# Patient Record
Sex: FEMALE | Race: BLACK OR AFRICAN AMERICAN | NOT HISPANIC OR LATINO | Employment: FULL TIME | URBAN - METROPOLITAN AREA
[De-identification: names, ages, dates, MRNs, and addresses within clinical notes are randomized per-mention and may not be internally consistent; named-entity substitution may affect disease eponyms.]

---

## 2017-01-09 RX ORDER — METFORMIN HYDROCHLORIDE 500 MG/1
TABLET ORAL
Qty: 60 TABLET | Refills: 0 | Status: SHIPPED | OUTPATIENT
Start: 2017-01-09 | End: 2017-03-09 | Stop reason: SDUPTHER

## 2017-02-26 ENCOUNTER — HOSPITAL ENCOUNTER (INPATIENT)
Facility: HOSPITAL | Age: 52
LOS: 4 days | Discharge: HOME | DRG: 023 | End: 2017-03-02
Attending: EMERGENCY MEDICINE | Admitting: PSYCHIATRY & NEUROLOGY
Payer: COMMERCIAL

## 2017-02-26 DIAGNOSIS — I63.031 CEREBRAL INFARCTION DUE TO THROMBOSIS OF RIGHT CAROTID ARTERY: Primary | ICD-10-CM

## 2017-02-26 DIAGNOSIS — I77.3 FIBROMUSCULAR DYSPLASIA OF CERVICOCRANIAL ARTERY: ICD-10-CM

## 2017-02-26 DIAGNOSIS — I63.9 CVA (CEREBRAL VASCULAR ACCIDENT): ICD-10-CM

## 2017-02-26 PROBLEM — I77.71 INTERNAL CAROTID ARTERY DISSECTION: Status: ACTIVE | Noted: 2017-02-26

## 2017-02-26 PROBLEM — G81.04 FLACCID HEMIPLEGIA AFFECTING LEFT NONDOMINANT SIDE: Status: ACTIVE | Noted: 2017-02-26

## 2017-02-26 PROBLEM — I63.411 EMBOLIC STROKE INVOLVING RIGHT MIDDLE CEREBRAL ARTERY: Status: ACTIVE | Noted: 2017-02-26

## 2017-02-26 LAB
ABO + RH BLD: NORMAL
ALBUMIN SERPL BCP-MCNC: 3.5 G/DL
ALP SERPL-CCNC: 63 U/L
ALT SERPL W/O P-5'-P-CCNC: 9 U/L
ANION GAP SERPL CALC-SCNC: 11 MMOL/L
AST SERPL-CCNC: 19 U/L
BASOPHILS # BLD AUTO: 0.05 K/UL
BASOPHILS NFR BLD: 0.4 %
BILIRUB SERPL-MCNC: 0.3 MG/DL
BILIRUB UR QL STRIP: NEGATIVE
BLD GP AB SCN CELLS X3 SERPL QL: NORMAL
BUN SERPL-MCNC: 15 MG/DL
CALCIUM SERPL-MCNC: 8.7 MG/DL
CHLORIDE SERPL-SCNC: 106 MMOL/L
CHOLEST/HDLC SERPL: 2 {RATIO}
CLARITY UR REFRACT.AUTO: CLEAR
CO2 SERPL-SCNC: 21 MMOL/L
COLOR UR AUTO: ABNORMAL
CREAT SERPL-MCNC: 1.1 MG/DL (ref 0.5–1.4)
CREAT SERPL-MCNC: 1.2 MG/DL
DIFFERENTIAL METHOD: ABNORMAL
EOSINOPHIL # BLD AUTO: 0.2 K/UL
EOSINOPHIL NFR BLD: 1.7 %
ERYTHROCYTE [DISTWIDTH] IN BLOOD BY AUTOMATED COUNT: 20.2 %
EST. GFR  (AFRICAN AMERICAN): >60 ML/MIN/1.73 M^2
EST. GFR  (NON AFRICAN AMERICAN): 52.8 ML/MIN/1.73 M^2
GLUCOSE SERPL-MCNC: 151 MG/DL
GLUCOSE UR QL STRIP: NEGATIVE
HCT VFR BLD AUTO: 24.9 %
HCT VFR BLD AUTO: 25.8 %
HDL/CHOLESTEROL RATIO: 50.3 %
HDLC SERPL-MCNC: 155 MG/DL
HDLC SERPL-MCNC: 78 MG/DL
HGB BLD-MCNC: 7 G/DL
HGB BLD-MCNC: 7.4 G/DL
HGB UR QL STRIP: NEGATIVE
INR PPP: 1
KETONES UR QL STRIP: ABNORMAL
LDLC SERPL CALC-MCNC: 60 MG/DL
LEUKOCYTE ESTERASE UR QL STRIP: NEGATIVE
LYMPHOCYTES # BLD AUTO: 1.2 K/UL
LYMPHOCYTES NFR BLD: 8.8 %
MCH RBC QN AUTO: 20.8 PG
MCHC RBC AUTO-ENTMCNC: 28.7 %
MCV RBC AUTO: 73 FL
MONOCYTES # BLD AUTO: 0.7 K/UL
MONOCYTES NFR BLD: 5.5 %
NEUTROPHILS # BLD AUTO: 11.3 K/UL
NEUTROPHILS NFR BLD: 83.3 %
NITRITE UR QL STRIP: NEGATIVE
NONHDLC SERPL-MCNC: 77 MG/DL
PH UR STRIP: 6 [PH] (ref 5–8)
PLATELET # BLD AUTO: 706 K/UL
PMV BLD AUTO: 10.2 FL
POC PTINR: 1 (ref 0.9–1.2)
POC PTWBT: 12.5 SEC (ref 9.7–14.3)
POCT GLUCOSE: 172 MG/DL (ref 70–110)
POCT GLUCOSE: 93 MG/DL (ref 70–110)
POTASSIUM SERPL-SCNC: 3.8 MMOL/L
PROT SERPL-MCNC: 8 G/DL
PROT UR QL STRIP: NEGATIVE
PROTHROMBIN TIME: 10.7 SEC
RBC # BLD AUTO: 3.56 M/UL
SAMPLE: NORMAL
SAMPLE: NORMAL
SODIUM SERPL-SCNC: 138 MMOL/L
SP GR UR STRIP: 1.02 (ref 1–1.03)
TRIGL SERPL-MCNC: 85 MG/DL
TSH SERPL DL<=0.005 MIU/L-ACNC: 1.15 UIU/ML
URN SPEC COLLECT METH UR: ABNORMAL
UROBILINOGEN UR STRIP-ACNC: NEGATIVE EU/DL
WBC # BLD AUTO: 13.57 K/UL

## 2017-02-26 PROCEDURE — 25000003 PHARM REV CODE 250: Performed by: STUDENT IN AN ORGANIZED HEALTH CARE EDUCATION/TRAINING PROGRAM

## 2017-02-26 PROCEDURE — 96374 THER/PROPH/DIAG INJ IV PUSH: CPT

## 2017-02-26 PROCEDURE — 93010 ELECTROCARDIOGRAM REPORT: CPT | Mod: 77,,, | Performed by: INTERNAL MEDICINE

## 2017-02-26 PROCEDURE — 81003 URINALYSIS AUTO W/O SCOPE: CPT

## 2017-02-26 PROCEDURE — 85610 PROTHROMBIN TIME: CPT

## 2017-02-26 PROCEDURE — 20000000 HC ICU ROOM

## 2017-02-26 PROCEDURE — 80061 LIPID PANEL: CPT

## 2017-02-26 PROCEDURE — 85014 HEMATOCRIT: CPT

## 2017-02-26 PROCEDURE — 83036 HEMOGLOBIN GLYCOSYLATED A1C: CPT

## 2017-02-26 PROCEDURE — 03CK3ZZ EXTIRPATION OF MATTER FROM RIGHT INTERNAL CAROTID ARTERY, PERCUTANEOUS APPROACH: ICD-10-PCS | Performed by: RADIOLOGY

## 2017-02-26 PROCEDURE — 25500020 PHARM REV CODE 255: Performed by: EMERGENCY MEDICINE

## 2017-02-26 PROCEDURE — 93005 ELECTROCARDIOGRAM TRACING: CPT

## 2017-02-26 PROCEDURE — 86850 RBC ANTIBODY SCREEN: CPT

## 2017-02-26 PROCEDURE — 63600175 PHARM REV CODE 636 W HCPCS: Performed by: RADIOLOGY

## 2017-02-26 PROCEDURE — 86920 COMPATIBILITY TEST SPIN: CPT

## 2017-02-26 PROCEDURE — 85018 HEMOGLOBIN: CPT

## 2017-02-26 PROCEDURE — 99285 EMERGENCY DEPT VISIT HI MDM: CPT

## 2017-02-26 PROCEDURE — 86900 BLOOD TYPING SEROLOGIC ABO: CPT

## 2017-02-26 PROCEDURE — 99291 CRITICAL CARE FIRST HOUR: CPT | Mod: ,,, | Performed by: EMERGENCY MEDICINE

## 2017-02-26 PROCEDURE — 84443 ASSAY THYROID STIM HORMONE: CPT

## 2017-02-26 PROCEDURE — 27000221 HC OXYGEN, UP TO 24 HOURS

## 2017-02-26 PROCEDURE — 82565 ASSAY OF CREATININE: CPT

## 2017-02-26 PROCEDURE — 99223 1ST HOSP IP/OBS HIGH 75: CPT | Mod: ,,, | Performed by: PSYCHIATRY & NEUROLOGY

## 2017-02-26 PROCEDURE — 80053 COMPREHEN METABOLIC PANEL: CPT

## 2017-02-26 PROCEDURE — 85025 COMPLETE CBC W/AUTO DIFF WBC: CPT

## 2017-02-26 PROCEDURE — 93010 ELECTROCARDIOGRAM REPORT: CPT | Mod: ,,, | Performed by: INTERNAL MEDICINE

## 2017-02-26 PROCEDURE — 99900035 HC TECH TIME PER 15 MIN (STAT)

## 2017-02-26 PROCEDURE — 82962 GLUCOSE BLOOD TEST: CPT

## 2017-02-26 RX ORDER — LANOLIN ALCOHOL/MO/W.PET/CERES
1000 CREAM (GRAM) TOPICAL DAILY
Status: DISCONTINUED | OUTPATIENT
Start: 2017-02-27 | End: 2017-02-27

## 2017-02-26 RX ORDER — SENNOSIDES 8.6 MG/1
8.6 TABLET ORAL DAILY PRN
Status: DISCONTINUED | OUTPATIENT
Start: 2017-02-26 | End: 2017-02-28

## 2017-02-26 RX ORDER — IBUPROFEN 200 MG
16 TABLET ORAL
Status: DISCONTINUED | OUTPATIENT
Start: 2017-02-26 | End: 2017-03-02 | Stop reason: HOSPADM

## 2017-02-26 RX ORDER — POTASSIUM CHLORIDE 7.45 MG/ML
60 INJECTION INTRAVENOUS
Status: DISCONTINUED | OUTPATIENT
Start: 2017-02-26 | End: 2017-02-28

## 2017-02-26 RX ORDER — IODIXANOL 320 MG/ML
150 INJECTION, SOLUTION INTRAVASCULAR
Status: COMPLETED | OUTPATIENT
Start: 2017-02-26 | End: 2017-02-26

## 2017-02-26 RX ORDER — GLUCAGON 1 MG
1 KIT INJECTION
Status: DISCONTINUED | OUTPATIENT
Start: 2017-02-26 | End: 2017-03-02 | Stop reason: HOSPADM

## 2017-02-26 RX ORDER — MAGNESIUM SULFATE HEPTAHYDRATE 40 MG/ML
4 INJECTION, SOLUTION INTRAVENOUS
Status: DISCONTINUED | OUTPATIENT
Start: 2017-02-26 | End: 2017-02-28

## 2017-02-26 RX ORDER — POLYETHYLENE GLYCOL 3350 17 G/17G
17 POWDER, FOR SOLUTION ORAL DAILY
Status: DISCONTINUED | OUTPATIENT
Start: 2017-02-26 | End: 2017-03-02 | Stop reason: HOSPADM

## 2017-02-26 RX ORDER — POTASSIUM CHLORIDE 7.45 MG/ML
40 INJECTION INTRAVENOUS
Status: DISCONTINUED | OUTPATIENT
Start: 2017-02-26 | End: 2017-02-28

## 2017-02-26 RX ORDER — POTASSIUM CHLORIDE 7.45 MG/ML
80 INJECTION INTRAVENOUS
Status: DISCONTINUED | OUTPATIENT
Start: 2017-02-26 | End: 2017-02-28

## 2017-02-26 RX ORDER — SODIUM CHLORIDE 0.9 % (FLUSH) 0.9 %
3 SYRINGE (ML) INJECTION EVERY 8 HOURS
Status: DISCONTINUED | OUTPATIENT
Start: 2017-02-26 | End: 2017-03-02 | Stop reason: HOSPADM

## 2017-02-26 RX ORDER — MIDAZOLAM HYDROCHLORIDE 1 MG/ML
INJECTION, SOLUTION INTRAMUSCULAR; INTRAVENOUS CODE/TRAUMA/SEDATION MEDICATION
Status: DISCONTINUED | OUTPATIENT
Start: 2017-02-26 | End: 2017-02-28

## 2017-02-26 RX ORDER — SODIUM CHLORIDE 9 MG/ML
INJECTION, SOLUTION INTRAVENOUS CONTINUOUS
Status: DISCONTINUED | OUTPATIENT
Start: 2017-02-26 | End: 2017-03-01

## 2017-02-26 RX ORDER — MAGNESIUM SULFATE HEPTAHYDRATE 40 MG/ML
2 INJECTION, SOLUTION INTRAVENOUS
Status: DISCONTINUED | OUTPATIENT
Start: 2017-02-26 | End: 2017-02-28

## 2017-02-26 RX ORDER — IBUPROFEN 200 MG
24 TABLET ORAL
Status: DISCONTINUED | OUTPATIENT
Start: 2017-02-26 | End: 2017-03-02 | Stop reason: HOSPADM

## 2017-02-26 RX ORDER — LABETALOL HYDROCHLORIDE 5 MG/ML
10 INJECTION, SOLUTION INTRAVENOUS EVERY 4 HOURS PRN
Status: DISCONTINUED | OUTPATIENT
Start: 2017-02-26 | End: 2017-03-02 | Stop reason: HOSPADM

## 2017-02-26 RX ORDER — ALPRAZOLAM 0.5 MG/1
0.5 TABLET ORAL ONCE
Status: COMPLETED | OUTPATIENT
Start: 2017-02-26 | End: 2017-02-26

## 2017-02-26 RX ORDER — INSULIN ASPART 100 [IU]/ML
1-10 INJECTION, SOLUTION INTRAVENOUS; SUBCUTANEOUS
Status: DISCONTINUED | OUTPATIENT
Start: 2017-02-26 | End: 2017-02-28

## 2017-02-26 RX ORDER — IPRATROPIUM BROMIDE AND ALBUTEROL SULFATE 2.5; .5 MG/3ML; MG/3ML
3 SOLUTION RESPIRATORY (INHALATION) EVERY 4 HOURS PRN
Status: DISCONTINUED | OUTPATIENT
Start: 2017-02-26 | End: 2017-03-02 | Stop reason: HOSPADM

## 2017-02-26 RX ADMIN — SODIUM CHLORIDE, PRESERVATIVE FREE 3 ML: 5 INJECTION INTRAVENOUS at 02:02

## 2017-02-26 RX ADMIN — SODIUM CHLORIDE: 0.9 INJECTION, SOLUTION INTRAVENOUS at 02:02

## 2017-02-26 RX ADMIN — SODIUM CHLORIDE 500 ML: 0.9 INJECTION, SOLUTION INTRAVENOUS at 01:02

## 2017-02-26 RX ADMIN — IODIXANOL 55 ML: 320 INJECTION, SOLUTION INTRAVASCULAR at 09:02

## 2017-02-26 RX ADMIN — IOHEXOL 100 ML: 350 INJECTION, SOLUTION INTRAVENOUS at 08:02

## 2017-02-26 RX ADMIN — ALPRAZOLAM 0.5 MG: 0.5 TABLET ORAL at 03:02

## 2017-02-26 RX ADMIN — MIDAZOLAM HYDROCHLORIDE 2 MG: 1 INJECTION, SOLUTION INTRAMUSCULAR; INTRAVENOUS at 08:02

## 2017-02-26 NOTE — H&P
History & Physical  Neurocritical Care    Admit Date: 2/26/2017  LOS: 0    Code Status: Full Code     CC: Cerebral infarction due to thrombosis of right carotid artery    SUBJECTIVE:     History of Present Illness: Patient is a 51 year old female with PMHx DVT & PE x2 (first in 1991 thought to be due to OCP use) s/p greenfiled filter placement in 1990s with her most recent DVT in June 2016 hospitalized @ [facility], asthma, HTN, B12 deficiency with anemia, DM and daily alcohol use admitted to NCC unit after R ICA thrombectomy. Patient also had distal M3 branch occlusion that was too distal for intervention.     Patient was in usual state of health when she woke up around 6 AM short of breath. Patient then fell out of her bed on her right side and could not get up due to left sided weakness. Patient was found by her aunt and came to hospital for further care. In ED Vascular Neurology declined IV TPA due to patient being compliant on Xarelto. Patient was taken directly to angiography where R ICA occlusive thrombus seen and thrombectoly was successfully performed. Subsequent R distal M3 branch occlusion was too distal for intervention. Patient admitted to critical care unit for further management. Patient only endorsing SOB following intervention. Continues with left arm weakness.         Past Medical History:   Diagnosis Date    Asthma     Dyspnea on exertion     Hx of long term use of blood thinners     Hypertension 3/8/2016    PE (pulmonary embolism)     Hx of multiple DVT/PE    Presence of IVC filter     Scleroderma     Type 2 diabetes mellitus without complication      Past Surgical History:   Procedure Laterality Date    DANETTE FILTER PLACEMENT       No current facility-administered medications on file prior to encounter.      Current Outpatient Prescriptions on File Prior to Encounter   Medication Sig Dispense Refill    albuterol (PROVENTIL) 2.5 mg /3 mL (0.083 %) nebulizer solution Take 3 mLs (2.5 mg  total) by nebulization every 6 (six) hours as needed for Wheezing. 180 mL 3    albuterol 90 mcg/actuation inhaler Inhale 1-2 puffs into the lungs every 6 (six) hours as needed for Wheezing. 1 Inhaler 0    cyanocobalamin (VITAMIN B-12) 1000 MCG tablet Take 100 mcg by mouth once daily.      fluticasone-salmeterol 250-50 mcg/dose (ADVAIR) 250-50 mcg/dose diskus inhaler Inhale 1 puff into the lungs 2 (two) times daily. 60 each 3    metformin (GLUCOPHAGE) 500 MG tablet TAKE ONE TABLET BY MOUTH TWICE DAILY WITH MEALS 60 tablet 0    rivaroxaban (XARELTO) 20 mg Tab Take 1 tablet (20 mg total) by mouth daily with dinner or evening meal. 30 tablet 11    triamterene-hydrochlorothiazide 37.5-25 mg (MAXZIDE-25) 37.5-25 mg per tablet Take 1 tablet by mouth once daily. 90 tablet 4    lisinopril (PRINIVIL,ZESTRIL) 20 MG tablet TAKE ONE TABLET BY MOUTH ONCE DAILY 30 tablet 0     Review of patient's allergies indicates:   Allergen Reactions    Plasma, human, normal      Family History   Problem Relation Age of Onset    Breast cancer Mother     Hypertension Father     No Known Problems Brother     No Known Problems Maternal Grandmother     No Known Problems Maternal Grandfather     No Known Problems Paternal Grandmother     No Known Problems Paternal Grandfather     Diabetes Mellitus Maternal Uncle     No Known Problems Sister     No Known Problems Maternal Aunt     No Known Problems Paternal Aunt     No Known Problems Paternal Uncle     Psoriasis Neg Hx     Rheum arthritis Neg Hx     Thyroid disease Neg Hx     Lupus Neg Hx     Anemia Neg Hx     Arrhythmia Neg Hx     Asthma Neg Hx     Clotting disorder Neg Hx     Fainting Neg Hx     Heart attack Neg Hx     Heart disease Neg Hx     Heart failure Neg Hx     Hyperlipidemia Neg Hx     Stroke Neg Hx     Atrial Septal Defect Neg Hx      Social History   Substance Use Topics    Smoking status: Never Smoker    Smokeless tobacco: None    Alcohol use 1.8  oz/week     3 Cans of beer, 0 Standard drinks or equivalent per week      Comment: occ      Review of Symptoms:  Constitutional: Denies fevers, weight loss, chills, or weakness.  Eyes: Denies changes in vision.  ENT: Denies dysphagia, nasal discharge, ear pain or discharge.  Cardiovascular: Denies chest pain, palpitations, orthopnea, or claudication.  Respiratory: +shortness of breath, denies, cough, hemoptysis, or wheezing.  GI: Denies nausea/vomitting, hematochezia, melena, abd pain, or changes in appetite.  : Denies dysuria, incontinence, or hematuria.  Musculoskeletal: Denies joint pain or myalgias.  Skin/breast: Denies rashes, lumps, lesions, or discharge.  Neurologic: Denies headache, dizziness, vertigo, or paresthesias.  Psychiatric: Denies changes in mood or hallucinations.  Endocrine: Denies polyuria, polydipsia, heat/cold intolerance.  Hematologic/Lymph: Denies lymphadenopathy, easy bruising or easy bleeding.  Allergic/Immunologic: Denies rash, rhinitis.     OBJECTIVE:   Vital Signs (Most Recent):   Temp: 99 °F (37.2 °C) (02/26/17 1500)  Pulse: 84 (02/26/17 1600)  Resp: (!) 22 (02/26/17 1600)  BP: (!) 155/81 (02/26/17 1600)  SpO2: 100 % (02/26/17 1600)    Vital Signs (24h Range):   Temp:  [98.1 °F (36.7 °C)-99 °F (37.2 °C)] 99 °F (37.2 °C)  Pulse:  [] 84  Resp:  [15-36] 22  SpO2:  [98 %-100 %] 100 %  BP: (138-210)/() 155/81    ICP/CPP (Last 24h):        I & O (Last 24h):    Intake/Output Summary (Last 24 hours) at 02/26/17 1644  Last data filed at 02/26/17 1600   Gross per 24 hour   Intake           621.25 ml   Output                0 ml   Net           621.25 ml     Physical Exam:  GA: alert, no acute distress.   HEENT: No scleral icterus or JVD. (+swelling over left cheek, orbit and upper lip)  Pulmonary: decreased at bases but CTAB  Cardiac: tachycardic, S1 & S2 w/o rubs/murmurs/gallops.   Abdominal: Bowel sounds present x 4. No appreciable hepatosplenomegaly.  Skin: No jaundice, rashes,  or visible lesions.  Neuro:  --GCS: E4 V5 M6  --Mental Status: AAOx3, no dysarthria or aphasia  --CN II-XII grossly intact (noted left sided facial droop)   --Pupils 4mm, PERRL.   --Corneal reflex, gag, cough intact.  --LUE strength: 2/5  --RUE strength: 5/5  --LLE strength: 4-/5  --RLE strength: 5/5    Vent Data:        Lines/Drains/Airway:          Nutrition/Tube Feeds:   Current Diet Order   Procedures    Diet NPO     No food intake until passes RACHNA or evaluated by speech.       Labs:  ABG: No results for input(s): PH, PO2, PCO2, HCO3, POCSATURATED, BE in the last 24 hours.  BMP:  Recent Labs  Lab 02/26/17  0740      K 3.8      CO2 21*   BUN 15   CREATININE 1.2   *     LFT: Lab Results   Component Value Date    AST 19 02/26/2017    ALT 9 (L) 02/26/2017    ALKPHOS 63 02/26/2017    BILITOT 0.3 02/26/2017    ALBUMIN 3.5 02/26/2017    PROT 8.0 02/26/2017     CBC:   Lab Results   Component Value Date    WBC 13.57 (H) 02/26/2017    HGB 7.4 (L) 02/26/2017    HCT 25.8 (L) 02/26/2017    MCV 73 (L) 02/26/2017     (H) 02/26/2017     Microbiology x 7d:   Microbiology Results (last 7 days)     ** No results found for the last 168 hours. **        Imaging:  Imaging Results         IR Angiogram Carotid Internal Inc Arch and Cerebral Unilateral (Final result) Result time:  02/26/17 11:38:13    Final result by Van Coombs MD (02/26/17 11:38:13)    Impression:      1. Right internal carotid artery origin occlusion due to thrombus formation at the tortuous proximal ICA segment of fibromuscular disease.    2. Successful right ICA extracranial thrombectomy with restoration of antegrade flow into the right hemisphere.  There is a residual right M3 branch occlusion that was not further treated.  This is TICI 2B reperfusion.            Electronically signed by: VAN COOMBS MD  Date:     02/26/17  Time:    11:38     Narrative:    EXAM:   Cerebral angiogram.Mechanical thrombectomy of the right internal  carotid artery    CPT CODES:  34931,     CLINICAL HISTORY:  Patient is a 51-year-old female with right MCA stroke syndrome consisting of left-sided neglect and inability to move the left arm or leg , referred for cerebral angiography and possible intervention by Dr. Fritz of vascular neurology.    CLINICAL INDICATION:  To better delineate the anatomy in a high resolution study, in order to optimize medical decision making and management.  To treat the patient's stroke.    PROCEDURE COMMENT:   Informed consent was obtained from the the family and patient prior to the examination. A timeout was performed.      Sedation: Light sedation was given with Versed only.    The right groin was prepared using standard sterile technique and a right common femoral artery puncture was performed with a micropuncture needle. A 8F sheath was placed and connected to saline flush.  Through this sheath a 5F Wallace catheter was advanced over a wire and used to perform selective angiography of the following vessels: Right common and internal carotid arteries.    FINDINGS:    The right common carotid artery was injected demonstrating complete occlusion of the right internal carotid artery at the origin in the proximal bulb.  The Wallace catheter was advanced into the internal carotid artery and another injection outlined considerable amount of clot in the bulb and just above the bulb extending into a loop of the ICA with a beaded appearance consistent with fibromuscular disease.  There was minimal slow filling of the ICA above this abnormal segment.    INTERVENTION:  This was discussed with vascular neurology attending Dr. Fritz.  The Wallace catheter was exchanged over a guidewire for an Infinity guide catheter.  This was advanced into the distal bulb and suction thrombectomy was performed removing multiple soft blood clots.  Followup angiography demonstrated that all of the clots were removed, and there was underlying severe tortuous  fibromuscular disease.  There was no good intracranial flow through the extracranial and intracranial ICA without additional thrombus.  The right anterior cerebral artery filled normally.  Most of the MCA branches filled, but there was a right posterior division M3 branches occlusion which had retrograde filling via leptomeningeal collaterals.  Because of the severe disease at the proximal extracranial ICA, no additional intracranial thrombectomy attempts were made for this M3 occlusion.  Findings were discussed with Dr. Fritz who agreed with the plan.  The catheter and sheath were removed. There were no complications.    Hemostasis was obtained in the right groin using the Angio-Seal device.  The total contrast dose for the procedure was: 55 cc of Visipaque 320.  The total radiation dose was approximately: 5124 cGycm2.  Physicians in the procedure:  Dr. Christian Riley, Dr. Lopez.  Images and report were permanently recorded.            X-Ray Chest AP Portable (Final result) Result time:  02/26/17 08:33:58    Final result by Keily Mauro MD (02/26/17 08:33:58)    Impression:    No detrimental change.      Electronically signed by: KEILY MAURO MD  Date:     02/26/17  Time:    08:33     Narrative:    EXAM:  AP CHEST RADIOGRAPH.     CLINICAL INDICATION:  Stroke.    TECHNIQUE: Single AP view of the chest was obtained.     COMPARISON: Prior from 8/7/16    FINDINGS: The mediastinal structures are midline.  The cardiac silhouette is prominent but difficult to evaluate due to AP technique.  There is bandlike opacity in the right mid and lower lung zones which is favored to reflect scarring.  No new areas of consolidation seen.  No osseous abnormalities are identified.            CTA STROKE MULTI-PHASE (Final result) Result time:  02/26/17 11:25:24    Final result by Austyn Bhakta MD (02/26/17 11:25:24)    Impression:        Contrast extravasation as detailed above.  ______________________________________      Electronically signed by resident: TERI MATUTE MD  Date:     02/26/17  Time:    10:54            As the supervising and teaching physician, I personally reviewed the images and resident's interpretation and I agree with the findings.          Electronically signed by: HIPOLITO MAURO MD  Date:     02/26/17  Time:    11:25     Narrative:    CTA stroke multiphase    Technique: Initial  images were obtained.  Upon contrast injection there was extravasation into the antecubital fossa and lateral soft tissues of the upper arm.  The exam was terminated at this time.  Further  images were obtained of the arm at site of extravasation.    Findings:     images reveal no acute finding.  There is elevation of the right hemidiaphragm.    Contrast material is visualized in the antecubital fossa and lateral soft tissues of the upper arm.  Approximately 73 mL reported to be extravasated by the technologist.  Upon physical examination there was a palpable collection measuring approximately 6.5 x 5.0 cm.  Patient reported only minimal pain on palpation.  No sensation loss or weakness of the right upper extremity.  Radial pulse 2+.            CT Head Without Contrast (Final result) Result time:  02/26/17 07:35:25    Final result by Afshin Chairez MD (02/26/17 07:35:25)    Impression:        No acute intracranial abnormalities.  Specifically, no intracranial hemorrhage.    Preseptal soft tissue swelling with a small hematoma overlying the zygoma.        Electronically signed by: LEIGHTON AGUIRRE MD  Date:     02/26/17  Time:    07:35     Narrative:    Technique: 5-mm axial images were obtained through the head without the use of IV contrast. Coronal and sagittal reformats were performed.    Comparison: None.    Findings:     No large major vascular distribution infarct.  No intra-or extra-axial hemorrhage.  Ventricular system is normal in size and configuration without evidence for hydrocephalus.  No midline shift or  mass effect.    Paranasal sinuses and mastoid air cells are clear.  Calvarium is unremarkable.  Sellar region is normal.  There is left preseptal soft tissue swelling with a small hematoma overlying the zygoma.            CT Cervical Spine Without Contrast (Final result) Result time:  02/26/17 08:05:20    Final result by Afshin Chairez MD (02/26/17 08:05:20)    Impression:        Mild subluxation of C1 relative to C2, possibly positional in nature.  Given patient's reported history of trauma, atlantoaxial rotatory displacement is possible and cannot be entirely excluded.  Consider repeat examination would patient's head straightened.  Atlantodens interval is normal.  Atlantooccipital alignment is normal.      Electronically signed by: LEIGHTON AUGIRRE MD  Date:     02/26/17  Time:    08:05     Narrative:    Technique: 2 mm axial images were obtained through the neck without the use of contrast.  Coronal and sagittal reformats were performed.    Comparison: None    Findings:     There is mild subluxation of C1 relative to C2 with associated widening of the atlantodens interval.  Atlantooccipital alignment is normal.    Odontoid process is intact.  Vertebral bodies are normal without evidence for fracture.  No lytic or blastic lesions.  Facet joints below the C2 level are well aligned.  Posterior abdomen demonstrate no evidence of fracture.    No traumatic intervertebral disc herniation by CT criteria.    Lung apices are clear.  Prevertebral soft tissues are unremarkable.  No cervical lymph enlargement.                ASSESSMENT/PLAN:     Patient Active Problem List    Diagnosis Date Noted    Internal carotid artery dissection 02/26/2017    Cerebral infarction due to thrombosis of right carotid artery 02/26/2017    Flaccid hemiplegia affecting left nondominant side 02/26/2017    Cough     Pulmonary embolism without acute cor pulmonale 08/05/2016    Asthma     Type 2 diabetes mellitus without complication      Pulmonary embolism 03/08/2016    Essential hypertension 03/08/2016    Acute deep vein thrombosis of left lower extremity, unspecified vein 03/08/2016    Other pulmonary embolism without acute cor pulmonale, unspecified chronicity 03/08/2016    Scleroderma 03/08/2016    Long-term (current) use of anticoagulants 03/08/2016     Neuro:   CVA  - Patient with thrombosis in R ICA and distal M3 segment  - Vascular Neurology following  - Repeat CT head this afternoon and if stable can re-start patient's Xarelto.   - ST/PT/OT   - Carotid US ordered per vascular neurology  - 2D echo ordered   - SBP goal <180   - PRN Labetalol, can restart home antihypertensives in AM likely  - MRI brain ordered in AM  - NPO until passes RACHNA or seen by ST, follow up recommendations  - Consider hypercoagulable work up as an outpatient, review of chart showed negative CCP and cardiolipin Ab on 3/9/2016    History of Alcohol Use  - Patient reports that she drinks 3 16 oz beers per day.   - Tachycardic on admission, improved with xanax dose  - Monitor for alcohol withdrawl    Pulmonary:   History of Asthma  - Duo-neb PRN ordered  - CXR WNL essentially and satting 100%    History of PE  - Will restart Xarelto in ICU today if CT head WNL  - Continue to follow with vascular neurology thoughts on other NOACs    Cardiac:   - SBP <180  - Labetalol ordered  - 2D echo ordered  - Carotid US ordered  - Tachycardia on initial presentation, given NS bolus and xanax dose and HR improved  - EKG ordered    Renal:    - Monitor I & O's, UOP  - UA showed 1+ ketones otherwise WNL      ID:   - afebrile  - minor elevated white count 13.6    Hem/Onc:   B12 Anemia  - h/h 7.4/25.8  - platelets 706  - Will recheck h/h tonight  - Oral home b12 continued  - Ordered B12 level    Endocrine:    Hx DM  - TSH WNL  - HbA1c 6.8  - LDL 60  - SSI ordered    Fluids/Electrolytes/Nutrition/GI:   NPO then can advance to diabetic diet  NS @ 75 ml/hr  ADAT  SSI    Case discussed with  Dr. Tracy Amin MD  Neuro Critical Care PGY-3

## 2017-02-26 NOTE — ED PROVIDER NOTES
Encounter Date: 2/26/2017    SCRIBE #1 NOTE: I, Sonja Ba , ramya scribing for, and in the presence of, Dr. Fontana .       History     Chief Complaint   Patient presents with    Extremity Weakness     Complains of L sided weakness and shortness of breath that began at 0630 after waking up. Last seen normal at 2200 last night.      Review of patient's allergies indicates:   Allergen Reactions    Plasma, human, normal      HPI Comments: Time seen by provider: 6:51 AM    This is a 51 y.o. female who presents with complaint of extremity weakness. Complains of left sided weakness and shortness of breath that began at 6:30 AM after waking up. Patient went to bed at 10:00 PM last night and slept normally. She woke up with SOB and went to get her breathing machine and then fell because of left sided weakness.     7:44 AM  After discussing in more detail with patient she states she woke up at approximately 4am and was able to walk to use the bathroom and then back to bed. She then tried to get up out og bed approximately half hour later and she fell because she was weak on the left side. Family heard her fall and tried to help her but then they ultimately called EMS.  Pt does c/o shortness of breath. Pt denies having headache, vision changes, and chest pain.     The history is provided by the EMS personnel, the patient, medical records and a relative.     Past Medical History:   Diagnosis Date    Asthma     Dyspnea on exertion     Hx of long term use of blood thinners     Hypertension 3/8/2016    PE (pulmonary embolism)     Hx of multiple DVT/PE    Presence of IVC filter     Scleroderma     Type 2 diabetes mellitus without complication      Past Surgical History:   Procedure Laterality Date    DANETTE FILTER PLACEMENT       Family History   Problem Relation Age of Onset    Breast cancer Mother     Hypertension Father     No Known Problems Brother     No Known Problems Maternal Grandmother     No Known  Problems Maternal Grandfather     No Known Problems Paternal Grandmother     No Known Problems Paternal Grandfather     Diabetes Mellitus Maternal Uncle     No Known Problems Sister     No Known Problems Maternal Aunt     No Known Problems Paternal Aunt     No Known Problems Paternal Uncle     Psoriasis Neg Hx     Rheum arthritis Neg Hx     Thyroid disease Neg Hx     Lupus Neg Hx     Anemia Neg Hx     Arrhythmia Neg Hx     Asthma Neg Hx     Clotting disorder Neg Hx     Fainting Neg Hx     Heart attack Neg Hx     Heart disease Neg Hx     Heart failure Neg Hx     Hyperlipidemia Neg Hx     Stroke Neg Hx     Atrial Septal Defect Neg Hx      Social History   Substance Use Topics    Smoking status: Never Smoker    Smokeless tobacco: Not on file    Alcohol use 1.8 oz/week     3 Cans of beer, 0 Standard drinks or equivalent per week      Comment: occ     Review of Systems   Constitutional: Negative for fever.   HENT: Positive for facial swelling.    Eyes: Negative for visual disturbance.   Respiratory: Positive for shortness of breath. Negative for choking and chest tightness.    Cardiovascular: Negative for palpitations and leg swelling.   Gastrointestinal: Negative for abdominal pain.   Genitourinary: Negative for dysuria.   Musculoskeletal: Negative for back pain.   Skin: Negative for rash.   Neurological: Positive for weakness. Negative for dizziness and headaches.   Psychiatric/Behavioral: Negative for hallucinations.       Physical Exam   Initial Vitals   BP Pulse Resp Temp SpO2   02/26/17 0655 02/26/17 0655 02/26/17 0655 02/26/17 0655 02/26/17 0655   169/100 115 20 98.1 °F (36.7 °C) 100 %     Physical Exam    Nursing note and vitals reviewed.  Constitutional: She appears well-developed and well-nourished. She is not diaphoretic.   HENT:   Nose: Nose normal.   Lt april orbital and facial swelling  Lt inferior orbital- ecchymosis  Minimal blood lt upper lip, no laceration, no loose teeth      Eyes: EOM are normal.   Neck: No JVD present.   Cervical collar in place   Pulmonary/Chest: No respiratory distress.   Mild exp wheezing   Abdominal: Soft. Bowel sounds are normal. She exhibits no distension. There is no tenderness. There is no rebound.   Neurological: She is alert and oriented to person, place, and time.   Lt UE 4/5 and LE 3/5  Rt gaze preference   Skin: Skin is warm and dry.         ED Course   Critical Care  Date/Time: 2/26/2017 6:30 AM  Performed by: TIMOTHY STALEY  Authorized by: JULIETA CHAPPELL   Direct patient critical care time: 15 minutes  Additional history critical care time: 10 minutes  Ordering / reviewing critical care time: 10 minutes  Documentation critical care time: 5 minutes  Consulting other physicians critical care time: 5 minutes  Consult with family critical care time: 5 minutes  Total critical care time (exclusive of procedural time) : 50 minutes  Critical care was necessary to treat or prevent imminent or life-threatening deterioration of the following conditions: CNS failure or compromise.  Critical care was time spent personally by me on the following activities: discussions with consultants, examination of patient, ordering and review of laboratory studies and re-evaluation of patient's condition.        Labs Reviewed   CBC W/ AUTO DIFFERENTIAL - Abnormal; Notable for the following:        Result Value    WBC 13.57 (*)     RBC 3.56 (*)     Hemoglobin 7.4 (*)     Hematocrit 25.8 (*)     MCV 73 (*)     MCH 20.8 (*)     MCHC 28.7 (*)     RDW 20.2 (*)     Platelets 706 (*)     Gran # 11.3 (*)     Gran% 83.3 (*)     Lymph% 8.8 (*)     All other components within normal limits   COMPREHENSIVE METABOLIC PANEL - Abnormal; Notable for the following:     CO2 21 (*)     Glucose 151 (*)     ALT 9 (*)     eGFR if non  52.8 (*)     All other components within normal limits   LIPID PANEL - Abnormal; Notable for the following:     HDL 78 (*)     LDL Cholesterol  60.0 (*)     HDL/Chol Ratio 50.3 (*)     All other components within normal limits   PROTIME-INR   TSH   POCT GLUCOSE   TYPE & SCREEN   ISTAT CREATININE   ISTAT PROCEDURE     EKG Readings: (Independently Interpreted)   Sinus tachycardia of 124. No acute ischemic changes. No prior.        X-Rays:     Medical Decision Making:   Initial Assessment:   52 y/o F fall with Lt sided weakness and facial droop as well as facial trauma. Arrived with c-collar in place  Stroke called on arrival to ED for emergent eval Lt sided weakness will also eval trauma- intracranial hemorrhage, c-spine injury  Head CT, CT c-spine, poc INR and creatinine,CXR, ecg, routine blood work   Independently Interpreted Test(s):   I have ordered and independently interpreted X-rays - see prior notes.  I have ordered and independently interpreted EKG Reading(s) - see prior notes  Clinical Tests:   Lab Tests: Ordered and Reviewed  Radiological Study: Ordered and Reviewed  Medical Tests: Ordered and Reviewed            Scribe Attestation:   Scribe #1: I performed the above scribed service and the documentation accurately describes the services I performed. I attest to the accuracy of the note.    Attending Attestation:           Physician Attestation for Scribe:  Physician Attestation Statement for Scribe #1: I, Dr. Fontana, reviewed documentation, as scribed by Sonja Ba  in my presence, and it is both accurate and complete.         Attending ED Notes:   7:45 AM   CT cervical spine shows no evidence of fracture. Patient without neck pain. C collar removed.     10:45 AM  CTperfussion IV infilatrated. Neurovasc to take pt for intervention           ED Course     Clinical Impression:   The encounter diagnosis was CVA (cerebral vascular accident).    Disposition:   Disposition: Admitted  Condition: Serious       Jodee Fontana MD  02/27/17 0959

## 2017-02-26 NOTE — PLAN OF CARE
Problem: Patient Care Overview  Goal: Plan of Care Review  Outcome: Ongoing (interventions implemented as appropriate)  POC reviewed with pt and family at 1400. Pt verbalized understanding. Questions and concerns addressed. No acute events today. Pt progressing toward goals. Will continue to monitor. See flowsheets for full assessment and VS info.

## 2017-02-26 NOTE — SUBJECTIVE & OBJECTIVE
Symptom/Intervention Times  Last Known Normal Date:: 02/26/17 (02/26/17 1033)  Last Known Normal Time:: 0430 (02/26/17 1033)  Symptom Onset Date:: 02/26/17 (02/26/17 1033)  Symptom Onset Time:: 0430 (02/26/17 1033)  Stroke Team Called Date:: 02/26/17 (02/26/17 1033)  Stroke Team Called Time:: 0715 (02/26/17 1033)  Stroke Team Arrival Date:: 02/26/17 (02/26/17 1033)  Stroke Team Arrival Time:: 0720 (02/26/17 1033)  CT Interpretation Time:: 0722 (02/26/17 1033)    Past Medical History:   Diagnosis Date    Asthma     Dyspnea on exertion     Hx of long term use of blood thinners     Hypertension 3/8/2016    PE (pulmonary embolism)     Hx of multiple DVT/PE    Presence of IVC filter     Scleroderma     Type 2 diabetes mellitus without complication      Past Surgical History:   Procedure Laterality Date    DANETTE FILTER PLACEMENT       Family History   Problem Relation Age of Onset    Breast cancer Mother     Hypertension Father     No Known Problems Brother     No Known Problems Maternal Grandmother     No Known Problems Maternal Grandfather     No Known Problems Paternal Grandmother     No Known Problems Paternal Grandfather     Diabetes Mellitus Maternal Uncle     No Known Problems Sister     No Known Problems Maternal Aunt     No Known Problems Paternal Aunt     No Known Problems Paternal Uncle     Psoriasis Neg Hx     Rheum arthritis Neg Hx     Thyroid disease Neg Hx     Lupus Neg Hx     Anemia Neg Hx     Arrhythmia Neg Hx     Asthma Neg Hx     Clotting disorder Neg Hx     Fainting Neg Hx     Heart attack Neg Hx     Heart disease Neg Hx     Heart failure Neg Hx     Hyperlipidemia Neg Hx     Stroke Neg Hx     Atrial Septal Defect Neg Hx      Social History   Substance Use Topics    Smoking status: Never Smoker    Smokeless tobacco: Not on file    Alcohol use 1.8 oz/week     3 Cans of beer, 0 Standard drinks or equivalent per week      Comment: occ     Review of patient's  allergies indicates:   Allergen Reactions    Plasma, human, normal      Medications: I have reviewed the current medication administration record.    Prescriptions Prior to Admission   Medication Sig Dispense Refill Last Dose    albuterol (PROVENTIL) 2.5 mg /3 mL (0.083 %) nebulizer solution Take 3 mLs (2.5 mg total) by nebulization every 6 (six) hours as needed for Wheezing. 180 mL 3 Taking    albuterol 90 mcg/actuation inhaler Inhale 1-2 puffs into the lungs every 6 (six) hours as needed for Wheezing. 1 Inhaler 0 Taking    cyanocobalamin (VITAMIN B-12) 1000 MCG tablet Take 100 mcg by mouth once daily.   Taking    fluticasone-salmeterol 250-50 mcg/dose (ADVAIR) 250-50 mcg/dose diskus inhaler Inhale 1 puff into the lungs 2 (two) times daily. 60 each 3 Taking    metformin (GLUCOPHAGE) 500 MG tablet TAKE ONE TABLET BY MOUTH TWICE DAILY WITH MEALS 60 tablet 0     rivaroxaban (XARELTO) 20 mg Tab Take 1 tablet (20 mg total) by mouth daily with dinner or evening meal. 30 tablet 11 Taking    triamterene-hydrochlorothiazide 37.5-25 mg (MAXZIDE-25) 37.5-25 mg per tablet Take 1 tablet by mouth once daily. 90 tablet 4 Taking    lisinopril (PRINIVIL,ZESTRIL) 20 MG tablet TAKE ONE TABLET BY MOUTH ONCE DAILY 30 tablet 0        Review of Systems   Constitutional: Negative for activity change.   HENT: Negative for hearing loss.    Eyes: Negative for photophobia.   Respiratory: Negative for apnea.    Cardiovascular: Negative for chest pain.   Gastrointestinal: Negative for abdominal distention.   Endocrine: Negative for cold intolerance.   Genitourinary: Negative for difficulty urinating.   Musculoskeletal: Negative for arthralgias.   Skin: Negative for color change.   Neurological: Negative for dizziness.   Psychiatric/Behavioral: Negative for agitation.     Objective:     Vital Signs (Most Recent):  Temp: 98.1 °F (36.7 °C) (02/26/17 0655)  Pulse: (!) 118 (02/26/17 1300)  Resp: (!) 31 (02/26/17 1300)  BP: (!) 173/89  (17 1300)  SpO2: 100 % (17 1300)    Vital Signs Range (Last 24H):  Temp:  [98.1 °F (36.7 °C)]   Pulse:  []   Resp:  [15-36]   BP: (138-210)/()   SpO2:  [98 %-100 %]     Physical Exam   Constitutional: No distress.   HENT:   Head trauma to left face w/ swelling and edema involving orbit and upper lip   Eyes: Pupils are equal, round, and reactive to light.   Neck: No thyromegaly present.   Cardiovascular: Regular rhythm.    Tachycardic   Pulmonary/Chest: No respiratory distress. She has no wheezes.   Abdominal: She exhibits no distension. There is no tenderness.   Musculoskeletal: She exhibits no edema, tenderness or deformity.   Lymphadenopathy:     She has no cervical adenopathy.   Skin: Skin is warm and dry.       Neurological Exam:   LOC: alert and follows requests  Language: No aphasia  Speech: Dysarthria  Orientation: Person, Place, Time  Visual Fields (CN II): Full  EOM (CN III, IV, VI): fluctuating R gaze preference; able to overcome midline on most attempts  Pupils (CN III, IV, VI): PERRL  Facial Movement (CN VII): left lower facial droop, present despite confounder of left facial trauma  Motor*: Arm Left:  Paretic:  2/5, Leg Left:   Paretic:  3/5, Arm Right:   Normal (5/5), Leg Right:   Normal (5/5)  Cerebellar*: Normal limb  Sensation: decreased sensation on left hemibody ~ 50%   Tactile extinction noted on left side    NIH Stroke Scale:  Interval: baseline (upon arrival/admit)  Level of Consciousness: 0 - alert  LOC Questions: 0 - answers both correctly  LOC Commands: 0 - performs both correctly  Best Gaze: 1 - partial gaze palsy  Visual: 0 - no visual loss  Facial Palsy: 2 - partial  Motor Left Arm: 3 - no effort against gravity  Motor Right Arm: 0 - no drift  Motor Left Le - can't resist gravity  Motor Right Le - no drift  Limb Ataxia: 0 - absent  Sensory: 1 - mild to moderate loss  Best Language: 0 - no aphasia  Dysarthria: 1 - mild to moderate dysarthria  Extinction and  Inattention: 1 - partial neglect  NIH Stroke Scale Total: 11      Laboratory:  CMP:   Recent Labs  Lab 02/26/17  0740   CALCIUM 8.7   ALBUMIN 3.5   PROT 8.0      K 3.8   CO2 21*      BUN 15   CREATININE 1.2   ALKPHOS 63   ALT 9*   AST 19   BILITOT 0.3     CBC:   Recent Labs  Lab 02/26/17  0740   WBC 13.57*   RBC 3.56*   HGB 7.4*   HCT 25.8*   *   MCV 73*   MCH 20.8*   MCHC 28.7*     Lipid Panel:   Recent Labs  Lab 02/26/17  0740   CHOL 155   LDLCALC 60.0*   HDL 78*   TRIG 85     Coagulation:   Recent Labs  Lab 02/26/17  0740   INR 1.0     Hgb A1C: No results for input(s): HGBA1C in the last 168 hours.    Diagnostic Results:  Brain Imaging: CT Head. Date: 2/26/17  Acute Pathology: Infarct  ASPECTS 7 (R temporal, R insula, R temporal lobe)    Cerebrovascular Imaging: Angiogram Head. Date: 2/26/17  R ICA occlusion w/ irregularity of looped proximal segment; unclear if plaque w/ underlying stenosis vs. Dissection w/ underlying FMD; successful thrombectomy; intracranial runs do show at least one M3 branch occlusion w/ pial collateral retrograde filling    Cardiac Evaluation: Not completed yet

## 2017-02-26 NOTE — PROGRESS NOTES
Patient transported from IR on bed and portable CM. she is alert and oriented. VSS, HR in the 100's. Denies pain at the moment. Will lie flat until 1130. Gave report and care taken over by LUCIO Shelby.

## 2017-02-26 NOTE — H&P
Radiology History & Physical      SUBJECTIVE:     Chief Complaint: LUE and LLE weakness.    History of Present Illness:  Lyssa Gandara is a 51 y.o. female who presents for image guided diagnostic and therapeutic cerebral angiogram.    Past Medical History:   Diagnosis Date    Asthma     Dyspnea on exertion     Hx of long term use of blood thinners     Hypertension 3/8/2016    PE (pulmonary embolism)     Hx of multiple DVT/PE    Presence of IVC filter     Scleroderma     Type 2 diabetes mellitus without complication      Past Surgical History:   Procedure Laterality Date    DANETTE FILTER PLACEMENT         Home Meds:   Prior to Admission medications    Medication Sig Start Date End Date Taking? Authorizing Provider   albuterol (PROVENTIL) 2.5 mg /3 mL (0.083 %) nebulizer solution Take 3 mLs (2.5 mg total) by nebulization every 6 (six) hours as needed for Wheezing. 10/18/16 10/18/17 Yes Christina Guerrero MD   albuterol 90 mcg/actuation inhaler Inhale 1-2 puffs into the lungs every 6 (six) hours as needed for Wheezing. 3/5/16 3/5/17 Yes Meera Rudd PA-C   cyanocobalamin (VITAMIN B-12) 1000 MCG tablet Take 100 mcg by mouth once daily.   Yes Historical Provider, MD   fluticasone-salmeterol 250-50 mcg/dose (ADVAIR) 250-50 mcg/dose diskus inhaler Inhale 1 puff into the lungs 2 (two) times daily. 9/9/16 9/9/17 Yes Dylan Keane DNP   metformin (GLUCOPHAGE) 500 MG tablet TAKE ONE TABLET BY MOUTH TWICE DAILY WITH MEALS 1/9/17  Yes Gerard Joyce MD   rivaroxaban (XARELTO) 20 mg Tab Take 1 tablet (20 mg total) by mouth daily with dinner or evening meal. 8/6/16  Yes Sherwin Avitia PA-C   triamterene-hydrochlorothiazide 37.5-25 mg (MAXZIDE-25) 37.5-25 mg per tablet Take 1 tablet by mouth once daily. 10/18/16  Yes Christina Guerrero MD   lisinopril (PRINIVIL,ZESTRIL) 20 MG tablet TAKE ONE TABLET BY MOUTH ONCE DAILY 12/29/16   Gerard Joyce MD     Anticoagulants/Antiplatelets:  Xarelto    Allergies:   Review of patient's allergies indicates:   Allergen Reactions    Plasma, human, normal      Sedation History:  no adverse reactions    Review of Systems:   Hematological: no known coagulopathies  Respiratory: no shortness of breath  Cardiovascular: no chest pain  Gastrointestinal: no abdominal pain  Genito-Urinary: no dysuria  Musculoskeletal: negative  Neurological: no TIA or stroke symptoms  positive for weakness LUE and LLE         OBJECTIVE:     Vital Signs (Most Recent)  Temp: 98.1 °F (36.7 °C) (02/26/17 0655)  Pulse: (!) 124 (02/26/17 0815)  Resp: 17 (02/26/17 0815)  BP: (!) 161/108 (02/26/17 0815)  SpO2: 100 % (02/26/17 0815)    Physical Exam:  ASA: 3  Mallampati: 3    General: no acute distress  Mental Status: alert and oriented to person, place and time  HEENT: normocephalic, atraumatic  Chest: unlabored breathing  Heart: regular heart rate  Abdomen: nondistended  Extremity: LUE and LLE weakness.    Laboratory  Lab Results   Component Value Date    INR 1.0 02/26/2017       Lab Results   Component Value Date    WBC 13.57 (H) 02/26/2017    HGB 7.4 (L) 02/26/2017    HCT 25.8 (L) 02/26/2017    MCV 73 (L) 02/26/2017     (H) 02/26/2017      Lab Results   Component Value Date     (H) 02/26/2017     02/26/2017    K 3.8 02/26/2017     02/26/2017    CO2 21 (L) 02/26/2017    BUN 15 02/26/2017    CREATININE 1.2 02/26/2017    CALCIUM 8.7 02/26/2017    ALT 9 (L) 02/26/2017    AST 19 02/26/2017    ALBUMIN 3.5 02/26/2017    BILITOT 0.3 02/26/2017       ASSESSMENT/PLAN:     Sedation Plan: Moderate.  Patient will undergo image guided diagnostic and therapeutic cerebral angiogram.    Gemma Alcaraz  PGY-3  Department of Radiology  [phone]

## 2017-02-26 NOTE — ED NOTES
LOC: The patient is awake, alert and aware of environment with an appropriate affect, the patient is oriented x 3 and speaking appropriately.  APPEARANCE: Patient resting comfortably and in no acute distress, patient is clean and well groomed, patient's clothing is properly fastened.  SKIN: The skin is warm and dry, patient has normal skin turgor and moist mucus membranes; swelling noted left eye; dried blood noted around lips  MUSKULOSKELETAL: Limited ROM noted to left upper extremity;no obvious swelling or deformities noted.  RESPIRATORY: Airway is open and patent, respirations are spontaneous, patient has a normal effort and rate; wheezing noted throughout  CARDIAC: Normal heart sounds; tachycardic with rate of 129  ABDOMEN: Soft and non tender to palpation, no distention noted. Bowel sounds present.  NEURO: left sided facial droop noted; left hand grasp weaker than right; unable to lift left arm; drift noted to left lower extremity; pt c/o headache

## 2017-02-26 NOTE — PROGRESS NOTES
Received report from ER nurse Everett. Patient is in IR, alert and oriented. VSS, still cant feel left side. Will continue to monitor patient throughout procedure.

## 2017-02-26 NOTE — ED TRIAGE NOTES
"Pt was LSN at 2200. Per aunt, "I heard a thump around ten to seven. I asked her if she was ok and she said no. When I walked in her room and she was on the floor." Pt states she woke up around 0530 and fell. Pt oriented to person place and time. Pt c/o headache to right frontal region of head. Pt states "Im so tired." Pt has swelling noted to left eye. Pt c/o 2/10 headache. Presents with slurred speech   "

## 2017-02-26 NOTE — PLAN OF CARE
There was contrast extravasation into the right upper extremity and antecubital fossa at site of Right AC IV.  Patient reports mild pain on palpation.  A collection measuring approximately 6.5 x 5.0 cm is present along the anterior cubital dio and distal upper arm.  Patient denies weakness or numbness.  Extremity is warm with good perfusion.  Pulses 2+.      Recommend elevation of the affected extremity and cold compress to site of extravasation.     Austyn Bhakta  R-3  Pager: [phone]

## 2017-02-26 NOTE — IP AVS SNAPSHOT
[facility]  [address]  [facility] [address]  Phone: [phone]           Patient Discharge Instructions     Our goal is to set you up for success. This packet includes information on your condition, medications, and your home care. It will help you to care for yourself so you don't get sicker and need to go back to the hospital.     Please ask your nurse if you have any questions.        There are many details to remember when preparing to leave the hospital. Here is what you will need to do:    1. Take your medicine. If you are prescribed medications, review your Medication List in the following pages. You may have new medications to  at the pharmacy and others that you'll need to stop taking. Review the instructions for how and when to take your medications. Talk with your doctor or nurses if you are unsure of what to do.     2. Go to your follow-up appointments. Specific follow-up information is listed in the following pages. Your may be contacted by a transition nurse or clinical provider about future appointments. Be sure we have all of the phone numbers to reach you, if needed. Please contact your provider's office if you are unable to make an appointment.     3. Watch for warning signs. Your doctor or nurse will give you detailed warning signs to watch for and when to call for assistance. These instructions may also include educational information about your condition. If you experience any of warning signs to your health, call your doctor.               [facility]  Unless otherwise directed by your provider, please contact [facility], our nurse care line that is available for 24/7 assistance.     [phone] (toll-free)    Registered nurses in the [facility] provide clinical advisement, health education, appointment booking, and other advisory services.                    ** Verify the list of medication(s) below is accurate and up  "to date. Carry this with you in case of emergency. If your medications have changed, please notify your healthcare provider.             Medication List      START taking these medications        Additional Info                      dabigatran etexilate 150 mg Cap   Commonly known as:  PRADAXA   Quantity:  60 capsule   Refills:  1   Dose:  150 mg    Last time this was given:  150 mg on 3/2/2017  9:43 AM   Instructions:  Take 1 capsule (150 mg total) by mouth 2 (two) times daily. "Do NOT break, chew, or open capsules."     Begin Date    AM    Noon    PM    Bedtime       ferrous sulfate 325 (65 FE) MG EC tablet   Quantity:  30 tablet   Refills:  1   Dose:  325 mg    Last time this was given:  325 mg on 3/2/2017  9:41 AM   Instructions:  Take 1 tablet (325 mg total) by mouth once daily.     Begin Date    AM    Noon    PM    Bedtime         CONTINUE taking these medications        Additional Info                      * albuterol 90 mcg/actuation inhaler   Quantity:  1 Inhaler   Refills:  0   Dose:  1-2 puff    Instructions:  Inhale 1-2 puffs into the lungs every 6 (six) hours as needed for Wheezing.     Begin Date    AM    Noon    PM    Bedtime       * albuterol 2.5 mg /3 mL (0.083 %) nebulizer solution   Commonly known as:  PROVENTIL   Quantity:  180 mL   Refills:  3   Dose:  2.5 mg    Instructions:  Take 3 mLs (2.5 mg total) by nebulization every 6 (six) hours as needed for Wheezing.     Begin Date    AM    Noon    PM    Bedtime       cyanocobalamin 1000 MCG tablet   Commonly known as:  VITAMIN B-12   Refills:  0   Dose:  100 mcg    Last time this was given:  1,000 mcg on 2/27/2017  8:31 AM   Instructions:  Take 100 mcg by mouth once daily.     Begin Date    AM    Noon    PM    Bedtime       fluticasone-salmeterol 250-50 mcg/dose 250-50 mcg/dose diskus inhaler   Commonly known as:  ADVAIR   Quantity:  60 each   Refills:  3   Dose:  1 puff    Instructions:  Inhale 1 puff into the lungs 2 (two) times daily.     Begin " Date    AM    Noon    PM    Bedtime       lisinopril 20 MG tablet   Commonly known as:  PRINIVIL,ZESTRIL   Quantity:  30 tablet   Refills:  0    Last time this was given:  40 mg on 3/2/2017  9:40 AM   Instructions:  TAKE ONE TABLET BY MOUTH ONCE DAILY     Begin Date    AM    Noon    PM    Bedtime       metformin 500 MG tablet   Commonly known as:  GLUCOPHAGE   Quantity:  60 tablet   Refills:  0    Instructions:  TAKE ONE TABLET BY MOUTH TWICE DAILY WITH MEALS     Begin Date    AM    Noon    PM    Bedtime       triamterene-hydrochlorothiazide 37.5-25 mg 37.5-25 mg per tablet   Commonly known as:  MAXZIDE-25   Quantity:  90 tablet   Refills:  4   Dose:  1 tablet    Instructions:  Take 1 tablet by mouth once daily.     Begin Date    AM    Noon    PM    Bedtime       * Notice:  This list has 2 medication(s) that are the same as other medications prescribed for you. Read the directions carefully, and ask your doctor or other care provider to review them with you.      STOP taking these medications     rivaroxaban 20 mg Tab   Commonly known as:  XARELTO            Where to Get Your Medications      These medications were sent to [address]  [address] [place] [id number]     Phone:  [phone]     dabigatran etexilate 150 mg Cap    ferrous sulfate 325 (65 FE) MG EC tablet                  Please bring to all follow up appointments:    1. A copy of your discharge instructions.  2. All medicines you are currently taking in their original bottles.  3. Identification and insurance card.    Please arrive 15 minutes ahead of scheduled appointment time.    Please call 24 hours in advance if you must reschedule your appointment and/or time.        Follow-up Information     Follow up with [facility].    Specialty:  Outpatient Rehab    Why:  Please call to schedule your outpatient therapy appointment if you have not received a call  within 1-2 business days    Contact information:    [address]  [facility] [address]  [phone]    Additional information:    [facility]        Follow up with Gerard Joyce MD In 1 week.    Specialty:  Family Medicine    Contact information:    [address]  [facility] [address]  [phone]          Follow up with [facility] In 4 weeks.    Specialty:  Vascular Neurology    Why:  stroke follow up appointment    Contact information:    [address]  [facility] [address]  [phone]      Referrals     Future Orders    Ambulatory Referral to Physical/Occupational Therapy     Questions:    Post Surgical?:  No    Eval and Treat:  Yes    Duration:      Frequency (times per week):      Precautions:      Location:      OT Location:      Restore Functional ADL Training?:      Gait Training:      Therapeutic Exercises:      Wound Care:      Traction:      Electric Stimulation:      IONTO.:      U/S:      Developmental Stimulation?:      Specialty Programs:      Ambulatory Referral to Physical/Occupational Therapy     Questions:    Post Surgical?:  No    Eval and Treat:  Yes    Duration:      Frequency (times per week):      Precautions:      Location:      OT Location:      Restore Functional ADL Training?:      Gait Training:      Therapeutic Exercises:      Wound Care:      Traction:      Electric Stimulation:      IONTO.:      U/S:      Developmental Stimulation?:      Specialty Programs:          Discharge Instructions     Future Orders    Activity as tolerated     Diet general     Questions:    Total calories:      Fat restriction, if any:      Protein restriction, if any:      Na restriction, if any:      Fluid restriction:      Additional restrictions:  Dental Soft        Primary Diagnosis     Your primary diagnosis was:  Stroke      Admission Information     Date & Time Provider Department [facility]    2/26/2017  6:51 AM Dillan Fritz MD [facility] [id number]      Care Providers     Provider Role Specialty Primary office phone    Dillan Fritz MD Attending Provider Neurology [phone]    Harrison Thakur MD Team Attending  Neuro Critical Care [phone]    Kenton Harrison MD Team Attending  Neuro Critical Care [phone]    Kyrie Giron MD Team Attending  Interventional Neurology [phone]    Alexei Pepper MD Team Attending  Neurology [phone]      Your Vitals Were     BP                   165/77 (BP Location: Left arm, Patient Position: Lying, BP Method: Automatic)           Recent Lab Values        8/5/2016 11/4/2016 2/26/2017                     7:57 AM  8:56 AM  5:56 PM         A1C 9.1 (H) 6.8 (H) 6.8 (H)         Comment for A1C at  7:57 AM on 8/5/2016:  According to ADA guidelines, hemoglobin A1C <7.0% represents  optimal control in non-pregnant diabetic patients.  Different  metrics may apply to specific populations.   Standards of Medical Care in Diabetes - 2016.  For the purpose of screening for the presence of diabetes:  <5.7%     Consistent with the absence of diabetes  5.7-6.4%  Consistent with increasing risk for diabetes   (prediabetes)  >or=6.5%  Consistent with diabetes  Currently no consensus exists for use of hemoglobin A1C  for diagnosis of diabetes for children.      Comment for A1C at  8:56 AM on 11/4/2016:  According to ADA guidelines, hemoglobin A1C <7.0% represents  optimal control in non-pregnant diabetic patients.  Different  metrics may apply to specific populations.   Standards of Medical Care in Diabetes - 2016.  For the purpose of screening for the presence of diabetes:  <5.7%     Consistent with the absence of diabetes  5.7-6.4%  Consistent with increasing risk for diabetes   (prediabetes)  >or=6.5%  Consistent with diabetes  Currently no consensus exists for use of hemoglobin A1C  for diagnosis of diabetes for children.      Comment for A1C at  5:56 PM on 2/26/2017:  According to  ADA guidelines, hemoglobin A1C <7.0% represents  optimal control in non-pregnant diabetic patients.  Different  metrics may apply to specific populations.   Standards of Medical Care in Diabetes - 2016.  For the purpose of screening for the presence of diabetes:  <5.7%     Consistent with the absence of diabetes  5.7-6.4%  Consistent with increasing risk for diabetes   (prediabetes)  >or=6.5%  Consistent with diabetes  Currently no consensus exists for use of hemoglobin A1C  for diagnosis of diabetes for children.        Pending Labs     Order Current Status    Occult blood x 1, stool In process    Prepare RBC 2 Units; acute anemia Preliminary result      Allergies as of 3/2/2017        Reactions    Plasma, Human, Normal       Advance Directives     An advance directive is a document which, in the event you are no longer able to make decisions for yourself, tells your healthcare team what kind of treatment you do or do not want to receive, or who you would like to make those decisions for you.  If you do not currently have an advance directive, [facility] encourages you to create one.  For more information call:  [phone]), [phone] ([phone]),  or log on to [email or web address].        Language Assistance Services     ATTENTION: Language assistance services are available, free of charge. Please call [phone].      ATENCIÓN: Si habla español, tiene a babb disposición servicios gratuitos de asistencia lingüística. Llame al [phone].     CHÚ Ý: N?u b?n nói Ti?ng Vi?t, có các d?ch v? h? tr? ngôn ng? mi?n phí dành cho b?n. G?i s? [phone].        Blood Transfusion Reaction Signs and Symptoms     The blood you have received has been matched for you as carefully as possible. Most patients who receive a blood transfusion do not experience problems. However, there can be a delayed reaction that happens a few weeks after your blood transfusion. Contact your physician immediately if  you experience any NEW SYMPTOMS listed below:     Fever greater than 100.4 degrees    Chills   Yellow color to your skin or eyes(Jaundice)   Back pain, chest pain, or pain at the infusion site   Weakness (more than usual)   Discomfort or uneasiness more than usual (Malaise)   Nausea or vomiting   Shortness of breath, wheezing, or coughing   Higher or lower blood pressure than normal   Skin rash, itching, skin redness, or localized swelling (example: hands or feet)   Urinating less than normal   Urine appears reddish or orange and is darker than normal      Remember that some these signs may already exist for you--such as having chronic back pain or high blood pressure. You only need to look for and report to your doctor any new occurrences since your blood transfusion that are of concern.        Stroke Education              Diabetes Discharge Instructions                                   [employer] Information [facility] complies with applicable Federal civil rights laws and does not discriminate on the basis of race, color, national origin, age, disability, or sex.

## 2017-02-26 NOTE — PROCEDURES
Radiology Post-Procedure Note    Pre Op Diagnosis: Right MCA stroke    Post Op Diagnosis: Same, S/P thrombectomy RENETTA    Procedure: Cerebral angiogram    Procedure performed by: Christian Riley MD    Written Informed Consent Obtained: Yes    Specimen Removed: YES multiple soft clots from the right ICA above the bulb    Estimated Blood Loss: less than 100     Procedure report:     An 8F sheath was placed into the right femoral artery and a 5F Wallace catheter was advanced into the aortic arch.  The RCCA and RENETTA were subselected and angiography of the brain was performed after injection into each of these vessels.  There was RENETTA origin occlusion.  Injection into the RENETTA origin showed a loop just above the bulb with FMD filled with thrombus.  This was successfully treated with suction thrombectomy via an 8F sheath with restoration of flow to the right hemisphere.  There was a posterior frontal M3 occlusion present, but I chose not to treat due to the FMD with loop.      Groin sheath in at 9:06 AM.  TICI 2B flow noted at 9:17 AM after thrombectomy.  Please see Imaging report for full details.    The sheath removed and hemostasis achieved using Angioseal.  No hematoma was present at the time of hemostasis.    The patient tolerated the procedure well.     Christian Riley MD  Attending Radiologist  Interventional Neuroradiology  Pager: [phone]

## 2017-02-26 NOTE — PROGRESS NOTES
Received rm 190 for angio. Under direct care of ER RN.    IN ROOM-835  PREPPED AND READY -905  LOCAL-906  ACCESS-906  1ST PASS TICI- N/A  2ND PASS TICI-N/A        HEMOSTASIS/END TIME-930  OUT OF ROOM-940

## 2017-02-26 NOTE — CONSULTS
[facility]  Vascular Neurology  Comprehensive Stroke Center  Consult Note      Consults  Subjective:     History of Present Illness:  51F p/w left hemiparesis and dysarthria, last known normal at 0430 w/ symptom onset at that time. These symptoms have never happened before. There are no identified triggers or modifying factors. There have been no recurrent events. There are no other associated symptoms.  Was brought to ED for evaluation and stroke team was alerted. NCCT was initially completed, attempted CTA but infiltration of IV occurred. Not a candidate for IVtPA d/t being on Xarelto and compliant for pulmonary embolus previously diagnosed. We decided to take patient straight to angiography. R ICA occlusive thrombus seen w/ successful aspiration thrombectomy revealing an irregular looped segment, unclear if underlying FMD w/ dissection or underlying atherosclerotic plaque rupture. Subsequently found to have distal M3 branch occlusion that was too distal for intervention.      Symptom/Intervention Times  Last Known Normal Date:: 02/26/17 (02/26/17 1033)  Last Known Normal Time:: 0430 (02/26/17 1033)  Symptom Onset Date:: 02/26/17 (02/26/17 1033)  Symptom Onset Time:: 0430 (02/26/17 1033)  Stroke Team Called Date:: 02/26/17 (02/26/17 1033)  Stroke Team Called Time:: 0715 (02/26/17 1033)  Stroke Team Arrival Date:: 02/26/17 (02/26/17 1033)  Stroke Team Arrival Time:: 0720 (02/26/17 1033)  CT Interpretation Time:: 0722 (02/26/17 1033)    Past Medical History:   Diagnosis Date    Asthma     Dyspnea on exertion     Hx of long term use of blood thinners     Hypertension 3/8/2016    PE (pulmonary embolism)     Hx of multiple DVT/PE    Presence of IVC filter     Scleroderma     Type 2 diabetes mellitus without complication      Past Surgical History:   Procedure Laterality Date    DANETTE FILTER PLACEMENT       Family History   Problem Relation Age of Onset    Breast cancer Mother      Hypertension Father     No Known Problems Brother     No Known Problems Maternal Grandmother     No Known Problems Maternal Grandfather     No Known Problems Paternal Grandmother     No Known Problems Paternal Grandfather     Diabetes Mellitus Maternal Uncle     No Known Problems Sister     No Known Problems Maternal Aunt     No Known Problems Paternal Aunt     No Known Problems Paternal Uncle     Psoriasis Neg Hx     Rheum arthritis Neg Hx     Thyroid disease Neg Hx     Lupus Neg Hx     Anemia Neg Hx     Arrhythmia Neg Hx     Asthma Neg Hx     Clotting disorder Neg Hx     Fainting Neg Hx     Heart attack Neg Hx     Heart disease Neg Hx     Heart failure Neg Hx     Hyperlipidemia Neg Hx     Stroke Neg Hx     Atrial Septal Defect Neg Hx      Social History   Substance Use Topics    Smoking status: Never Smoker    Smokeless tobacco: Not on file    Alcohol use 1.8 oz/week     3 Cans of beer, 0 Standard drinks or equivalent per week      Comment: occ     Review of patient's allergies indicates:   Allergen Reactions    Plasma, human, normal      Medications: I have reviewed the current medication administration record.    Prescriptions Prior to Admission   Medication Sig Dispense Refill Last Dose    albuterol (PROVENTIL) 2.5 mg /3 mL (0.083 %) nebulizer solution Take 3 mLs (2.5 mg total) by nebulization every 6 (six) hours as needed for Wheezing. 180 mL 3 Taking    albuterol 90 mcg/actuation inhaler Inhale 1-2 puffs into the lungs every 6 (six) hours as needed for Wheezing. 1 Inhaler 0 Taking    cyanocobalamin (VITAMIN B-12) 1000 MCG tablet Take 100 mcg by mouth once daily.   Taking    fluticasone-salmeterol 250-50 mcg/dose (ADVAIR) 250-50 mcg/dose diskus inhaler Inhale 1 puff into the lungs 2 (two) times daily. 60 each 3 Taking    metformin (GLUCOPHAGE) 500 MG tablet TAKE ONE TABLET BY MOUTH TWICE DAILY WITH MEALS 60 tablet 0     rivaroxaban (XARELTO) 20 mg Tab Take 1 tablet (20 mg  total) by mouth daily with dinner or evening meal. 30 tablet 11 Taking    triamterene-hydrochlorothiazide 37.5-25 mg (MAXZIDE-25) 37.5-25 mg per tablet Take 1 tablet by mouth once daily. 90 tablet 4 Taking    lisinopril (PRINIVIL,ZESTRIL) 20 MG tablet TAKE ONE TABLET BY MOUTH ONCE DAILY 30 tablet 0        Review of Systems   Constitutional: Negative for activity change.   HENT: Negative for hearing loss.    Eyes: Negative for photophobia.   Respiratory: Negative for apnea.    Cardiovascular: Negative for chest pain.   Gastrointestinal: Negative for abdominal distention.   Endocrine: Negative for cold intolerance.   Genitourinary: Negative for difficulty urinating.   Musculoskeletal: Negative for arthralgias.   Skin: Negative for color change.   Neurological: Negative for dizziness.   Psychiatric/Behavioral: Negative for agitation.     Objective:     Vital Signs (Most Recent):  Temp: 98.1 °F (36.7 °C) (02/26/17 0655)  Pulse: (!) 118 (02/26/17 1300)  Resp: (!) 31 (02/26/17 1300)  BP: (!) 173/89 (02/26/17 1300)  SpO2: 100 % (02/26/17 1300)    Vital Signs Range (Last 24H):  Temp:  [98.1 °F (36.7 °C)]   Pulse:  []   Resp:  [15-36]   BP: (138-210)/()   SpO2:  [98 %-100 %]     Physical Exam   Constitutional: No distress.   HENT:   Head trauma to left face w/ swelling and edema involving orbit and upper lip   Eyes: Pupils are equal, round, and reactive to light.   Neck: No thyromegaly present.   Cardiovascular: Regular rhythm.    Tachycardic   Pulmonary/Chest: No respiratory distress. She has no wheezes.   Abdominal: She exhibits no distension. There is no tenderness.   Musculoskeletal: She exhibits no edema, tenderness or deformity.   Lymphadenopathy:     She has no cervical adenopathy.   Skin: Skin is warm and dry.       Neurological Exam:   LOC: alert and follows requests  Language: No aphasia  Speech: Dysarthria  Orientation: Person, Place, Time  Visual Fields (CN II): Full  EOM (CN III, IV, VI):  fluctuating R gaze preference; able to overcome midline on most attempts  Pupils (CN III, IV, VI): PERRL  Facial Movement (CN VII): left lower facial droop, present despite confounder of left facial trauma  Motor*: Arm Left:  Paretic:  2/5, Leg Left:   Paretic:  3/5, Arm Right:   Normal (5/5), Leg Right:   Normal (5/5)  Cerebellar*: Normal limb  Sensation: decreased sensation on left hemibody ~ 50%   Tactile extinction noted on left side    NIH Stroke Scale:  Interval: baseline (upon arrival/admit)  Level of Consciousness: 0 - alert  LOC Questions: 0 - answers both correctly  LOC Commands: 0 - performs both correctly  Best Gaze: 1 - partial gaze palsy  Visual: 0 - no visual loss  Facial Palsy: 2 - partial  Motor Left Arm: 3 - no effort against gravity  Motor Right Arm: 0 - no drift  Motor Left Le - can't resist gravity  Motor Right Le - no drift  Limb Ataxia: 0 - absent  Sensory: 1 - mild to moderate loss  Best Language: 0 - no aphasia  Dysarthria: 1 - mild to moderate dysarthria  Extinction and Inattention: 1 - partial neglect  NIH Stroke Scale Total: 11      Laboratory:  CMP:   Recent Labs  Lab 17  0740   CALCIUM 8.7   ALBUMIN 3.5   PROT 8.0      K 3.8   CO2 21*      BUN 15   CREATININE 1.2   ALKPHOS 63   ALT 9*   AST 19   BILITOT 0.3     CBC:   Recent Labs  Lab 17  0740   WBC 13.57*   RBC 3.56*   HGB 7.4*   HCT 25.8*   *   MCV 73*   MCH 20.8*   MCHC 28.7*     Lipid Panel:   Recent Labs  Lab 17  0740   CHOL 155   LDLCALC 60.0*   HDL 78*   TRIG 85     Coagulation:   Recent Labs  Lab 17  0740   INR 1.0     Hgb A1C: No results for input(s): HGBA1C in the last 168 hours.    Diagnostic Results:  Brain Imaging: CT Head. Date: 17  Acute Pathology: Infarct  ASPECTS 7 (R temporal, R insula, R temporal lobe)    Cerebrovascular Imaging: Angiogram Head. Date: 17  R ICA occlusion w/ irregularity of looped proximal segment; unclear if plaque w/ underlying stenosis vs.  Dissection w/ underlying FMD; successful thrombectomy; intracranial runs do show at least one M3 branch occlusion w/ pial collateral retrograde filling    Cardiac Evaluation: Not completed yet    Assessment/Plan:     Patient is a 51 y.o. year old female with baseline mRS of 0    * Cerebral infarction due to thrombosis of right carotid artery  Thrombosis found in irregular loop of R ICA extracranial segment during DSA, s/p successful aspiration thrombectomy.   Distal artery to artery thromboembolism found in M3 branch which was too distal for intervention.  Underlying etiology of carotid lesion either dissection given irregularity vs. Atherosclerotic plaque w/ in-situ thrombosis; underlying FMD a possibility  · Continue Xarelto (or change NOAC) after MRI characterization of total infarct and risk of hemorrhage. May need to resort to NCCT given inability for patient to lay flat for long periods  · BP goal < 180/100, given continued distal branch occlusion.  · TTE  · Carotid US to further characterize carotid lesion given limited imaging  · PT/OT/SLP      Internal carotid artery dissection  Most probable etiology of thrombosis in the R ICA, although not confirmed and difficult with only DSA imaging for characterization.  MRI/A w/ T1-FS would be ideal to characterize lesion if patient can tolerate, otherwise at least a carotid US  Continued anticoagulation given underlying PE would also suffice for a dissection if present. Will likely recommend changing Xarelto to alternative NOAC, Dabigatran or Apixaban    Essential hypertension  Recanalization of extracranial carotid however still distal stenoses still present  Goal BP < 180/100 currently    Long-term (current) use of anticoagulants  On Xarelto for hx of PE  Need to discuss how she takes her Xarelto and if she takes it with meals  May consider alternative agents such as Apixaban or Dabigatran    Type 2 diabetes mellitus without complication  Goal -200 in the  acute setting  Overall Goal A1c < 7    Flaccid hemiplegia affecting left nondominant side  PT/OT/SLP evaluation and treatment ongoing      Dillan Fritz MD  Comprehensive Stroke Center  Department of Vascular Neurology   [facility]

## 2017-02-26 NOTE — CONSULTS
See consult note dated 02/26/2017 for full consult and recs.    Jacquelyn Morejon NP  Vascular Neurology  Staff: Catrina

## 2017-02-27 LAB
ALBUMIN SERPL BCP-MCNC: 2.8 G/DL
ALP SERPL-CCNC: 51 U/L
ALT SERPL W/O P-5'-P-CCNC: 8 U/L
ANION GAP SERPL CALC-SCNC: 9 MMOL/L
ANISOCYTOSIS BLD QL SMEAR: SLIGHT
ANISOCYTOSIS BLD QL SMEAR: SLIGHT
AST SERPL-CCNC: 19 U/L
BASOPHILS # BLD AUTO: 0.04 K/UL
BASOPHILS # BLD AUTO: 0.04 K/UL
BASOPHILS # BLD AUTO: 0.06 K/UL
BASOPHILS NFR BLD: 0.4 %
BASOPHILS NFR BLD: 0.4 %
BASOPHILS NFR BLD: 0.5 %
BILIRUB SERPL-MCNC: 0.3 MG/DL
BLD PROD TYP BPU: NORMAL
BLD PROD TYP BPU: NORMAL
BLOOD UNIT EXPIRATION DATE: NORMAL
BLOOD UNIT EXPIRATION DATE: NORMAL
BLOOD UNIT TYPE CODE: 5100
BLOOD UNIT TYPE CODE: 5100
BLOOD UNIT TYPE: NORMAL
BLOOD UNIT TYPE: NORMAL
BUN SERPL-MCNC: 9 MG/DL
CALCIUM SERPL-MCNC: 8.2 MG/DL
CHLORIDE SERPL-SCNC: 111 MMOL/L
CO2 SERPL-SCNC: 21 MMOL/L
CODING SYSTEM: NORMAL
CODING SYSTEM: NORMAL
CREAT SERPL-MCNC: 0.9 MG/DL
DIASTOLIC DYSFUNCTION: NO
DIFFERENTIAL METHOD: ABNORMAL
DISPENSE STATUS: NORMAL
DISPENSE STATUS: NORMAL
EOSINOPHIL # BLD AUTO: 0.1 K/UL
EOSINOPHIL # BLD AUTO: 0.2 K/UL
EOSINOPHIL # BLD AUTO: 0.2 K/UL
EOSINOPHIL NFR BLD: 1.4 %
EOSINOPHIL NFR BLD: 1.8 %
EOSINOPHIL NFR BLD: 1.9 %
ERYTHROCYTE [DISTWIDTH] IN BLOOD BY AUTOMATED COUNT: 19.8 %
ERYTHROCYTE [DISTWIDTH] IN BLOOD BY AUTOMATED COUNT: 20.1 %
ERYTHROCYTE [DISTWIDTH] IN BLOOD BY AUTOMATED COUNT: 21.8 %
EST. GFR  (AFRICAN AMERICAN): >60 ML/MIN/1.73 M^2
EST. GFR  (NON AFRICAN AMERICAN): >60 ML/MIN/1.73 M^2
ESTIMATED AVG GLUCOSE: 148 MG/DL
FERRITIN SERPL-MCNC: 5 NG/ML
GLUCOSE SERPL-MCNC: 90 MG/DL
HAPTOGLOB SERPL-MCNC: 215 MG/DL
HBA1C MFR BLD HPLC: 6.8 %
HCT VFR BLD AUTO: 20 %
HCT VFR BLD AUTO: 20 %
HCT VFR BLD AUTO: 21.1 %
HCT VFR BLD AUTO: 27.3 %
HGB BLD-MCNC: 5.7 G/DL
HGB BLD-MCNC: 5.7 G/DL
HGB BLD-MCNC: 6 G/DL
HGB BLD-MCNC: 8.1 G/DL
HYPOCHROMIA BLD QL SMEAR: ABNORMAL
HYPOCHROMIA BLD QL SMEAR: ABNORMAL
INR PPP: 1
IRON SERPL-MCNC: 10 UG/DL
LDH SERPL L TO P-CCNC: 248 U/L
LYMPHOCYTES # BLD AUTO: 1.5 K/UL
LYMPHOCYTES # BLD AUTO: 1.5 K/UL
LYMPHOCYTES # BLD AUTO: 2.1 K/UL
LYMPHOCYTES NFR BLD: 14.3 %
LYMPHOCYTES NFR BLD: 16.3 %
LYMPHOCYTES NFR BLD: 19.6 %
MAGNESIUM SERPL-MCNC: 1.4 MG/DL
MCH RBC QN AUTO: 20.5 PG
MCH RBC QN AUTO: 20.5 PG
MCH RBC QN AUTO: 23.2 PG
MCHC RBC AUTO-ENTMCNC: 28.4 %
MCHC RBC AUTO-ENTMCNC: 28.5 %
MCHC RBC AUTO-ENTMCNC: 29.7 %
MCV RBC AUTO: 72 FL
MCV RBC AUTO: 72 FL
MCV RBC AUTO: 78 FL
MITRAL VALVE MOBILITY: NORMAL
MONOCYTES # BLD AUTO: 0.7 K/UL
MONOCYTES # BLD AUTO: 0.7 K/UL
MONOCYTES # BLD AUTO: 0.8 K/UL
MONOCYTES NFR BLD: 7.1 %
MONOCYTES NFR BLD: 7.2 %
MONOCYTES NFR BLD: 7.2 %
NEUTROPHILS # BLD AUTO: 7 K/UL
NEUTROPHILS # BLD AUTO: 7.7 K/UL
NEUTROPHILS # BLD AUTO: 7.7 K/UL
NEUTROPHILS NFR BLD: 70.6 %
NEUTROPHILS NFR BLD: 74.6 %
NEUTROPHILS NFR BLD: 76.3 %
OVALOCYTES BLD QL SMEAR: ABNORMAL
PHOSPHATE SERPL-MCNC: 2.7 MG/DL
PLATELET # BLD AUTO: 540 K/UL
PLATELET # BLD AUTO: 606 K/UL
PLATELET # BLD AUTO: 624 K/UL
PLATELET BLD QL SMEAR: ABNORMAL
PMV BLD AUTO: 9.2 FL
PMV BLD AUTO: 9.3 FL
PMV BLD AUTO: 9.6 FL
POCT GLUCOSE: 110 MG/DL (ref 70–110)
POCT GLUCOSE: 181 MG/DL (ref 70–110)
POCT GLUCOSE: 77 MG/DL (ref 70–110)
POCT GLUCOSE: 91 MG/DL (ref 70–110)
POCT GLUCOSE: 95 MG/DL (ref 70–110)
POCT GLUCOSE: 97 MG/DL (ref 70–110)
POIKILOCYTOSIS BLD QL SMEAR: SLIGHT
POLYCHROMASIA BLD QL SMEAR: ABNORMAL
POLYCHROMASIA BLD QL SMEAR: ABNORMAL
POTASSIUM SERPL-SCNC: 3.5 MMOL/L
PROT SERPL-MCNC: 6.3 G/DL
PROTHROMBIN TIME: 10.8 SEC
RBC # BLD AUTO: 2.78 M/UL
RBC # BLD AUTO: 2.92 M/UL
RBC # BLD AUTO: 3.49 M/UL
RETICS/RBC NFR AUTO: 2 %
RETIRED EF AND QEF - SEE NOTES: 55 (ref 55–65)
SATURATED IRON: 2 %
SODIUM SERPL-SCNC: 141 MMOL/L
TOTAL IRON BINDING CAPACITY: 462 UG/DL
TRANS ERYTHROCYTES VOL PATIENT: NORMAL ML
TRANS ERYTHROCYTES VOL PATIENT: NORMAL ML
TRANSFERRIN SERPL-MCNC: 312 MG/DL
TRANSFERRIN SERPL-MCNC: 312 MG/DL
VIT B12 SERPL-MCNC: 168 PG/ML
WBC # BLD AUTO: 10.12 K/UL
WBC # BLD AUTO: 10.92 K/UL
WBC # BLD AUTO: 9.42 K/UL

## 2017-02-27 PROCEDURE — 85045 AUTOMATED RETICULOCYTE COUNT: CPT

## 2017-02-27 PROCEDURE — 63600175 PHARM REV CODE 636 W HCPCS: Performed by: NURSE PRACTITIONER

## 2017-02-27 PROCEDURE — 99233 SBSQ HOSP IP/OBS HIGH 50: CPT | Mod: ,,, | Performed by: NURSE PRACTITIONER

## 2017-02-27 PROCEDURE — 20000000 HC ICU ROOM

## 2017-02-27 PROCEDURE — 97802 MEDICAL NUTRITION INDIV IN: CPT

## 2017-02-27 PROCEDURE — G8979 MOBILITY GOAL STATUS: HCPCS | Mod: CK

## 2017-02-27 PROCEDURE — 97166 OT EVAL MOD COMPLEX 45 MIN: CPT

## 2017-02-27 PROCEDURE — 63600175 PHARM REV CODE 636 W HCPCS: Performed by: STUDENT IN AN ORGANIZED HEALTH CARE EDUCATION/TRAINING PROGRAM

## 2017-02-27 PROCEDURE — 85610 PROTHROMBIN TIME: CPT

## 2017-02-27 PROCEDURE — 83615 LACTATE (LD) (LDH) ENZYME: CPT

## 2017-02-27 PROCEDURE — 83010 ASSAY OF HAPTOGLOBIN QUANT: CPT

## 2017-02-27 PROCEDURE — G8987 SELF CARE CURRENT STATUS: HCPCS | Mod: CL

## 2017-02-27 PROCEDURE — P9021 RED BLOOD CELLS UNIT: HCPCS

## 2017-02-27 PROCEDURE — 93307 TTE W/O DOPPLER COMPLETE: CPT

## 2017-02-27 PROCEDURE — 80053 COMPREHEN METABOLIC PANEL: CPT

## 2017-02-27 PROCEDURE — 99233 SBSQ HOSP IP/OBS HIGH 50: CPT | Mod: ,,, | Performed by: PSYCHIATRY & NEUROLOGY

## 2017-02-27 PROCEDURE — 25000003 PHARM REV CODE 250: Performed by: NURSE PRACTITIONER

## 2017-02-27 PROCEDURE — 85025 COMPLETE CBC W/AUTO DIFF WBC: CPT | Mod: 91

## 2017-02-27 PROCEDURE — 94640 AIRWAY INHALATION TREATMENT: CPT

## 2017-02-27 PROCEDURE — 36430 TRANSFUSION BLD/BLD COMPNT: CPT

## 2017-02-27 PROCEDURE — 36415 COLL VENOUS BLD VENIPUNCTURE: CPT

## 2017-02-27 PROCEDURE — G8996 SWALLOW CURRENT STATUS: HCPCS | Mod: CJ

## 2017-02-27 PROCEDURE — 25000242 PHARM REV CODE 250 ALT 637 W/ HCPCS: Performed by: STUDENT IN AN ORGANIZED HEALTH CARE EDUCATION/TRAINING PROGRAM

## 2017-02-27 PROCEDURE — 83540 ASSAY OF IRON: CPT

## 2017-02-27 PROCEDURE — G8978 MOBILITY CURRENT STATUS: HCPCS | Mod: CK

## 2017-02-27 PROCEDURE — G8997 SWALLOW GOAL STATUS: HCPCS | Mod: CJ

## 2017-02-27 PROCEDURE — 93307 TTE W/O DOPPLER COMPLETE: CPT | Mod: 26,,, | Performed by: INTERNAL MEDICINE

## 2017-02-27 PROCEDURE — 83735 ASSAY OF MAGNESIUM: CPT

## 2017-02-27 PROCEDURE — G8988 SELF CARE GOAL STATUS: HCPCS | Mod: CJ

## 2017-02-27 PROCEDURE — 82728 ASSAY OF FERRITIN: CPT

## 2017-02-27 PROCEDURE — 92610 EVALUATE SWALLOWING FUNCTION: CPT

## 2017-02-27 PROCEDURE — 27000221 HC OXYGEN, UP TO 24 HOURS

## 2017-02-27 PROCEDURE — 97161 PT EVAL LOW COMPLEX 20 MIN: CPT

## 2017-02-27 PROCEDURE — 82607 VITAMIN B-12: CPT

## 2017-02-27 PROCEDURE — 25000003 PHARM REV CODE 250: Performed by: STUDENT IN AN ORGANIZED HEALTH CARE EDUCATION/TRAINING PROGRAM

## 2017-02-27 PROCEDURE — 84100 ASSAY OF PHOSPHORUS: CPT

## 2017-02-27 RX ORDER — HYDROCODONE BITARTRATE AND ACETAMINOPHEN 500; 5 MG/1; MG/1
TABLET ORAL
Status: DISCONTINUED | OUTPATIENT
Start: 2017-02-27 | End: 2017-03-02 | Stop reason: HOSPADM

## 2017-02-27 RX ORDER — CYANOCOBALAMIN 1000 UG/ML
1000 INJECTION, SOLUTION INTRAMUSCULAR; SUBCUTANEOUS
Status: DISCONTINUED | OUTPATIENT
Start: 2017-02-28 | End: 2017-03-02 | Stop reason: HOSPADM

## 2017-02-27 RX ORDER — MIDAZOLAM HYDROCHLORIDE 1 MG/ML
2 INJECTION INTRAMUSCULAR; INTRAVENOUS ONCE
Status: DISCONTINUED | OUTPATIENT
Start: 2017-02-27 | End: 2017-02-28

## 2017-02-27 RX ORDER — AMOXICILLIN 250 MG
1 CAPSULE ORAL DAILY
Status: DISCONTINUED | OUTPATIENT
Start: 2017-02-27 | End: 2017-03-02 | Stop reason: HOSPADM

## 2017-02-27 RX ORDER — ACETYLCYSTEINE 200 MG/ML
600 SOLUTION ORAL; RESPIRATORY (INHALATION) 2 TIMES DAILY
Status: COMPLETED | OUTPATIENT
Start: 2017-02-27 | End: 2017-02-27

## 2017-02-27 RX ORDER — THIAMINE HCL 100 MG
100 TABLET ORAL DAILY
Status: DISCONTINUED | OUTPATIENT
Start: 2017-02-27 | End: 2017-03-02 | Stop reason: HOSPADM

## 2017-02-27 RX ORDER — LISINOPRIL 20 MG/1
20 TABLET ORAL DAILY
Status: DISCONTINUED | OUTPATIENT
Start: 2017-02-28 | End: 2017-02-28

## 2017-02-27 RX ORDER — MIDAZOLAM HYDROCHLORIDE 1 MG/ML
INJECTION INTRAMUSCULAR; INTRAVENOUS
Status: DISPENSED
Start: 2017-02-27 | End: 2017-02-27

## 2017-02-27 RX ADMIN — POTASSIUM CHLORIDE 40 MEQ: 7.46 INJECTION, SOLUTION INTRAVENOUS at 08:02

## 2017-02-27 RX ADMIN — LABETALOL HYDROCHLORIDE 10 MG: 5 INJECTION, SOLUTION INTRAVENOUS at 11:02

## 2017-02-27 RX ADMIN — INSULIN ASPART 1 UNITS: 100 INJECTION, SOLUTION INTRAVENOUS; SUBCUTANEOUS at 11:02

## 2017-02-27 RX ADMIN — STANDARDIZED SENNA CONCENTRATE AND DOCUSATE SODIUM 1 TABLET: 8.6; 5 TABLET, FILM COATED ORAL at 02:02

## 2017-02-27 RX ADMIN — IPRATROPIUM BROMIDE AND ALBUTEROL SULFATE 3 ML: .5; 3 SOLUTION RESPIRATORY (INHALATION) at 12:02

## 2017-02-27 RX ADMIN — MAGNESIUM SULFATE IN WATER 4 G: 40 INJECTION, SOLUTION INTRAVENOUS at 05:02

## 2017-02-27 RX ADMIN — ACETYLCYSTEINE 600 MG: 200 INHALANT RESPIRATORY (INHALATION) at 09:02

## 2017-02-27 RX ADMIN — SODIUM PHOSPHATE, MONOBASIC, MONOHYDRATE 15 MMOL: 276; 142 INJECTION, SOLUTION INTRAVENOUS at 05:02

## 2017-02-27 RX ADMIN — IPRATROPIUM BROMIDE AND ALBUTEROL SULFATE 3 ML: .5; 3 SOLUTION RESPIRATORY (INHALATION) at 01:02

## 2017-02-27 RX ADMIN — SODIUM CHLORIDE, PRESERVATIVE FREE 3 ML: 5 INJECTION INTRAVENOUS at 09:02

## 2017-02-27 RX ADMIN — THIAMINE HCL TAB 100 MG 100 MG: 100 TAB at 02:02

## 2017-02-27 RX ADMIN — CYANOCOBALAMIN TAB 1000 MCG 1000 MCG: 1000 TAB at 08:02

## 2017-02-27 NOTE — PLAN OF CARE
SW reviewed chart. OT/PT orders are in and assigned. SW will continue to follow for recs and spoke with PT regarding this Pt. They will advise when recs are determined.    Radha Breen LMSW  Neurocritical Care   [facility]  [id number]

## 2017-02-27 NOTE — PLAN OF CARE
Problem: Patient Care Overview  Goal: Plan of Care Review  Recommendations     Recommendation/Intervention:   1. When able, advance diet to Cardiac - dental soft (texture per SLP) as tolerated. Encourage small bites and sips sitting upright.   2. Encourage optimal po intake and order Boost Glucose Control TID for added protein and calories.   3. If medically appropriate, add MV.   RD to monitor     Goals: Pt will receive nutrition within 24-48 hours  Nutrition Goal Status: new  Communication of RD Recs: reviewed with RN

## 2017-02-27 NOTE — CONSULTS
[facility]  Adult Nutrition  Consult Note    SUMMARY     Recommendations    Recommendation/Intervention:   1. When able, advance diet to Cardiac - dental soft (texture per SLP) as tolerated. Encourage small bites and sips sitting upright. 2. Encourage optimal po intake and order Boost Glucose Control TID for added protein and calories.   3. If medically appropriate, add MV.   RD to monitor    Goals: Pt will receive nutrition within 24-48 hours  Nutrition Goal Status: new  Communication of RD Recs: reviewed with RN    Continuum of Care Plan    Referral to Outpatient Services: other (see comments) (Nutrition D/C Planning: Adequate nutrition intake via PO)    Reason for Assessment    Reason for Assessment: physician consult  Diagnosis: stroke/CVA  Relevent Medical History: 52 yo F presents with daily alcohol consuumption, PMH of DVT, B12 deficiency anemia, T2DM   Interdisciplinary Rounds: did not attend     General Information Comments: Pt out for MRI upon visit. With SLP upon second attempt. Per SLP note, pt able to advance diet to dental soft. Currently NPO. RD will monitor and follow-up.     Nutrition Prescription Ordered    Current Diet Order: NPO     Evaluation of Received Nutrients/Fluid Intake      IV Fluid (mL): 426       Nutrition Risk Screen     Nutrition Risk Screen: dysphagia or difficulty swallowing    Nutrition/Diet History    Patient Reported Diet/Restrictions/Preferences: other (see comments) (FACUNDO)  Typical Food/Fluid Intake: FACUNDO  Food Preferences: FACUNDO        Factors Affecting Nutritional Intake: NPO      Labs/Tests/Procedures/Meds       Pertinent Labs Reviewed: reviewed  Pertinent Labs Comments: H&H 5.7/20.0, Plts 606, MCHC 28.5, RBC 2.78, Glu 90, Cl 111, Co2 21  Pertinent Medications Reviewed: reviewed  Pertinent Medications Comments: B12 tab, IVF @ 75 ml/hr    Physical Findings    Overall Physical Appearance: overweight        Skin: intact    Anthropometrics       Height (inches):  65 in  Weight Method: Stated  Weight (kg): 78 kg     Ideal Body Weight (IBW), Female: 125 lb     % Ideal Body Weight, Female (lb): 137.57 lb  BMI (kg/m2): 28.62  BMI Grade: 25 - 29.9 - overweight     Estimated/Assessed Needs    Weight Used For Calorie Calculations: 79.9 kg (176 lb 2.4 oz)   Height (cm): 165.1 cm     Energy Need Method: [place]-St Jeor (3280-5680 kcal (PAL x1.25-1.3))     RMR ([place]-St. Jeor Equation): 1404.1        Weight Used For Protein Calculations: 79.9 kg (176 lb 2.4 oz)  Protein Requirements: 72-88 g (0.9-1.1 g/kg)    Fluid Need Method: RDA Method (or per MD)     Monitor and Evaluation    Food and Nutrient Intake: energy intake  Food and Nutrient Adminstration: diet order     Physical Activity and Function: nutrition-related ADLs and IADLs  Anthropometric Measurements: weight, weight change, body mass index  Biochemical Data, Medical Tests and Procedures: electrolyte and renal panel, glucose/endocrine profile, lipid profile  Nutrition-Focused Physical Findings: overall appearance    Nutrition Risk    Level of Risk: high    Nutrition Follow-Up    RD Follow-up?: Yes    Nutrition Diagnosis    Problem: Inadequate energy intake   Etiology: related to inability to consume sufficient energy    S&S: dysphagia and NPO status  Status: New

## 2017-02-27 NOTE — PROGRESS NOTES
ICU Progress Note  Neurocritical Care    Admit Date: 2/26/2017  LOS: 1    CC: Cerebral infarction due to thrombosis of right carotid artery    Code Status: Full Code     SUBJECTIVE:     Interval History/Significant Events:     Patient is a 51 year old female with PMHx DVT & PE x2 (first in 1991 thought to be due to OCP use) s/p greenfiled filter placement in 1990s with her most recent DVT in June 2016 hospitalized @ [facility], asthma, HTN, B12 deficiency with anemia, DM and daily alcohol use admitted to NCC unit after R ICA thrombectomy. Patient also had distal M3 branch occlusion that was too distal for intervention.      Patient was in usual state of health when she woke up around 6 AM short of breath. Patient then fell out of her bed on her right side and could not get up due to left sided weakness. Patient was found by her aunt and came to hospital for further care. In ED Vascular Neurology declined IV TPA due to patient being compliant on Xarelto. Patient was taken directly to angiography where R ICA occlusive thrombus seen and thrombectoly was successfully performed. Subsequent R distal M3 branch occlusion was too distal for intervention. Patient admitted to critical care unit for further management. Patient only endorsing SOB following intervention. Continues with left arm weakness.           Plan:  SBP <160  ASA/chemical DVT ppx held while investigating anemia/blood loss.   ABD US in AM, mucomyst for renal protection and increase IVF  Anemia panel, transfuse 2 PRBC  MRI pending        Review of Symptoms:   Constitutional: Denies fevers or chills.  Pulmonary: Denies shortness of breath or cough.  Cardiology: Denies chest pain or palpitations.  GI: Denies abdominal pain or constipation.  Neurologic: Denies new weakness, headaches, or paresthesias.     Medications:  Continuous Infusions:   sodium chloride 0.9% 75 mL/hr at 02/27/17 0500     Scheduled Meds:   cyanocobalamin  1,000 mcg Oral Daily    midazolam         midazolam (PF)  2 mg Intravenous Once    polyethylene glycol  17 g Oral Daily    sodium chloride 0.9%  3 mL Intravenous Q8H    thiamine  100 mg Oral Daily     PRN Meds:.albuterol-ipratropium 2.5mg-0.5mg/3mL, dextrose 50%, dextrose 50%, glucagon (human recombinant), glucose, glucose, insulin aspart, labetalol, magnesium sulfate IVPB, magnesium sulfate IVPB, midazolam, potassium chloride **AND** potassium chloride **AND** potassium chloride, senna, sodium phosphate IVPB, sodium phosphate IVPB, sodium phosphate IVPB    OBJECTIVE:   Vital Signs (Most Recent):   Temp: 98.6 °F (37 °C) (02/27/17 1050)  Pulse: 84 (02/27/17 1353)  Resp: (!) 29 (02/27/17 1353)  BP: (!) 154/86 (02/27/17 1353)  SpO2: 100 % (02/27/17 1353)    Vital Signs (24h Range):   Temp:  [98.3 °F (36.8 °C)-99 °F (37.2 °C)] 98.6 °F (37 °C)  Pulse:  [] 84  Resp:  [15-34] 29  SpO2:  [95 %-100 %] 100 %  BP: (135-197)/() 154/86    ICP/CPP (Last 24h):        I & O (Last 24h):   Intake/Output Summary (Last 24 hours) at 02/27/17 1402  Last data filed at 02/27/17 1357   Gross per 24 hour   Intake           2417.5 ml   Output              775 ml   Net           1642.5 ml     Physical Exam:  GA: Alert, comfortable, no acute distress.   HEENT: No scleral icterus or JVD.   Pulmonary: Clear to auscultation A/P/L. No wheezing, crackles, or rhonchi.  Cardiac: RRR S1 & S2 w/o rubs/murmurs/gallops.   Abdominal: Bowel sounds present x 4. No appreciable hepatosplenomegaly.  Skin: No jaundice, rashes, or visible lesions.  Neuro:  --GCS: E4 V5 M6  --Mental Status:  AAO x 4, COnverse  --CN II-XII grossly intact.   --Pupils mm, PERRL.  -Left facial weakness   --left hemiparesis      Vent Data:        Lines/Drains/Airway:          Nutrition/Tube Feeds (if NPO state why): mechanical soft    Labs:  ABG: No results for input(s): PH, PO2, PCO2, HCO3, POCSATURATED, BE in the last 24 hours.  BMP:  Recent Labs  Lab 02/27/17  0257      K 3.5   *   CO2 21*    BUN 9   CREATININE 0.9   GLU 90   MG 1.4*   PHOS 2.7     LFT: Lab Results   Component Value Date    AST 19 02/27/2017    ALT 8 (L) 02/27/2017    ALKPHOS 51 (L) 02/27/2017    BILITOT 0.3 02/27/2017    ALBUMIN 2.8 (L) 02/27/2017    PROT 6.3 02/27/2017     CBC:   Lab Results   Component Value Date    WBC 9.42 02/27/2017    HGB 5.7 (LL) 02/27/2017    HGB 5.7 (LL) 02/27/2017    HCT 20.0 (L) 02/27/2017    HCT 20.0 (L) 02/27/2017    MCV 72 (L) 02/27/2017     (H) 02/27/2017     Microbiology x 7d:   Microbiology Results (last 7 days)     ** No results found for the last 168 hours. **        Imaging:  I personally reviewed the above image.    ASSESSMENT/PLAN:     Active Hospital Problems    Diagnosis    *Cerebral infarction due to thrombosis of right carotid artery    Internal carotid artery dissection    Flaccid hemiplegia affecting left nondominant side    Type 2 diabetes mellitus without complication    Long-term (current) use of anticoagulants    Essential hypertension      Neuro:   Right MCA stroke  S/p thrombectomy, right ICA occlusion 2/26  Vascular Neurology following  Q1hr Neuro Checks  SBP  < 160  PT/OT  Hold ASA/chemical DVT ppx while investigating decreasing Hgb    Pulmonary:   NC  Nebs PRN  CXR/ABG if distress    Cardiac:   HTN  -SBP <160  -Home lisinopril reumed  -Echo: EF 60%    Renal:    BUN/Crt stable  Concern with recent Cerebral angio contrast. Will give mucomyst x 24 hours and have ABD CT in am for eval of retroperitoneal bleed.     ID:   Leukocytosis improved  Afebrile      Hem/Onc:   Anemia  -Repeat CBC  -CT ABD in AM, monitor renal function with recent Cerebral angio  -Anemia panel  -Serial CBC  -Transfuse 2 PRBC     Endocrine:    DM2  HgbA1c 6.8  FSBS q6hr    TSH 1.147    Fluids/Electrolytes/Nutrition/GI:   Lytes PRN  Diet: Mechanical SOft, nectar liquids    Proph:  DVT: SCD  Constipation:  Senna, mirlax      Level 3  I spent >35 minutes reviewing patient records, examining, and  counseling the patient with greater than 50% of the time spent with direct patient care and coordination.     Adebayo Sabillon [facility]  NeuroCritical Care

## 2017-02-27 NOTE — PT/OT/SLP EVAL
Occupational Therapy  Evaluation    Lyssa Gandara   MRN: [id number]   Admitting Diagnosis: Cerebral infarction due to thrombosis of right carotid artery    OT Date of Treatment: 02/27/17   OT Start Time: 1005  OT Stop Time: 1025  OT Total Time (min): 20 min    Billable Minutes:  Evaluation 20    Diagnosis: Cerebral infarction due to thrombosis of right carotid artery   Left facial droop, left sided weakness, left eye maxillary bruising & hematoma s/p fall from bed, s/p right carotid thrombectomy    Past Medical History:   Diagnosis Date    Asthma     Dyspnea on exertion     Hx of long term use of blood thinners     Hypertension 3/8/2016    PE (pulmonary embolism)     Hx of multiple DVT/PE    Presence of IVC filter     Scleroderma     Type 2 diabetes mellitus without complication       Past Surgical History:   Procedure Laterality Date    DANETTE FILTER PLACEMENT         Referring physician: JOMAR Amin MD  Date referred to OT: 2/26    General Precautions: Standard, aspiration, fall, NPO (cardiac)    Do you have any cultural, spiritual, Anglican conflicts, given your current situation?: Mormonism     Patient History:  Living Environment  Living Environment Comment: Pt resides with her grand-mother & 2 aunts in [place] in 1 story house with 4-5 steps & 2 rails to enter.  Pt has a tall toilet & grab bars in bathtub.    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     Dominant hand: right    Subjective:  Communicated with RN prior to session.  Pt with no complaints.  Chief Complaint: none  Patient/Family stated goals: none stated    Pain  Ratin/10  Pain Rating Post-Intervention: 0/10    Objective:  Patient found with: blood pressure cuff, pulse ox (continuous), oxygen, peripheral IV    Cognitive Exam:  Oriented to: Person, Place, Time and Situation  Follows Commands/attention: Follows two-step commands  Communication: dysarthria  Memory:  No Deficits noted  Safety awareness/insight to disability: intact  Coping skills/emotional control: Appropriate to situation    Visual/perceptual:  Fair tracking & peripheral vision, some inattention to the left noted    Physical Exam:  Postural examination/scapula alignment: Rounded shoulder, Head forward and Posterior pelvic tilt  Skin integrity: bruising & edema around left eye & facial region  Edema: moderate at eye & face on left side    Sensation:   Moderately impaired to left side of face & LUE  WFL for RUE    Upper Extremity Range of Motion:  Right Upper Extremity: WFL  Left Upper Extremity: WFL    Upper Extremity Strength:  Right Upper Extremity: WFL  Left Upper Extremity: WFL   Strength: WFL    Fine motor coordination:   Slightly impaired LUE    Gross motor coordination: impaired LUE    Functional Mobility:  Bed Mobility:  Supine to Sit: Stand by Assistance    Transfers:  Sit <> Stand Assistance: Contact Guard Assistance (from EOB)  Sit <> Stand Assistive Device: No Assistive Device  Bed <> Chair Technique: Stand Pivot  Bed <> Chair Transfer Assistance: Contact Guard Assistance (from EOB to w/c)  Bed <> Chair Assistive Device: No Assistive Device    Functional Ambulation: Pt took 3-4 steps forward & back with CGA.    Activities of Daily Living:     UE Dressing Level of Assistance: Moderate assistance (seated EOB)  LE Dressing Level of Assistance: Contact guard (donning socks seated EOb)  Grooming Position: Standing  Grooming Level of Assistance: Contact guard assistance     AM-PAC 6 CLICK ADL  How much help from another person does this patient currently need?  1 = Unable, Total/Dependent  "Assistance  2 = A lot, Maximum/Moderate Assistance  3 = A little, Minimum/Contact Guard/Supervision  4 = None, Modified [place]/Independent    Putting on and taking off regular lower body clothing? : 2  Bathing (including washing, rinsing, drying)?: 2  Toileting, which includes using toilet, bedpan, or urinal? : 2  Putting on and taking off regular upper body clothing?: 2  Taking care of personal grooming such as brushing teeth?: 3  Eating meals?: 1  Total Score: 12    AM-PAC Raw Score CMS "G-Code Modifier Level of Impairment Assistance   6 % Total / Unable   7 - 9 CM 80 - 100% Maximal Assist   10 - 14 CL 60 - 80% Moderate Assist   15 - 19 CK 40 - 60% Moderate Assist   20 - 22 CJ 20 - 40% Minimal Assist   23 CI 1-20% SBA / CGA   24 CH 0% Independent/ Mod I       Patient left seated in w/c with RN present with all lines intact, call button in reach, RN notified and white board updated.    Assessment:  Lyssa Gandara is a 51 y.o. 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  Pt would benefit from OT to address independence with ADL's.    Rehab identified problem list/impairments: Rehab identified problem list/impairments: weakness, impaired endurance, impaired sensation, impaired self care skills, impaired functional mobilty, impaired balance, decreased coordination, decreased safety awareness, decreased upper extremity function, decreased lower extremity function    Rehab potential is good.    Activity tolerance: Good    Discharge recommendations: Discharge Facility/Level Of Care Needs: rehabilitation facility (pt is caretaker of her grandmother)     GOALS:   Occupational Therapy Goals        Problem: Occupational Therapy Goal    Goal Priority Disciplines " Outcome Interventions   Occupational Therapy Goal     OT, PT/OT Ongoing (interventions implemented as appropriate)    Description:  Goals to be met by: 3/12     Patient will increase functional independence with ADLs by performing:    UE Dressing with Supervision.  LE Dressing with Supervision.  Grooming while standing with Supervision.  Toileting from bedside commode with Supervision for hygiene and clothing management.   Rolling to Bilateral with Modified [place].   Supine to sit with Modified [place].  Stand pivot transfers with Supervision.                PLAN:  Patient to be seen 6 x/week to address the above listed problems via self-care/home management, neuromuscular re-education, cognitive retraining, sensory integration, therapeutic activities, therapeutic exercises  Plan of Care expires: 03/29/17  Plan of Care reviewed with: patient    OT G-codes  Functional Assessment Tool Used: [facility]  Score: 12  Functional Limitation: Self care  Self Care Current Status (): CL  Self Care Goal Status (): KIAH Rojo  02/27/2017

## 2017-02-27 NOTE — PLAN OF CARE
Problem: SLP Goal  Goal: SLP Goal  Speech Language Pathology Goals  Goals expected to be met by 3/6  1. Pt. Will participate in speech language eval  2. Tolerate soft diet with thin liquids with no s/s of aspiration  Outcome: Ongoing (interventions implemented as appropriate)  Dental soft diet with thin liquids recommended.  ELIA Fry MA/[facility]-SLP  Speech Language Pathologist  Pager [phone]  2/27/2017

## 2017-02-27 NOTE — PT/OT/SLP EVAL
Physical Therapy  Evaluation    Lyssa Gandara   MRN: [id number]   Admitting Diagnosis: Cerebral infarction due to thrombosis of right carotid artery    PT Received On: 17  PT Start Time: 1247     PT Stop Time: 1300    PT Total Time (min): 13 min       Billable Minutes:  Evaluation 13    Diagnosis: Cerebral infarction due to thrombosis of right carotid artery  S/p thrombectomy    Past Medical History:   Diagnosis Date    Asthma     Dyspnea on exertion     Hx of long term use of blood thinners     Hypertension 3/8/2016    PE (pulmonary embolism)     Hx of multiple DVT/PE    Presence of IVC filter     Scleroderma     Type 2 diabetes mellitus without complication       Past Surgical History:   Procedure Laterality Date    DANETTE FILTER PLACEMENT         Referring physician: Mary Amin MD  Date referred to PT: 17     General Precautions: Standard, aspiration, fall, NPO      Do you have any cultural, spiritual, Tenriism conflicts, given your current situation?: none stated    Patient History:  Living Environment Comment: Pt reports she lives with her grandmother & 2 aunts in a [facility] with 5 FELIPE, B HR. Pt provides care to grandmother and assists her with mobility & care. PTA, pt was (I) with all mobility and ADLs including driving. She owns no DME, but states her grandmother has a shower chair & BSC she doesn't use. Pt states her aunts are able to assist as needed upon d/c, but this PT is unsure to what extent.   Equipment Currently Used at Home: none       Previous Level of Function:  Ambulation Skills: independent  Transfer Skills: independent  ADL Skills: independent    Subjective:  Communicated with RN prior to session.  Pt agreeable to PT session.     Chief Complaint: SOB/difficulty breathing after session  Patient goals: To get stronger and return home    Pain Ratin/10   Pain Rating Post-Intervention: 0/10    Objective:   Patient found with: blood pressure cuff, pulse ox  (continuous), oxygen, telemetry, peripheral IV     Cognitive Exam:  Oriented to: Person, Place, Time and Situation    Follows Commands/attention: Follows two-step commands  Communication: dysarthria  Safety awareness/insight to disability: intact    Physical Exam:  Postural examination/scapula alignment: Rounded shoulder and Head forward    Skin integrity: Visible skin intact and Bruising of L eye  Edema: Mild L eye & face    Sensation:   Intact B LE    Lower Extremity Range of Motion:  Right Lower Extremity: WFL  Left Lower Extremity: WFL    Lower Extremity Strength:  Right Lower Extremity: 4/5 grossly throughout  Left Lower Extremity: 4/5 grossly throughout       Gross motor coordination: WFL    Functional Mobility:  Bed Mobility:  Supine to Sit: Stand by Assistance  Sit to Supine: Stand by Assistance    Transfers:  Sit <> Stand Assistance: Contact Guard Assistance  Sit <> Stand Assistive Device: No Assistive Device    Gait:   Gait Distance: Pt ambulated ~8 feet fwd/back from EOB using no AD and CGA. Pt demonstrated slight unsteady gait with decrease stance on L LE, short steps, and step-to gait. No LOB noted.   Assistance 1: Contact Guard Assistance  Gait Assistive Device: No device  Gait Pattern: 2-point gait  Gait Deviation(s): decreased jillian, increased time in double stance, decreased velocity of limb motion, decreased step length, decreased stride length, decreased swing-to-stance ratio, decreased toe-to-floor clearance    Stairs:  Unable to attempt this visit 2° ICU lines monitoring vitals    Balance:   Static Sit: GOOD: Takes MODERATE challenges from all directions  Dynamic Sit: GOOD: Maintains balance through MODERATE excursions of active trunk movement  Static Stand: FAIR: Maintains without assist but unable to take challenges  Dynamic stand: FAIR: Needs CONTACT GUARD during gait    Therapeutic Activities and Exercises:  FUNCTION IN SITTING TEST (FIST)    Anterior Nudge: superior sternum  4  Posterior  Nudge: between scapular spines 4  Lateral Nudge: to dominant side at acromion 4  Static sittin seconds  4  Sitting, shake no: left and right  3  Sitting, eyes closed: 30 seconds  4  Sitting, lift foot: dominant side, lift foot 1 inch twice  3   object from behind: hands breadth posterior/mid  4  Forward reach: use dominant arm, complete full ROM  3  Lateral reach: use dominant arm, clear opposite isch tub  3   object from floor: from between feet  3  Posterior scooting: move backwards 2 inches  3  Anterior scooting: move forward 2 inches  3  Lateral scooting: move to dominant side 2 inches  3    Total 48/ 56    Scoring Key:  4 = Independent (completes task independently & successfully)  3 = Verbal cues/increased time (completes task independently; only needs more time/cues)  2 = Upper extremity support (must use UE for support or assistance to complete successfully)  1 = Needs assistance ( document level: min, mod, max)  0 = Dependent (requires complete physical assist; unable to complete even w/physical assist    FIST cutoff score indicating higher likelihood of discharge NOT to home (with or without assistance) measured within first 5 days of inpatient rehabilitation < 42 points.    Education:  Education provided to pt regarding: PT role/POC; safety with mobility. Verbalized understanding.     Whiteboard updated with correct mobility information. RN/PCT notified.  Pt ok to transfer with RN/PCT. Use CGA x1 person.      AM-PAC 6 CLICK MOBILITY  How much help from another person does this patient currently need?   1 = Unable, Total/Dependent Assistance  2 = A lot, Maximum/Moderate Assistance  3 = A little, Minimum/Contact Guard/Supervision  4 = None, Modified [place]/Independent    Turning over in bed (including adjusting bedclothes, sheets and blankets)?: 4  Sitting down on and standing up from a chair with arms (e.g., wheelchair, bedside commode, etc.): 3  Moving from lying on back to  sitting on the side of the bed?: 4  Moving to and from a bed to a chair (including a wheelchair)?: 3  Need to walk in hospital room?: 3  Climbing 3-5 steps with a railing?: 2  Total Score: 19     AM-PAC Raw Score CMS G-Code Modifier Level of Impairment Assistance   6 % Total / Unable   7 - 9 CM 80 - 100% Maximal Assist   10 - 14 CL 60 - 80% Moderate Assist   15 - 19 CK 40 - 60% Moderate Assist   20 - 22 CJ 20 - 40% Minimal Assist   23 CI 1-20% SBA / CGA   24 CH 0% Independent/ Mod I     Patient left supine with all lines intact and call button in reach.    Assessment:   Lyssa Gandara is a 51 y.o. female with a medical diagnosis of Cerebral infarction due to thrombosis of right carotid artery; s/p thrombectomy and presents with decrease endurance, strength, balance, coordination, and safety with mobility.  Pt tolerated session well with no c/o increase pain. Pt O2 sats read 100% during PT interview with pt on supplemental oxygen. PT removed oxygen during session and O2 sats remained at 100% on RA. After gait, pt reported feeling SOB stating I can't breathe. O2 was replaced, and RN notified. Pt reported no other symptoms of dizziness. HR elevated during session, but within parameters.  Pt would benefit from continued skilled physical therapy for the listed impairments to improve functional independence and overall safety with mobility prior to d/c. PT recommends d/c disposition to Rehab for further strengthening prior to d/c home. Pt demonstrates good potential to progress and would benefit from 3 h/day to maximize recovery to gain functional independence prior to d/c home.    Pt's history is positive for the following co-morbidities impacting current health status and course of care: PE, Asthma, HTN, DM2. Impairments listed below.  Prior to admit, pt was (I) with mobility/transfers and with ADLs/self-care including driving. 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 She has 5 FELIPE her home which she is also unable to complete at this time.   .    Rehab identified problem list/impairments: Rehab identified problem list/impairments: weakness, impaired endurance, impaired balance, impaired self care skills, impaired functional mobilty, gait instability, decreased lower extremity function, decreased upper extremity function, decreased coordination, impaired cardiopulmonary response to activity, decreased safety awareness    Rehab potential is good.    Activity tolerance: Good    Discharge recommendations: Discharge Facility/Level Of Care Needs: rehabilitation facility (pt is caretaker of grandmother)     Barriers to discharge: Barriers to Discharge: Inaccessible home environment, Decreased caregiver support (5 FELIPE & unsure of family assist upon d/c)    Equipment recommendations: Equipment Needed After Discharge:  (TBD)     GOALS:   Physical Therapy Goals        Problem: Physical Therapy Goal    Goal Priority Disciplines Outcome Goal Variances Interventions   Physical Therapy Goal     PT/OT, PT      Description:  Goals to be met by: 3/11/17     Patient will increase functional independence with mobility by performin. Supine to sit with Modified [place]  2. Sit to supine with Modified [place]  3. Sit to stand transfer with Supervision  4. Gait  x 50 feet with Contact Guard Assistance using most appropriate AD.   5. Ascend/descend 5 stair with right Handrails Minimal Assistance.   6. Lower extremity exercise program x10-15 reps with supervision                PLAN:    Patient to be seen 6 x/week to address the above listed problems via gait training, therapeutic activities, therapeutic exercises, neuromuscular re-education  Plan of Care expires: 17  Plan of Care reviewed with: patient    Functional Assessment Tool Used: ampac  Score: 19  Functional Limitation: Mobility: Walking and moving around  Mobility: Walking and Moving Around Current  Status (): ROGERIO  Mobility: Walking and Moving Around Goal Status (): ROGERIO Vigil, PT  02/27/2017

## 2017-02-27 NOTE — PT/OT/SLP EVAL
"Speech Language Pathology  Evaluation    Lyssa Gandara   MRN: [id number]   Admitting Diagnosis: Cerebral infarction due to thrombosis of right carotid artery    Diet recommendations: Solid Diet Level: Dental Soft  Liquid Diet Level: Thin No straws, HOB to 90 degrees and Small bites/sips    SLP Treatment Date: 17  Speech Start Time: 0845     Speech Stop Time: 0855     Speech Total (min): 10 min       TREATMENT BILLABLE MINUTES:  Eval Swallow and Oral Function 10    Diagnosis: Cerebral infarction due to thrombosis of right carotid artery      Past Medical History:   Diagnosis Date    Asthma     Dyspnea on exertion     Hx of long term use of blood thinners     Hypertension 3/8/2016    PE (pulmonary embolism)     Hx of multiple DVT/PE    Presence of IVC filter     Scleroderma     Type 2 diabetes mellitus without complication      Past Surgical History:   Procedure Laterality Date    DANETTE FILTER PLACEMENT         Has the patient been evaluated by SLP for swallowing? : Yes  Keep patient NPO?: No   General Precautions: Standard, aspiration            Prior diet: regular    Subjective:  "My face is numb from when I fell"  Pain Ratin/10     Pain Rating Post-Intervention: 0/10    Objective:        Oral Musculature Evaluation  Oral Musculature: left weakness  Dentition: present and adequate  Mucosal Quality: good  Mandibular Strength and Mobility: WFL  Lingual Strength and Mobility: impaired left lateral movement  Velar Elevation: WFL  Buccal Strength and Mobility: impaired  Volitional Cough: strong  Volitional Swallow: no delay  Voice Prior to PO Intake: wfl     Bedside Swallow Eval:  Consistencies Assessed: Thin liquids 1/2 cup (3 teaspoons then 5 bolus controlled sips), Puree 3 teaspoons and Solids 1/2 cracker  Oral Phase: anterior loss  Pharyngeal Phase: no overt clinical  signs/symptoms of aspiration and no overt clinical signs/symptoms of pharyngeal dysphagia    Additional Treatment:  "     FIM:                                 Assessment:  Lyssa Gandara is a 51 y.o. female with a medical diagnosis of Cerebral infarction due to thrombosis of right carotid artery and presents with oral dysphagia.    Do you have any cultural, spiritual, Religion conflicts, given your current situation?: no     Discharge recommendations: Discharge Facility/Level Of Care Needs: rehabilitation facility     Goals:   SLP Goals        Problem: SLP Goal    Goal Priority Disciplines Outcome   SLP Goal     SLP Ongoing (interventions implemented as appropriate)   Description:  Speech Language Pathology Goals  Goals expected to be met by 3/6  1.  Pt. Will participate in speech language eval  2.  Tolerate soft diet with thin liquids with no s/s of aspiration                   Plan:   Patient to be seen Therapy Frequency: 5 x/week   Plan of Care expires: 03/29/17  Plan of Care reviewed with: patient  SLP Follow-up?: Yes  SLP - Next Visit Date: 02/28/17           Alexandria Fry MA, CCC-SLP  02/27/2017

## 2017-02-27 NOTE — PLAN OF CARE
Problem: Occupational Therapy Goal  Goal: Occupational Therapy Goal  Goals to be met by: 3/12     Patient will increase functional independence with ADLs by performing:    UE Dressing with Supervision.  LE Dressing with Supervision.  Grooming while standing with Supervision.  Toileting from bedside commode with Supervision for hygiene and clothing management.   Rolling to Bilateral with Modified [place].   Supine to sit with Modified [place].  Stand pivot transfers with Supervision.  Outcome: Ongoing (interventions implemented as appropriate)  OT eval completed.

## 2017-02-27 NOTE — PLAN OF CARE
SW met with Pt at bedside. Discussed therapy recs and gave list for rehab. She will review with family that will be visiting tomorrow.    Radha Breen LMSW  Neurocritical Care   [facility]  [id number]

## 2017-02-27 NOTE — PROGRESS NOTES
NCC team notified of critical Hemoglobin result of 5.7. Orders given to continue to monitor. Pt neuro status unchanged. Will continue to monitor.

## 2017-02-27 NOTE — PLAN OF CARE
Problem: Physical Therapy Goal  Goal: Physical Therapy Goal  Goals to be met by: 3/11/17     Patient will increase functional independence with mobility by performin. Supine to sit with Modified [place]  2. Sit to supine with Modified [place]  3. Sit to stand transfer with Supervision  4. Gait x 50 feet with Contact Guard Assistance using most appropriate AD.   5. Ascend/descend 5 stair with right Handrails Minimal Assistance.   6. Lower extremity exercise program x10-15 reps with supervision           PT evaluation completed. POC and goals established. D/c recommendation is IP Rehab.      Lala Vigil, PT, DPT  2017

## 2017-02-27 NOTE — PLAN OF CARE
Problem: Patient Care Overview  Goal: Plan of Care Review  Outcome: Ongoing (interventions implemented as appropriate)  POC reviewed with pt at 0500. Pt verbalized understanding. CT scan completed. Pt progressing toward goals. Will continue to monitor. See flowsheets for full assessment and VS info

## 2017-02-27 NOTE — PLAN OF CARE
[facility] [place], [place] - [address] [facility]  [address] [facility] [address]  Phone: [phone] Fax: [phone]    This CM spoke with patient at bedside, patient aunt Celi called and ask question concerning patient care and next phase of care from this unit. This CM informed nurse of family request.       02/27/17 1341   Discharge Assessment   Assessment Type Discharge Planning Assessment   Confirmed/corrected address and phone number on facesheet? Yes   Assessment information obtained from? Patient   Expected Length of Stay (days) 3   Communicated expected length of stay with patient/caregiver yes   Prior to hospitilization cognitive status: Alert/Oriented   Prior to hospitalization functional status: Independent   Current cognitive status: Alert/Oriented   Current Functional Status: Needs Assistance   Arrived From home or self-care  (via ambulance)   Lives With grandparent(s);other relative(s)   Able to Return to Prior Arrangements yes   Is patient able to care for self after discharge? Unable to determine at this time (comments)   How many people do you have in your home that can help with your care after discharge? 2   Who are your caregiver(s) and their phone number(s)? (Kristineelizabeth moctezuma [phone] & Zoe Juliocesar [phone])   Patient's perception of discharge disposition home or selfcare   Readmission Within The Last 30 Days no previous admission in last 30 days   Patient currently being followed by outpatient case management? No   Patient currently receives home health services? No   Does the patient currently use HME? No   Patient currently receives private duty nursing? No   Patient currently receives any other outside agency services? No   Equipment Currently Used at Home none   Do you have any problems affording any of your prescribed medications? No   Is the patient taking medications as prescribed? yes   Do you have any financial concerns  preventing you from receiving the healthcare you need? No   Does the patient have transportation to healthcare appointments? Yes   On Dialysis? No   Does the patient receive services at the [facility]madin Clinic? No   Are there any open cases? No   Discharge Plan A Rehab   Discharge Plan B Home Health;Home   Patient/Family In Agreement With Plan yes       Jennifer Florez   [id number]

## 2017-02-28 PROBLEM — I77.3 FIBROMUSCULAR DYSPLASIA OF CERVICOCRANIAL ARTERY: Status: ACTIVE | Noted: 2017-02-28

## 2017-02-28 PROBLEM — I63.411 EMBOLIC STROKE INVOLVING RIGHT MIDDLE CEREBRAL ARTERY: Status: ACTIVE | Noted: 2017-02-28

## 2017-02-28 PROBLEM — R29.898 LEFT ARM WEAKNESS: Status: ACTIVE | Noted: 2017-02-26

## 2017-02-28 LAB
ALBUMIN SERPL BCP-MCNC: 3 G/DL
ALP SERPL-CCNC: 57 U/L
ALT SERPL W/O P-5'-P-CCNC: 10 U/L
ANION GAP SERPL CALC-SCNC: 7 MMOL/L
AST SERPL-CCNC: 17 U/L
BASOPHILS # BLD AUTO: 0.04 K/UL
BASOPHILS # BLD AUTO: 0.05 K/UL
BASOPHILS NFR BLD: 0.4 %
BASOPHILS NFR BLD: 0.5 %
BILIRUB SERPL-MCNC: 1.2 MG/DL
BUN SERPL-MCNC: 7 MG/DL
CALCIUM SERPL-MCNC: 8.7 MG/DL
CHLORIDE SERPL-SCNC: 108 MMOL/L
CO2 SERPL-SCNC: 24 MMOL/L
CREAT SERPL-MCNC: 1.1 MG/DL
DIFFERENTIAL METHOD: ABNORMAL
DIFFERENTIAL METHOD: ABNORMAL
EOSINOPHIL # BLD AUTO: 0.2 K/UL
EOSINOPHIL # BLD AUTO: 0.3 K/UL
EOSINOPHIL NFR BLD: 2 %
EOSINOPHIL NFR BLD: 2.9 %
ERYTHROCYTE [DISTWIDTH] IN BLOOD BY AUTOMATED COUNT: 20.2 %
ERYTHROCYTE [DISTWIDTH] IN BLOOD BY AUTOMATED COUNT: 20.6 %
EST. GFR  (AFRICAN AMERICAN): >60 ML/MIN/1.73 M^2
EST. GFR  (NON AFRICAN AMERICAN): 58.7 ML/MIN/1.73 M^2
GLUCOSE SERPL-MCNC: 126 MG/DL
HCT VFR BLD AUTO: 28.1 %
HCT VFR BLD AUTO: 29.4 %
HGB BLD-MCNC: 8.6 G/DL
HGB BLD-MCNC: 9 G/DL
INR PPP: 1
LYMPHOCYTES # BLD AUTO: 0.9 K/UL
LYMPHOCYTES # BLD AUTO: 1.3 K/UL
LYMPHOCYTES NFR BLD: 12 %
LYMPHOCYTES NFR BLD: 9.8 %
MAGNESIUM SERPL-MCNC: 2 MG/DL
MCH RBC QN AUTO: 23.6 PG
MCH RBC QN AUTO: 23.7 PG
MCHC RBC AUTO-ENTMCNC: 30.6 %
MCHC RBC AUTO-ENTMCNC: 30.6 %
MCV RBC AUTO: 77 FL
MCV RBC AUTO: 77 FL
MONOCYTES # BLD AUTO: 0.6 K/UL
MONOCYTES # BLD AUTO: 1 K/UL
MONOCYTES NFR BLD: 6.9 %
MONOCYTES NFR BLD: 9.2 %
NEUTROPHILS # BLD AUTO: 7.4 K/UL
NEUTROPHILS # BLD AUTO: 7.9 K/UL
NEUTROPHILS NFR BLD: 75.8 %
NEUTROPHILS NFR BLD: 79.8 %
PHOSPHATE SERPL-MCNC: 2.6 MG/DL
PLATELET # BLD AUTO: 516 K/UL
PLATELET # BLD AUTO: 519 K/UL
PMV BLD AUTO: 9.4 FL
PMV BLD AUTO: 9.5 FL
POCT GLUCOSE: 121 MG/DL (ref 70–110)
POCT GLUCOSE: 125 MG/DL (ref 70–110)
POCT GLUCOSE: 204 MG/DL (ref 70–110)
POTASSIUM SERPL-SCNC: 3.6 MMOL/L
PROT SERPL-MCNC: 7 G/DL
PROTHROMBIN TIME: 10.5 SEC
RBC # BLD AUTO: 3.64 M/UL
RBC # BLD AUTO: 3.8 M/UL
SODIUM SERPL-SCNC: 139 MMOL/L
WBC # BLD AUTO: 10.44 K/UL
WBC # BLD AUTO: 9.23 K/UL

## 2017-02-28 PROCEDURE — 97110 THERAPEUTIC EXERCISES: CPT

## 2017-02-28 PROCEDURE — 99233 SBSQ HOSP IP/OBS HIGH 50: CPT | Mod: ,,, | Performed by: PSYCHIATRY & NEUROLOGY

## 2017-02-28 PROCEDURE — 25000003 PHARM REV CODE 250: Performed by: STUDENT IN AN ORGANIZED HEALTH CARE EDUCATION/TRAINING PROGRAM

## 2017-02-28 PROCEDURE — 97530 THERAPEUTIC ACTIVITIES: CPT

## 2017-02-28 PROCEDURE — 84100 ASSAY OF PHOSPHORUS: CPT

## 2017-02-28 PROCEDURE — 94640 AIRWAY INHALATION TREATMENT: CPT

## 2017-02-28 PROCEDURE — 20000000 HC ICU ROOM

## 2017-02-28 PROCEDURE — 25500020 PHARM REV CODE 255: Performed by: PSYCHIATRY & NEUROLOGY

## 2017-02-28 PROCEDURE — 83735 ASSAY OF MAGNESIUM: CPT

## 2017-02-28 PROCEDURE — 25000003 PHARM REV CODE 250: Performed by: NURSE PRACTITIONER

## 2017-02-28 PROCEDURE — 63600175 PHARM REV CODE 636 W HCPCS: Performed by: STUDENT IN AN ORGANIZED HEALTH CARE EDUCATION/TRAINING PROGRAM

## 2017-02-28 PROCEDURE — 80053 COMPREHEN METABOLIC PANEL: CPT

## 2017-02-28 PROCEDURE — 85610 PROTHROMBIN TIME: CPT

## 2017-02-28 PROCEDURE — 97116 GAIT TRAINING THERAPY: CPT

## 2017-02-28 PROCEDURE — 85025 COMPLETE CBC W/AUTO DIFF WBC: CPT | Mod: 91

## 2017-02-28 PROCEDURE — 27000221 HC OXYGEN, UP TO 24 HOURS

## 2017-02-28 PROCEDURE — A9585 GADOBUTROL INJECTION: HCPCS | Performed by: PSYCHIATRY & NEUROLOGY

## 2017-02-28 PROCEDURE — 97535 SELF CARE MNGMENT TRAINING: CPT

## 2017-02-28 PROCEDURE — 25000242 PHARM REV CODE 250 ALT 637 W/ HCPCS: Performed by: STUDENT IN AN ORGANIZED HEALTH CARE EDUCATION/TRAINING PROGRAM

## 2017-02-28 RX ORDER — LISINOPRIL 20 MG/1
20 TABLET ORAL ONCE
Status: COMPLETED | OUTPATIENT
Start: 2017-02-28 | End: 2017-02-28

## 2017-02-28 RX ORDER — INSULIN ASPART 100 [IU]/ML
1-10 INJECTION, SOLUTION INTRAVENOUS; SUBCUTANEOUS
Status: DISCONTINUED | OUTPATIENT
Start: 2017-02-28 | End: 2017-03-02 | Stop reason: HOSPADM

## 2017-02-28 RX ORDER — DABIGATRAN ETEXILATE 150 MG/1
150 CAPSULE ORAL 2 TIMES DAILY
Status: DISCONTINUED | OUTPATIENT
Start: 2017-02-28 | End: 2017-03-02 | Stop reason: HOSPADM

## 2017-02-28 RX ORDER — LISINOPRIL 20 MG/1
40 TABLET ORAL DAILY
Status: DISCONTINUED | OUTPATIENT
Start: 2017-03-01 | End: 2017-03-02 | Stop reason: HOSPADM

## 2017-02-28 RX ORDER — GADOBUTROL 604.72 MG/ML
10 INJECTION INTRAVENOUS
Status: COMPLETED | OUTPATIENT
Start: 2017-02-28 | End: 2017-02-28

## 2017-02-28 RX ADMIN — SODIUM CHLORIDE, PRESERVATIVE FREE 3 ML: 5 INJECTION INTRAVENOUS at 02:02

## 2017-02-28 RX ADMIN — IPRATROPIUM BROMIDE AND ALBUTEROL SULFATE 3 ML: .5; 3 SOLUTION RESPIRATORY (INHALATION) at 02:02

## 2017-02-28 RX ADMIN — SODIUM CHLORIDE, PRESERVATIVE FREE 3 ML: 5 INJECTION INTRAVENOUS at 09:02

## 2017-02-28 RX ADMIN — LABETALOL HYDROCHLORIDE 10 MG: 5 INJECTION, SOLUTION INTRAVENOUS at 09:02

## 2017-02-28 RX ADMIN — LABETALOL HYDROCHLORIDE 10 MG: 5 INJECTION, SOLUTION INTRAVENOUS at 08:02

## 2017-02-28 RX ADMIN — GADOBUTROL 10 ML: 604.72 INJECTION INTRAVENOUS at 03:02

## 2017-02-28 RX ADMIN — LISINOPRIL 20 MG: 20 TABLET ORAL at 09:02

## 2017-02-28 RX ADMIN — POTASSIUM CHLORIDE 40 MEQ: 7.46 INJECTION, SOLUTION INTRAVENOUS at 05:02

## 2017-02-28 RX ADMIN — INSULIN ASPART 3 UNITS: 100 INJECTION, SOLUTION INTRAVENOUS; SUBCUTANEOUS at 12:02

## 2017-02-28 RX ADMIN — SODIUM PHOSPHATE, MONOBASIC, MONOHYDRATE 15 MMOL: 276; 142 INJECTION, SOLUTION INTRAVENOUS at 09:02

## 2017-02-28 RX ADMIN — DABIGATRAN ETEXILATE MESYLATE 150 MG: 150 CAPSULE ORAL at 09:02

## 2017-02-28 RX ADMIN — CEFTRIAXONE 1 G: 1 INJECTION, SOLUTION INTRAVENOUS at 12:02

## 2017-02-28 RX ADMIN — DABIGATRAN ETEXILATE MESYLATE 150 MG: 150 CAPSULE ORAL at 12:02

## 2017-02-28 RX ADMIN — SODIUM CHLORIDE, PRESERVATIVE FREE 3 ML: 5 INJECTION INTRAVENOUS at 05:02

## 2017-02-28 RX ADMIN — IPRATROPIUM BROMIDE AND ALBUTEROL SULFATE 3 ML: .5; 3 SOLUTION RESPIRATORY (INHALATION) at 08:02

## 2017-02-28 NOTE — PROGRESS NOTES
Pt brought to and from CT scan on 2 L/min NC and cardiac monitoring. RN and PCT accompanied patient. No acute events occurred during travel.

## 2017-02-28 NOTE — PT/OT/SLP PROGRESS
Physical Therapy  Treatment    Lyssa Gandara   MRN: [id number]   Admitting Diagnosis: Cerebral infarction due to thrombosis of right carotid artery    PT Received On: 17  PT Start Time: 08     PT Stop Time: 853    PT Total Time (min): 23 min       Billable Minutes:  Gait [id number] and Therapeutic Activity 8    Treatment Type: Treatment  PT/PTA: PT             General Precautions: Standard, fall, aspiration  Orthopedic Precautions: N/A   Braces: N/A    Do you have any cultural, spiritual, Uatsdin conflicts, given your current situation?: none stated    Subjective:  Communicated with nsg prior to session.      Pain Ratin/10            Pt verbalized agreement to treatment session.        Objective:   Patient found with: blood pressure cuff, arterial line, pulse ox (continuous), oxygen, telemetry, peripheral IV; found supine in bed     Functional Mobility:  Bed Mobility:   Supine to Sit: Supervision  Sit to Supine: Supervision    Transfers:  Sit <> Stand Assistance: Stand By Assistance (x3 trials at bedside)  Sit <> Stand Assistive Device: No Assistive Device    Gait:   Gait Distance: 20 ft, 30 ft in room; slowed jillian, trace postural instability denoted  Assistance 1: Stand by Assistance  Gait Assistive Device: No device  Gait Pattern: reciprocal  Gait Deviation(s): decreased jillian, decreased stride length, decreased step length    Balance:   Static Sit: GOOD+: Takes MAXIMAL challenges from all directions.    Dynamic Sit: GOOD: Maintains balance through MODERATE excursions of active trunk movement  Static Stand: GOOD: Takes MODERATE challenges from all directions  Dynamic stand: GOOD: Needs SUPERVISION only during gait and able to self right with moderate      Therapeutic Activities and Exercises:  Stood x 3 trials for ~30 secs prior to performing gait for first 2 trials.   - SBA, no AD, trace postural instability denoted in static standing position    Pt performed unilateral reaching across  midline, forward, and lateral for 15 reps for (B) UE  - incr time needed to reach targets with L UE due to incr weakness.    EDUCATION  Pt educated pt on incr OOB activity including sitting in bedside chair majority of day and amb with nsg and PCT.   Educated pt on being appropriate to transfer with nsg and PCT  Updated white board with appropriate PT information; notified nsg   Pt verbalized agreement.   Educated on discharge recommendation amendment; pt in agreement. Spoke with OT (Conchis) regarding amendment as well.       AM-PAC 6 CLICK MOBILITY  How much help from another person does this patient currently need?   1 = Unable, Total/Dependent Assistance  2 = A lot, Maximum/Moderate Assistance  3 = A little, Minimum/Contact Guard/Supervision  4 = None, Modified [place]/Independent    Turning over in bed (including adjusting bedclothes, sheets and blankets)?: 4  Sitting down on and standing up from a chair with arms (e.g., wheelchair, bedside commode, etc.): 3  Moving from lying on back to sitting on the side of the bed?: 4  Moving to and from a bed to a chair (including a wheelchair)?: 3  Need to walk in hospital room?: 3  Climbing 3-5 steps with a railing?: 3  Total Score: 20    AM-PAC Raw Score CMS G-Code Modifier Level of Impairment Assistance   6 % Total / Unable   7 - 9 CM 80 - 100% Maximal Assist   10 - 14 CL 60 - 80% Moderate Assist   15 - 19 CK 40 - 60% Moderate Assist   20 - 22 CJ 20 - 40% Minimal Assist   23 CI 1-20% SBA / CGA   24 CH 0% Independent/ Mod I     Patient left supine with all lines intact and call button in reach.    Assessment:  Lyssa Gandara is a 51 y.o. female with a medical diagnosis of Cerebral infarction due to thrombosis of right carotid artery and presents with improvement with mobility; trace instability denoted with gait and transfers. Continues to demonstrate L UE weakness req skilled services to address current needs. Discharge recommendation amended to  discharge home c/ OP PT.  Pt remains appropriate for continued skilled services and discharge to postacute location to address the below deficits and improve pt return to PLOF.      Rehab identified problem list/impairments: Rehab identified problem list/impairments: weakness, impaired endurance, gait instability, impaired balance, decreased upper extremity function, decreased coordination, impaired fine motor, decreased safety awareness, pain    Rehab potential is good.    Activity tolerance: Good    Discharge recommendations: Discharge Facility/Level Of Care Needs: outpatient PT     Barriers to discharge: Barriers to Discharge: None    Equipment recommendations: Equipment Needed After Discharge: none     GOALS:   Physical Therapy Goals        Problem: Physical Therapy Goal    Goal Priority Disciplines Outcome Goal Variances Interventions   Physical Therapy Goal     PT/OT, PT Ongoing (interventions implemented as appropriate)     Description:  Goals to be met by: 3/11/17     Patient will increase functional independence with mobility by performin. Supine to sit with Modified [place]  2. Sit to supine with Modified [place]  3. Sit to stand transfer with Supervision  4. Gait  x 50 feet with Contact Guard Assistance using most appropriate AD. MET   Revised: Gait x 200 ft with Supervision using no AD.   5. Ascend/descend 5 stair with right Handrails Minimal Assistance.   6. Lower extremity exercise program x10-15 reps with supervision                 PLAN:    Patient to be seen 6 x/week  to address the above listed problems via gait training, therapeutic activities, therapeutic exercises, neuromuscular re-education  Plan of Care expires: 17  Plan of Care reviewed with: patient         Michelle Duncan, PT, DPT  2017

## 2017-02-28 NOTE — PROGRESS NOTES
[facility]  Vascular Neurology  Comprehensive Stroke Center  Progress Note      Neurologic Chief Complaint: R MCA stroke s/p R ICA thrombectomy and distal M3 occlusion on right.     Subjective:     Interval History: Passed swallow study, recommended dental soft diet. Echo finished this morning, results pending. MRI showing scattered foci of infarct within right MCA distribution. Left sided weakness improving. Carotid ultrasound shows 60-79% R ICA stenosis    HPI, Past Medical, Family, and Social History remains the same as documented in the initial encounter.     Review of Systems   Constitutional: Negative for activity change.   HENT: Negative for hearing loss.    Eyes: Negative for photophobia.   Respiratory: Negative for apnea.    Cardiovascular: Negative for chest pain.   Gastrointestinal: Negative for abdominal distention.   Endocrine: Negative for cold intolerance.   Genitourinary: Negative for difficulty urinating.   Musculoskeletal: Negative for arthralgias.   Skin: Negative for color change.   Neurological: Negative for dizziness.   Psychiatric/Behavioral: Negative for agitation.     Scheduled Meds:   [START ON 2/28/2017] cyanocobalamin  1,000 mcg Intramuscular Q7 Days    [START ON 2/28/2017] lisinopril  20 mg Oral Daily    midazolam (PF)  2 mg Intravenous Once    polyethylene glycol  17 g Oral Daily    senna-docusate 8.6-50 mg  1 tablet Oral Daily    sodium chloride 0.9%  3 mL Intravenous Q8H    thiamine  100 mg Oral Daily     Continuous Infusions:   sodium chloride 0.9% 100 mL/hr at 02/27/17 2300     PRN Meds:sodium chloride, albuterol-ipratropium 2.5mg-0.5mg/3mL, dextrose 50%, dextrose 50%, glucagon (human recombinant), glucose, glucose, insulin aspart, labetalol, magnesium sulfate IVPB, magnesium sulfate IVPB, midazolam, potassium chloride **AND** potassium chloride **AND** potassium chloride, senna, sodium phosphate IVPB, sodium phosphate IVPB, sodium phosphate  IVPB    Objective:     Vital Signs (Most Recent):  Temp: 99 °F (37.2 °C) (17)  Pulse: 91 (17)  Resp: 20 (17)  BP: (!) 156/74 (17)  SpO2: (!) 92 % (17)       Vital Signs Range (Last 24H):  Temp:  [98.3 °F (36.8 °C)-99.1 °F (37.3 °C)]   Pulse:  []   Resp:  [15-39]   BP: (134-197)/()   SpO2:  [92 %-100 %]        Physical Exam   Constitutional: No distress.   HENT:   Head trauma to left face w/ swelling and edema involving orbit and upper lip   Eyes: Pupils are equal, round, and reactive to light.   Neck: No thyromegaly present.   Cardiovascular: Regular rhythm.    Pulmonary/Chest: No respiratory distress. She has no wheezes.   Abdominal: She exhibits no distension. There is no tenderness.   Musculoskeletal: She exhibits no edema, tenderness or deformity.   Lymphadenopathy:     She has no cervical adenopathy.   Skin: Skin is warm and dry.     Neurological Exam:   LOC: alert and follows requests  Language: No aphasia  Speech: Dysarthria  Orientation: Person, Place, Time  Visual Fields (CN II): Full  EOM (CN III, IV, VI): fluctuating R gaze preference; able to overcome midline on most attempts  Pupils (CN III, IV, VI): PERRL  Facial Movement (CN VII): left lower facial droop, present despite confounder of left facial trauma  Motor*: Arm Left: Paretic: 4/5, Leg Left: Paretic: 4/5, Arm Right: Normal (5/5), Leg Right: Normal (5/5)  Cerebellar*: Normal limb  Sensation: decreased sensation on left hemibody ~ 50%       NIH Stroke Scale:    Level of Consciousness: 0 - alert  LOC Questions: 0 - answers both correctly  LOC Commands: 0 - performs both correctly  Best Gaze: 1 - partial gaze palsy  Visual: 0 - no visual loss  Facial Palsy: 1 - minor  Motor Left Arm: 1 - drift  Motor Right Arm: 0 - no drift  Motor Left Le - drift  Motor Right Le - no drift  Limb Ataxia: 0 - absent  Sensory: 1 - mild to moderate loss  Best Language: 0 - no aphasia  Dysarthria: 1  - mild to moderate dysarthria  Extinction and Inattention: 1 - partial neglect  NIH Stroke Scale Total: 7        Laboratory:  CMP:   Recent Labs  Lab 02/27/17  0257   CALCIUM 8.2*   ALBUMIN 2.8*   PROT 6.3      K 3.5   CO2 21*   *   BUN 9   CREATININE 0.9   ALKPHOS 51*   ALT 8*   AST 19   BILITOT 0.3     BMP:   Recent Labs  Lab 02/27/17  0257      K 3.5   *   CO2 21*   BUN 9   CREATININE 0.9   CALCIUM 8.2*     CBC:   Recent Labs  Lab 02/27/17  1409   WBC 10.12   RBC 2.92*   HGB 6.0*   HCT 21.1*   *   MCV 72*   MCH 20.5*   MCHC 28.4*     Lipid Panel:   Recent Labs  Lab 02/26/17  0740   CHOL 155   LDLCALC 60.0*   HDL 78*   TRIG 85     Coagulation:   Recent Labs  Lab 02/27/17 0257   INR 1.0     Platelet Aggregation Study: No results for input(s): PLTAGG, PLTAGINTERP, PLTAGREGLACO, ADPPLTAGGREG in the last 168 hours.  Hgb A1C:   Recent Labs  Lab 02/26/17  1756   HGBA1C 6.8*     TSH:   Recent Labs  Lab 02/26/17  0740   TSH 1.147       Diagnostic Results:  Brain Imaging: CT Head. Date: 2/26/17  Acute Pathology: Infarct  MRI 2/27/17  acute infarct of the right MCA distribution involving the majority of the right parietal lobe with additional scattered foci of infarcts involving the right caudate head, right occipital lobe, right temporal lobe, right subinsular cortex, and right posterior frontal lobe.       Cerebrovascular Imaging: Angiogram Head. Date: 2/26/17  R ICA occlusion w/ irregularity of looped proximal segment; unclear if plaque w/ underlying stenosis vs. Dissection w/ underlying FMD; successful thrombectomy; intracranial runs do show at least one M3 branch occlusion w/ pial collateral retrograde filling     Cardiac Evaluation:   Ef 55-60%. Normal ventricular function    Assessment/Plan:     2/26/17 - IR w/ successful aspiration thrombectomy of R ICA proximal thrombus, underlying vessel irregularity, ? FMD w/ dissection; distal M3 occlusion persists; no tPA d/t Xarelto --> NCC for  monitoring    2/27/17 - Passed swallow study, recommended dental soft diet. Echo finished this morning, results pending. MRI showing scattered foci of infarct within right MCA distribution. Left sided weakness improving. Carotid ultrasound shows 60-79% R ICA stenosis      * Cerebral infarction due to thrombosis of right carotid artery  Thrombosis found in irregular loop of R ICA extracranial segment during DSA, s/p successful aspiration thrombectomy.   Distal artery to artery thromboembolism found in M3 branch which was too distal for intervention.  Underlying etiology of carotid lesion either dissection given irregularity vs. Atherosclerotic plaque w/ in-situ thrombosis; underlying FMD a possibility  · Continue Xarelto (or change NOAC) after MRI characterization of total infarct and risk of hemorrhage. May need to resort to NCCT given inability for patient to lay flat for long periods  · BP goal < 180/100, given continued distal branch occlusion.  · TTE did not show any major abnormalities  · Carotid US showed 60-79% stenosis in R ICA  · PT/OT/SLP      Essential hypertension  Recanalization of extracranial carotid however still distal stenoses still present  Goal BP < 180/100 currently.    Long-term (current) use of anticoagulants  On Xarelto for hx of PE at home  Need to discuss how she takes her Xarelto and if she takes it with meals  May consider alternative agents such as Apixaban or Dabigatran  Holding Xarelto at this time    Type 2 diabetes mellitus without complication  Goal -200 in the acute setting  Overall Goal A1c < 7    Internal carotid artery dissection  Possible atherosclerotic disease vs underlying vascular dyplasia  Continued anticoagulation given underlying PE would also suffice for a dissection if present. Will likely recommend changing Xarelto to alternative NOAC, Dabigatran or Apixaban    Flaccid hemiplegia affecting left nondominant side  PT/OT/SLP evaluation and treatment  ongoing      Dillan White MD  Comprehensive Stroke Center  Department of Vascular Neurology   [facility]

## 2017-02-28 NOTE — PROGRESS NOTES
Pt had STAT CT scheduled for A.M. Called CT at this time to see when I would be able to come down with patient. They said they would give me a call when they were ready. Will continue to monitor and update

## 2017-02-28 NOTE — PLAN OF CARE
Problem: Physical Therapy Goal  Goal: Physical Therapy Goal  Goals to be met by: 3/11/17     Patient will increase functional independence with mobility by performin. Supine to sit with Modified [place]  2. Sit to supine with Modified [place]  3. Sit to stand transfer with Supervision  4. Gait x 50 feet with Contact Guard Assistance using most appropriate AD. MET  Revised: Gait x 200 ft with Supervision using no AD.   5. Ascend/descend 5 stair with right Handrails Minimal Assistance.   6. Lower extremity exercise program x10-15 reps with supervision   Outcome: Ongoing (interventions implemented as appropriate)   goals met and revised on today. Goals remain appropriate.     Michelle Duncan, PT, DPT  2017

## 2017-02-28 NOTE — PROGRESS NOTES
[facility]  Vascular Neurology  Comprehensive Stroke Center  Progress Note      Neurologic Chief Complaint: R MCA infarct    Subjective:     Review of Systems   HENT: Negative.    Eyes: Negative.    Respiratory: Negative.    Cardiovascular: Negative.    Gastrointestinal: Negative.    Endocrine: Negative.    Genitourinary: Negative.    Musculoskeletal: Negative.    Skin: Negative.    Allergic/Immunologic: Negative.    Neurological: Positive for facial asymmetry and weakness. Negative for dizziness, tremors, seizures, syncope, speech difficulty, light-headedness, numbness and headaches.   Hematological: Negative.    Psychiatric/Behavioral: Negative.      Scheduled Meds:   cefTRIAXone (ROCEPHIN) IVPB  1 g Intravenous Q12H    cyanocobalamin  1,000 mcg Intramuscular Q7 Days    dabigatran etexilate  150 mg Oral BID    insulin aspart  1-10 Units Subcutaneous TIDWM    [START ON 3/1/2017] lisinopril  40 mg Oral Daily    midazolam (PF)  2 mg Intravenous Once    polyethylene glycol  17 g Oral Daily    senna-docusate 8.6-50 mg  1 tablet Oral Daily    sodium chloride 0.9%  3 mL Intravenous Q8H    thiamine  100 mg Oral Daily     Continuous Infusions:   sodium chloride 0.9% 100 mL/hr at 02/28/17 0500     PRN Meds:sodium chloride, albuterol-ipratropium 2.5mg-0.5mg/3mL, dextrose 50%, dextrose 50%, glucagon (human recombinant), glucose, glucose, labetalol, magnesium sulfate IVPB, magnesium sulfate IVPB, midazolam, pneumoc 13-mohan conj-dip cr(PF), potassium chloride **AND** potassium chloride **AND** potassium chloride, senna, sodium phosphate IVPB, sodium phosphate IVPB, sodium phosphate IVPB    Objective:     Vital Signs (Most Recent):  Temp: 97.3 °F (36.3 °C) (02/28/17 0700)  Pulse: 67 (02/28/17 1400)  Resp: 19 (02/28/17 1400)  BP: (!) 158/64 (02/28/17 1400)  SpO2: 100 % (02/28/17 1400)       Vital Signs Range (Last 24H):  Temp:  [97.3 °F (36.3 °C)-99.2 °F (37.3 °C)]   Pulse:  []   Resp:  [17-55]    BP: (130-180)/(62-94)   SpO2:  [92 %-100 %]        Physical Exam   Neurological:   Alert and oriented person, place, year, situation.  CN 2-12 grossly intact except L facial droop.  Strength 5/5 except in LUE where weakness is mostly distal with LUE wrist extension 4/5 and LUE pronator drift  No dysarthria or aphasia noted  Finger to nose and heel to shin without issue bilaterally     NIH Stroke Scale:    Level of Consciousness: 0 - alert  LOC Questions: 0 - answers both correctly  LOC Commands: 0 - performs both correctly  Best Gaze: 0 - normal  Visual: 0 - no visual loss  Facial Palsy: 2 - partial  Motor Left Arm: 1 - drift  Motor Right Arm: 0 - no drift  Motor Left Le - no drift  Motor Right Le - no drift  Limb Ataxia: 0 - absent  Sensory: 0 - normal  Best Language: 0 - no aphasia  Dysarthria: 0 - normal articulation  Extinction and Inattention: 0 - no neglect  NIH Stroke Scale Total: 3      Laboratory:  CMP:   Recent Labs  Lab 17   CALCIUM 8.7   ALBUMIN 3.0*   PROT 7.0      K 3.6   CO2 24      BUN 7   CREATININE 1.1   ALKPHOS 57   ALT 10   AST 17   BILITOT 1.2*     BMP:   Recent Labs  Lab 17      K 3.6      CO2 24   BUN 7   CREATININE 1.1   CALCIUM 8.7     CBC:   Recent Labs  Lab 17  0849   WBC 10.44   RBC 3.64*   HGB 8.6*   HCT 28.1*   *   MCV 77*   MCH 23.6*   MCHC 30.6*     Lipid Panel:   Recent Labs  Lab 17  0740   CHOL 155   LDLCALC 60.0*   HDL 78*   TRIG 85     Coagulation:   Recent Labs  Lab 17   INR 1.0     Platelet Aggregation Study: No results for input(s): PLTAGG, PLTAGINTERP, PLTAGREGLACO, ADPPLTAGGREG in the last 168 hours.  Hgb A1C:   Recent Labs  Lab 17  1756   HGBA1C 6.8*     TSH:   Recent Labs  Lab 17  0740   TSH 1.147         Assessment/Plan:     17 - IR w/ successful aspiration thrombectomy of R ICA proximal thrombus, underlying vessel irregularity, ? FMD w/ dissection; distal M3 occlusion  persists; no tPA d/t Xarelto --> NCC for monitoring    2/27/17 - Passed swallow study, recommended dental soft diet. Echo finished this morning, results pending. MRI showing scattered foci of infarct within right MCA distribution. Left sided weakness improving. Carotid ultrasound shows 60-79% R ICA stenosis    2/28/17 - 's this AM. NCC started lisinopril 40 mg daily with SBP improving this afternoon to 160's.  NCC switched her xarelto to dabigatran as patient was an xarelto and still had embolic suspected stroke.      * Cerebral infarction due to thrombosis of right carotid artery  Thrombosis found in irregular loop of R ICA extracranial segment during DSA, s/p successful aspiration thrombectomy.   Distal artery to artery thromboembolism found in M3 branch which was too distal for intervention.  Underlying etiology of carotid lesion either dissection given irregularity vs. Atherosclerotic plaque w/ in-situ thrombosis; underlying FMD a possibility  · Continue Dabigatran  · BP goal < 180/100, given continued distal branch occlusion.  · TTE did not show any major abnormalities  · Carotid US showed 60-79% stenosis in R ICA  · PT/OT/SLP      Essential hypertension  Recanalization of extracranial carotid however still distal stenoses still present  Goal BP < 180/100 currently.    Long-term (current) use of anticoagulants  On Xarelto for hx of PE at home but still had suspected embolic stroke  Switched to Dabigatran    Type 2 diabetes mellitus without complication  Goal -180  Overall Goal A1c < 7    Internal carotid artery dissection  Possible atherosclerotic disease vs underlying vascular dyplasia  Continued anticoagulation given underlying PE would also suffice for a dissection if present.  Ordered MRA Neck with fat sat to differentiate carotid bulb atherosclerosis vs dissection    Left arm weakness  PT/OT/SLP evaluation and treatment ongoing      Jovani Bell MD  Comprehensive Stroke Center  Department  of Vascular Neurology   [facility]

## 2017-02-28 NOTE — PT/OT/SLP PROGRESS
"Occupational Therapy  Treatment/ Goals revised    Lyssa Gandara   MRN: [id number]   Admitting Diagnosis: Cerebral infarction due to thrombosis of right carotid artery    OT Date of Treatment: 17   OT Start Time: 853  OT Stop Time: 932  OT Total Time (min): 39 min    Billable Minutes:  Self Care/Home Management 29 and Therapeutic Exercise 10    General Precautions: Standard, aspiration, fall  Orthopedic Precautions: N/A  Braces: N/A    Do you have any cultural, spiritual, Adventist conflicts, given your current situation?: Taoism    Subjective:  Communicated with nurse and PT prior to session.  Patient:  "I need my lip to go down."  Pain Ratin/10   Pain Rating Post-Intervention: 0/10    Objective:  Patient found with: blood pressure cuff, pulse ox (continuous), oxygen, telemetry, peripheral IV   Family not present.    Functional Mobility:  Bed Mobility:  Rolling/Turning to Left: Modified independent  Rolling/Turning Right: Modified independent  Scooting/Bridging: Modified Independent  Supine to Sit: Modified Independent  Sit to Supine: Modified Independent    Transfers:   Sit <> Stand Assistance: Modified Independent  Sit <> Stand Assistive Device: No Assistive Device  Bed <> Chair Technique: Stand Pivot  Bed <> Chair Transfer Assistance: Modified Independent  Bed <> Chair Assistive Device: No Assistive Device    Activities of Daily Living:  Feeding:  SBA while seated in bedside chair  UE Dressing Level of Assistance: Minimum assistance (assistance with fasteners)  LE Dressing Level of Assistance: Minimum assistance (with assistance with fasteners)  Grooming Position: Standing  Grooming Level of Assistance: Stand by assistance     Additional Treatment:   Patient/ Family education provided for stroke warning signs, prevention guidelines and personal risk factors.  Patient/ Family verbalizing understanding via teach back method.   Patient education provided on role of OT and need for outpt OT upon " discharge.  Patient education provided on hemiplegic dressing technique, left UE weight bearing / positioning, postural control, and FMC. Patient verbalizing understanding via teach back method. Continued education, patient/ family training recommended. Oral motor ex performed while standing to address left facial weakness.  Left UE AROM performed one set x 10 rep in all planes of motion.  Left UE shoulder strength: 5/5; elbow 4/5; hand 3/5.   Patient's functional status and disposition recommendation discussed with patient, PT and nurse.  White board updated in patient's room.  OT asked if there were any other questions; patient/ family had no further questions.      AM-PAC 6 CLICK ADL   How much help from another person does this patient currently need?   1 = Unable, Total/Dependent Assistance  2 = A lot, Maximum/Moderate Assistance  3 = A little, Minimum/Contact Guard/Supervision  4 = None, Modified [place]/Independent    Putting on and taking off regular lower body clothing? : 3  Bathing (including washing, rinsing, drying)?: 3  Toileting, which includes using toilet, bedpan, or urinal? : 3  Putting on and taking off regular upper body clothing?: 3  Taking care of personal grooming such as brushing teeth?: 3  Eating meals?: 3  Total Score: 18     AM-PAC Raw Score CMS G-Code Modifier Level of Impairment Assistance   6 % Total / Unable   7 - 9 CM 80 - 100% Maximal Assist   10 - 14 CL 60 - 80% Moderate Assist   15 - 19 CK 40 - 60% Moderate Assist   20 - 22 CJ 20 - 40% Minimal Assist   23 CI 1-20% SBA / CGA   24 CH 0% Independent/ Mod I     Patient left up in chair with all lines intact and call button in reach    ASSESSMENT:  Lyssa Gandara is a 51 y.o. female with a medical diagnosis of Cerebral infarction due to thrombosis of right carotid artery and presents with performance deficits of physical skills including impaired fine motor coordination.   These performance deficits have resulted in  activity limitations including but not limited to:  upper body dressing, lower body dressing, carrying objects, typing, meal preparation, and assisting others in self car (grand mother).   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 Patient will benefit from skilled OT services to maximize level of independence with self-care skills and functional mobility.  Will benefit from outpt OT.    Rehab identified problem list/impairments: Rehab identified problem list/impairments: weakness, impaired self care skills, impaired functional mobilty, impaired fine motor, decreased upper extremity function    Rehab potential is good.    Activity tolerance: Good    Discharge recommendations: Discharge Facility/Level Of Care Needs: outpatient OT     Barriers to discharge: Barriers to Discharge: None    Equipment recommendations: none     GOALS:   Occupational Therapy Goals        Problem: Occupational Therapy Goal    Goal Priority Disciplines Outcome Interventions   Occupational Therapy Goal     OT, PT/OT Ongoing (interventions implemented as appropriate)    Description:  Goals achieved for supine-sit, rolling, transfers.  Revised goal to be met by: 3/7  Patient will demonstrate stand pivot transfers with modified independence   Not met  Patient will demonstrate grooming while standing with modified independence.   Not met  Patient will demonstrate upper body dressing with modified independence.   Not met  Patient will demonstrate lower body dressing with modified independence.   Not met  Patient will demonstrate toileting with modified independence.   Not met  Patient will demonstrate independence with HEP for left UE FMC.    Not met  Patient's family / caregiver will demonstrate independence and safety with assisting patient with self-care skills and functional mobility.     Not met  Patient and/or patient's family will verbalize understanding of stroke prevention  guidelines, personal risk factors and stroke warning signs via teachback method.  Not met                      Plan:  Patient to be seen 6 x/week to address the above listed problems via self-care/home management, therapeutic exercises, therapeutic activities, neuromuscular re-education, sensory integration, cognitive retraining  Plan of Care expires: 03/27/17  Plan of Care reviewed with: patient (nurse)         KIAH Simms  02/28/2017

## 2017-02-28 NOTE — SUBJECTIVE & OBJECTIVE
Neurologic Chief Complaint: R MCA infarct    Subjective:     Review of Systems   HENT: Negative.    Eyes: Negative.    Respiratory: Negative.    Cardiovascular: Negative.    Gastrointestinal: Negative.    Endocrine: Negative.    Genitourinary: Negative.    Musculoskeletal: Negative.    Skin: Negative.    Allergic/Immunologic: Negative.    Neurological: Positive for facial asymmetry and weakness. Negative for dizziness, tremors, seizures, syncope, speech difficulty, light-headedness, numbness and headaches.   Hematological: Negative.    Psychiatric/Behavioral: Negative.      Scheduled Meds:   cefTRIAXone (ROCEPHIN) IVPB  1 g Intravenous Q12H    cyanocobalamin  1,000 mcg Intramuscular Q7 Days    dabigatran etexilate  150 mg Oral BID    insulin aspart  1-10 Units Subcutaneous TIDWM    [START ON 3/1/2017] lisinopril  40 mg Oral Daily    midazolam (PF)  2 mg Intravenous Once    polyethylene glycol  17 g Oral Daily    senna-docusate 8.6-50 mg  1 tablet Oral Daily    sodium chloride 0.9%  3 mL Intravenous Q8H    thiamine  100 mg Oral Daily     Continuous Infusions:   sodium chloride 0.9% 100 mL/hr at 02/28/17 0500     PRN Meds:sodium chloride, albuterol-ipratropium 2.5mg-0.5mg/3mL, dextrose 50%, dextrose 50%, glucagon (human recombinant), glucose, glucose, labetalol, magnesium sulfate IVPB, magnesium sulfate IVPB, midazolam, pneumoc 13-mohan conj-dip cr(PF), potassium chloride **AND** potassium chloride **AND** potassium chloride, senna, sodium phosphate IVPB, sodium phosphate IVPB, sodium phosphate IVPB    Objective:     Vital Signs (Most Recent):  Temp: 97.3 °F (36.3 °C) (02/28/17 0700)  Pulse: 67 (02/28/17 1400)  Resp: 19 (02/28/17 1400)  BP: (!) 158/64 (02/28/17 1400)  SpO2: 100 % (02/28/17 1400)       Vital Signs Range (Last 24H):  Temp:  [97.3 °F (36.3 °C)-99.2 °F (37.3 °C)]   Pulse:  []   Resp:  [17-55]   BP: (130-180)/(62-94)   SpO2:  [92 %-100 %]        Physical Exam   Neurological:   Alert and  oriented person, place, year, situation.  CN 2-12 grossly intact except L facial droop.  Strength 5/5 except in LUE where weakness is mostly distal with LUE wrist extension 4/5 and LUE pronator drift  No dysarthria or aphasia noted  Finger to nose and heel to shin without issue bilaterally     NIH Stroke Scale:    Level of Consciousness: 0 - alert  LOC Questions: 0 - answers both correctly  LOC Commands: 0 - performs both correctly  Best Gaze: 0 - normal  Visual: 0 - no visual loss  Facial Palsy: 2 - partial  Motor Left Arm: 1 - drift  Motor Right Arm: 0 - no drift  Motor Left Le - no drift  Motor Right Le - no drift  Limb Ataxia: 0 - absent  Sensory: 0 - normal  Best Language: 0 - no aphasia  Dysarthria: 0 - normal articulation  Extinction and Inattention: 0 - no neglect  NIH Stroke Scale Total: 3      Laboratory:  CMP:   Recent Labs  Lab 17   CALCIUM 8.7   ALBUMIN 3.0*   PROT 7.0      K 3.6   CO2 24      BUN 7   CREATININE 1.1   ALKPHOS 57   ALT 10   AST 17   BILITOT 1.2*     BMP:   Recent Labs  Lab 17      K 3.6      CO2 24   BUN 7   CREATININE 1.1   CALCIUM 8.7     CBC:   Recent Labs  Lab 17  0849   WBC 10.44   RBC 3.64*   HGB 8.6*   HCT 28.1*   *   MCV 77*   MCH 23.6*   MCHC 30.6*     Lipid Panel:   Recent Labs  Lab 17  0740   CHOL 155   LDLCALC 60.0*   HDL 78*   TRIG 85     Coagulation:   Recent Labs  Lab 17   INR 1.0     Platelet Aggregation Study: No results for input(s): PLTAGG, PLTAGINTERP, PLTAGREGLACO, ADPPLTAGGREG in the last 168 hours.  Hgb A1C:   Recent Labs  Lab 17  1756   HGBA1C 6.8*     TSH:   Recent Labs  Lab 17  0740   TSH 1.147

## 2017-02-28 NOTE — PLAN OF CARE
Problem: Occupational Therapy Goal  Goal: Occupational Therapy Goal  Goals to be met by: 3/12     Patient will increase functional independence with ADLs by performing:    UE Dressing with Supervision.  LE Dressing with Supervision.  Grooming while standing with Supervision.  Toileting from bedside commode with Supervision for hygiene and clothing management.   Rolling to Bilateral with Modified [place].   Supine to sit with Modified [place].  Stand pivot transfers with Supervision.   Goals remain appropriate.  KIAH Loya  2/28/2017

## 2017-02-28 NOTE — SUBJECTIVE & OBJECTIVE
Neurologic Chief Complaint: R MCA stroke s/p R ICA thrombectomy and distal M3 occlusion on right.     Subjective:     Interval History: Passed swallow study, recommended dental soft diet. Echo finished this morning, results pending. MRI showing scattered foci of infarct within right MCA distribution. Left sided weakness improving. Carotid ultrasound shows 60-79% R ICA stenosis    HPI, Past Medical, Family, and Social History remains the same as documented in the initial encounter.     Review of Systems   Constitutional: Negative for activity change.   HENT: Negative for hearing loss.    Eyes: Negative for photophobia.   Respiratory: Negative for apnea.    Cardiovascular: Negative for chest pain.   Gastrointestinal: Negative for abdominal distention.   Endocrine: Negative for cold intolerance.   Genitourinary: Negative for difficulty urinating.   Musculoskeletal: Negative for arthralgias.   Skin: Negative for color change.   Neurological: Negative for dizziness.   Psychiatric/Behavioral: Negative for agitation.     Scheduled Meds:   [START ON 2/28/2017] cyanocobalamin  1,000 mcg Intramuscular Q7 Days    [START ON 2/28/2017] lisinopril  20 mg Oral Daily    midazolam (PF)  2 mg Intravenous Once    polyethylene glycol  17 g Oral Daily    senna-docusate 8.6-50 mg  1 tablet Oral Daily    sodium chloride 0.9%  3 mL Intravenous Q8H    thiamine  100 mg Oral Daily     Continuous Infusions:   sodium chloride 0.9% 100 mL/hr at 02/27/17 2300     PRN Meds:sodium chloride, albuterol-ipratropium 2.5mg-0.5mg/3mL, dextrose 50%, dextrose 50%, glucagon (human recombinant), glucose, glucose, insulin aspart, labetalol, magnesium sulfate IVPB, magnesium sulfate IVPB, midazolam, potassium chloride **AND** potassium chloride **AND** potassium chloride, senna, sodium phosphate IVPB, sodium phosphate IVPB, sodium phosphate IVPB    Objective:     Vital Signs (Most Recent):  Temp: 99 °F (37.2 °C) (02/27/17 2139)  Pulse: 91 (02/27/17  )  Resp: 20 (17)  BP: (!) 156/74 (17)  SpO2: (!) 92 % (17)       Vital Signs Range (Last 24H):  Temp:  [98.3 °F (36.8 °C)-99.1 °F (37.3 °C)]   Pulse:  []   Resp:  [15-39]   BP: (134-197)/()   SpO2:  [92 %-100 %]        Physical Exam   Constitutional: No distress.   HENT:   Head trauma to left face w/ swelling and edema involving orbit and upper lip   Eyes: Pupils are equal, round, and reactive to light.   Neck: No thyromegaly present.   Cardiovascular: Regular rhythm.    Pulmonary/Chest: No respiratory distress. She has no wheezes.   Abdominal: She exhibits no distension. There is no tenderness.   Musculoskeletal: She exhibits no edema, tenderness or deformity.   Lymphadenopathy:     She has no cervical adenopathy.   Skin: Skin is warm and dry.     Neurological Exam:   LOC: alert and follows requests  Language: No aphasia  Speech: Dysarthria  Orientation: Person, Place, Time  Visual Fields (CN II): Full  EOM (CN III, IV, VI): fluctuating R gaze preference; able to overcome midline on most attempts  Pupils (CN III, IV, VI): PERRL  Facial Movement (CN VII): left lower facial droop, present despite confounder of left facial trauma  Motor*: Arm Left: Paretic: 4/5, Leg Left: Paretic: 4/5, Arm Right: Normal (5/5), Leg Right: Normal (5/5)  Cerebellar*: Normal limb  Sensation: decreased sensation on left hemibody ~ 50%       NIH Stroke Scale:    Level of Consciousness: 0 - alert  LOC Questions: 0 - answers both correctly  LOC Commands: 0 - performs both correctly  Best Gaze: 1 - partial gaze palsy  Visual: 0 - no visual loss  Facial Palsy: 1 - minor  Motor Left Arm: 1 - drift  Motor Right Arm: 0 - no drift  Motor Left Le - drift  Motor Right Le - no drift  Limb Ataxia: 0 - absent  Sensory: 1 - mild to moderate loss  Best Language: 0 - no aphasia  Dysarthria: 1 - mild to moderate dysarthria  Extinction and Inattention: 1 - partial neglect  NIH Stroke Scale Total:  7        Laboratory:  CMP:   Recent Labs  Lab 02/27/17  0257   CALCIUM 8.2*   ALBUMIN 2.8*   PROT 6.3      K 3.5   CO2 21*   *   BUN 9   CREATININE 0.9   ALKPHOS 51*   ALT 8*   AST 19   BILITOT 0.3     BMP:   Recent Labs  Lab 02/27/17  0257      K 3.5   *   CO2 21*   BUN 9   CREATININE 0.9   CALCIUM 8.2*     CBC:   Recent Labs  Lab 02/27/17  1409   WBC 10.12   RBC 2.92*   HGB 6.0*   HCT 21.1*   *   MCV 72*   MCH 20.5*   MCHC 28.4*     Lipid Panel:   Recent Labs  Lab 02/26/17  0740   CHOL 155   LDLCALC 60.0*   HDL 78*   TRIG 85     Coagulation:   Recent Labs  Lab 02/27/17 0257   INR 1.0     Platelet Aggregation Study: No results for input(s): PLTAGG, PLTAGINTERP, PLTAGREGLACO, ADPPLTAGGREG in the last 168 hours.  Hgb A1C:   Recent Labs  Lab 02/26/17  1756   HGBA1C 6.8*     TSH:   Recent Labs  Lab 02/26/17  0740   TSH 1.147       Diagnostic Results:  Brain Imaging: CT Head. Date: 2/26/17  Acute Pathology: Infarct  MRI 2/27/17  acute infarct of the right MCA distribution involving the majority of the right parietal lobe with additional scattered foci of infarcts involving the right caudate head, right occipital lobe, right temporal lobe, right subinsular cortex, and right posterior frontal lobe.       Cerebrovascular Imaging: Angiogram Head. Date: 2/26/17  R ICA occlusion w/ irregularity of looped proximal segment; unclear if plaque w/ underlying stenosis vs. Dissection w/ underlying FMD; successful thrombectomy; intracranial runs do show at least one M3 branch occlusion w/ pial collateral retrograde filling     Cardiac Evaluation:   Ef 55-60%. Normal ventricular function

## 2017-03-01 LAB
ALBUMIN SERPL BCP-MCNC: 2.8 G/DL
ALP SERPL-CCNC: 45 U/L
ALT SERPL W/O P-5'-P-CCNC: 11 U/L
ANION GAP SERPL CALC-SCNC: 9 MMOL/L
AST SERPL-CCNC: 17 U/L
BASOPHILS # BLD AUTO: 0.04 K/UL
BASOPHILS # BLD AUTO: 0.05 K/UL
BASOPHILS NFR BLD: 0.5 %
BASOPHILS NFR BLD: 0.8 %
BILIRUB SERPL-MCNC: 0.5 MG/DL
BUN SERPL-MCNC: 6 MG/DL
CALCIUM SERPL-MCNC: 8.6 MG/DL
CHLORIDE SERPL-SCNC: 105 MMOL/L
CO2 SERPL-SCNC: 23 MMOL/L
CREAT SERPL-MCNC: 1.1 MG/DL
DIFFERENTIAL METHOD: ABNORMAL
DIFFERENTIAL METHOD: ABNORMAL
EOSINOPHIL # BLD AUTO: 0.2 K/UL
EOSINOPHIL # BLD AUTO: 0.2 K/UL
EOSINOPHIL NFR BLD: 2.7 %
EOSINOPHIL NFR BLD: 4 %
ERYTHROCYTE [DISTWIDTH] IN BLOOD BY AUTOMATED COUNT: 20.8 %
ERYTHROCYTE [DISTWIDTH] IN BLOOD BY AUTOMATED COUNT: 20.8 %
EST. GFR  (AFRICAN AMERICAN): >60 ML/MIN/1.73 M^2
EST. GFR  (NON AFRICAN AMERICAN): 58.7 ML/MIN/1.73 M^2
GLUCOSE SERPL-MCNC: 127 MG/DL
HCT VFR BLD AUTO: 27.2 %
HCT VFR BLD AUTO: 28.9 %
HGB BLD-MCNC: 8.2 G/DL
HGB BLD-MCNC: 8.8 G/DL
INR PPP: 1
LYMPHOCYTES # BLD AUTO: 0.6 K/UL
LYMPHOCYTES # BLD AUTO: 0.8 K/UL
LYMPHOCYTES NFR BLD: 10.6 %
LYMPHOCYTES NFR BLD: 9.4 %
MAGNESIUM SERPL-MCNC: 1.7 MG/DL
MCH RBC QN AUTO: 23.5 PG
MCH RBC QN AUTO: 23.6 PG
MCHC RBC AUTO-ENTMCNC: 30.1 %
MCHC RBC AUTO-ENTMCNC: 30.4 %
MCV RBC AUTO: 78 FL
MCV RBC AUTO: 78 FL
MONOCYTES # BLD AUTO: 0.8 K/UL
MONOCYTES # BLD AUTO: 0.8 K/UL
MONOCYTES NFR BLD: 13.9 %
MONOCYTES NFR BLD: 9.6 %
NEUTROPHILS # BLD AUTO: 4.3 K/UL
NEUTROPHILS # BLD AUTO: 6 K/UL
NEUTROPHILS NFR BLD: 71.6 %
NEUTROPHILS NFR BLD: 76.2 %
PHOSPHATE SERPL-MCNC: 2.9 MG/DL
PLATELET # BLD AUTO: 456 K/UL
PLATELET # BLD AUTO: 475 K/UL
PMV BLD AUTO: 9.4 FL
PMV BLD AUTO: 9.7 FL
POCT GLUCOSE: 113 MG/DL (ref 70–110)
POCT GLUCOSE: 130 MG/DL (ref 70–110)
POCT GLUCOSE: 135 MG/DL (ref 70–110)
POCT GLUCOSE: 176 MG/DL (ref 70–110)
POCT GLUCOSE: 179 MG/DL (ref 70–110)
POTASSIUM SERPL-SCNC: 3.8 MMOL/L
PROT SERPL-MCNC: 6.6 G/DL
PROTHROMBIN TIME: 11.1 SEC
RBC # BLD AUTO: 3.49 M/UL
RBC # BLD AUTO: 3.73 M/UL
SODIUM SERPL-SCNC: 137 MMOL/L
WBC # BLD AUTO: 5.98 K/UL
WBC # BLD AUTO: 7.84 K/UL

## 2017-03-01 PROCEDURE — 85025 COMPLETE CBC W/AUTO DIFF WBC: CPT

## 2017-03-01 PROCEDURE — 97535 SELF CARE MNGMENT TRAINING: CPT

## 2017-03-01 PROCEDURE — 25000003 PHARM REV CODE 250: Performed by: NURSE PRACTITIONER

## 2017-03-01 PROCEDURE — 25000003 PHARM REV CODE 250: Performed by: PSYCHIATRY & NEUROLOGY

## 2017-03-01 PROCEDURE — 25000242 PHARM REV CODE 250 ALT 637 W/ HCPCS: Performed by: STUDENT IN AN ORGANIZED HEALTH CARE EDUCATION/TRAINING PROGRAM

## 2017-03-01 PROCEDURE — 97116 GAIT TRAINING THERAPY: CPT

## 2017-03-01 PROCEDURE — 94640 AIRWAY INHALATION TREATMENT: CPT

## 2017-03-01 PROCEDURE — 94761 N-INVAS EAR/PLS OXIMETRY MLT: CPT

## 2017-03-01 PROCEDURE — 25000003 PHARM REV CODE 250: Performed by: STUDENT IN AN ORGANIZED HEALTH CARE EDUCATION/TRAINING PROGRAM

## 2017-03-01 PROCEDURE — 63600175 PHARM REV CODE 636 W HCPCS: Performed by: STUDENT IN AN ORGANIZED HEALTH CARE EDUCATION/TRAINING PROGRAM

## 2017-03-01 PROCEDURE — 83735 ASSAY OF MAGNESIUM: CPT

## 2017-03-01 PROCEDURE — 20600001 HC STEP DOWN PRIVATE ROOM

## 2017-03-01 PROCEDURE — 97110 THERAPEUTIC EXERCISES: CPT

## 2017-03-01 PROCEDURE — 80053 COMPREHEN METABOLIC PANEL: CPT

## 2017-03-01 PROCEDURE — 99233 SBSQ HOSP IP/OBS HIGH 50: CPT | Mod: ,,, | Performed by: PSYCHIATRY & NEUROLOGY

## 2017-03-01 PROCEDURE — 27000221 HC OXYGEN, UP TO 24 HOURS

## 2017-03-01 PROCEDURE — 99232 SBSQ HOSP IP/OBS MODERATE 35: CPT | Mod: ,,, | Performed by: NURSE PRACTITIONER

## 2017-03-01 PROCEDURE — 85610 PROTHROMBIN TIME: CPT

## 2017-03-01 PROCEDURE — 84100 ASSAY OF PHOSPHORUS: CPT

## 2017-03-01 PROCEDURE — 92523 SPEECH SOUND LANG COMPREHEN: CPT

## 2017-03-01 RX ORDER — FERROUS SULFATE 325(65) MG
325 TABLET, DELAYED RELEASE (ENTERIC COATED) ORAL DAILY
Status: DISCONTINUED | OUTPATIENT
Start: 2017-03-01 | End: 2017-03-02 | Stop reason: HOSPADM

## 2017-03-01 RX ORDER — POLYETHYLENE GLYCOL 3350 17 G/17G
17 POWDER, FOR SOLUTION ORAL DAILY
Status: DISCONTINUED | OUTPATIENT
Start: 2017-03-01 | End: 2017-03-01 | Stop reason: SDUPTHER

## 2017-03-01 RX ORDER — ACETAMINOPHEN 325 MG/1
650 TABLET ORAL EVERY 6 HOURS PRN
Status: DISCONTINUED | OUTPATIENT
Start: 2017-03-01 | End: 2017-03-02 | Stop reason: HOSPADM

## 2017-03-01 RX ADMIN — CEFTRIAXONE 1 G: 1 INJECTION, SOLUTION INTRAVENOUS at 12:03

## 2017-03-01 RX ADMIN — POLYETHYLENE GLYCOL 3350 17 G: 17 POWDER, FOR SOLUTION ORAL at 09:03

## 2017-03-01 RX ADMIN — ACETAMINOPHEN 650 MG: 325 TABLET ORAL at 08:03

## 2017-03-01 RX ADMIN — FERROUS SULFATE TAB EC 325 MG (65 MG FE EQUIVALENT) 325 MG: 325 (65 FE) TABLET DELAYED RESPONSE at 01:03

## 2017-03-01 RX ADMIN — THIAMINE HCL TAB 100 MG 100 MG: 100 TAB at 09:03

## 2017-03-01 RX ADMIN — DABIGATRAN ETEXILATE MESYLATE 150 MG: 150 CAPSULE ORAL at 09:03

## 2017-03-01 RX ADMIN — SODIUM CHLORIDE, PRESERVATIVE FREE 3 ML: 5 INJECTION INTRAVENOUS at 09:03

## 2017-03-01 RX ADMIN — IPRATROPIUM BROMIDE AND ALBUTEROL SULFATE 3 ML: .5; 3 SOLUTION RESPIRATORY (INHALATION) at 03:03

## 2017-03-01 RX ADMIN — STANDARDIZED SENNA CONCENTRATE AND DOCUSATE SODIUM 1 TABLET: 8.6; 5 TABLET, FILM COATED ORAL at 09:03

## 2017-03-01 RX ADMIN — INSULIN ASPART 2 UNITS: 100 INJECTION, SOLUTION INTRAVENOUS; SUBCUTANEOUS at 05:03

## 2017-03-01 RX ADMIN — SODIUM CHLORIDE, PRESERVATIVE FREE 3 ML: 5 INJECTION INTRAVENOUS at 01:03

## 2017-03-01 RX ADMIN — LISINOPRIL 40 MG: 20 TABLET ORAL at 09:03

## 2017-03-01 NOTE — PROGRESS NOTES
Report called to Brenda VALDES on [facility]. Pt transferred via wheelchair with portable tele box to 724. All belongings including cell phone, i pad, chargers sent with patient.

## 2017-03-01 NOTE — PROGRESS NOTES
[facility] notified that MRA of neck was not completed yesterday due to IV access and needs to be reordered if still needed. Pt now has adequate IV access

## 2017-03-01 NOTE — PLAN OF CARE
Problem: Occupational Therapy Goal  Goal: Occupational Therapy Goal  Goals achieved for supine-sit, rolling, transfers. Revised goal to be met by: 3/7  Patient will demonstrate stand pivot transfers with modified independence Not met  Patient will demonstrate grooming while standing with modified independence. Not met  Patient will demonstrate upper body dressing with modified independence. Not met  Patient will demonstrate lower body dressing with modified independence. Not met  Patient will demonstrate toileting with modified independence. Not met  Patient will demonstrate independence with HEP for left UE FMC. Not met  Patients family / caregiver will demonstrate independence and safety with assisting patient with self-care skills and functional mobility. Not met  Patient and/or patients family will verbalize understanding of stroke prevention guidelines, personal risk factors and stroke warning signs via teachback method. Not met        Goals remain appropriate.  KIAH Loya  3/1/2017

## 2017-03-01 NOTE — PLAN OF CARE
03/01/17 1545   Right Care Assessment   Can the patient answer the patient profile reliably? Yes, cognitively intact   How often would a person be available to care for the patient? Whenever needed   Describe the patient's ability to walk at the present time. No restrictions   How does the patient rate their overall health at the present time? Fair   Number of comorbid conditions (as recorded on the chart) Three   During the past month, has the patient often been bothered by feeling down, depressed or hopeless? No   During the past month, has the patient often been bothered by little interest or pleasure in doing things? No       Jennifer Florez   [id number]

## 2017-03-01 NOTE — PT/OT/SLP EVAL
Speech Language Pathology Evaluation    Lyssa Gandara   MRN: [id number]   Admitting Diagnosis: Cerebral infarction due to thrombosis of right carotid artery    Diet recommendations: Solid Diet Level: Dental Soft  Liquid Diet Level: Thin Feed only when awake/alert, No straws, HOB to 90 degrees, Small bites/sips and 1 bite/sip at a time    SLP Treatment Date: 17  Speech Start Time: 08     Speech Stop Time: 0832     Speech Total (min): 12 min       TREATMENT BILLABLE MINUTES:  Eval 12     Diagnosis: Cerebral infarction due to thrombosis of right carotid artery      Past Medical History:   Diagnosis Date    Asthma     Dyspnea on exertion     Hx of long term use of blood thinners     Hypertension 3/8/2016    PE (pulmonary embolism)     Hx of multiple DVT/PE    Presence of IVC filter     Scleroderma     Type 2 diabetes mellitus without complication      Past Surgical History:   Procedure Laterality Date    DANETTE FILTER PLACEMENT         Has the patient been evaluated by SLP for swallowing? : Yes  Keep patient NPO?: No   General Precautions: Standard, aspiration, fall          Social Hx: Patient lives with 2 aunts and her grandmother, works as manager of [employer], indpt pta.    Subjective:  Pt alert, cooperative  Patient goals: to return home/work.    Pain Ratin/10              Pain Rating Post-Intervention: 0/10    Objective:   Patient found with: blood pressure cuff, pulse ox (continuous), SCD, telemetry, peripheral IV    Oral Musculature Evaluation  Oral Musculature: left weakness  Dentition: present and adequate  Mucosal Quality: good  Mandibular Strength and Mobility: WFL  Lingual Strength and Mobility: impaired left lateral movement  Velar Elevation: WFL  Buccal Strength and Mobility: impaired  Volitional Cough: strong  Volitional Swallow: no delay  Voice Prior to PO Intake: wfl     Cognitive Status:  Behavioral Observations: alert and cooperative-  Memory and Orientation:  "Pt Ox4 indptly, immediate recall up to 5 digits/words was WFL, 1/3 unassociated words recalled after delay indptly, 1/3 with min A  Attention: adequate.  Problem Solving: Pt completed simple verbal problem solving, compare/contrast, convergent categorization, and 4 word sequencing with 100% accy, responses were somewhat concrete.  10 items stated in concrete cat in 1 min, 15-20 is WFL, pt reporting, "I just don't like animals", limited flexibility.  Assessment to be ongoing  Pragmatics: WFL  Executive Function: mental flexibility    Language: no    Auditory Comprehension: WFl for complex tasks    Verbal Expression: WFL, fluent    Motor Speech: mild dysarthria    Voice: WFL, some decreased breath support, pt reports on Advair at home to help control prior to admit    Reading: tba    Writing: tba    Visual-Spatial: tba    Additional Treatment:  Swallow treatment order discontinued.  SLP to resubmit.  Pt denied difficulty with recent meals though reports poor appetite. White board updated.     FIM:  Social Interaction: 7 Complete [place]--The patient interacts appropriately with staff, other patients, and family members (e.g., controls temper, accepts criticism, is aware that words and actions have an impact on others), and does not require medication for   Problem Solvin Modified [place]--In most situations, the patient recognizes a present problem, and with only mild difficulty makes appropriate decisions, initiates and carries out a sequence of steps to solve complex problems, or requires more than a   Comprehension: 7 Complete [place]--The patient understand complex or abstract directions and conversation, and understand either spoken or written language (not necessarily English).   Expression: 5 Standby Prompting--The patient expresses basic daily needs and ideas more than 90% of the time.  Requires prompting (e.g., frequent repetition) less than 10% of the time to be understood.   Memory: 5 " Supervision-The patient requires prompting (e.g., cueing, repetition, reminders) only under stressful or unfamiliar conditions, but no more than 10% of the time.     Assessment:  Lyssa Gandara is a 51 y.o. female with a SLP diagnosis of mild oral Dysphagia and Dysarthria.     Do you have any cultural, spiritual, Jehovah's witness conflicts, given your current situation?: none    Discharge recommendations: Discharge Facility/Level Of Care Needs: outpatient speech therapy, home (pending progress)     Goals:   SLP Goals        Problem: SLP Goal    Goal Priority Disciplines Outcome   SLP Goal     SLP Ongoing (interventions implemented as appropriate)   Description:  Speech Language Pathology Goals  Goals expected to be met by 3/6  1.  Pt. Will participate in speech language eval-met  2.  Tolerate soft diet with thin liquids with no s/s of aspiration-ongoing  Updated Speech Language Pathology Goals to be addressed daily, M-F,  All goals expected to be met by 3/8  2. Pt will demonstrate understanding of OME's indptly for home program.   3. Pt will implement simple speech strategies in conversation indptly.  4. Pt will complete delayed recall tasks with 90% accy indptly.    5. Pt will complete assessment of higher level cognition/organization to determine additional need for tx.  6. Pt will complete assessment of reading, writing, visual spatial skills to determine additional need for tx                        Plan:   Patient to be seen Therapy Frequency: 5 x/week   Plan of Care expires: 03/29/17  Plan of Care reviewed with: patient  SLP Follow-up?: Yes  SLP - Next Visit Date: 03/02/17           AMANDA Kowalski, HOLL[place]-SLP  03/01/2017

## 2017-03-01 NOTE — PROGRESS NOTES
ICU Progress Note  Neurocritical Care    Admit Date: 2/26/2017  LOS: 3    CC: Cerebral infarction due to thrombosis of right carotid artery    Code Status: Full Code     SUBJECTIVE:     Interval History/Significant Events:   Got PRN antihypertensives yesterday. CT of abdomen and pelvis. No cholecystitis, No retroperitoneal ICH. Bilateral patchy infiltrates in the lung.     HPI:  Mrs.. Gandara is a 51 y.o. female presenting with  left hemiparesis and dysarthria, last known normal at 0430 w/ symptom onset at that time. These symptoms have never happened before. There are no identified triggers or modifying factors. There have been no recurrent events. There are no other associated symptoms.  Was brought to ED for evaluation and stroke team was alerted. NCCT was initially completed, attempted CTA but infiltration of IV occurred. Not a candidate for IVtPA d/t being on Xarelto and compliant for pulmonary embolus previously diagnosed. We decided to take patient straight to angiography. R ICA occlusive thrombus seen w/ successful aspiration thrombectomy revealing an irregular looped segment, unclear if underlying FMD w/ dissection or underlying atherosclerotic plaque rupture. Subsequently found to have distal M3 branch occlusion that was too distal for intervention.      PE and DVTs on Xarelto, Extracranial thrombectomy and Had small M3 occlusion. R-ICA occlusion s/p thrombectomy. She got no rTPA. Last Ct with multiple R-MCA territory small juarez.       Review of Symptoms:    ROS     Medications:  Continuous Infusions:   sodium chloride 0.9% 100 mL/hr at 03/01/17 0000     Scheduled Meds:   cefTRIAXone (ROCEPHIN) IVPB  1 g Intravenous Q12H    cyanocobalamin  1,000 mcg Intramuscular Q7 Days    dabigatran etexilate  150 mg Oral BID    insulin aspart  1-10 Units Subcutaneous TIDWM    lisinopril  40 mg Oral Daily    polyethylene glycol  17 g Oral Daily    senna-docusate 8.6-50 mg  1 tablet Oral Daily    sodium chloride  0.9%  3 mL Intravenous Q8H    thiamine  100 mg Oral Daily     PRN Meds:.sodium chloride, albuterol-ipratropium 2.5mg-0.5mg/3mL, dextrose 50%, dextrose 50%, glucagon (human recombinant), glucose, glucose, labetalol, pneumoc 13-mohan conj-dip cr(PF)    OBJECTIVE:   Vital Signs (Most Recent):   Temp: 99 °F (37.2 °C) (03/01/17 0300)  Pulse: 96 (03/01/17 0600)  Resp: (!) 22 (03/01/17 0600)  BP: (!) 154/78 (03/01/17 0600)  SpO2: 98 % (03/01/17 0600)    Vital Signs (24h Range):   Temp:  [97.3 °F (36.3 °C)-99.9 °F (37.7 °C)] 99 °F (37.2 °C)  Pulse:  [] 96  Resp:  [18-50] 22  SpO2:  [97 %-100 %] 98 %  BP: (140-190)/(64-95) 154/78    ICP/CPP (Last 24h):        I & O (Last 24h):   Intake/Output Summary (Last 24 hours) at 03/01/17 0641  Last data filed at 03/01/17 0600   Gross per 24 hour   Intake             3060 ml   Output             2575 ml   Net              485 ml     Physical Exam:  Physical Exam  General   HEENT:   Chest Heart RRR / Lungs Clear to auscultation  Adbomen: Soft nontender + BS  Extremities: OK distal pulses.  Skin:   Neurological Exam:  MS; Alert, oriented to P/T/P/R, No language abnormalities. Normal mood.  CN: II-XII L-UMN VII Decreased sensation on L side. Dysphagia for clear liquids.  Motor: LUE 4+ /5 / RUE 5/5 Tone normal bilaterally Improved L strength.  LLE 4+ /5 / RLE 5/5 Tone normal bilaterally  Sensory: LT/PP/T/ Vibration UK  Complex sensory modalities: not tested  DTR: normal throughout.  Coordination /Fine motor:   Gait: Not tested.  Meningeal signs: Absent    Vent Data:        Lines/Drains/Airway:          Nutrition/Tube Feeds (if NPO state why): PO dental soft.     Labs:  ABG: No results for input(s): PH, PO2, PCO2, HCO3, POCSATURATED, BE in the last 24 hours.  BMP:  Recent Labs  Lab 03/01/17  0346      K 3.8      CO2 23   BUN 6   CREATININE 1.1   *   MG 1.7   PHOS 2.9     LFT: Lab Results   Component Value Date    AST 17 03/01/2017    ALT 11 03/01/2017     ALKPHOS 45 (L) 03/01/2017    BILITOT 0.5 03/01/2017    ALBUMIN 2.8 (L) 03/01/2017    PROT 6.6 03/01/2017     CBC:   Lab Results   Component Value Date    WBC 7.84 02/28/2017    HGB 8.2 (L) 02/28/2017    HCT 27.2 (L) 02/28/2017    MCV 78 (L) 02/28/2017     (H) 02/28/2017     Microbiology x 7d:   Microbiology Results (last 7 days)     ** No results found for the last 168 hours. **        Imaging:  MRI brain. R-parietal stroke.  I personally reviewed the above image.    ASSESSMENT/PLAN:     Active Hospital Problems    Diagnosis    *Cerebral infarction due to thrombosis of right carotid artery    Embolic stroke involving right middle cerebral artery    Fibromuscular dysplasia of cervicocranial artery    Internal carotid artery dissection    Left arm weakness    Type 2 diabetes mellitus without complication    Long-term (current) use of anticoagulants    Essential hypertension      Active Problem List:   1.R-ICA occlusion with distal M3 embolus and R-MCA strokes.  2. Anemia   3. B12 deficiency.         Assessment / Plan:     Neuro:  -No anticoagulation  -Pending to assess for antiplatelets.  -B12 IM 1mg q week x 4 and then monthly.  -Restart Dabigatran 150mg bid.  PT, OT and speech.  Resp:  2 NC at 100%  Dual nebs PRN.  CV: Keep SBP < 180 mmHg. TTE : diastolic dysf.   -Lisinopril 40mg qd Give extra dose of 20 mmHg qd.  -PRN Labetalol.  IVF/nutrition/Renal/GI: BUN/Cr OK  -PO soft mechanical with thickened liquids.  - cc/hr IVD x 24hrs and D/C.  -Mucomyst enteral 600mg q 12hrs For today.  -BR: Myralax Senna and Colace,  Hem / ID: CFT abd and pelvis negative for retroperitoneal ICH, May have a pneumonia.   -Anemia panel  -Transfuse 2 units of PRBC  -Rocephin 1gr qd x 24hrs.   Endo:  -SSI qAC  Prophylaxis:  -Dabigatran 150mg bid.  -SCDs.   Advance Directives and Disposition:    Full Code. Transfer to Vascular neurology        Uninterrupted level 3 Follow-up /Counseling Time (not including procedures): =  35 min                    Pelon Allen MD, MPH, FAHA,  Neurocritical Care Department Faculty

## 2017-03-01 NOTE — PROGRESS NOTES
ICU Transfer/Progress Note  Neurocritical Care    Admit Date: 2/26/2017  LOS: 3    CC: Cerebral infarction due to thrombosis of right carotid artery    Code Status: Full Code     SUBJECTIVE:     Interval History/Significant Events:     Patient is a 51 year old female with PMHx DVT & PE x2 (first in 1991 thought to be due to OCP use) s/p greenfiled filter placement in 1990s with her most recent DVT in June 2016 hospitalized @ [facility], asthma, HTN, B12 deficiency with anemia, DM and daily alcohol use admitted to NCC unit after R ICA thrombectomy. Patient also had distal M3 branch occlusion that was too distal for intervention.      Patient was in usual state of health when she woke up around 6 AM short of breath. Patient then fell out of her bed on her right side and could not get up due to left sided weakness. Patient was found by her aunt and came to hospital for further care. In ED Vascular Neurology declined IV TPA due to patient being compliant on Xarelto. Patient was taken directly to angiography where R ICA occlusive thrombus seen and thrombectoly was successfully performed. Subsequent R distal M3 branch occlusion was too distal for intervention. Patient admitted to critical care unit for further management. Patient only endorsing SOB following intervention. Continues with left arm weakness.           Plan:  -pending transfer to Stroke Team  - Iron replacement for anemia  -Stop IVF, takings in adequate PO    Review of Symptoms:   Constitutional: Denies fevers or chills.  Pulmonary: Denies shortness of breath or cough.  Cardiology: Denies chest pain or palpitations.  GI: Denies abdominal pain or constipation.  Neurologic: Denies new weakness, headaches, or paresthesias.     Medications:  Continuous Infusions:   sodium chloride 0.9% 100 mL/hr at 03/01/17 0900     Scheduled Meds:   cefTRIAXone (ROCEPHIN) IVPB  1 g Intravenous Q12H    cyanocobalamin  1,000 mcg Intramuscular Q7 Days    dabigatran etexilate  150  mg Oral BID    insulin aspart  1-10 Units Subcutaneous TIDWM    lisinopril  40 mg Oral Daily    polyethylene glycol  17 g Oral Daily    senna-docusate 8.6-50 mg  1 tablet Oral Daily    sodium chloride 0.9%  3 mL Intravenous Q8H    thiamine  100 mg Oral Daily     PRN Meds:.sodium chloride, acetaminophen, albuterol-ipratropium 2.5mg-0.5mg/3mL, dextrose 50%, dextrose 50%, glucagon (human recombinant), glucose, glucose, labetalol, pneumoc 13-mohan conj-dip cr(PF)    OBJECTIVE:   Vital Signs (Most Recent):   Temp: 99.7 °F (37.6 °C) (03/01/17 0800)  Pulse: 94 (03/01/17 0900)  Resp: 16 (03/01/17 0900)  BP: (!) 168/87 (03/01/17 0919)  SpO2: 95 % (03/01/17 0900)    Vital Signs (24h Range):   Temp:  [98.4 °F (36.9 °C)-99.9 °F (37.7 °C)] 99.7 °F (37.6 °C)  Pulse:  [] 94  Resp:  [0-50] 16  SpO2:  [95 %-100 %] 95 %  BP: (140-190)/(64-95) 168/87    ICP/CPP (Last 24h):        I & O (Last 24h):     Intake/Output Summary (Last 24 hours) at 03/01/17 1038  Last data filed at 03/01/17 0938   Gross per 24 hour   Intake             2650 ml   Output             1850 ml   Net              800 ml     Physical Exam:  GA: Alert, comfortable, no acute distress.   HEENT: No scleral icterus or JVD.   Pulmonary: Clear to auscultation A/P/L. No wheezing, crackles, or rhonchi.  Cardiac: RRR S1 & S2 w/o rubs/murmurs/gallops.   Abdominal: Bowel sounds present x 4. No appreciable hepatosplenomegaly.  Skin: No jaundice, rashes, or visible lesions.  Neuro:  --GCS: E4 V5 M6  --Mental Status:  AAO x 4, COnverse  --CN II-XII grossly intact.   --Pupils mm, PERRL.  -Left facial weakness   --left hemiparesis      Vent Data:        Lines/Drains/Airway:          Nutrition/Tube Feeds (if NPO state why): mechanical soft    Labs:  ABG: No results for input(s): PH, PO2, PCO2, HCO3, POCSATURATED, BE in the last 24 hours.  BMP:    Recent Labs  Lab 03/01/17  0346      K 3.8      CO2 23   BUN 6   CREATININE 1.1   *   MG 1.7   PHOS 2.9      LFT:   Lab Results   Component Value Date    AST 17 03/01/2017    ALT 11 03/01/2017    ALKPHOS 45 (L) 03/01/2017    BILITOT 0.5 03/01/2017    ALBUMIN 2.8 (L) 03/01/2017    PROT 6.6 03/01/2017     CBC:   Lab Results   Component Value Date    WBC 5.98 03/01/2017    HGB 8.8 (L) 03/01/2017    HCT 28.9 (L) 03/01/2017    MCV 78 (L) 03/01/2017     (H) 03/01/2017     Microbiology x 7d:   Microbiology Results (last 7 days)     ** No results found for the last 168 hours. **        Imaging:  I personally reviewed the above image.    ASSESSMENT/PLAN:     Active Hospital Problems    Diagnosis    *Cerebral infarction due to thrombosis of right carotid artery    Embolic stroke involving right middle cerebral artery    Fibromuscular dysplasia of cervicocranial artery    Internal carotid artery dissection    Left arm weakness    Type 2 diabetes mellitus without complication    Long-term (current) use of anticoagulants    Essential hypertension      Neuro:   Right MCA stroke  S/p thrombectomy, right ICA occlusion 2/26  Vascular Neurology following  Q1hr Neuro Checks  SBP  < 160  PT/OT  Low Vit B12, will need to be discharged on IM B12    Pulmonary:   NC  Nebs PRN  CXR/ABG if distress    Cardiac:   HTN  -SBP <160  -Home lisinopril reumed  -Echo: EF 60%    Renal:    BUN/Crt stable  Follow I/O, (+735ml)  IVF stopped    ID:   No leukocytosis  Afebrile  -rocephin started 2/28, no Cx drawn'. Stopped 3/1.    Hem/Onc:   Anemia  -CT Abd stable, no bleeding  -H/H stable, started dibigatran   -iron studies low, start Iron replacement ( will need to be discharged on Iron, f/u out patient).     Endocrine:    DM2  HgbA1c 6.8  FSBS q6hr    TSH 1.147    Fluids/Electrolytes/Nutrition/GI:   Lytes PRN  Diet: Mechanical SOft, nectar liquids    Proph:  DVT: SCD, no chemical with dagabatrin  Constipation:  Senna, mirlax      Level 2    Adebayo Sabillon [facility]  NeuroCritical Care

## 2017-03-01 NOTE — PLAN OF CARE
Problem: SLP Goal  Goal: SLP Goal  Speech Language Pathology Goals  Goals expected to be met by 3/6  1. Pt. Will participate in speech language eval  2. Tolerate soft diet with thin liquids with no s/s of aspiration     Outcome: Ongoing (interventions implemented as appropriate)  Speech, language, cognitive evaluation completed.  POC updated. AMANDA Kowalski, CCC/SLP  3/1/2017

## 2017-03-01 NOTE — PT/OT/SLP PROGRESS
"Occupational Therapy  Treatment    Lyssa Gandara   MRN: [id number]   Admitting Diagnosis: Cerebral infarction due to thrombosis of right carotid artery    OT Date of Treatment: 17   OT Start Time: 620  OT Stop Time: 644  OT Total Time (min): 24 min    Billable Minutes:  Self Care/Home Management 15 and Therapeutic Exercise 9    General Precautions: Standard, aspiration, fall  Orthopedic Precautions: N/A  Braces: N/A    Do you have any cultural, spiritual, Islam conflicts, given your current situation?: Oriental orthodox    Subjective:  Communicated with nurse prior to session.  Patient:  "I have a little headache."  Pain Ratin/10  Location:  (headache)  Pain Addressed: Reposition, Nurse notified  Pain Rating Post-Intervention: 2/10    Objective:  Patient found with: blood pressure cuff, SCD, telemetry, oxygen, peripheral IV   Family not present.    Functional Mobility:  Bed Mobility:  Rolling/Turning to Left: Modified independent  Rolling/Turning Right: Modified independent  Scooting/Bridging: Modified Independent  Supine to Sit: Modified Independent  Sit to Supine: Modified Independent    Transfers:   Sit <> Stand Assistance: Stand By Assistance  Sit <> Stand Assistive Device: No Assistive Device  Bed <> Chair Technique: Stand Pivot  Bed <> Chair Transfer Assistance: Stand By Assistance  Bed <> Chair Assistive Device: No Assistive Device    Activities of Daily Living:  UE Dressing Level of Assistance: Minimum assistance with assistance for fasteners  LE Dressing Level of Assistance: Minimum assistance with assistance for fasteners  Grooming Position: Standing  Grooming Level of Assistance: Stand by assistance         Additional Treatment:   Patient education provided on role of OT and need for outpt OT upon discharge. Patient education provided on hemiplegic dressing technique, left UE weight bearing / positioning, postural control, and FMC. Patient verbalizing understanding via teach back method. " Continued education, patient/ family training recommended. Oral motor ex performed while standing to address left facial weakness. Left UE AROM performed one set x 10 rep in all planes of motion.  Patient's functional status and disposition recommendation discussed with patient and nurse. White board updated in patient's room. OT asked if there were any other questions; patient/ family had no further questions.    AM-PAC 6 CLICK ADL   How much help from another person does this patient currently need?   1 = Unable, Total/Dependent Assistance  2 = A lot, Maximum/Moderate Assistance  3 = A little, Minimum/Contact Guard/Supervision  4 = None, Modified [place]cock/Independent    Putting on and taking off regular lower body clothing? : 3  Bathing (including washing, rinsing, drying)?: 3  Toileting, which includes using toilet, bedpan, or urinal? : 3  Putting on and taking off regular upper body clothing?: 3  Taking care of personal grooming such as brushing teeth?: 3  Eating meals?: 3  Total Score: 18     AM-PAC Raw Score CMS G-Code Modifier Level of Impairment Assistance   6 % Total / Unable   7 - 9 CM 80 - 100% Maximal Assist   10 - 14 CL 60 - 80% Moderate Assist   15 - 19 CK 40 - 60% Moderate Assist   20 - 22 CJ 20 - 40% Minimal Assist   23 CI 1-20% SBA / CGA   24 CH 0% Independent/ Mod I     Patient left supine with all lines intact, call button in reach and bed alarm on    ASSESSMENT:  Lyssa Gandara is a 51 y.o. female with a medical diagnosis of Cerebral infarction due to thrombosis of right carotid artery and presents with performance deficits of physical skills including impaired fine motor coordination. These performance deficits have resulted in activity limitations including but not limited to: upper body dressing, lower body dressing, carrying objects, typing, meal preparation, and assisting others in self car (grand mother). 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 Patient will benefit from skilled OT services to maximize level of independence with self-care skills and functional mobility. Will benefit from outpt OT.    Rehab identified problem list/impairments: Rehab identified problem list/impairments: weakness, impaired balance, decreased safety awareness, impaired endurance, impaired sensation, impaired self care skills, decreased coordination, impaired cognition, impaired functional mobilty, gait instability, decreased upper extremity function, impaired coordination, impaired fine motor    Rehab potential is good.    Activity tolerance: Good    Discharge recommendations: Discharge Facility/Level Of Care Needs: outpatient OT     Barriers to discharge: Barriers to Discharge: None    Equipment recommendations: none     GOALS:   Occupational Therapy Goals        Problem: Occupational Therapy Goal    Goal Priority Disciplines Outcome Interventions   Occupational Therapy Goal     OT, PT/OT Ongoing (interventions implemented as appropriate)    Description:  Goals achieved for supine-sit, rolling, transfers.  Revised goal to be met by: 3/7  Patient will demonstrate stand pivot transfers with modified independence   Not met  Patient will demonstrate grooming while standing with modified independence.   Not met  Patient will demonstrate upper body dressing with modified independence.   Not met  Patient will demonstrate lower body dressing with modified independence.   Not met  Patient will demonstrate toileting with modified independence.   Not met  Patient will demonstrate independence with HEP for left UE FMC.    Not met  Patient's family / caregiver will demonstrate independence and safety with assisting patient with self-care skills and functional mobility.     Not met  Patient and/or patient's family will verbalize understanding of stroke prevention guidelines, personal risk factors and stroke warning signs via teachback  method.  Not met                      Plan:  Patient to be seen 6 x/week to address the above listed problems via self-care/home management, therapeutic activities, therapeutic exercises, cognitive retraining, sensory integration  Plan of Care expires: 03/27/17  Plan of Care reviewed with: patient         Conchis KIAH Carver  03/01/2017

## 2017-03-01 NOTE — SUBJECTIVE & OBJECTIVE
Neurologic Chief Complaint: R MCA stroke s/p R ICA thrombectomy and distal M3 occlusion on right.     Subjective:     Interval History: Passed swallow study, recommended dental soft diet. Echo finished this morning, results pending. MRI showing scattered foci of infarct within right MCA distribution. Left sided weakness improving. Carotid ultrasound shows 60-79% R ICA stenosis    HPI, Past Medical, Family, and Social History remains the same as documented in the initial encounter.     Review of Systems   Constitutional: Negative for activity change.   HENT: Negative for hearing loss.    Eyes: Negative for photophobia.   Respiratory: Negative for apnea.    Cardiovascular: Negative for chest pain.   Gastrointestinal: Negative for abdominal distention.   Endocrine: Negative for cold intolerance.   Genitourinary: Negative for difficulty urinating.   Musculoskeletal: Negative for arthralgias.   Skin: Negative for color change.   Neurological: Negative for dizziness.   Psychiatric/Behavioral: Negative for agitation.     Scheduled Meds:   cyanocobalamin  1,000 mcg Intramuscular Q7 Days    dabigatran etexilate  150 mg Oral BID    ferrous sulfate  325 mg Oral Daily    insulin aspart  1-10 Units Subcutaneous TIDWM    lisinopril  40 mg Oral Daily    polyethylene glycol  17 g Oral Daily    senna-docusate 8.6-50 mg  1 tablet Oral Daily    sodium chloride 0.9%  3 mL Intravenous Q8H    thiamine  100 mg Oral Daily     Continuous Infusions:     PRN Meds:sodium chloride, acetaminophen, albuterol-ipratropium 2.5mg-0.5mg/3mL, dextrose 50%, dextrose 50%, glucagon (human recombinant), glucose, glucose, labetalol, pneumoc 13-mohan conj-dip cr(PF)    Objective:     Vital Signs (Most Recent):  Temp: 97.8 °F (36.6 °C) (03/01/17 1500)  Pulse: 101 (03/01/17 1705)  Resp: (!) 39 (03/01/17 1705)  BP: (!) 160/89 (03/01/17 1705)  SpO2: 95 % (03/01/17 1705)       Vital Signs Range (Last 24H):  Temp:  [97.8 °F (36.6 °C)-99.9 °F (37.7 °C)]    Pulse:  []   Resp:  [0-50]   BP: (140-190)/(65-95)   SpO2:  [95 %-100 %]        Physical Exam   Constitutional: No distress.   HENT:   Head trauma to left face w/ swelling and edema involving orbit and upper lip   Eyes: Pupils are equal, round, and reactive to light.   Neck: No thyromegaly present.   Cardiovascular: Regular rhythm.    Pulmonary/Chest: No respiratory distress. She has no wheezes.   Abdominal: She exhibits no distension. There is no tenderness.   Musculoskeletal: She exhibits no edema, tenderness or deformity.   Lymphadenopathy:     She has no cervical adenopathy.   Skin: Skin is warm and dry.     Neurological Exam:   LOC: alert and follows requests  Language: No aphasia  Speech: Dysarthria  Orientation: Person, Place, Time  Visual Fields (CN II): Full  EOM (CN III, IV, VI): fluctuating R gaze preference; able to overcome midline on most attempts  Pupils (CN III, IV, VI): PERRL  Facial Movement (CN VII): left lower facial droop, present despite confounder of left facial trauma  Motor*: Arm Left: Paretic: 4/5, Leg Left: Paretic: 4/5, Arm Right: Normal (5/5), Leg Right: Normal (5/5)  Cerebellar*: Normal limb  Sensation: decreased sensation on left hemibody ~ 50%       NIH Stroke Scale:    Level of Consciousness: 0 - alert  LOC Questions: 0 - answers both correctly  LOC Commands: 0 - performs both correctly  Best Gaze: 1 - partial gaze palsy  Visual: 0 - no visual loss  Facial Palsy: 1 - minor  Motor Left Arm: 1 - drift  Motor Right Arm: 0 - no drift  Motor Left Le - drift  Motor Right Le - no drift  Limb Ataxia: 0 - absent  Sensory: 1 - mild to moderate loss  Best Language: 0 - no aphasia  Dysarthria: 1 - mild to moderate dysarthria  Extinction and Inattention: 1 - partial neglect  NIH Stroke Scale Total: 7      Laboratory:  CMP:     Recent Labs  Lab 17  0346   CALCIUM 8.6*   ALBUMIN 2.8*   PROT 6.6      K 3.8   CO2 23      BUN 6   CREATININE 1.1   ALKPHOS 45*   ALT 11    AST 17   BILITOT 0.5     BMP:     Recent Labs  Lab 03/01/17  0346      K 3.8      CO2 23   BUN 6   CREATININE 1.1   CALCIUM 8.6*     CBC:     Recent Labs  Lab 03/01/17  0935   WBC 5.98   RBC 3.73*   HGB 8.8*   HCT 28.9*   *   MCV 78*   MCH 23.6*   MCHC 30.4*     Lipid Panel:     Recent Labs  Lab 02/26/17  0740   CHOL 155   LDLCALC 60.0*   HDL 78*   TRIG 85     Coagulation:     Recent Labs  Lab 03/01/17  0346   INR 1.0     Platelet Aggregation Study: No results for input(s): PLTAGG, PLTAGINTERP, PLTAGREGLACO, ADPPLTAGGREG in the last 168 hours.  Hgb A1C:     Recent Labs  Lab 02/26/17  1756   HGBA1C 6.8*     TSH:     Recent Labs  Lab 02/26/17  0740   TSH 1.147       Diagnostic Results:  Brain Imaging: CT Head. Date: 2/26/17  Acute Pathology: Infarct  MRI 2/27/17  acute infarct of the right MCA distribution involving the majority of the right parietal lobe with additional scattered foci of infarcts involving the right caudate head, right occipital lobe, right temporal lobe, right subinsular cortex, and right posterior frontal lobe.       Cerebrovascular Imaging: Angiogram Head. Date: 2/26/17  R ICA occlusion w/ irregularity of looped proximal segment; unclear if plaque w/ underlying stenosis vs. Dissection w/ underlying FMD; successful thrombectomy; intracranial runs do show at least one M3 branch occlusion w/ pial collateral retrograde filling     Cardiac Evaluation:   Ef 55-60%. Normal ventricular function

## 2017-03-01 NOTE — PLAN OF CARE
Problem: Patient Care Overview  Goal: Plan of Care Review  Outcome: Ongoing (interventions implemented as appropriate)  POC reviewed with pt at bedside. Pt verbalized understanding. Questions and concerns addressed. No acute events overnight. Pt progressing toward goals. Transfer orders remain. No bed available on our stepdown unit yet. Will continue to monitor. See flowsheets for full assessment and VS info

## 2017-03-01 NOTE — PROGRESS NOTES
Pt assisted to bathroom, audible wheezing noted, pt complained of being short of breath. Oxygen saturations 93% on room air. Respiratory therapy called for breathing treatment

## 2017-03-02 VITALS
RESPIRATION RATE: 20 BRPM | DIASTOLIC BLOOD PRESSURE: 77 MMHG | BODY MASS INDEX: 28.66 KG/M2 | OXYGEN SATURATION: 95 % | HEART RATE: 98 BPM | SYSTOLIC BLOOD PRESSURE: 165 MMHG | TEMPERATURE: 99 F | HEIGHT: 65 IN | WEIGHT: 172 LBS

## 2017-03-02 PROBLEM — I77.3 FIBROMUSCULAR DYSPLASIA OF CERVICOCRANIAL ARTERY: Status: ACTIVE | Noted: 2017-03-02

## 2017-03-02 PROBLEM — G93.6 CYTOTOXIC CEREBRAL EDEMA: Status: ACTIVE | Noted: 2017-03-02

## 2017-03-02 LAB
ALBUMIN SERPL BCP-MCNC: 2.9 G/DL
ALP SERPL-CCNC: 46 U/L
ALT SERPL W/O P-5'-P-CCNC: 12 U/L
ANION GAP SERPL CALC-SCNC: 11 MMOL/L
AST SERPL-CCNC: 26 U/L
BASOPHILS # BLD AUTO: 0.05 K/UL
BASOPHILS NFR BLD: 1.1 %
BILIRUB SERPL-MCNC: 0.5 MG/DL
BUN SERPL-MCNC: 8 MG/DL
CALCIUM SERPL-MCNC: 8.8 MG/DL
CHLORIDE SERPL-SCNC: 105 MMOL/L
CO2 SERPL-SCNC: 23 MMOL/L
CREAT SERPL-MCNC: 1 MG/DL
DIFFERENTIAL METHOD: ABNORMAL
EOSINOPHIL # BLD AUTO: 0.3 K/UL
EOSINOPHIL NFR BLD: 6.4 %
ERYTHROCYTE [DISTWIDTH] IN BLOOD BY AUTOMATED COUNT: 21.1 %
EST. GFR  (AFRICAN AMERICAN): >60 ML/MIN/1.73 M^2
EST. GFR  (NON AFRICAN AMERICAN): >60 ML/MIN/1.73 M^2
GLUCOSE SERPL-MCNC: 95 MG/DL
HCT VFR BLD AUTO: 29.1 %
HGB BLD-MCNC: 8.8 G/DL
INR PPP: 1
LYMPHOCYTES # BLD AUTO: 0.8 K/UL
LYMPHOCYTES NFR BLD: 17.4 %
MAGNESIUM SERPL-MCNC: 1.6 MG/DL
MCH RBC QN AUTO: 23.5 PG
MCHC RBC AUTO-ENTMCNC: 30.2 %
MCV RBC AUTO: 78 FL
MONOCYTES # BLD AUTO: 0.9 K/UL
MONOCYTES NFR BLD: 18.7 %
NEUTROPHILS # BLD AUTO: 2.6 K/UL
NEUTROPHILS NFR BLD: 56.2 %
PHOSPHATE SERPL-MCNC: 3.8 MG/DL
PLATELET # BLD AUTO: 459 K/UL
PMV BLD AUTO: 9.5 FL
POCT GLUCOSE: 104 MG/DL (ref 70–110)
POCT GLUCOSE: 120 MG/DL (ref 70–110)
POTASSIUM SERPL-SCNC: 3.6 MMOL/L
PROT SERPL-MCNC: 6.8 G/DL
PROTHROMBIN TIME: 10.4 SEC
RBC # BLD AUTO: 3.75 M/UL
SODIUM SERPL-SCNC: 139 MMOL/L
WBC # BLD AUTO: 4.7 K/UL

## 2017-03-02 PROCEDURE — 97110 THERAPEUTIC EXERCISES: CPT

## 2017-03-02 PROCEDURE — 25000242 PHARM REV CODE 250 ALT 637 W/ HCPCS: Performed by: STUDENT IN AN ORGANIZED HEALTH CARE EDUCATION/TRAINING PROGRAM

## 2017-03-02 PROCEDURE — 97532 *HC OT COG SKL DEV EA 15: CPT

## 2017-03-02 PROCEDURE — G8988 SELF CARE GOAL STATUS: HCPCS | Mod: CJ

## 2017-03-02 PROCEDURE — 84100 ASSAY OF PHOSPHORUS: CPT

## 2017-03-02 PROCEDURE — 92526 ORAL FUNCTION THERAPY: CPT

## 2017-03-02 PROCEDURE — 25000003 PHARM REV CODE 250: Performed by: STUDENT IN AN ORGANIZED HEALTH CARE EDUCATION/TRAINING PROGRAM

## 2017-03-02 PROCEDURE — 90471 IMMUNIZATION ADMIN: CPT | Performed by: NURSE PRACTITIONER

## 2017-03-02 PROCEDURE — 85025 COMPLETE CBC W/AUTO DIFF WBC: CPT

## 2017-03-02 PROCEDURE — 80053 COMPREHEN METABOLIC PANEL: CPT

## 2017-03-02 PROCEDURE — 97112 NEUROMUSCULAR REEDUCATION: CPT

## 2017-03-02 PROCEDURE — 36415 COLL VENOUS BLD VENIPUNCTURE: CPT

## 2017-03-02 PROCEDURE — 25000003 PHARM REV CODE 250: Performed by: NURSE PRACTITIONER

## 2017-03-02 PROCEDURE — 94640 AIRWAY INHALATION TREATMENT: CPT

## 2017-03-02 PROCEDURE — 63600175 PHARM REV CODE 636 W HCPCS: Performed by: NURSE PRACTITIONER

## 2017-03-02 PROCEDURE — 97530 THERAPEUTIC ACTIVITIES: CPT

## 2017-03-02 PROCEDURE — 3E0234Z INTRODUCTION OF SERUM, TOXOID AND VACCINE INTO MUSCLE, PERCUTANEOUS APPROACH: ICD-10-PCS | Performed by: NURSE PRACTITIONER

## 2017-03-02 PROCEDURE — 85610 PROTHROMBIN TIME: CPT

## 2017-03-02 PROCEDURE — G8989 SELF CARE D/C STATUS: HCPCS | Mod: CJ

## 2017-03-02 PROCEDURE — 83735 ASSAY OF MAGNESIUM: CPT

## 2017-03-02 PROCEDURE — 90670 PCV13 VACCINE IM: CPT | Performed by: NURSE PRACTITIONER

## 2017-03-02 PROCEDURE — 99233 SBSQ HOSP IP/OBS HIGH 50: CPT | Mod: ,,, | Performed by: PSYCHIATRY & NEUROLOGY

## 2017-03-02 PROCEDURE — 92507 TX SP LANG VOICE COMM INDIV: CPT

## 2017-03-02 PROCEDURE — 94761 N-INVAS EAR/PLS OXIMETRY MLT: CPT

## 2017-03-02 PROCEDURE — 97535 SELF CARE MNGMENT TRAINING: CPT

## 2017-03-02 RX ORDER — DABIGATRAN ETEXILATE 150 MG/1
150 CAPSULE ORAL 2 TIMES DAILY
Qty: 60 CAPSULE | Refills: 1 | Status: SHIPPED | OUTPATIENT
Start: 2017-03-02 | End: 2017-04-12 | Stop reason: SDUPTHER

## 2017-03-02 RX ORDER — FERROUS SULFATE 325(65) MG
325 TABLET, DELAYED RELEASE (ENTERIC COATED) ORAL DAILY
Qty: 30 TABLET | Refills: 1 | Status: SHIPPED | OUTPATIENT
Start: 2017-03-02 | End: 2018-06-30

## 2017-03-02 RX ADMIN — LISINOPRIL 40 MG: 20 TABLET ORAL at 09:03

## 2017-03-02 RX ADMIN — THIAMINE HCL TAB 100 MG 100 MG: 100 TAB at 09:03

## 2017-03-02 RX ADMIN — DABIGATRAN ETEXILATE MESYLATE 150 MG: 150 CAPSULE ORAL at 09:03

## 2017-03-02 RX ADMIN — PNEUMOCOCCAL 13-VALENT CONJUGATE VACCINE 0.5 ML: 2.2; 2.2; 2.2; 2.2; 2.2; 4.4; 2.2; 2.2; 2.2; 2.2; 2.2; 2.2; 2.2 INJECTION, SUSPENSION INTRAMUSCULAR at 03:03

## 2017-03-02 RX ADMIN — FERROUS SULFATE TAB EC 325 MG (65 MG FE EQUIVALENT) 325 MG: 325 (65 FE) TABLET DELAYED RESPONSE at 09:03

## 2017-03-02 RX ADMIN — SODIUM CHLORIDE, PRESERVATIVE FREE 3 ML: 5 INJECTION INTRAVENOUS at 06:03

## 2017-03-02 RX ADMIN — IPRATROPIUM BROMIDE AND ALBUTEROL SULFATE 3 ML: .5; 3 SOLUTION RESPIRATORY (INHALATION) at 12:03

## 2017-03-02 RX ADMIN — IPRATROPIUM BROMIDE AND ALBUTEROL SULFATE 3 ML: .5; 3 SOLUTION RESPIRATORY (INHALATION) at 11:03

## 2017-03-02 NOTE — PT/OT/SLP PROGRESS
"Speech Language Pathology  Treatment    Lyssa Gandara   MRN: [id number]   Admitting Diagnosis: Cerebral infarction due to thrombosis of right carotid artery    Diet recommendations: Solid Diet Level: Dental Soft  Liquid Diet Level: Thin Feed only when awake/alert, No straws, HOB to 90 degrees, Small bites/sips, 1 bite/sip at a time and Meds whole 1 at a time    SLP Treatment Date: 17  Speech Start Time: 1204     Speech Stop Time: 1222     Speech Total (min): 18 min       TREATMENT BILLABLE MINUTES:  Speech Therapy Individual 10 and Treatment Swallowing Dysfunction 8    Has the patient been evaluated by SLP for swallowing? : Yes  Keep patient NPO?: No   General Precautions: Standard, aspiration, fall          Subjective:  "Oh it's still off" re: speech    Pain Ratin/10              Pain Rating Post-Intervention: 0/10    Objective:   Patient found with: telemetry    Pt alert and appropriate t/o session.  Improved labial retraction noted.  Speech in conversation is % intelligible though some mild incoordination noted.  OME's and rapid speech drills introduced and demonstrated.  Pt demonstrated understanding.  Handout provided for additional practice.  Speech strategies also reviewed with good implementation by pt.  Pt denied difficulty with recent meals, reports mild pocketing.  Mild anterior loss and mild pocketing on L noted with soft solids though pt cleared indptly.  No overt s/s aspiration.       Assessment:  Lyssa Gandara is a 51 y.o. female with a medical diagnosis of Cerebral infarction due to thrombosis of right carotid artery and presents with mild dysarthria, mild oral dysphagia.    Discharge recommendations: Discharge Facility/Level Of Care Needs: outpatient speech therapy, home (pending progress)     Goals:   SLP Goals        Problem: SLP Goal    Goal Priority Disciplines Outcome   SLP Goal     SLP Ongoing (interventions implemented as appropriate)   Description:  Speech " Language Pathology Goals  Goals expected to be met by 3/6  1.  Pt. Will participate in speech language eval-met  2.  Tolerate soft diet with thin liquids with no s/s of aspiration-ongoing  Updated Speech Language Pathology Goals to be addressed daily, M-F,  All goals expected to be met by 3/8  2. Pt will demonstrate understanding of OME's indptly for home program.   3. Pt will implement simple speech strategies in conversation indptly.  4. Pt will complete delayed recall tasks with 90% accy indptly.    5. Pt will complete assessment of higher level cognition/organization to determine additional need for tx.  6. Pt will complete assessment of reading, writing, visual spatial skills to determine additional need for tx                        Plan:   Patient to be seen Therapy Frequency: 5 x/week   Plan of Care expires: 03/29/17  Plan of Care reviewed with: patient  SLP Follow-up?: Yes  SLP - Next Visit Date: 03/03/17           AMANDA Kowalski, CCC-SLP  03/02/2017

## 2017-03-02 NOTE — PROGRESS NOTES
Discharge instructions reviewed with pt, pt verbalized understanding. IV and telemetry removed at discharge. Pt in possession of all belongings. NAD.

## 2017-03-02 NOTE — PLAN OF CARE
Transition of care note    Transfer to Stroke    DX: CVA (cerebral vascular accident) [I63.9]  CVA (cerebral vascular accident) [I63.9]     PT/OT: Discharge Facility/Level Of Care Needs: outpatient PT     Insurance: Payor: [employer] / Plan: [facility] ALL OUT OF STATE / Product Type: PPO /      PCP: Gerard Joyce MD     Pharmacy:   [address] [address]  Phone: [phone] Fax: [phone]      No future appointments.     Jennifer Florez CM  [id number]

## 2017-03-02 NOTE — PLAN OF CARE
Problem: Physical Therapy Goal  Goal: Physical Therapy Goal  Goals to be met by: 3/11/17     Patient will increase functional independence with mobility by performin. Supine to sit with Modified [place]  2. Sit to supine with Modified [place]  3. Sit to stand transfer with Supervision  4. Gait x 50 feet with Contact Guard Assistance using most appropriate AD. MET  Revised: Gait x 200 ft with Supervision using no AD.   5. Ascend/descend 5 stair with right Handrails Minimal Assistance. MET 3/1/2017   6. Lower extremity exercise program x10-15 reps with supervision            Goals remain appropriate.      Vale Kenny, PTA.  3/1/2017

## 2017-03-02 NOTE — CONSULTS
PM&R consult follow up.     Participating with therapy.  Patient now mod I with mobility, transfers, functional ambulation, and ADLs.  Ambulated 200 ft SBA.  Patien progressing well with therapy and with limited goals for inpatient rehab.  Agree with therapy recommendations for outpatient PT, OT, and SLP.  Will sign off.  Please call with questions/concerns or re-consult if situation changes.    CHINTAN Wilcox, FNP-C  Physical Medicine & Rehabilitation   03/02/2017  Spectralink: [id number]

## 2017-03-02 NOTE — PLAN OF CARE
Problem: Occupational Therapy Goal  Goal: Occupational Therapy Goal  Goals achieved for supine-sit, rolling, transfers. Revised goal to be met by: 3/7  Patient will demonstrate stand pivot transfers with modified independence Not met  Patient will demonstrate grooming while standing with modified independence. Not met  Patient will demonstrate upper body dressing with modified independence. Not met  Patient will demonstrate lower body dressing with modified independence. Not met  Patient will demonstrate toileting with modified independence. Not met  Patient will demonstrate independence with HEP for left UE FMC. Not met  Patients family / caregiver will demonstrate independence and safety with assisting patient with self-care skills and functional mobility. Not met  Patient and/or patients family will verbalize understanding of stroke prevention guidelines, personal risk factors and stroke warning signs via teachback method. Not met        Goals remain appropriate.  KIAH Loya  3/2/2017

## 2017-03-02 NOTE — PLAN OF CARE
Problem: Patient Care Overview  Goal: Individualization & Mutuality  Outcome: Ongoing (interventions implemented as appropriate)  POC reviewed with patient. Verbalized understanding. AAOx4. Pt remained free from falls and injuries this shift. No new skin breakdown noted. VSS. Afebrile. Safety maintained. Telemetry monitoring in progress- NSR, ST. Stroke booklet reviewed. Questions and concerns addressed. No acute events overnight. Pt progressing towards goals. Will continue to monitor.

## 2017-03-02 NOTE — DISCHARGE SUMMARY
[facility]  Vascular Neurology  Comprehensive Stroke Center  Discharge Summary     Summary:     Admit Date: 2/26/2017  6:51 AM    Discharge Date and Time: 3/2/2017  3:54 PM    Attending Physician: No att. providers found     Discharge Provider: Meera Mendez PA-C    History of Present Illness: 51F p/w left hemiparesis and dysarthria, last known normal at 0430 w/ symptom onset at that time. These symptoms have never happened before. There are no identified triggers or modifying factors. There have been no recurrent events. There are no other associated symptoms.  Was brought to ED for evaluation and stroke team was alerted. NCCT was initially completed, attempted CTA but infiltration of IV occurred. Not a candidate for IVtPA d/t being on Xarelto and compliant for pulmonary embolus previously diagnosed. We decided to take patient straight to angiography. R ICA occlusive thrombus seen w/ successful aspiration thrombectomy revealing an irregular looped segment, unclear if underlying FMD w/ dissection or underlying atherosclerotic plaque rupture. Subsequently found to have distal M3 branch occlusion that was too distal for intervention.      Hospital Course (synopsis of major diagnoses, care, treatment, and services provided during the course of the hospital stay): Lyssa Gandara is a 51 y.o. Female with PMHx of DM, HTN, PE (on xarelto), and B12 anemia who presented with L hemiparesis and dysarthria. Patient was not a candidate for tPA due to being on Xarelto for her PE. Patient was supposed to have CTA but infiltration of the IV occurred. Therefore, patient was taken straight to IR for thrombectomy and was found to have a R ICA occlusive thrombus that was successfully aspirated. After clot removal, it was noted that the patient had an irregular looped segment suggesting possible FMD with dissection or underlying atherosclerotic plaque rupture. Patient also had a R M3 occlusion, which was  too distal for intervention.     MRI reveled scatter foci of infarct within R MCA distribution. Carotid ultrasound showed 60-79% R ICA stenosis. Echo revealed an EF of 55% with no LA enlargement. Patient had TCD with bubble contrast to rule out possible shunt, this study was negative. Patient had both hypercoagulable & autoimmune workup completed in 3/2016. Complete APLS lab work (DRVVT/LA, anticardiolipin, beta 2) was negative. 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     On 17, the patient passed her swallow study and tolerated a dental soft diet. On 17, her SBP was in the 190's and she was started on lisinopril 40 mg which dropped her SBP in the 160s. Patient was originally on xarelto for her pulmonary emboli, but was switched to dabigatran since she had a stroke while on xarelto. She remained neurologically stable. She currently has drift in LUE and LLE, decreased sensation on L, L facial droop, and dysarthria. Patient was evaluated by PT/OT and speech who recommended outpatient therapy. For secondary stroke prevention, the patient will continue dabigatran. She does not require a statin due to LDL of 60. She will resume all other home medications. Ms. Gandara will be discharged home with outpatient therapy and follow up in stroke clinic in 4-6 weeks and with her PCP in 1 week.           NIH Stroke Scale:    Level of Consciousness: 0 - alert  LOC Questions: 0 - answers both correctly  LOC Commands: 0 - performs both correctly  Best Gaze: 0 - normal  Visual: 0 - no visual loss  Facial Palsy: 1 - minor  Motor Left Arm: 1 - drift  Motor Right Arm: 0 - no drift  Motor Left Le - drift  Motor Right Le - no drift  Limb Ataxia: 0 - absent  Sensory: 1 - mild to moderate loss  Best Language: 0 - no aphasia  Dysarthria: 1 - mild to moderate dysarthria  Extinction and Inattention: 0 - no neglect  NIH Stroke Scale Total: 5  Modified Taylor Scale:   Timeline: At  discharge  Modified Taylor Score: 2 - slight disability          Assessment/Plan:     Interventions: Thrombectomy    Complications: None    Research Candidate?:  No    Neurological deficit at discharge: Weakness: left, Sensory Loss: left, Dysarthria     Disposition: Home or Self Care    Final Active Diagnoses:    Diagnosis Date Noted POA    PRINCIPAL PROBLEM:  Cerebral infarction due to thrombosis of right carotid artery [I63.031] 02/26/2017 Yes    Fibromuscular dysplasia of cervicocranial artery [I77.3] 03/02/2017 Yes    Cytotoxic cerebral edema [G93.6] 03/02/2017 Unknown    Internal carotid artery dissection [I77.71] 02/26/2017 Yes    Left arm weakness [R29.898] 02/26/2017 Yes    Type 2 diabetes mellitus without complication [E11.9]  Yes    Long-term (current) use of anticoagulants [Z79.01] 03/08/2016 Not Applicable    Essential hypertension [I10] 03/08/2016 Yes      Problems Resolved During this Admission:    Diagnosis Date Noted Date Resolved POA     No new Assessment & Plan notes have been filed under this hospital service since the last note was generated.  Service: Vascular Neurology      Recommendations:     Post-discharge complication risks: Falls    Stroke Education given to: patient    Follow-up in Stroke Clinic in 4-6 weeks and with PCP in 1 week    Discharge Plan:  Antocoagulant: Dabigatran  Aggresive risk factor modification:  Hypertension and Diabetes    Follow Up:  Follow-up Information     Follow up with [facility].    Specialty:  Outpatient Rehab    Why:  Please call to schedule your outpatient therapy appointment if you have not received a call within 1-2 business days    Contact information:    [address]  [facility] [id number] [phone]    Additional information:    [address]        Follow up with Gerard Joyce MD In 1 week.    Specialty:  Family Medicine    Contact information:    [address]  [facility] [address]  [phone]       "    Follow up with [facility] VASCULAR NEUROLOGY In 4 weeks.    Specialty:  Vascular Neurology    Why:  stroke follow up appointment    Contact information:    [address]  [facility] [id number] [phone]        Patient Instructions:     Ambulatory Referral to Physical/Occupational Therapy   Referral Priority: Routine Referral Type: Physical Medicine   Referral Reason: Specialty Services Required    Number of Visits Requested: 1      Ambulatory Referral to Physical/Occupational Therapy   Referral Priority: Routine Referral Type: Physical Medicine   Referral Reason: Specialty Services Required    Number of Visits Requested: 1      Diet general   Order Specific Question Answer Comments   Additional restrictions: Dental Soft      Activity as tolerated       Medications:  Reconciled Home Medications:   Discharge Medication List as of 3/2/2017  3:11 PM      START taking these medications    Details   dabigatran etexilate (PRADAXA) 150 mg Cap Take 1 capsule (150 mg total) by mouth 2 (two) times daily. "Do NOT break, chew, or open capsules.", Starting 3/2/2017, Until Discontinued, Normal      ferrous sulfate 325 (65 FE) MG EC tablet Take 1 tablet (325 mg total) by mouth once daily., Starting 3/2/2017, Until Discontinued, Normal         CONTINUE these medications which have NOT CHANGED    Details   albuterol (PROVENTIL) 2.5 mg /3 mL (0.083 %) nebulizer solution Take 3 mLs (2.5 mg total) by nebulization every 6 (six) hours as needed for Wheezing., Starting 10/18/2016, Until Wed 10/18/17, Normal      albuterol 90 mcg/actuation inhaler Inhale 1-2 puffs into the lungs every 6 (six) hours as needed for Wheezing., Starting 3/5/2016, Until Sun 3/5/17, Print      cyanocobalamin (VITAMIN B-12) 1000 MCG tablet Take 100 mcg by mouth once daily., Until Discontinued, Historical Med      fluticasone-salmeterol 250-50 mcg/dose (ADVAIR) 250-50 mcg/dose diskus inhaler Inhale 1 puff into the lungs 2 (two) times daily., " Starting 9/9/2016, Until Sat 9/9/17, Normal      metformin (GLUCOPHAGE) 500 MG tablet TAKE ONE TABLET BY MOUTH TWICE DAILY WITH MEALS, Normal      triamterene-hydrochlorothiazide 37.5-25 mg (MAXZIDE-25) 37.5-25 mg per tablet Take 1 tablet by mouth once daily., Starting 10/18/2016, Until Discontinued, Normal      lisinopril (PRINIVIL,ZESTRIL) 20 MG tablet TAKE ONE TABLET BY MOUTH ONCE DAILY, Normal         STOP taking these medications       rivaroxaban (XARELTO) 20 mg Tab Comments:   Reason for Stopping:                   Meera Mendez PA-C  Comprehensive Stroke Center  Department of Vascular Neurology   [facility]

## 2017-03-02 NOTE — PLAN OF CARE
03/02/2017      Lyssa Gandara  [address] [facility] [address]          Vascular Neurolgy [facility] [address] [facility] [address] [phone] after hours  [phone] fax Diagnosis:  Cerebral infarction due to thrombosis of right carotid artery                               Please evaluate and treat patient for her occupation therapy, physical therapy, and speech therapy needs.            __________________________  Dillan Fritz MD  03/02/2017

## 2017-03-02 NOTE — SUBJECTIVE & OBJECTIVE
NIH Stroke Scale:    Level of Consciousness: 0 - alert  LOC Questions: 0 - answers both correctly  LOC Commands: 0 - performs both correctly  Best Gaze: 0 - normal  Visual: 0 - no visual loss  Facial Palsy: 1 - minor  Motor Left Arm: 1 - drift  Motor Right Arm: 0 - no drift  Motor Left Le - drift  Motor Right Le - no drift  Limb Ataxia: 0 - absent  Sensory: 1 - mild to moderate loss  Best Language: 0 - no aphasia  Dysarthria: 1 - mild to moderate dysarthria  Extinction and Inattention: 0 - no neglect  NIH Stroke Scale Total: 5  Modified Gray Scale:   Timeline: At discharge  Modified Gray Score: 2 - slight disability

## 2017-03-02 NOTE — SUBJECTIVE & OBJECTIVE
Neurologic Chief Complaint: R MCA stroke s/p R ICA thrombectomy and distal M3 occlusion on right.     Subjective:     Interval History: NAEON, patient with no complaints this morning    HPI, Past Medical, Family, and Social History remains the same as documented in the initial encounter.     Review of Systems   Constitutional: Negative for chills and fever.   HENT: Negative for drooling and hearing loss.    Eyes: Negative for visual disturbance.   Respiratory: Negative for cough and shortness of breath.    Cardiovascular: Negative for chest pain and palpitations.   Gastrointestinal: Negative for nausea and vomiting.   Genitourinary: Negative for difficulty urinating.   Musculoskeletal: Negative for joint swelling.   Skin: Negative for rash.   Neurological: Positive for facial asymmetry, speech difficulty, weakness and numbness. Negative for dizziness and headaches.   Psychiatric/Behavioral: Negative for agitation and behavioral problems.     Scheduled Meds:   cyanocobalamin  1,000 mcg Intramuscular Q7 Days    dabigatran etexilate  150 mg Oral BID    ferrous sulfate  325 mg Oral Daily    insulin aspart  1-10 Units Subcutaneous TIDWM    lisinopril  40 mg Oral Daily    polyethylene glycol  17 g Oral Daily    senna-docusate 8.6-50 mg  1 tablet Oral Daily    sodium chloride 0.9%  3 mL Intravenous Q8H    thiamine  100 mg Oral Daily     Continuous Infusions:     PRN Meds:sodium chloride, acetaminophen, albuterol-ipratropium 2.5mg-0.5mg/3mL, dextrose 50%, dextrose 50%, glucagon (human recombinant), glucose, glucose, labetalol, pneumoc 13-mohan conj-dip cr(PF)    Objective:     Vital Signs (Most Recent):  Temp: 99.3 °F (37.4 °C) (03/02/17 1219)  Pulse: 99 (03/02/17 1219)  Resp: 20 (03/02/17 1219)  BP: (!) 165/77 (03/02/17 1219)  SpO2: 95 % (03/02/17 1219)  BP Location: Left arm    Vital Signs Range (Last 24H):  Temp:  [97.8 °F (36.6 °C)-99.4 °F (37.4 °C)]   Pulse:  []   Resp:  [16-40]   BP: (139-165)/(71-94)    SpO2:  [92 %-100 %]   BP Location: Left arm    Physical Exam   Constitutional: She is oriented to person, place, and time. She appears well-developed and well-nourished. No distress.   HENT:   Head: Normocephalic.   Head trauma to left face w/ swelling and edema involving orbit and upper lip   Eyes: EOM are normal. Pupils are equal, round, and reactive to light.   Neck: Normal range of motion.   Cardiovascular: Normal rate.    Pulmonary/Chest: Effort normal. No respiratory distress.   Abdominal: She exhibits no distension.   Musculoskeletal: Normal range of motion.   Neurological: She is alert and oriented to person, place, and time.   Skin: Skin is warm and dry.   Psychiatric: She has a normal mood and affect. Her behavior is normal.   Vitals reviewed.    Neurological Exam:   LOC: alert and follows requests  Language: No aphasia  Speech: Dysarthria  Orientation: Person, Place, Time  Visual Fields (CN II): Full  EOM (CN III, IV, VI): fluctuating R gaze preference; able to overcome midline on most attempts  Pupils (CN III, IV, VI): PERRL  Facial Movement (CN VII): left lower facial droop, present despite confounder of left facial trauma  Motor*: Arm Left: Paretic: 4/5, Leg Left: Paretic: 4/5, Arm Right: Normal (5/5), Leg Right: Normal (5/5)  Cerebellar*: Normal limb  Sensation: decreased sensation on left hemibody ~ 50%       NIH Stroke Scale:    Level of Consciousness: 0 - alert  LOC Questions: 0 - answers both correctly  LOC Commands: 0 - performs both correctly  Best Gaze: 0 - normal  Visual: 0 - no visual loss  Facial Palsy: 1 - minor  Motor Left Arm: 1 - drift  Motor Right Arm: 0 - no drift  Motor Left Le - drift  Motor Right Le - no drift  Limb Ataxia: 0 - absent  Sensory: 1 - mild to moderate loss  Best Language: 0 - no aphasia  Dysarthria: 1 - mild to moderate dysarthria  Extinction and Inattention: 0 - no neglect  NIH Stroke Scale Total: 5      Laboratory:  CMP:     Recent Labs  Lab 17  3595    CALCIUM 8.8   ALBUMIN 2.9*   PROT 6.8      K 3.6   CO2 23      BUN 8   CREATININE 1.0   ALKPHOS 46*   ALT 12   AST 26   BILITOT 0.5     BMP:     Recent Labs  Lab 03/02/17  0524      K 3.6      CO2 23   BUN 8   CREATININE 1.0   CALCIUM 8.8     CBC:     Recent Labs  Lab 03/02/17  0524   WBC 4.70   RBC 3.75*   HGB 8.8*   HCT 29.1*   *   MCV 78*   MCH 23.5*   MCHC 30.2*     Lipid Panel:     Recent Labs  Lab 02/26/17  0740   CHOL 155   LDLCALC 60.0*   HDL 78*   TRIG 85     Coagulation:     Recent Labs  Lab 03/02/17  0524   INR 1.0     Platelet Aggregation Study: No results for input(s): PLTAGG, PLTAGINTERP, PLTAGREGLACO, ADPPLTAGGREG in the last 168 hours.  Hgb A1C:     Recent Labs  Lab 02/26/17  1756   HGBA1C 6.8*     TSH:     Recent Labs  Lab 02/26/17  0740   TSH 1.147       Diagnostic Results:    CT Head. Date: 2/26/17  -No acute intracranial abnormalities.  Specifically, no intracranial hemorrhage.    MRI Brain. Date: 2/27/17  -acute infarct of the right MCA distribution involving the majority of the right parietal lobe with additional scattered foci of infarcts involving the right caudate head, right occipital lobe, right temporal lobe, right subinsular cortex, and right posterior frontal lobe.    Cerebrovascular Imaging: Angiogram Head. Date: 2/26/17  R ICA occlusion w/ irregularity of looped proximal segment; unclear if plaque w/ underlying stenosis vs. Dissection w/ underlying FMD; successful thrombectomy; intracranial runs do show at least one M3 branch occlusion w/ pial collateral retrograde filling     Cardiac Evaluation:   Ef 55-60%. Normal ventricular function  No LA enlargement    US Carotid. Date: 02/26/17  -60-79% stenosis of the right internal carotid artery by velocity.  Please note, there is vessel tortuosity, and a large focus of homogeneous plaque at the level of the bulb, within the internal carotid artery.  Visually, this plaque narrows the lumen by at least 70%.   Irregularity of the vessel wall is again noted, similar in appearance to the recent angiogram, please see that report.  -1-39% stenosis of the left internal carotid artery.

## 2017-03-02 NOTE — PT/OT/SLP PROGRESS
Physical Therapy  Treatment    Lyssa Gandara   MRN: [id number]   Admitting Diagnosis: Cerebral infarction due to thrombosis of right carotid artery    PT Received On: 17  PT Start Time: 1602     PT Stop Time: 1625    PT Total Time (min): 23 min       Billable Minutes:  Gait Training 13 and Therapeutic Exercise 10    Treatment Type: Treatment  PT/PTA: PTA     PTA Visit Number: 1       General Precautions: Standard, aspiration, fall  Orthopedic Precautions: N/A   Braces: N/A    Do you have any cultural, spiritual, [employer] conflicts, given your current situation?: none stated    Subjective:  Communicated with RN (Elaine) prior to session.  Pt agreeable to PT session    Pain Ratin/10   Pain Rating Post-Intervention: 0/10    Objective:   Patient found with: blood pressure cuff, pulse ox (continuous), telemetry (UIC with no family present)   2 attempts for tx session 2* pt with wheezing during mobility for 1st attempt.  RN suggests pt get a resp tx  And PTA return at a later time (3:00-3:13 pm)    Functional Mobility:  Bed Mobility:   Not performed 2* pt found/left seated UIC    Transfers:  Sit <> Stand Assistance: Stand By Assistance (from BS chair with vc's)  Sit <> Stand Assistive Device: No Assistive Device    Gait:   Gait Distance: 200ft with vc's for jillian, directional guidance and safety.  Pt demonstrates minor instability, but no LOB  Assistance 1: Stand by Assistance  Gait Assistive Device: No device  Gait Pattern: reciprocal  Gait Deviation(s): decreased jillian, decreased step length, decreased stride length    Stairs:  Pt ascended/descend 6 stair(s) with No Assistive Device with R HR with Stand-by Assistance and vc's for sequencing and safety.       Therapeutic Activities and Exercises:  B LE AROM ther ex's in standing using side rail with bed raised x15 reps with vc's with SBA for safety    AM-PAC 6 CLICK MOBILITY  How much help from another person does this patient currently need?   1 =  Unable, Total/Dependent Assistance  2 = A lot, Maximum/Moderate Assistance  3 = A little, Minimum/Contact Guard/Supervision  4 = None, Modified [place]/Independent    Turning over in bed (including adjusting bedclothes, sheets and blankets)?: 4  Sitting down on and standing up from a chair with arms (e.g., wheelchair, bedside commode, etc.): 3  Moving from lying on back to sitting on the side of the bed?: 4  Moving to and from a bed to a chair (including a wheelchair)?: 3  Need to walk in hospital room?: 3  Climbing 3-5 steps with a railing?: 3  Total Score: 20    AM-PAC Raw Score CMS G-Code Modifier Level of Impairment Assistance   6 % Total / Unable   7 - 9 CM 80 - 100% Maximal Assist   10 - 14 CL 60 - 80% Moderate Assist   15 - 19 CK 40 - 60% Moderate Assist   20 - 22 CJ 20 - 40% Minimal Assist   23 CI 1-20% SBA / CGA   24 CH 0% Independent/ Mod I     Patient left up in chair with all lines intact, call button in reach and RN present.    Assessment:  Lyssa Gandara is a 51 y.o. female with a medical diagnosis of Cerebral infarction due to thrombosis of right carotid artery and presents with impaired balance requiring SBA for safety.  Pt did well negotiating stairs.  Pt will cont to benefit from skilled PT intervention to address deficits and improve functional mobility.     Rehab identified problem list/impairments: Rehab identified problem list/impairments: weakness, impaired endurance, impaired self care skills, impaired functional mobilty, gait instability, impaired balance, decreased coordination, decreased upper extremity function, decreased lower extremity function, decreased safety awareness    Rehab potential is good.    Activity tolerance: Good    Discharge recommendations: Discharge Facility/Level Of Care Needs: outpatient PT     Barriers to discharge: Barriers to Discharge: None    Equipment recommendations: Equipment Needed After Discharge: none     GOALS:   Physical Therapy Goals         Problem: Physical Therapy Goal    Goal Priority Disciplines Outcome Goal Variances Interventions   Physical Therapy Goal     PT/OT, PT Ongoing (interventions implemented as appropriate)     Description:  Goals to be met by: 3/11/17     Patient will increase functional independence with mobility by performin. Supine to sit with Modified [place]  2. Sit to supine with Modified [place]  3. Sit to stand transfer with Supervision  4. Gait  x 50 feet with Contact Guard Assistance using most appropriate AD. MET   Revised: Gait x 200 ft with Supervision using no AD.   5. Ascend/descend 5 stair with right Handrails Minimal Assistance.   MET 3/1/2017   6. Lower extremity exercise program x10-15 reps with supervision                  PLAN:    Patient to be seen 6 x/week  to address the above listed problems via gait training, therapeutic activities, therapeutic exercises, neuromuscular re-education  Plan of Care expires: 17  Plan of Care reviewed with: patient         Vale Kenny, PTA  2017

## 2017-03-02 NOTE — PT/OT/SLP PROGRESS
"Occupational Therapy  Treatment    Lyssa Gandara   MRN: [id number]   Admitting Diagnosis: Cerebral infarction due to thrombosis of right carotid artery    OT Date of Treatment: 17   OT Start Time: 742  OT Stop Time: 822  OT Total Time (min): 40 min    Billable Minutes:  Self Care/Home Management 13 and Therapeutic Exercise 27    General Precautions: Standard, aspiration, fall  Orthopedic Precautions: N/A  Braces: N/A    Do you have any cultural, spiritual, Temple conflicts, given your current situation?: Nondenominational    Subjective:  Communicated with nurse prior to session.  Patient:  "I did it!"--in reference to being able to tie laces.  "I have been walking to the bathroom on my own."  Pain Ratin/10   Pain Rating Post-Intervention: 0/10    Objective:  Patient found with: peripheral IV, telemetry   Family not present.    Functional Mobility:  Bed Mobility:  Rolling/Turning to Left: Independent  Rolling/Turning Right: Independent  Scooting/Bridging: Independent  Supine to Sit: Independent  Sit to Supine: Independent    Transfers:   Sit <> Stand Assistance: Independent  Sit <> Stand Assistive Device: No Assistive Device  Bed <> Chair Technique: Stand Pivot  Bed <> Chair Transfer Assistance: Modified Independent  Bed <> Chair Assistive Device: No Assistive Device    Functional Ambulation: Modified independent walking to the bathroom.    Activities of Daily Living:  Feeding Level of Assistance: Modified independent  UE Dressing Level of Assistance: Modified independent  LE Dressing Level of Assistance: Modified independent  Grooming Position: Standing  Grooming Level of Assistance: Modified independent  Toileting Where Assessed: Toilet  Toileting Level of Assistance: Modified independent     Additional Treatment:   Patient/ Family education provided for stroke warning signs, prevention guidelines and personal risk factors.  Patient/ Family verbalizing understanding via teach back method.  Patient " education provided on role of OT and need for outpt OT upon discharge.  Patient education provided on left UE exercises for isolating finger extension / abduction and adduction.   Patient verbalizing understanding via teach back method. Continued education, patient/ family training recommended.  Oral motor ex performed while standing to address left facial weakness.   Addressed FMC with tip-tip pinch; able to open butter container with left hand; able to tear different consistencies of paper.  Recommended activities at home:  Shuffling cards, dealing cards with left hand, picking up coins.  Patient's functional status and disposition recommendation discussed with stroke team in daily rounds.  White board updated in patient's room.  OT asked if there were any other questions; patient/ family had no further questions.      AM-PAC 6 CLICK ADL   How much help from another person does this patient currently need?   1 = Unable, Total/Dependent Assistance  2 = A lot, Maximum/Moderate Assistance  3 = A little, Minimum/Contact Guard/Supervision  4 = None, Modified [place]/Independent    Putting on and taking off regular lower body clothing? : 4  Bathing (including washing, rinsing, drying)?: 4  Toileting, which includes using toilet, bedpan, or urinal? : 4  Putting on and taking off regular upper body clothing?: 4  Taking care of personal grooming such as brushing teeth?: 4  Eating meals?: 4  Total Score: 24     AM-PAC Raw Score CMS G-Code Modifier Level of Impairment Assistance   6 % Total / Unable   7 - 9 CM 80 - 100% Maximal Assist   10 - 14 CL 60 - 80% Moderate Assist   15 - 19 CK 40 - 60% Moderate Assist   20 - 22 CJ 20 - 40% Minimal Assist   23 CI 1-20% SBA / CGA   24 CH 0% Independent/ Mod I     Patient left up in chair with all lines intact    ASSESSMENT:  Lyssa Gandara is a 51 y.o. female with a medical diagnosis of Cerebral infarction due to thrombosis of right carotid artery and presents with  performance deficits of physical skills including impaired fine motor coordination. These performance deficits have resulted in activity limitations including but not limited to: upper body dressing, lower body dressing, carrying objects, typing, meal preparation, and assisting others in self car (grand mother). 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 Patient will benefit from skilled OT services to maximize level of independence with self-care skills and functional mobility. Will benefit from outpt OT.    Rehab identified problem list/impairments: Rehab identified problem list/impairments: impaired sensation, impaired self care skills, impaired functional mobilty, decreased coordination, impaired balance, impaired fine motor, impaired coordination    Rehab potential is good.    Activity tolerance: Good    Discharge recommendations: Discharge Facility/Level Of Care Needs: outpatient OT     Barriers to discharge: Barriers to Discharge: None    Equipment recommendations: none     GOALS:   Occupational Therapy Goals        Problem: Occupational Therapy Goal    Goal Priority Disciplines Outcome Interventions   Occupational Therapy Goal     OT, PT/OT Ongoing (interventions implemented as appropriate)    Description:  Goals achieved for supine-sit, rolling, transfers.  Revised goal to be met by: 3/7  Patient will demonstrate stand pivot transfers with modified independence   Not met  Patient will demonstrate grooming while standing with modified independence.   Not met  Patient will demonstrate upper body dressing with modified independence.   Not met  Patient will demonstrate lower body dressing with modified independence.   Not met  Patient will demonstrate toileting with modified independence.   Not met  Patient will demonstrate independence with HEP for left UE FMC.    Not met  Patient's family / caregiver will demonstrate independence and safety with  assisting patient with self-care skills and functional mobility.     Not met  Patient and/or patient's family will verbalize understanding of stroke prevention guidelines, personal risk factors and stroke warning signs via teachback method.  Not met                      Plan:  Patient to be seen 6 x/week to address the above listed problems via self-care/home management, sensory integration, therapeutic activities, therapeutic exercises, neuromuscular re-education  Plan of Care expires: 03/27/17  Plan of Care reviewed with: patient         KIAH Simms  03/02/2017

## 2017-03-02 NOTE — PROGRESS NOTES
[facility]  Vascular Neurology  Comprehensive Stroke Center  Progress Note      Neurologic Chief Complaint: R MCA stroke s/p R ICA thrombectomy and distal M3 occlusion on right.     Subjective:     Interval History: NAEON, patient with no complaints this morning    HPI, Past Medical, Family, and Social History remains the same as documented in the initial encounter.     Review of Systems   Constitutional: Negative for chills and fever.   HENT: Negative for drooling and hearing loss.    Eyes: Negative for visual disturbance.   Respiratory: Negative for cough and shortness of breath.    Cardiovascular: Negative for chest pain and palpitations.   Gastrointestinal: Negative for nausea and vomiting.   Genitourinary: Negative for difficulty urinating.   Musculoskeletal: Negative for joint swelling.   Skin: Negative for rash.   Neurological: Positive for facial asymmetry, speech difficulty, weakness and numbness. Negative for dizziness and headaches.   Psychiatric/Behavioral: Negative for agitation and behavioral problems.     Scheduled Meds:   cyanocobalamin  1,000 mcg Intramuscular Q7 Days    dabigatran etexilate  150 mg Oral BID    ferrous sulfate  325 mg Oral Daily    insulin aspart  1-10 Units Subcutaneous TIDWM    lisinopril  40 mg Oral Daily    polyethylene glycol  17 g Oral Daily    senna-docusate 8.6-50 mg  1 tablet Oral Daily    sodium chloride 0.9%  3 mL Intravenous Q8H    thiamine  100 mg Oral Daily     Continuous Infusions:     PRN Meds:sodium chloride, acetaminophen, albuterol-ipratropium 2.5mg-0.5mg/3mL, dextrose 50%, dextrose 50%, glucagon (human recombinant), glucose, glucose, labetalol, pneumoc 13-mohan conj-dip cr(PF)    Objective:     Vital Signs (Most Recent):  Temp: 99.3 °F (37.4 °C) (03/02/17 1219)  Pulse: 99 (03/02/17 1219)  Resp: 20 (03/02/17 1219)  BP: (!) 165/77 (03/02/17 1219)  SpO2: 95 % (03/02/17 1219)  BP Location: Left arm    Vital Signs Range (Last 24H):  Temp:   [97.8 °F (36.6 °C)-99.4 °F (37.4 °C)]   Pulse:  []   Resp:  [16-40]   BP: (139-165)/(71-94)   SpO2:  [92 %-100 %]   BP Location: Left arm    Physical Exam   Constitutional: She is oriented to person, place, and time. She appears well-developed and well-nourished. No distress.   HENT:   Head: Normocephalic.   Head trauma to left face w/ swelling and edema involving orbit and upper lip   Eyes: EOM are normal. Pupils are equal, round, and reactive to light.   Neck: Normal range of motion.   Cardiovascular: Normal rate.    Pulmonary/Chest: Effort normal. No respiratory distress.   Abdominal: She exhibits no distension.   Musculoskeletal: Normal range of motion.   Neurological: She is alert and oriented to person, place, and time.   Skin: Skin is warm and dry.   Psychiatric: She has a normal mood and affect. Her behavior is normal.   Vitals reviewed.    Neurological Exam:   LOC: alert and follows requests  Language: No aphasia  Speech: Dysarthria  Orientation: Person, Place, Time  Visual Fields (CN II): Full  EOM (CN III, IV, VI): fluctuating R gaze preference; able to overcome midline on most attempts  Pupils (CN III, IV, VI): PERRL  Facial Movement (CN VII): left lower facial droop, present despite confounder of left facial trauma  Motor*: Arm Left: Paretic: 4/5, Leg Left: Paretic: 4/5, Arm Right: Normal (5/5), Leg Right: Normal (5/5)  Cerebellar*: Normal limb  Sensation: decreased sensation on left hemibody ~ 50%       NIH Stroke Scale:    Level of Consciousness: 0 - alert  LOC Questions: 0 - answers both correctly  LOC Commands: 0 - performs both correctly  Best Gaze: 0 - normal  Visual: 0 - no visual loss  Facial Palsy: 1 - minor  Motor Left Arm: 1 - drift  Motor Right Arm: 0 - no drift  Motor Left Le - drift  Motor Right Le - no drift  Limb Ataxia: 0 - absent  Sensory: 1 - mild to moderate loss  Best Language: 0 - no aphasia  Dysarthria: 1 - mild to moderate dysarthria  Extinction and Inattention: 0 -  no neglect  NIH Stroke Scale Total: 5      Laboratory:  CMP:     Recent Labs  Lab 03/02/17  0524   CALCIUM 8.8   ALBUMIN 2.9*   PROT 6.8      K 3.6   CO2 23      BUN 8   CREATININE 1.0   ALKPHOS 46*   ALT 12   AST 26   BILITOT 0.5     BMP:     Recent Labs  Lab 03/02/17  0524      K 3.6      CO2 23   BUN 8   CREATININE 1.0   CALCIUM 8.8     CBC:     Recent Labs  Lab 03/02/17 0524   WBC 4.70   RBC 3.75*   HGB 8.8*   HCT 29.1*   *   MCV 78*   MCH 23.5*   MCHC 30.2*     Lipid Panel:     Recent Labs  Lab 02/26/17  0740   CHOL 155   LDLCALC 60.0*   HDL 78*   TRIG 85     Coagulation:     Recent Labs  Lab 03/02/17 0524   INR 1.0     Platelet Aggregation Study: No results for input(s): PLTAGG, PLTAGINTERP, PLTAGREGLACO, ADPPLTAGGREG in the last 168 hours.  Hgb A1C:     Recent Labs  Lab 02/26/17  1756   HGBA1C 6.8*     TSH:     Recent Labs  Lab 02/26/17  0740   TSH 1.147       Diagnostic Results:    CT Head. Date: 2/26/17  -No acute intracranial abnormalities.  Specifically, no intracranial hemorrhage.    MRI Brain. Date: 2/27/17  -acute infarct of the right MCA distribution involving the majority of the right parietal lobe with additional scattered foci of infarcts involving the right caudate head, right occipital lobe, right temporal lobe, right subinsular cortex, and right posterior frontal lobe.    Cerebrovascular Imaging: Angiogram Head. Date: 2/26/17  R ICA occlusion w/ irregularity of looped proximal segment; unclear if plaque w/ underlying stenosis vs. Dissection w/ underlying FMD; successful thrombectomy; intracranial runs do show at least one M3 branch occlusion w/ pial collateral retrograde filling     Cardiac Evaluation:   Ef 55-60%. Normal ventricular function  No LA enlargement    US Carotid. Date: 02/26/17  -60-79% stenosis of the right internal carotid artery by velocity.  Please note, there is vessel tortuosity, and a large focus of homogeneous plaque at the level of the bulb,  within the internal carotid artery.  Visually, this plaque narrows the lumen by at least 70%.  Irregularity of the vessel wall is again noted, similar in appearance to the recent angiogram, please see that report.  -1-39% stenosis of the left internal carotid artery.    Assessment/Plan:     Lyssa Gandara is a 51 y.o. Female with PMHx of DM, HTN, PE (on xarelto), and B12 anemia who presented with L hemiparesis and dysarthria. Patient was not a candidate for tPA due to being on Xarelto for her PE. Patient was supposed to have CTA but infiltration of the IV occurred. Therefore, patient was taken straight to IR for thrombectomy and was found to have a R ICA occlusive thrombus that was successfully aspirated. After clot removal, it was noted that the patient had an irregular looped segment suggesting possible FMD with dissection or underlying atherosclerotic plaque rupture. Patient also had a R M3 occlusion, which was too distal for intervention.     MRI reveled scatter foci of infarct within R MCA distribution. Carotid ultrasound showed 60-79% R ICA stenosis. Echo revealed an EF of 55% with no LA enlargement. On 2/27/17, the patient passed her swallow study and tolerated a dental soft diet.     2/28/17 - 's this AM. [facility] started lisinopril 40 mg daily with SBP improving this afternoon to 160's.  [facility] switched her xarelto to dabigatran as patient was an xarelto and still had embolic suspected stroke.    3/2/17 Patient neurologically stable. Currently with drift in LUE and LLE, decreased sensation on L, L facial droop, and dysarthria. Patient had TCD with bubble contrast to rule out possible shunt, this study was negative. Patient had both hypercoagulable & autoimmune w/u completed in 3/2016. Complete APLS lab work (DRVVT/LA, anticardiolipin, beta 2) was negative. 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     Stroke workup complete, will likely discharge today  with outpatient therapy.      * Cerebral infarction due to thrombosis of right carotid artery  Thrombosis found in irregular loop of R ICA extracranial segment during IR angiogram, s/p successful aspiration thrombectomy.   Distal artery to artery thromboembolism found in M3 branch which was too distal for intervention.  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  · TTE with EF of 55% and no LA enlargement  · Carotid US showed 60-79% stenosis in R ICA  · PT/OT/SLP      Essential hypertension  Stroke risk factor  Continue home lisinopril  Recanalization of extracranial carotid however still distal stenoses still present, therefore goal SBP <180      Long-term (current) use of anticoagulants  On Xarelto for hx of PE at home but still had suspected embolic stroke  Switched to Dabigatran    Type 2 diabetes mellitus without complication  Goal -180  Current A1C 6.8  Overall Goal A1c < 7    Internal carotid artery dissection  Possible atherosclerotic disease vs underlying vascular dyplasia  Continued anticoagulation given underlying PE would also suffice for a dissection if present.      Left arm weakness  PT/OT/SLP evaluation and treatment ongoing  Plans for outpatient therapy    Cytotoxic cerebral edema  2/2 infarct  Evident on imaging          Meera Mendez PA-C  Comprehensive Stroke Center  Department of Vascular Neurology   [facility]

## 2017-03-03 ENCOUNTER — TELEPHONE (OUTPATIENT)
Dept: NEUROSURGERY | Facility: HOSPITAL | Age: 52
End: 2017-03-03

## 2017-03-03 ENCOUNTER — TELEPHONE (OUTPATIENT)
Dept: INTERNAL MEDICINE | Facility: CLINIC | Age: 52
End: 2017-03-03

## 2017-03-03 NOTE — PT/OT/SLP DISCHARGE
Occupational Therapy Discharge Summary    Lyssa Gandara  MRN: [id number]   Cerebral infarction due to thrombosis of right carotid artery   Patient Discharged from acute Occupational Therapy on 3/2.  Please refer to prior OT note dated on 3/2 for functional status.     Assessment:   Patient appropriate for care in another setting.  GOALS:   Occupational Therapy Goals        Problem: Occupational Therapy Goal    Goal Priority Disciplines Outcome Interventions   Occupational Therapy Goal     OT, PT/OT Ongoing (interventions implemented as appropriate)    Description:  Goals achieved for supine-sit, rolling, transfers.  Revised goal to be met by: 3/7  Patient will demonstrate stand pivot transfers with modified independence   Not met  Patient will demonstrate grooming while standing with modified independence.   Not met  Patient will demonstrate upper body dressing with modified independence.   Not met  Patient will demonstrate lower body dressing with modified independence.   Not met  Patient will demonstrate toileting with modified independence.   Not met  Patient will demonstrate independence with HEP for left UE FMC.    Not met  Patient's family / caregiver will demonstrate independence and safety with assisting patient with self-care skills and functional mobility.     Not met  Patient and/or patient's family will verbalize understanding of stroke prevention guidelines, personal risk factors and stroke warning signs via teachback method.  Not met                    Reasons for Discontinuation of Therapy Services  Transfer to alternate level of care.      Plan:  Patient Discharged to: Outpatient Therapy Services   KIAH Loya  3/2/2017    .

## 2017-03-03 NOTE — PLAN OF CARE
Problem: Physical Therapy Goal  Goal: Physical Therapy Goal  Goals to be met by: 3/11/17     Patient will increase functional independence with mobility by performin. Supine to sit with Modified [place] MET 3/2/2017  2. Sit to supine with Modified [place] MET 3/2/2017  3. Sit to stand transfer with Supervision MET 3/2/2017  4. Gait x 50 feet with Contact Guard Assistance using most appropriate AD. MET  Revised: Gait x 200 ft with Supervision using no AD. MET 3/2/2017  5. Ascend/descend 5 stair with right Handrails Minimal Assistance. MET 3/1/2017   6. Lower extremity exercise program x10-15 reps with supervision NOT MET            Goals remain appropriate.      Vale Kenny, PTA.  3/2/2017

## 2017-03-03 NOTE — TELEPHONE ENCOUNTER
"Called number on file.  Spoke with patient.  Risk factors specific to patient for stroke discussed with teach back implemented.  Patient verbalized understanding of discharge instructions and medications.  Patient stated "For the Pradaxa, do I have to pay for it?"  Informed patient that due to novelty of drug there would be an out of pocket cost.  Was able to provide patient with ID number and activation line for Pradaxa Coupon Card.  Patient was asked about discharge appointments and follow up care.  No follow appointment scheduled thus far. Message sent to [facility] on patient's behalf to schedule follow up appointment.  Warning sings discussed with teach back discussion method implemented.  Notified to seek immediate medical help via 911 if new or worsening stroke symptoms occur.  Patient relayed no new questions or comments at this time.  Instructed to call [facility] with any further questions.  "

## 2017-03-03 NOTE — PT/OT/SLP DISCHARGE
Physical Therapy Discharge Summary    Lyssa Gandara  MRN: [id number]   Cerebral infarction due to thrombosis of right carotid artery   Patient Discharged from acute Physical Therapy on 3/2/2017.  Please refer to prior PT noted date on 3/2/2017 for functional status.     Assessment:   Patient appropriate for care in another setting.  GOALS:   Physical Therapy Goals        Problem: Physical Therapy Goal    Goal Priority Disciplines Outcome Goal Variances Interventions   Physical Therapy Goal     PT/OT, PT Ongoing (interventions implemented as appropriate)     Description:  Goals to be met by: 3/11/17     Patient will increase functional independence with mobility by performin. Supine to sit with Modified [place]   MET 3/2/2017  2. Sit to supine with Modified [place]   MET 3/2/2017  3. Sit to stand transfer with Supervision   MET 3/2/2017  4. Gait  x 50 feet with Contact Guard Assistance using most appropriate AD. MET   Revised: Gait x 200 ft with Supervision using no AD.    MET 3/2/2017  5. Ascend/descend 5 stair with right Handrails Minimal Assistance.   MET 3/1/2017   6. Lower extremity exercise program x10-15 reps with supervision    NOT MET                 Reasons for Discontinuation of Therapy Services  Transfer to alternate level of care.      Plan:  Patient Discharged to: Outpatient Therapy Services     Michelle Duncan, PT, DPT  3/3/2017

## 2017-03-03 NOTE — TELEPHONE ENCOUNTER
----- Message from Nichole Maldonado sent at 3/3/2017 10:09 AM CST -----  Contact: Self/[phone]  Type: Sooner appointment than  is able to schedule    When is the first available appointment? 3/13/2017    What is the nature of the appointment?1 week hospital discharge follow up(Stroke)    What appointment type:Ep    Comments:Please call and advise      Thank you

## 2017-03-03 NOTE — PT/OT/SLP PROGRESS
"Physical Therapy  Treatment    Lyssa Gandara   MRN: [id number]   Admitting Diagnosis: Cerebral infarction due to thrombosis of right carotid artery    PT Received On: 17  PT Start Time: 1225     PT Stop Time: 1242    PT Total Time (min): 17 min       Billable Minutes:  Gait Training 17    Treatment Type: Treatment  PT/PTA: PTA     PTA Visit Number: 2       General Precautions: Standard, aspiration, fall  Orthopedic Precautions: N/A   Braces: N/A    Do you have any cultural, spiritual, Alevism conflicts, given your current situation?: none stated    Subjective:  Communicated with RN (Tryler) prior to session.  Pt agreeable to PT session   "I don't want to go home yet, I don't think I'm ready"  PTA encouraged pt and pointed out the improvement she's made thus far.    Pain Ratin/10   Pain Rating Post-Intervention: 0/10    Objective:   Patient found with: telemetry   3 attempts for tx session: 1. Pt with wheezing and requesting resp tx at 11:18 am;  2. SLP with pt at 12:07 pm    Functional Mobility:  Bed Mobility:   Supine to Sit:  (NP 2* found seated at the EOB)  Sit to Supine: Modified Independent    Transfers:  Sit <> Stand Assistance: Independent (from EOB)  Sit <> Stand Assistive Device: No Assistive Device    Gait:   Gait Distance: 300ft with vc's for jillian and safety.   Pt demonstrates some instability, but no LOB  Assistance 1: Supervision  Gait Assistive Device: No device  Gait Pattern: reciprocal  Gait Deviation(s): decreased step length, decreased stride length    Stairs:  Pt ascended/descend full flight of  stair(s) with No Assistive Device with R HR with Supervision or Set-up Assistance, Stand-by Assistance and vc's for safety.     Therapeutic Activities and Exercises:  Education provided regarding identification of stroke warning signs and   Stroke risk factors via booklet.  Pt verbalizes understanding     AM-PAC 6 CLICK MOBILITY  How much help from another person does this patient " currently need?   1 = Unable, Total/Dependent Assistance  2 = A lot, Maximum/Moderate Assistance  3 = A little, Minimum/Contact Guard/Supervision  4 = None, Modified [place]/Independent    Turning over in bed (including adjusting bedclothes, sheets and blankets)?: 4  Sitting down on and standing up from a chair with arms (e.g., wheelchair, bedside commode, etc.): 4  Moving from lying on back to sitting on the side of the bed?: 4  Moving to and from a bed to a chair (including a wheelchair)?: 4  Need to walk in hospital room?: 3  Climbing 3-5 steps with a railing?: 3  Total Score: 22    AM-PAC Raw Score CMS G-Code Modifier Level of Impairment Assistance   6 % Total / Unable   7 - 9 CM 80 - 100% Maximal Assist   10 - 14 CL 60 - 80% Moderate Assist   15 - 19 CK 40 - 60% Moderate Assist   20 - 22 CJ 20 - 40% Minimal Assist   23 CI 1-20% SBA / CGA   24 CH 0% Independent/ Mod I     Patient left supine with all lines intact, call button in reach and RN notified.    Assessment:  Lyssa Gandara is a 51 y.o. female with a medical diagnosis of Cerebral infarction due to thrombosis of right carotid artery and presents with improvement in functional mobility, gait and stair negotiation requiring increased time 2* respiratory response to activity.     Rehab identified problem list/impairments: Rehab identified problem list/impairments: weakness, gait instability, impaired cardiopulmonary response to activity    Rehab potential is good.    Activity tolerance: Good    Discharge recommendations: Discharge Facility/Level Of Care Needs: outpatient PT     Barriers to discharge: Barriers to Discharge: None    Equipment recommendations: Equipment Needed After Discharge: none     GOALS:   Physical Therapy Goals        Problem: Physical Therapy Goal    Goal Priority Disciplines Outcome Goal Variances Interventions   Physical Therapy Goal     PT/OT, PT Ongoing (interventions implemented as appropriate)     Description:   Goals to be met by: 3/11/17     Patient will increase functional independence with mobility by performin. Supine to sit with Modified [place]   MET 3/2/2017  2. Sit to supine with Modified [place]   MET 3/2/2017  3. Sit to stand transfer with Supervision   MET 3/2/2017  4. Gait  x 50 feet with Contact Guard Assistance using most appropriate AD. MET   Revised: Gait x 200 ft with Supervision using no AD.    MET 3/2/2017  5. Ascend/descend 5 stair with right Handrails Minimal Assistance.   MET 3/1/2017   6. Lower extremity exercise program x10-15 reps with supervision    NOT MET                   PLAN:  Pt was discharged from hospital on this date.  Full discharge summary to follow.     Plan of Care reviewed with: patient         Vale Kenny, PTA  2017

## 2017-03-04 PROBLEM — G93.6 CYTOTOXIC CEREBRAL EDEMA: Status: ACTIVE | Noted: 2017-03-04

## 2017-03-06 ENCOUNTER — TELEPHONE (OUTPATIENT)
Dept: NEUROLOGY | Facility: CLINIC | Age: 52
End: 2017-03-06

## 2017-03-06 NOTE — TELEPHONE ENCOUNTER
Called Mrs. Gandara-Left message on voicemail asking that hshe return my call in reference to scheduling an appointment.

## 2017-03-08 ENCOUNTER — CLINICAL SUPPORT (OUTPATIENT)
Dept: REHABILITATION | Facility: HOSPITAL | Age: 52
End: 2017-03-08
Attending: PSYCHIATRY & NEUROLOGY
Payer: COMMERCIAL

## 2017-03-08 DIAGNOSIS — I69.398 IMPAIRED BALANCE AS LATE EFFECT OF CEREBROVASCULAR ACCIDENT (CVA): Primary | ICD-10-CM

## 2017-03-08 DIAGNOSIS — R27.8 LOSS OF COORDINATION: ICD-10-CM

## 2017-03-08 DIAGNOSIS — Z74.09 IMPAIRED MOBILITY AND ADLS: ICD-10-CM

## 2017-03-08 DIAGNOSIS — Z78.9 IMPAIRED MOBILITY AND ADLS: ICD-10-CM

## 2017-03-08 DIAGNOSIS — R26.89 IMPAIRED BALANCE AS LATE EFFECT OF CEREBROVASCULAR ACCIDENT (CVA): Primary | ICD-10-CM

## 2017-03-08 DIAGNOSIS — R29.898 LEFT ARM WEAKNESS: ICD-10-CM

## 2017-03-08 PROCEDURE — 97161 PT EVAL LOW COMPLEX 20 MIN: CPT | Mod: PO

## 2017-03-08 PROCEDURE — 97166 OT EVAL MOD COMPLEX 45 MIN: CPT | Mod: PO

## 2017-03-08 NOTE — PROGRESS NOTES
OUTPATIENT NEUROLOGICAL REHABILITATION  PHYSICAL THERAPY EVALUATION    Name: Lyssa Gandara  Clinic Number: [id number]    Diagnosis:   Encounter Diagnosis   Name Primary?    Impaired balance as late effect of cerebrovascular accident (CVA) Yes     Physician: Dillan Fritz MD  Treatment Orders: PT Eval and Treat  Past Medical History:   Diagnosis Date    Asthma     Dyspnea on exertion     Hx of long term use of blood thinners     Hypertension 3/8/2016    PE (pulmonary embolism)     Hx of multiple DVT/PE    Presence of IVC filter     Scleroderma     Type 2 diabetes mellitus without complication        Evaluation Date: 3/8/17  Visit #: 1  Plan of care expiration: EVAL ONLY  Precautions: standard    History     Medical Diagnosis: CVA  PT Diagnosis: impaired balance    History of Present Illness: Lyssa is a 51 y.o. female that presents to [facility] secondary to CVA 2/26/17. She has been home for a week.      Chief complaints:  1. Initial L side weakness, resolving  2. About 60% back to PLOF for ADLs/IADLs - relates this is all lacking due to L UE  3. L hand weakness largest impairment    Falls in the past year: None  Prior Therapy: None  Nutrition:  Overweight  Social History/Support systems: Lives with 2 aunts and grandmother   Place of Residence (Steps/Adaptations/Levels): 6 steps to enter with rail B, 1 level once inside  Previous functional status includes: Independent  Current functional status:  [place]  Exercise routine prior to onset: None; Knows she should have exercise routine but has never in the past.   DME owned: None  Work/Job description includes:  [employer]  - not yet back to work but intends to return when able.       Subjective   Pt stated goals: to get L hand working better.    Pain: R knee - new since CVA, 4/10 at current. Aggravated with kneeling on the knee only.     Objective   - Follows commands: 100% of time   - Speech: no  deficits    Mental status: alert, oriented to person, place, and time  Appearance: Casually dressed  Behavior:  calm and cooperative  Attention Span and Concentration:  Normal    Dominant hand:  right     Sensation: Light Touch: Impaired: L UE only          Tone: 0 - No increase in muscle tone  Limbs/muscles affected: n/a    Visual/Auditory: denies changes     Coordination:   - LE coordination:  intact    RANGE OF MOTION--LOWER EXTREMITIES  (R) LE Hip: normal   Knee: normal   Ankle: normal    (L) LE: Hip: normal   Knee: normal   Ankle: normal    Strength: manual muscle test grades below   Lower Extremity Strength  Right LE  Left LE    Hip Flexion: 5/5 Hip Flexion: 5/5   Hip Extension:  5/5 Hip Extension: 5/5   Hip Abduction: 5/5 Hip Abduction: 5/5   Hip Adduction: 5/5 Hip Adduction 5/5   Knee Extension: 5/5 Knee Extension: 5/5   Knee Flexion: 5/5 Knee Flexion: 5/5   Ankle Dorsiflexion: 5/5 Ankle Dorsiflexion: 5/5   Ankle Plantarflexion: 5/5 Ankle Plantarflexion: 5/5     Flexibility: WNL    R knee assessment: no tenderness to palpation. Visible slight edema which pt reports is partially normal. Seems to be bursitis flare up of the suprapatellar bursa.        Evaluation   Single Limb Stance R LE 6s  (<10 sec = HIGH FALL RISK)   Single Limb Stance L LE 5s  (<10 sec = HIGH FALL RISK)   5 times sit-stand 15 seconds, no hands     Gait Assessment:   - AD used: none  - Assistance: Independent  - Distance: 300+ feet     GAIT DEVIATIONS:  Lyssa displays the following deviations with ambulation: slow speed but she reports it is her normal     Endurance Deficit: reports no fatigue     Evaluation   Self Selected Walking Speed 1.0 m/sec (6m/6s)   Activities Balance Confidence (ABC) scale 71.9%       Functional Gait Assessment:   1. Gait on level surface =  3  2. Change in Gait Speed = 3  3. Gait with horizontal head turns  = 3  4. Gait with vertical head turns = 3  5. Gait with pivot turns = 3  6. Step over obstacle = 3  7.  Gait with Narrow FRANK = 2  8. Gait with eyes closed = 2  9. Ambulating Backwards = 2  10. Steps = 3   Score 27/30     Functional Mobility (Bed mobility, transfers)  Bed mobility: I  Supine to sit: I  Sit to supine: I  Transfers to bed: I  Transfers to toilet: I  Sit to stand:  I  Stand pivot:  I  Car transfers: I    Functional Limitations Reports - G Codes  Category: Mobility  Tool: CMS Impairment/Limitation/Restriction for FOTO Cerebrovascular Disorders Survey  Status Limitation   Intake 48% 52%   Predicted 65% 35%         Written Home Exercises Provided: walking backwards, tandem walking, tandem backwards, figure 8s, SLS.    Pt demo good understanding of the education provided.  demonstrated good return demonstration of activities.     Education provided re:role of PT, goals for PT, scheduling - pt verbalized understanding. Discussed insurance limitations with pt.     Lyssa verbalized good understanding of education provided.   Pt has no cultural, educational or language barriers to learning provided.      Assessment   This is a 51 y.o. female referred to outpatient physical therapy and presents with a medical diagnosis of CVA 1.5 weeks ago. At this point she feels all of her LE and gait impairments have resolved. She scored WNL for all testing for her age range other than single leg stance, but she reported she did not think she could do that even before the stroke. She was educated on ice and elevation for the R knee irritation and given a balance HEP to keep her actively improving to 100% of her PLOF. She was not interested in a general fitness plan. She wanted to focus on OT and her L hand. No further skilled PT warranted at current.      History  Co-morbidities and personal factors that may impact the plan of care Examination  Body Structures and Functions, activity limitations and participation restrictions that may impact the plan of care. Medical necessity is demonstrated by the following impairments.  Clinical Presentation Decision Making/ Complexity Score   1. DMII 1. Slight balance impairment  2. R knee pain stable    moderate low low low       Plan   PT evaluation only. No further skilled PT warranted.     Sonja Barker, PT  03/08/2017

## 2017-03-08 NOTE — PLAN OF CARE
"Name: Lyssa Gandara  MRN: [id number]   Physician: Dillan Fritz MD     Diagnosis: CVA due to thrombosis of R carotid artery     Onset date: 2/26/2017    Date of service: 3/8/17  Start time: 0815  End time: 0900    Orders: Eval and Treat    Subjective:  Patient goals: "Get left hand working like it used to."  Return to work.   Chief Complaint:  Numbness in L hand. She states when she is holding something in her L hand, she doesn't realize it and it falls.  Past Medical History:   Diagnosis Date    Asthma     Dyspnea on exertion     Hx of long term use of blood thinners     Hypertension 3/8/2016    PE (pulmonary embolism)     Hx of multiple DVT/PE    Presence of IVC filter     Scleroderma     Type 2 diabetes mellitus without complication       Current Outpatient Prescriptions   Medication Sig    albuterol (PROVENTIL) 2.5 mg /3 mL (0.083 %) nebulizer solution Take 3 mLs (2.5 mg total) by nebulization every 6 (six) hours as needed for Wheezing.    cyanocobalamin (VITAMIN B-12) 1000 MCG tablet Take 100 mcg by mouth once daily.    dabigatran etexilate (PRADAXA) 150 mg Cap Take 1 capsule (150 mg total) by mouth 2 (two) times daily. "Do NOT break, chew, or open capsules."    ferrous sulfate 325 (65 FE) MG EC tablet Take 1 tablet (325 mg total) by mouth once daily.    fluticasone-salmeterol 250-50 mcg/dose (ADVAIR) 250-50 mcg/dose diskus inhaler Inhale 1 puff into the lungs 2 (two) times daily.    lisinopril (PRINIVIL,ZESTRIL) 20 MG tablet TAKE ONE TABLET BY MOUTH ONCE DAILY    metformin (GLUCOPHAGE) 500 MG tablet TAKE ONE TABLET BY MOUTH TWICE DAILY WITH MEALS    triamterene-hydrochlorothiazide 37.5-25 mg (MAXZIDE-25) 37.5-25 mg per tablet Take 1 tablet by mouth once daily.     No current facility-administered medications for this visit.        Social Hx: Working full-time prior to CVA. Lives with two aunts and grandmother     DME: none     Home access: 1 story home with 6 steps to enter and rail on " both sides. Bathroom: tub-shower combo     Review of patient's allergies indicates:   Allergen Reactions    Plasma, human, normal        Special Tests:  MRI: see EPIC imaging     Past treatment includes: none     Precautions: standard   Pain: 0/10 pain. Pain established using the Numbers pain scale.       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    Job description includes: physical labor, uses hands a lot    Driving status: Active     Objective  Cognitive Exam  Oriented: Person, Place, Time and Situation  Behaviors: calm, informative   Follows Commands/attention: Follows multistep  commands  Communication: clear/fluent, no deficits noted   Memory: No Deficits noted  Safety awareness/insight to disability: WFL   Coping skills/emotional control: Appropriate to situation    Visual/perceptual:   Tracking: Impaired   Comments: Difficulty tracking to the L     Physical Exam:    Postural examination/scapula alignment: Rounded shoulders   Joint integrity: normal   Skin integrity: intact   Edema: none noted     Palpation: no palpable tenderness     Sensation: Lyssa reports numbness is L hand   Light touch:  impaired  Sharp/Dull:  impaired  Proprioception:  intact  Temperature: impaired per pt report, but she states she is able to recognize extreme temperatures     Joint Evaluation: B UE WFL    Fist: B hands composite fist WFL     Strength: B UE 5/5 in all planes of motion      Strength: average of three trials: R 40.3# / L 26#     Fine motor coordination: impaired on L    9 hole peg test: R 21'' / L 1'59''     Gross motor coordination: impaired on L with speed and accuracy.     Tone:  Modified Emma Scale:   0 - No increase in muscle tone      Balance:   Static Sit - good   Dynamic sit - good     Functional Status:    PLOF - Independent and active. 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                                Current:     Functional Mobility:  Bed mobility: Mod I  Roll to left: Mod I  Roll to right: Mod I  Supine to sit: Mod I  Sit to supine: Mod I  Transfers to bed: Mod I  Transfers to toilet: Mod I  Car transfers: Mod I  Wheelchair mobility: n/a     ADL's:  Feeding: Mod I  Grooming: Mod I  Hygiene: Mod I  UB Dressing: Mod I - difficulty with clothing fasteners but is eventually able to do them   LB Dressing: Mod I - difficulty tying shoes but is able to complete task with increased time   Toileting: Mod I  Bathing: Mod I    IADL's:   Homecare: Mod I - increased time, more difficulty handling objects, currently only using R hand   Cooking: Mod I - aunt is typically in charge of cooking, but pt reports she is able to prepare a simple meal herself   Laundry: Mod I - increased time to fold clothes   Yard work: n/a   Use of telephone: Min A - finds it more challenging to use phone, pt reports she used to text with both hands but is now only using the R   Money management: Mod I  Medication management: Mod I      CMS Impairment/Limitation/Restriction for FOTO Cerebrovascular Disorders Survey  Status Limitation G-Code CMS Severity Modifier  Intake 49% 51% Current Status CK - At least 40 percent but less than 60 percent  Predicted 68% 32% Goal Status+ CJ - At least 20 percent but less than 40 percent      CMS Impairment/Limitation/Restriction for Stroke IS Physical - Hand Function Survey  Status Limitation G-Code CMS Severity Modifier  Intake 30% 70% Current Status CL - At least 60 percent but less than 80 percent  TODAY'S TREATMENT  Evaluation completed. Pt was provided educational information, including role of OT, POC, LTGs, and scheduling.  Pt was provided with a written copy of fine motor hand and finger exercises to perform. Exercises were reviewed and Lyssa verbalized understanding prior to end of the session. Educated patient on state law regarding driving after CVA.        ASSESSMENT:  OT diagnosis: impaired fine and gross motor coordination, impaired sensation     Assessment  Lyssa Gandara is a 51 y.o. female with a medical diagnosis of CVA due to thrombosis of R carotid artery referred to occupational therapy for impaired participation and independence in prior occupations. She presents with impaired sensation and performance deficits of physical nature including impaired fine motor coordination and gross motor coordination.  These problems/impairments have resulted in activity limitations including, but not limited to: self-care, IADLs, work, community participation, and assisting grandmother in self-care.  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  Lyssa can benefit from outpatient Occupational therapy and a home program to address the stated goals to improve impairments and functional limitations, and maximize independence and participation in prior occupations. Rehab potential is good.    PROBLEM LIST/IMPAIRMENTS:   Impaired fine motor coordination, impaired gross motor coordination, impaired sensation, decreased work ability and self care     LTG GOALS:  Time frame: 8 weeks   1. Pt will return to work  2. Pt will demonstrate increased sensation and awareness of objects in hand   3. Pt will demonstrate improved tracking to the L and compensatory strategies for visual deficits   4. 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 Pt IND with HEP   6. CMS - hand function survey CJ for self care  STG Goals:  Time frame: 4 weeks   1. 9 hole peg test < 1 min on L hand   2. L  strength increased by 10#   3. Pt will verbalize/demonstatre improved clothing fastener and laces management       PLAN:    Patient/caregiver understands and agrees with plan of care.    Outpatient occupational therapy 2  times weekly for 8 weeks  to include: pt ed, hep, therapeutic  exercises, therapeutic activity, ADLs,  neuromuscular re-education, joint mobilizations, modalities prn, 3/8/17-5/5/17    History Examination Decision Making Complexity Score   Occupational Profile: expanded review of occupational profile via past medical history and personal interview     Medical and Therapy History:   History of DVT, hypertension, PE asthma, sclerodoma, DM type II                  Performance Deficits     Physical -  Impaired sensation, impaired visual tracking to the L, impaired fine motor coordination, impaired sensation        Cognitive: WNL        Psychosocial:  decreased ability to work and care for grandmother        Clinical decision making of moderate complexity including analysis of occupational profile via detailed assessments and consideration of multiple tx options.  Pt presents with co-morbidities that make affect functional outcomes.  Moderate       Treatment Time: 45 min     CAL Garcia   I certify that I was present in the room directing the student in service delivery and guiding them using my skilled judgment. As the co-signing therapist I have reviewed the students documentation and am responsible for the treatment, assessment, and plan.     Alis Fuller OT

## 2017-03-08 NOTE — PROGRESS NOTES
"Name: Lyssa Gandara  MRN: [id number]   Physician: Dillan Fritz MD     Diagnosis: CVA due to thrombosis of R carotid artery     Onset date: 2/26/2017    Date of service: 3/8/17  Start time: 0815  End time: 0900    Orders: Eval and Treat    Subjective:  Patient goals: "Get left hand working like it used to."  Return to work.   Chief Complaint:  Numbness in L hand. She states when she is holding something in her L hand, she doesn't realize it and it falls.  Past Medical History:   Diagnosis Date    Asthma     Dyspnea on exertion     Hx of long term use of blood thinners     Hypertension 3/8/2016    PE (pulmonary embolism)     Hx of multiple DVT/PE    Presence of IVC filter     Scleroderma     Type 2 diabetes mellitus without complication       Current Outpatient Prescriptions   Medication Sig    albuterol (PROVENTIL) 2.5 mg /3 mL (0.083 %) nebulizer solution Take 3 mLs (2.5 mg total) by nebulization every 6 (six) hours as needed for Wheezing.    cyanocobalamin (VITAMIN B-12) 1000 MCG tablet Take 100 mcg by mouth once daily.    dabigatran etexilate (PRADAXA) 150 mg Cap Take 1 capsule (150 mg total) by mouth 2 (two) times daily. "Do NOT break, chew, or open capsules."    ferrous sulfate 325 (65 FE) MG EC tablet Take 1 tablet (325 mg total) by mouth once daily.    fluticasone-salmeterol 250-50 mcg/dose (ADVAIR) 250-50 mcg/dose diskus inhaler Inhale 1 puff into the lungs 2 (two) times daily.    lisinopril (PRINIVIL,ZESTRIL) 20 MG tablet TAKE ONE TABLET BY MOUTH ONCE DAILY    metformin (GLUCOPHAGE) 500 MG tablet TAKE ONE TABLET BY MOUTH TWICE DAILY WITH MEALS    triamterene-hydrochlorothiazide 37.5-25 mg (MAXZIDE-25) 37.5-25 mg per tablet Take 1 tablet by mouth once daily.     No current facility-administered medications for this visit.        Social Hx: Working full-time prior to CVA. Lives with two aunts and grandmother     DME: none     Home access: 1 story home with 6 steps to enter and rail on " both sides. Bathroom: tub-shower combo     Review of patient's allergies indicates:   Allergen Reactions    Plasma, human, normal        Special Tests:  MRI: see EPIC imaging     Past treatment includes: none     Precautions: standard   Pain: 0/10 pain. Pain established using the Numbers pain scale.       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    Job description includes: physical labor, uses hands a lot    Driving status: Active     Objective  Cognitive Exam  Oriented: Person, Place, Time and Situation  Behaviors: calm, informative   Follows Commands/attention: Follows multistep  commands  Communication: clear/fluent, no deficits noted   Memory: No Deficits noted  Safety awareness/insight to disability: WFL   Coping skills/emotional control: Appropriate to situation    Visual/perceptual:   Tracking: Impaired   Comments: Difficulty tracking to the L     Physical Exam:    Postural examination/scapula alignment: Rounded shoulders   Joint integrity: normal   Skin integrity: intact   Edema: none noted     Palpation: no palpable tenderness     Sensation: Lyssa reports numbness is L hand   Light touch:  impaired  Sharp/Dull:  impaired  Proprioception:  intact  Temperature: impaired per pt report, but she states she is able to recognize extreme temperatures     Joint Evaluation: B UE WFL    Fist: B hands composite fist WFL     Strength: B UE 5/5 in all planes of motion      Strength: average of three trials: R 40.3# / L 26#     Fine motor coordination: impaired on L    9 hole peg test: R 21'' / L 1'59''     Gross motor coordination: impaired on L with speed and accuracy.     Tone:  Modified Emma Scale:   0 - No increase in muscle tone      Balance:   Static Sit - good   Dynamic sit - good     Functional Status:    PLOF - Independent and active. 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                                Current:     Functional Mobility:  Bed mobility: Mod I  Roll to left: Mod I  Roll to right: Mod I  Supine to sit: Mod I  Sit to supine: Mod I  Transfers to bed: Mod I  Transfers to toilet: Mod I  Car transfers: Mod I  Wheelchair mobility: n/a     ADL's:  Feeding: Mod I  Grooming: Mod I  Hygiene: Mod I  UB Dressing: Mod I - difficulty with clothing fasteners but is eventually able to do them   LB Dressing: Mod I - difficulty tying shoes but is able to complete task with increased time   Toileting: Mod I  Bathing: Mod I    IADL's:   Homecare: Mod I - increased time, more difficulty handling objects, currently only using R hand   Cooking: Mod I - aunt is typically in charge of cooking, but pt reports she is able to prepare a simple meal herself   Laundry: Mod I - increased time to fold clothes   Yard work: n/a   Use of telephone: Min A - finds it more challenging to use phone, pt reports she used to text with both hands but is now only using the R   Money management: Mod I  Medication management: Mod I      CMS Impairment/Limitation/Restriction for FOTO Cerebrovascular Disorders Survey  Status Limitation G-Code CMS Severity Modifier  Intake 49% 51% Current Status CK - At least 40 percent but less than 60 percent  Predicted 68% 32% Goal Status+ CJ - At least 20 percent but less than 40 percent      CMS Impairment/Limitation/Restriction for Stroke IS Physical - Hand Function Survey  Status Limitation G-Code CMS Severity Modifier  Intake 30% 70% Current Status CL - At least 60 percent but less than 80 percent  TODAY'S TREATMENT  Evaluation completed. Pt was provided educational information, including role of OT, POC, LTGs, and scheduling.  Pt was provided with a written copy of fine motor hand and finger exercises to perform. Exercises were reviewed and Lyssa verbalized understanding prior to end of the session. Educated patient on state law regarding driving after CVA.        ASSESSMENT:  OT diagnosis: impaired fine and gross motor coordination, impaired sensation     Assessment  Lyssa Gandara is a 51 y.o. female with a medical diagnosis of CVA due to thrombosis of R carotid artery referred to occupational therapy for impaired participation and independence in prior occupations. She presents with impaired sensation and performance deficits of physical nature including impaired fine motor coordination and gross motor coordination.  These problems/impairments have resulted in activity limitations including, but not limited to: self-care, IADLs, work, community participation, and assisting grandmother in self-care.  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  Lyssa can benefit from outpatient Occupational therapy and a home program to address the stated goals to improve impairments and functional limitations, and maximize independence and participation in prior occupations. Rehab potential is good.    PROBLEM LIST/IMPAIRMENTS:   Impaired fine motor coordination, impaired gross motor coordination, impaired sensation, decreased work ability and self care     LTG GOALS:  Time frame: 8 weeks   1. Pt will return to work  2. Pt will demonstrate increased sensation and awareness of objects in hand   3. Pt will demonstrate improved tracking to the L and compensatory strategies for visual deficits   4. 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 Pt IND with HEP   6. CMS - hand function survey CJ for self care  STG Goals:  Time frame: 4 weeks   1. 9 hole peg test < 1 min on L hand   2. L  strength increased by 10#   3. Pt will verbalize/demonstatre improved clothing fastener and laces management       PLAN:    Patient/caregiver understands and agrees with plan of care.    Outpatient occupational therapy 2  times weekly for 8 weeks  to include: pt ed, hep, therapeutic  exercises, therapeutic activity, ADLs,  neuromuscular re-education, joint mobilizations, modalities prn, 3/8/17-5/5/17    History Examination Decision Making Complexity Score   Occupational Profile: expanded review of occupational profile via past medical history and personal interview     Medical and Therapy History:   History of DVT, hypertension, PE asthma, sclerodoma, DM type II                  Performance Deficits     Physical -  Impaired sensation, impaired visual tracking to the L, impaired fine motor coordination, impaired sensation        Cognitive: WNL        Psychosocial:  decreased ability to work and care for grandmother        Clinical decision making of moderate complexity including analysis of occupational profile via detailed assessments and consideration of multiple tx options.  Pt presents with co-morbidities that make affect functional outcomes.  Moderate       Treatment Time: 45 min     CAL Garcia   I certify that I was present in the room directing the student in service delivery and guiding them using my skilled judgment. As the co-signing therapist I have reviewed the students documentation and am responsible for the treatment, assessment, and plan.     lAis Fuller OT

## 2017-03-09 ENCOUNTER — TELEPHONE (OUTPATIENT)
Dept: HEMATOLOGY/ONCOLOGY | Facility: CLINIC | Age: 52
End: 2017-03-09

## 2017-03-09 ENCOUNTER — OFFICE VISIT (OUTPATIENT)
Dept: INTERNAL MEDICINE | Facility: CLINIC | Age: 52
End: 2017-03-09
Payer: COMMERCIAL

## 2017-03-09 VITALS — DIASTOLIC BLOOD PRESSURE: 73 MMHG | HEART RATE: 70 BPM | SYSTOLIC BLOOD PRESSURE: 115 MMHG

## 2017-03-09 DIAGNOSIS — I73.9 PERIPHERAL VASCULAR DISEASE: ICD-10-CM

## 2017-03-09 DIAGNOSIS — I77.71 INTERNAL CAROTID ARTERY DISSECTION: ICD-10-CM

## 2017-03-09 DIAGNOSIS — I63.031 CEREBRAL INFARCTION DUE TO THROMBOSIS OF RIGHT CAROTID ARTERY: ICD-10-CM

## 2017-03-09 DIAGNOSIS — I26.99 OTHER PULMONARY EMBOLISM WITHOUT ACUTE COR PULMONALE, UNSPECIFIED CHRONICITY: ICD-10-CM

## 2017-03-09 DIAGNOSIS — D64.9 ANEMIA, UNSPECIFIED TYPE: ICD-10-CM

## 2017-03-09 DIAGNOSIS — J45.20 ASTHMA WITHOUT STATUS ASTHMATICUS, MILD INTERMITTENT, UNCOMPLICATED: ICD-10-CM

## 2017-03-09 DIAGNOSIS — E11.9 TYPE 2 DIABETES MELLITUS WITHOUT COMPLICATION, WITHOUT LONG-TERM CURRENT USE OF INSULIN: ICD-10-CM

## 2017-03-09 DIAGNOSIS — J45.52 SEVERE PERSISTENT ASTHMA WITH STATUS ASTHMATICUS: Chronic | ICD-10-CM

## 2017-03-09 DIAGNOSIS — I10 ESSENTIAL HYPERTENSION: Primary | ICD-10-CM

## 2017-03-09 PROCEDURE — 99999 PR PBB SHADOW E&M-EST. PATIENT-LVL III: CPT | Mod: PBBFAC,,, | Performed by: FAMILY MEDICINE

## 2017-03-09 RX ORDER — FLUTICASONE PROPIONATE AND SALMETEROL 250; 50 UG/1; UG/1
1 POWDER RESPIRATORY (INHALATION) 2 TIMES DAILY
Qty: 60 EACH | Refills: 3 | Status: SHIPPED | OUTPATIENT
Start: 2017-03-09 | End: 2017-04-12 | Stop reason: SDUPTHER

## 2017-03-09 RX ORDER — LISINOPRIL 20 MG/1
20 TABLET ORAL DAILY
Qty: 30 TABLET | Refills: 0 | Status: SHIPPED | OUTPATIENT
Start: 2017-03-09 | End: 2017-04-12 | Stop reason: SDUPTHER

## 2017-03-09 RX ORDER — METFORMIN HYDROCHLORIDE 500 MG/1
500 TABLET ORAL 2 TIMES DAILY WITH MEALS
Qty: 60 TABLET | Refills: 0 | Status: SHIPPED | OUTPATIENT
Start: 2017-03-09 | End: 2017-04-12 | Stop reason: SDUPTHER

## 2017-03-09 RX ORDER — TRIAMTERENE/HYDROCHLOROTHIAZID 37.5-25 MG
1 TABLET ORAL DAILY
Qty: 90 TABLET | Refills: 4 | Status: SHIPPED | OUTPATIENT
Start: 2017-03-09 | End: 2017-04-12 | Stop reason: SDUPTHER

## 2017-03-09 NOTE — TELEPHONE ENCOUNTER
----- Message from Jessica Garcia sent at 3/9/2017  1:52 PM CST -----  Contact: Self  Please contact pt to setup an appt per Dr. Joyce    Thank you

## 2017-03-09 NOTE — MR AVS SNAPSHOT
[facility]  [address]  [facility] [address]  Phone: [phone]  Fax: [phone]                  Lyssa Gandara   3/9/2017 1:00 PM   Office Visit    Description:  Female : 1965   Provider:  Gerard Joyce MD   Department:  [facility]           Diagnoses this Visit        Comments    Essential hypertension    -  Primary     Type 2 diabetes mellitus without complication, without long-term current use of insulin         Internal carotid artery dissection         Cerebral infarction due to thrombosis of right carotid artery         Anemia, unspecified type         Peripheral vascular disease         Other pulmonary embolism without acute cor pulmonale, unspecified chronicity         Severe persistent asthma with status asthmaticus     pt reported that she used to take advair but could not refill due to price, provided savings card, stated she will refill today    Asthma without status asthmaticus, mild intermittent, uncomplicated                To Do List           Future Appointments        Provider Department Dept Phone    3/20/2017 10:00 AM Dillan Fritz MD [facility] [phone]    3/24/2017 9:00 AM Alis Fuller OT [facility] [phone]    3/27/2017 8:15 AM Alsi Fuller OT [facility] [phone]    3/29/2017 8:15 AM Alis Fuller OT [facility] [phone]      Goals (5 Years of Data)              16    COMPLETED: Patient/caregiver will have an action plan in place to manage and prevent complications of DM, Asthma, Long-term anti-coagulant, HTN therapy prior to discharge from [facility].   No change  On track    Notes - Note edited  2016  2:36 PM by Angelica Lombardo RN    Overall Time to Completion  2 months from 10/24/2016    Short Term Goals  Patient/caregiver will discuss health care needs with Physician and care team during visits  or using Patient Portal 2 weeks  Interventions   · Collaborate with Physician as appropriate to meet patient needs.  · Empower patient/caregiver to discuss treatment plan with Physician/care team.  · Facilitate to Medical appointments.  · Provide contact information for [facility] contact information.  · Provide contact information for Outpatient Case Management contact information.  · Assess for availability of working blood pressure cuff in home setting.  · Assess for availability of working glucometer in home setting.   Status  · Partially met    Patient/caregiver will verbalize 4 signs and symptoms of Hypertension Diabetes Asthma Deep Vein Thrombosis within 1 month.   Interventions   · Refer to Clinical Pharmacist.  · Refer to Dietician.  · Facilitate to referral to Diabetic educator.  · Facilitate to referral to Endocrinologist.  · Encourage Dietary Compliance.  · Encourage compliance with annual vaccinations.  · Encourage compliance with preventive screenings.  · Mail Blood glucose logs for home use.  · Mail Blood pressure logs for home use.   Status  · Partially met    Patient/caregiver will verbalize 3 ways of preventing complications due to disease process within 1 month.  Interventions   · Empower patient/caregiver to discuss treatment plan with Physician/care team.  · Encourage Dietary Compliance.  · Encourage Medication Compliance.   Status  · Partially met       Clinical Reference Documents Added to Patient Instructions       Document    ASTHMA, CONTROLLING YOUR TRIGGERS: ALLERGENS (ENGLISH)    ASTHMA, UNDERSTANDING (ENGLISH)    BLOOD SUGAR, LOW; HYPOGLYCEMIA (ENGLISH)    DIABETES, HEALTHY MEALS FOR (ENGLISH)    DIABETES, LONG-TERM COMPLICATIONS (ENGLISH)    DIABETES, MEAL PLANNING (ENGLISH)    DIABETES, SERVING AND PORTION SIZES, LEARNING ABOUT (ENGLISH)    DIABETES: EXAMS AND TESTS (ENGLISH)    DIET, LOW-SALT (ENGLISH)    EATING HEART-HEALTHY FOODS (ENGLISH)    HIGH BLOOD PRESSURE, WHAT IS?   (ENGLISH)    HYPERGLYCEMIA (HIGH BLOOD SUGAR) (ENGLISH)    HYPERTENSION, ESTABLISHED (ENGLISH)    HYPOTENSION, ALL CAUSES (ENGLISH)           Clinical Reference Documents Added to Patient Instructions       Document    BLOOD SUGAR LOG, USING A (ENGLISH)    DIABETES, LONG-TERM COMPLICATIONS (ENGLISH)    DIABETES, MANAGING: THE A1C TEST (ENGLISH)    DIABETES, MEAL PLANNING (ENGLISH)              Follow-Up and Disposition     Return in about 4 weeks (around 4/6/2017), or if symptoms worsen or fail to improve.       These Medications        Disp Refills Start End    lisinopril (PRINIVIL,ZESTRIL) 20 MG tablet 30 tablet 0 3/9/2017     Take 1 tablet (20 mg total) by mouth once daily. - Oral    Pharmacy: [address] Ph #: [phone]       metformin (GLUCOPHAGE) 500 MG tablet 60 tablet 0 3/9/2017     Take 1 tablet (500 mg total) by mouth 2 (two) times daily with meals. - Oral    Pharmacy: [address] Ph #: [phone]       triamterene-hydrochlorothiazide 37.5-25 mg (MAXZIDE-25) 37.5-25 mg per tablet 90 tablet 4 3/9/2017     Take 1 tablet by mouth once daily. - Oral    Pharmacy: [address] Ph #: [phone]       fluticasone-salmeterol 250-50 mcg/dose (ADVAIR) 250-50 mcg/dose diskus inhaler 60 each 3 3/9/2017 3/9/2018    Inhale 1 puff into the lungs 2 (two) times daily. - Inhalation    Pharmacy: [address] Ph #: [phone]         [facility] On [facility]     [facility] On Call Nurse Care Line - 24/7 Assistance  Registered nurses in the [facility] provide clinical advisement, health education, appointment booking, and other advisory services.  Call for this free service at [phone].             Medications           Message regarding Medications     Verify the changes and/or  "additions to your medication regime listed below are the same as discussed with your clinician today.  If any of these changes or additions are incorrect, please notify your healthcare provider.        CHANGE how you are taking these medications     Start Taking Instead of    lisinopril (PRINIVIL,ZESTRIL) 20 MG tablet lisinopril (PRINIVIL,ZESTRIL) 20 MG tablet    Dosage:  Take 1 tablet (20 mg total) by mouth once daily. Dosage:  TAKE ONE TABLET BY MOUTH ONCE DAILY    Reason for Change:  Reorder     metformin (GLUCOPHAGE) 500 MG tablet metformin (GLUCOPHAGE) 500 MG tablet    Dosage:  Take 1 tablet (500 mg total) by mouth 2 (two) times daily with meals. Dosage:  TAKE ONE TABLET BY MOUTH TWICE DAILY WITH MEALS    Reason for Change:  Reorder            Verify that the below list of medications is an accurate representation of the medications you are currently taking.  If none reported, the list may be blank. If incorrect, please contact your healthcare provider. Carry this list with you in case of emergency.           Current Medications     albuterol (PROVENTIL) 2.5 mg /3 mL (0.083 %) nebulizer solution Take 3 mLs (2.5 mg total) by nebulization every 6 (six) hours as needed for Wheezing.    cyanocobalamin (VITAMIN B-12) 1000 MCG tablet Take 100 mcg by mouth once daily.    dabigatran etexilate (PRADAXA) 150 mg Cap Take 1 capsule (150 mg total) by mouth 2 (two) times daily. "Do NOT break, chew, or open capsules."    ferrous sulfate 325 (65 FE) MG EC tablet Take 1 tablet (325 mg total) by mouth once daily.    fluticasone-salmeterol 250-50 mcg/dose (ADVAIR) 250-50 mcg/dose diskus inhaler Inhale 1 puff into the lungs 2 (two) times daily.    lisinopril (PRINIVIL,ZESTRIL) 20 MG tablet Take 1 tablet (20 mg total) by mouth once daily.    metformin (GLUCOPHAGE) 500 MG tablet Take 1 tablet (500 mg total) by mouth 2 (two) times daily with meals.    triamterene-hydrochlorothiazide 37.5-25 mg (MAXZIDE-25) 37.5-25 mg per tablet Take " 1 tablet by mouth once daily.           Clinical Reference Information           Your Vitals Were     BP Pulse Last Period             115/73 70 (LMP Unknown)         Blood Pressure          Most Recent Value    BP  115/73      Allergies as of 3/9/2017     Plasma, Human, Normal      Immunizations Administered on Date of Encounter - 3/9/2017     None      Orders Placed During Today's Visit      Normal Orders This Visit    Ambulatory Referral to Hematology / Oncology     Ambulatory Referral to Vascular Medicine       Language Assistance Services     ATTENTION: Language assistance services are available, free of charge. Please call [phone].      ATENCIÓN: Si kiahla bryanangelina, tiene a babb disposición servicios gratuitos de asistencia lingüística. Llame al [phone].     GENET Ý: N?u b?n nói Ti?ng Vi?t, có các d?ch v? h? tr? ngôn ng? mi?n phí dành cho b?n. G?i s? [phone].         [facility] complies with applicable Federal civil rights laws and does not discriminate on the basis of race, color, national origin, age, disability, or sex.

## 2017-03-09 NOTE — PROGRESS NOTES
"Subjective:       Patient ID: Lyssa Gandara is a 51 y.o. female.    Chief Complaint: No chief complaint on file.  Lyssa Gandara 51 y.o. female is here for office visit to review care and physical exam, f/u recent CVA: D/c note "Not a candidate for IVtPA d/t being on Xarelto . R ICA occlusive thrombus noted, had aspiration thrombectomy, unclear if underlying FMD w/ dissection or underlying atherosclerotic plaque rupture. Of note had autoimmune workup completed in 3/2016. Complete APLS lab work (DRVVT/LA, anticardiolipin, beta 2) was negative. Plan was continue dabigatran, does not require a statin due to LDL of 60, resume all other home medications.  Reports feeling much better, has some left hand numbness noted, articulation returning.  Speech and swallowing well.  HAs pt.  HAs Neuro f/u appt.  Note pt seen by me twice, first time almost a tyear ago, moved from [place].  Pt notes had extensive care there.  HAd anemia, given blood?  Told had bone marrow issue?  Had bone amrrow bx planned but "hurt too much."  Has had CRc scr and endoscopies in [place].  Reports was placed on steroids, given PRBCs in past.  Pt reports good BP readings at home.    Transitional Care Note    Family and/or Caretaker present at visit?  no  Diagnostic tests reviewed/disposition: yes.  Disease/illness education: yes  Home health/community services discussion/referrals: yes.   Establishment or re-establishment of referral orders for community resources: no.   Discussion with other health care providers: no.             .HPI  Review of Systems   Constitutional: Negative for activity change, fatigue, fever and unexpected weight change.   HENT: Negative for congestion, hearing loss, postnasal drip and rhinorrhea.    Eyes: Negative for redness and visual disturbance.   Respiratory: Negative for chest tightness, shortness of breath and wheezing.    Cardiovascular: Negative for chest pain, palpitations and leg swelling. "   Gastrointestinal: Negative for abdominal distention.   Genitourinary: Negative for decreased urine volume, dysuria, flank pain, hematuria, pelvic pain and urgency.   Musculoskeletal: Negative for back pain, gait problem, joint swelling and neck stiffness.   Skin: Negative for color change, rash and wound.   Neurological: Negative for dizziness, syncope, weakness and headaches.   Psychiatric/Behavioral: Negative for behavioral problems, confusion and sleep disturbance. The patient is not nervous/anxious.        Objective:      Physical Exam   Constitutional: She is oriented to person, place, and time. She appears well-developed and well-nourished. No distress.   HENT:   Head: Normocephalic.   Mouth/Throat: No oropharyngeal exudate.   Eyes: EOM are normal. Pupils are equal, round, and reactive to light. No scleral icterus.   Neck: Neck supple. No JVD present. No thyromegaly present.   Cardiovascular: Normal rate, regular rhythm and normal heart sounds.  Exam reveals no gallop and no friction rub.    No murmur heard.  Pulmonary/Chest: Effort normal and breath sounds normal. She has no wheezes. She has no rales.   Abdominal: Soft. Bowel sounds are normal. She exhibits no distension and no mass. There is no tenderness. There is no guarding.   Musculoskeletal: Normal range of motion. She exhibits no edema.   Lymphadenopathy:     She has no cervical adenopathy.   Neurological: She is alert and oriented to person, place, and time. She has normal reflexes. She displays normal reflexes. No cranial nerve deficit. She exhibits normal muscle tone.   Skin: Skin is warm. No rash noted. No erythema.   Psychiatric: She has a normal mood and affect. Thought content normal.       Assessment:       No diagnosis found.    Plan:       Diagnoses and all orders for this visit:    Essential hypertension  - controled, continue home BPs  Type 2 diabetes mellitus without complication, without long-term current use of insulin  -  controled  Internal carotid artery dissection  - Has neuro f/u  Cerebral infarction due to thrombosis of right carotid artery  -     Ambulatory Referral to Vascular Medicine    Anemia, unspecified type  - See Hematology, review PMH  Peripheral vascular disease  -     Ambulatory Referral to Vascular Medicine    Other pulmonary embolism without acute cor pulmonale, unspecified chronicity  -     Ambulatory Referral to Hematology / Oncology  -     Ambulatory Referral to Vascular Medicine

## 2017-03-13 ENCOUNTER — INITIAL CONSULT (OUTPATIENT)
Dept: CARDIOLOGY | Facility: CLINIC | Age: 52
End: 2017-03-13
Payer: COMMERCIAL

## 2017-03-13 VITALS
HEART RATE: 97 BPM | SYSTOLIC BLOOD PRESSURE: 116 MMHG | BODY MASS INDEX: 28.4 KG/M2 | DIASTOLIC BLOOD PRESSURE: 76 MMHG | WEIGHT: 170.44 LBS | HEIGHT: 65 IN

## 2017-03-13 DIAGNOSIS — I26.92 CHRONIC SADDLE PULMONARY EMBOLISM WITHOUT ACUTE COR PULMONALE: ICD-10-CM

## 2017-03-13 DIAGNOSIS — I65.21 CAROTID ARTERY STENOSIS, UNILATERAL, RIGHT: Primary | ICD-10-CM

## 2017-03-13 DIAGNOSIS — I27.82 CHRONIC SADDLE PULMONARY EMBOLISM WITHOUT ACUTE COR PULMONALE: ICD-10-CM

## 2017-03-13 DIAGNOSIS — I26.99 OTHER PULMONARY EMBOLISM WITHOUT ACUTE COR PULMONALE, UNSPECIFIED CHRONICITY: ICD-10-CM

## 2017-03-13 DIAGNOSIS — I63.031 CEREBROVASCULAR ACCIDENT (CVA) DUE TO THROMBOSIS OF RIGHT CAROTID ARTERY: ICD-10-CM

## 2017-03-13 DIAGNOSIS — Z79.01 LONG-TERM (CURRENT) USE OF ANTICOAGULANTS: ICD-10-CM

## 2017-03-13 PROCEDURE — 3078F DIAST BP <80 MM HG: CPT | Mod: S$GLB,,, | Performed by: INTERNAL MEDICINE

## 2017-03-13 PROCEDURE — 3074F SYST BP LT 130 MM HG: CPT | Mod: S$GLB,,, | Performed by: INTERNAL MEDICINE

## 2017-03-13 PROCEDURE — 1160F RVW MEDS BY RX/DR IN RCRD: CPT | Mod: S$GLB,,, | Performed by: INTERNAL MEDICINE

## 2017-03-13 PROCEDURE — 99999 PR PBB SHADOW E&M-EST. PATIENT-LVL III: CPT | Mod: PBBFAC,,, | Performed by: INTERNAL MEDICINE

## 2017-03-13 PROCEDURE — 99213 OFFICE O/P EST LOW 20 MIN: CPT | Mod: S$GLB,,, | Performed by: INTERNAL MEDICINE

## 2017-03-13 RX ORDER — ASPIRIN 81 MG/1
81 TABLET ORAL DAILY
Qty: 30 TABLET | Refills: 11 | Status: SHIPPED | OUTPATIENT
Start: 2017-03-13 | End: 2017-04-13 | Stop reason: SDUPTHER

## 2017-03-13 NOTE — PROGRESS NOTES
Subjective:   Patient ID:  Lyssa Gandara is a 51 y.o. female who presents for evaluation of Peripheral Vascular Disease      HPI: Mrs. Gandara, presents today for a follow up visit for chronic DVT/PE. 2 weeks ago she was rushed to the ER for evaluation for possible acute stroke. She had weakness in her left arm and leg, MRI showed, Findings concerning for acute infarct of the right MCA distribution involving the majority of the right parietal lobe with additional scattered foci of infarcts involving the right caudate head, right occipital lobe, right temporal lobe, right subinsular cortex, and right posterior frontal lobe. Angiography showed, 1. Right internal carotid artery origin occlusion due to thrombus formation at the tortuous proximal ICA segment of fibromuscular disease.and possible FMD. Didn't get CTA due iv line issues. The patient clotting work up was negative 3/2016. In the last few months the patient compliant with rivaroxaban. During her last hospital visit, she was switched to pradaxa. There is residual left hand numbness. No weakness. She will start physical rehab next week.       The patient under went Successful right ICA extracranial thrombectomy with restoration of antegrade flow into the right hemisphere.  There is a residual right M3 branch occlusion that was not further treated.          Past Medical History:   Diagnosis Date    Asthma     Dyspnea on exertion     Hx of long term use of blood thinners     Hypertension 3/8/2016    PE (pulmonary embolism)     Hx of multiple DVT/PE    Presence of IVC filter     Scleroderma     Type 2 diabetes mellitus without complication        Past Surgical History:   Procedure Laterality Date    DANETTE FILTER PLACEMENT         Social History   Substance Use Topics    Smoking status: Never Smoker    Smokeless tobacco: None    Alcohol use 1.8 oz/week     3 Cans of beer, 0 Standard drinks or equivalent per week      Comment: occ  "      Family History   Problem Relation Age of Onset    Breast cancer Mother     Hypertension Father     No Known Problems Brother     No Known Problems Maternal Grandmother     No Known Problems Maternal Grandfather     No Known Problems Paternal Grandmother     No Known Problems Paternal Grandfather     Diabetes Mellitus Maternal Uncle     No Known Problems Sister     No Known Problems Maternal Aunt     No Known Problems Paternal Aunt     No Known Problems Paternal Uncle     Psoriasis Neg Hx     Rheum arthritis Neg Hx     Thyroid disease Neg Hx     Lupus Neg Hx     Anemia Neg Hx     Arrhythmia Neg Hx     Asthma Neg Hx     Clotting disorder Neg Hx     Fainting Neg Hx     Heart attack Neg Hx     Heart disease Neg Hx     Heart failure Neg Hx     Hyperlipidemia Neg Hx     Stroke Neg Hx     Atrial Septal Defect Neg Hx        Current Outpatient Prescriptions   Medication Sig Dispense Refill    albuterol (PROVENTIL) 2.5 mg /3 mL (0.083 %) nebulizer solution Take 3 mLs (2.5 mg total) by nebulization every 6 (six) hours as needed for Wheezing. 180 mL 3    cyanocobalamin (VITAMIN B-12) 1000 MCG tablet Take 100 mcg by mouth once daily.      dabigatran etexilate (PRADAXA) 150 mg Cap Take 1 capsule (150 mg total) by mouth 2 (two) times daily. "Do NOT break, chew, or open capsules." 60 capsule 1    ferrous sulfate 325 (65 FE) MG EC tablet Take 1 tablet (325 mg total) by mouth once daily. 30 tablet 1    fluticasone-salmeterol 250-50 mcg/dose (ADVAIR) 250-50 mcg/dose diskus inhaler Inhale 1 puff into the lungs 2 (two) times daily. 60 each 3    lisinopril (PRINIVIL,ZESTRIL) 20 MG tablet Take 1 tablet (20 mg total) by mouth once daily. 30 tablet 0    metformin (GLUCOPHAGE) 500 MG tablet Take 1 tablet (500 mg total) by mouth 2 (two) times daily with meals. 60 tablet 0    triamterene-hydrochlorothiazide 37.5-25 mg (MAXZIDE-25) 37.5-25 mg per tablet Take 1 tablet by mouth once daily. 90 tablet 4    " "aspirin (ECOTRIN) 81 MG EC tablet Take 1 tablet (81 mg total) by mouth once daily. 30 tablet 11     No current facility-administered medications for this visit.          Review of patient's allergies indicates:   Allergen Reactions    Plasma, human, normal        Review of Systems   Constitution: Negative for decreased appetite, fever and weight gain.   Cardiovascular: Negative for chest pain, claudication, cyanosis, dyspnea on exertion and leg swelling.   Respiratory: Negative for cough, shortness of breath and wheezing.    Hematologic/Lymphatic: Negative for bleeding problem. Does not bruise/bleed easily.   Skin: Negative for color change, dry skin, itching, rash and suspicious lesions.   Musculoskeletal: Negative for arthritis, back pain, joint swelling and muscle weakness.   Gastrointestinal: Negative for melena, nausea and vomiting.   Neurological: Positive for numbness. Negative for loss of balance and paresthesias.       Objective:/76 (BP Location: Right arm, Patient Position: Sitting, BP Method: Manual)  Pulse 97  Ht 5' 5" (1.651 m)  Wt 77.3 kg (170 lb 6.7 oz)  LMP  (LMP Unknown)  BMI 28.36 kg/m2   Physical Exam   Constitutional: She is oriented to person, place, and time. She appears well-developed and well-nourished.   HENT:   Head: Normocephalic and atraumatic.   Neck: Normal range of motion. Neck supple. No JVD present.   Cardiovascular: Normal rate, regular rhythm and normal heart sounds.  Exam reveals no gallop and no friction rub.    No murmur heard.  Pulses:       Radial pulses are 2+ on the right side, and 2+ on the left side.        Dorsalis pedis pulses are 2+ on the right side, and 2+ on the left side.        Posterior tibial pulses are 2+ on the right side, and 2+ on the left side.   Pulmonary/Chest: Effort normal. No respiratory distress. She has no wheezes. She has no rales.   Abdominal: Soft.   Musculoskeletal: Normal range of motion. She exhibits no edema or tenderness. "   Neurological: She is alert and oriented to person, place, and time. No cranial nerve deficit.   Skin: Skin is warm. No rash noted. No erythema. No pallor.       Lab Results   Component Value Date    WBC 4.70 03/02/2017    HGB 8.8 (L) 03/02/2017    HCT 29.1 (L) 03/02/2017     (H) 03/02/2017    CHOL 155 02/26/2017    TRIG 85 02/26/2017    HDL 78 (H) 02/26/2017    ALT 12 03/02/2017    AST 26 03/02/2017     03/02/2017    K 3.6 03/02/2017     03/02/2017    CREATININE 1.0 03/02/2017    BUN 8 03/02/2017    CO2 23 03/02/2017    TSH 1.147 02/26/2017    INR 1.0 03/02/2017    HGBA1C 6.8 (H) 02/26/2017     Venous doppler 3/5/2016.  Occlusive expansile thrombus the left popliteal vein consistent with acute DVT.   CTA  3/5/2016.  Paucity of blood flow to the right middle and lower lobes with a filling defect noted more proximally in the RLL pulmonary artery, suggestive of acute/subacute pulmonary embolism.  Findings suggestive of pulmonary arterial hypertension.    CTA 8/2016.   When compared to the patient's previous exam, there has been detrimental interval change in the left lung with interval development of new filling defect within left upper lobe pulmonary artery and distal branches.  There is a small area of ground glass opacity at the periphery of the left upper lobe, may represent consolidation versus small infarct.    Stable paucity of blood flow to the right middle and lower lobes with a stable filling defect of the right lower pulmonary artery.  There is associated volume loss and atelectasis/consolidation in right middle and lower lobes.  This appears to be a chronic process given the stability and findings in the lungs.    Enlargement of the main pulmonary artery which can be seen in the context of pulmonary hypertension.    Stable left lung 6 mm nodule.  According to the Fleischner society guidelines if the patient is low risk recommend follow up in 18-24 months.  If high risk, recommend follow  up in 9 and 24 months.  Follow up recommendations are from the initial exam which was March 2016.    Finding of chronic and new pulmonary embolism was discussed with Dr. Nicole 08/05/2016 at 8:50 AM.  Finding was initially observed at 8:30 AM on same day.    Assessment:     1. Carotid artery stenosis, unilateral, right    2. Cerebrovascular accident (CVA) due to thrombosis of right carotid artery    3. Long-term (current) use of anticoagulants    4. Chronic saddle pulmonary embolism without acute cor pulmonale    5. Other pulmonary embolism without acute cor pulmonale, unspecified chronicity        Plan:       1. Continue Pradaxa 150 BID.  2. Add ASA 81 mg. Daily.  3. CTA neck for further evaluation.  4. Repeat the clotting work up.  5. Normal Lipid test, we will start her on statin low dose.  6. We discussed the case w ith Dr. Carlos snell. (Vascular Surgery).    Davey Morris  Vascular Medicine.

## 2017-03-13 NOTE — LETTER
March 13, 2017      Gerard Joyce MD  [address]  [place] [address]           [facility]  [address]  [facility] [address]  Phone: [phone]          Patient: Lyssa Gandara   MR Number: [id number]   YOB: 1965   Date of Visit: 3/13/2017       Dear Dr. Gerard Joyce:    Thank you for referring Lyssa Gandara to me for evaluation. Attached you will find relevant portions of my assessment and plan of care.    If you have questions, please do not hesitate to call me. I look forward to following Lyssa Gandara along with you.    Sincerely,    Sayjudith Morris MD    Enclosure  CC:  No Recipients    If you would like to receive this communication electronically, please contact [email or web address] or [phone] to request more information on [employer] Link access.    For providers and/or their staff who would like to refer a patient to [facility], please contact us through our one-stop-shop provider referral line, [facility], at [phone].    If you feel you have received this communication in error or would no longer like to receive these types of communications, please e-mail [email or web address]

## 2017-03-17 ENCOUNTER — INITIAL CONSULT (OUTPATIENT)
Dept: HEMATOLOGY/ONCOLOGY | Facility: CLINIC | Age: 52
End: 2017-03-17
Payer: COMMERCIAL

## 2017-03-17 ENCOUNTER — LAB VISIT (OUTPATIENT)
Dept: LAB | Facility: HOSPITAL | Age: 52
End: 2017-03-17
Attending: INTERNAL MEDICINE
Payer: COMMERCIAL

## 2017-03-17 VITALS
TEMPERATURE: 98 F | WEIGHT: 170.63 LBS | BODY MASS INDEX: 29.13 KG/M2 | HEIGHT: 64 IN | SYSTOLIC BLOOD PRESSURE: 203 MMHG | HEART RATE: 84 BPM | DIASTOLIC BLOOD PRESSURE: 98 MMHG

## 2017-03-17 DIAGNOSIS — I63.031 CEREBRAL INFARCTION DUE TO THROMBOSIS OF RIGHT CAROTID ARTERY: ICD-10-CM

## 2017-03-17 DIAGNOSIS — I63.031 CEREBROVASCULAR ACCIDENT (CVA) DUE TO THROMBOSIS OF RIGHT CAROTID ARTERY: ICD-10-CM

## 2017-03-17 DIAGNOSIS — I63.031 CEREBRAL INFARCTION DUE TO THROMBOSIS OF RIGHT CAROTID ARTERY: Primary | ICD-10-CM

## 2017-03-17 DIAGNOSIS — D50.0 IRON DEFICIENCY ANEMIA DUE TO CHRONIC BLOOD LOSS: ICD-10-CM

## 2017-03-17 DIAGNOSIS — I26.99 OTHER PULMONARY EMBOLISM WITHOUT ACUTE COR PULMONALE, UNSPECIFIED CHRONICITY: ICD-10-CM

## 2017-03-17 PROCEDURE — 81241 F5 GENE: CPT

## 2017-03-17 PROCEDURE — 99204 OFFICE O/P NEW MOD 45 MIN: CPT | Mod: S$GLB,,, | Performed by: INTERNAL MEDICINE

## 2017-03-17 PROCEDURE — 85613 RUSSELL VIPER VENOM DILUTED: CPT

## 2017-03-17 PROCEDURE — 86146 BETA-2 GLYCOPROTEIN ANTIBODY: CPT | Mod: 59

## 2017-03-17 PROCEDURE — 85301 ANTITHROMBIN III ANTIGEN: CPT

## 2017-03-17 PROCEDURE — 81240 F2 GENE: CPT

## 2017-03-17 PROCEDURE — 3080F DIAST BP >= 90 MM HG: CPT | Mod: S$GLB,,, | Performed by: INTERNAL MEDICINE

## 2017-03-17 PROCEDURE — 99999 PR PBB SHADOW E&M-EST. PATIENT-LVL III: CPT | Mod: PBBFAC,,, | Performed by: INTERNAL MEDICINE

## 2017-03-17 PROCEDURE — 3077F SYST BP >= 140 MM HG: CPT | Mod: S$GLB,,, | Performed by: INTERNAL MEDICINE

## 2017-03-17 PROCEDURE — 85598 HEXAGNAL PHOSPH PLTLT NEUTRL: CPT

## 2017-03-17 PROCEDURE — 81270 JAK2 GENE: CPT

## 2017-03-17 PROCEDURE — 86147 CARDIOLIPIN ANTIBODY EA IG: CPT | Mod: 59

## 2017-03-17 PROCEDURE — 85303 CLOT INHIBIT PROT C ACTIVITY: CPT

## 2017-03-17 PROCEDURE — 1160F RVW MEDS BY RX/DR IN RCRD: CPT | Mod: S$GLB,,, | Performed by: INTERNAL MEDICINE

## 2017-03-17 PROCEDURE — 85305 CLOT INHIBIT PROT S TOTAL: CPT

## 2017-03-17 NOTE — LETTER
March 17, 2017      Gerard Joyce MD  [address]  [facility] [address]           [facility]  [address]  [facility] [address]  Phone: [phone]          Patient: Lyssa Gandara   MR Number: [id number]   YOB: 1965   Date of Visit: 3/17/2017       Dear Dr. Gerard Joyce:    Thank you for referring Lyssa Gandara to me for evaluation. Attached you will find relevant portions of my assessment and plan of care.    If you have questions, please do not hesitate to call me. I look forward to following Lyssa Gandara along with you.    Sincerely,    Sera Koehler MD    Enclosure  CC:  No Recipients    If you would like to receive this communication electronically, please contact [email or web address] or [phone] to request more information on [employer] Link access.    For providers and/or their staff who would like to refer a patient to [facility], please contact us through our one-stop-shop provider referral line, [facility], at [phone].    If you feel you have received this communication in error or would no longer like to receive these types of communications, please e-mail [email or web address]

## 2017-03-17 NOTE — PROGRESS NOTES
Subjective:       Patient ID: Lyssa Gandara is a 51 y.o. female.    Chief Complaint: Anemia and Coagulation Disorder    Lyssa presents for evaluation of a history of venous thrombosis and recent arterial thrombosis/CVA. She describes her first DVT and pulmonary embolism in 1991 while on birth control therapy. This was treated with cessation of birth control and coumadin anticoagulation. She had subsequent events in 1992, 1993, and 1994 when eventually a Ratna filter was placed. She admits to missed doses of coumadin. She has no family history of thrombosis.   She presented with symptoms of CVA 2/26/17 and found to have right carotid artery stenosis and right MCA stroke. She was treated with Xarelto and recently changed to Pradaxa. She has never had bleeding on anticoagulation.   She does have iron deficiency anemia likely due to fibroid menorrhagia. She has fatigue and ice cravings. Dietary heme iron is adequate. She just started oral iron therapy. Prior colonoscopy was negative at outside hospital per patient.    HPI  Review of Systems   Constitutional: Positive for fatigue.   HENT: Negative.    Eyes: Negative.    Respiratory: Negative.    Cardiovascular: Negative.    Endocrine: Negative.    Genitourinary: Positive for menstrual problem and vaginal bleeding.   Musculoskeletal: Negative.    Skin: Negative.    Allergic/Immunologic: Negative for environmental allergies, food allergies and immunocompromised state.   Neurological: Negative.    Hematological: Negative for adenopathy. Does not bruise/bleed easily.   Psychiatric/Behavioral: Negative.        Objective:      Physical Exam   Constitutional: She is oriented to person, place, and time. She appears well-developed and well-nourished.   HENT:   Head: Normocephalic and atraumatic.   Eyes: Conjunctivae are normal. No scleral icterus.   Neck: Normal range of motion. Neck supple.   Cardiovascular: Normal rate and intact distal pulses.     Pulmonary/Chest: Effort normal. No respiratory distress.   Abdominal: She exhibits no distension. There is no tenderness.   Musculoskeletal: Normal range of motion. She exhibits no edema.   Neurological: She is alert and oriented to person, place, and time.   Skin: Skin is warm and dry.   Psychiatric: She has a normal mood and affect. Her behavior is normal.   Nursing note and vitals reviewed.      Assessment:       1. Cerebral infarction due to thrombosis of right carotid artery    2. Other pulmonary embolism without acute cor pulmonale, unspecified chronicity    3. Iron deficiency anemia due to chronic blood loss        Plan:       Venous/Arterial thrombosis  First venous thrombosis is likely due to birth control therapy. Greatest risk for venous thrombosis is prior thrombosis.  The most common inherited and acquired thrombophilias are risks for venous thrombosis more than arterial.   Antiphospholipid antibody tests were completed in 2016 these were negative.   The lack of family history mostly excludes any inherited thrombophilia.  Will proceed with testing for acquired thrombophila today.    Iron deficiency Anemia with associated thrombocytosis  Likely due to bleeding from fibroid menorrhagia.  Discussed pace of recovery with oral iron replacement in setting of active bleeding up to 3 years if ever.  Discussed IV iron therapy. Risks of side effects at 1-3% including nausea or allergic reaction.  Patient will consider.    Visit 30 minutes, >50% counseling

## 2017-03-18 LAB
B2 GLYCOPROT1 IGA SER QL: <9 SAU
B2 GLYCOPROT1 IGG SER QL: <9 SGU
B2 GLYCOPROT1 IGM SER QL: <9 SMU

## 2017-03-19 LAB — AT III AG ACT/NOR PPP IA: 75 %

## 2017-03-20 ENCOUNTER — OFFICE VISIT (OUTPATIENT)
Dept: NEUROLOGY | Facility: CLINIC | Age: 52
End: 2017-03-20
Payer: COMMERCIAL

## 2017-03-20 VITALS
SYSTOLIC BLOOD PRESSURE: 177 MMHG | DIASTOLIC BLOOD PRESSURE: 103 MMHG | HEART RATE: 87 BPM | BODY MASS INDEX: 29.1 KG/M2 | WEIGHT: 170.44 LBS | HEIGHT: 64 IN

## 2017-03-20 DIAGNOSIS — Z79.01 LONG-TERM (CURRENT) USE OF ANTICOAGULANTS: ICD-10-CM

## 2017-03-20 DIAGNOSIS — I77.71 INTERNAL CAROTID ARTERY DISSECTION: ICD-10-CM

## 2017-03-20 DIAGNOSIS — I77.3 FIBROMUSCULAR DYSPLASIA OF CERVICOCRANIAL ARTERY: ICD-10-CM

## 2017-03-20 DIAGNOSIS — I63.031 CEREBRAL INFARCTION DUE TO THROMBOSIS OF RIGHT CAROTID ARTERY: Primary | ICD-10-CM

## 2017-03-20 DIAGNOSIS — I10 ESSENTIAL HYPERTENSION: ICD-10-CM

## 2017-03-20 PROBLEM — G93.6 CYTOTOXIC CEREBRAL EDEMA: Status: RESOLVED | Noted: 2017-03-04 | Resolved: 2017-03-20

## 2017-03-20 LAB
F2 GENE MUT ANL BLD/T: NORMAL
F5 P.R506Q BLD/T QL: NORMAL

## 2017-03-20 PROCEDURE — 99215 OFFICE O/P EST HI 40 MIN: CPT | Mod: S$GLB,,, | Performed by: PSYCHIATRY & NEUROLOGY

## 2017-03-20 PROCEDURE — 99999 PR PBB SHADOW E&M-EST. PATIENT-LVL III: CPT | Mod: PBBFAC,,, | Performed by: PSYCHIATRY & NEUROLOGY

## 2017-03-20 PROCEDURE — 3080F DIAST BP >= 90 MM HG: CPT | Mod: S$GLB,,, | Performed by: PSYCHIATRY & NEUROLOGY

## 2017-03-20 PROCEDURE — 3077F SYST BP >= 140 MM HG: CPT | Mod: S$GLB,,, | Performed by: PSYCHIATRY & NEUROLOGY

## 2017-03-20 PROCEDURE — 1160F RVW MEDS BY RX/DR IN RCRD: CPT | Mod: S$GLB,,, | Performed by: PSYCHIATRY & NEUROLOGY

## 2017-03-20 NOTE — PROGRESS NOTES
Vascular Neurology  Clinic Note    Reason For Visit (Chief Complaint): R MCA stroke 2/2017, follow-up    HPI: 51 y.o. right handed female with R MCA stroke on 2/26/17. Workup is detailed below, but she did not receive IVtPA d/t Xarelto for PE hx, went to cerebral angiography which revealed irregularity and thrombus of proximal R extracranial ICA consistent with underlying FMD w/ dissection and thrombus. Aspiration successful for this proximal lesion but distal M3 occlusive lesion was present distally which was not amenable for intervention. She was transitioned to Dabigatran and continues on on this therapy, in addition to aspirin, at this point.    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 She has not seen outpatient therapy yet, her first appointment will be this Friday.    Brain Imaging:  MRI 2/27 - multiple scattered infarcts in R MCA territory involving insular cortex, R caudate head, scattered temporal and frontal lobe, and largest is M3 branch occlusion  Vessel Imaging:  DSA/Angiogram 2/26/16 - R proximal extracranial ICA w/ fibromuscular dyplasia and looping w/ irregularity, s/p aspiration of thrombus, Carotid Ultrasound - 2/26/17 - intraluminal thrombus present after review w/ Dr. Novak and Dr. Riley, no clear e/o underlying atherosclerotic disease contradictory to report and MRA -  w/ T1FS- presence of luminal thrombus likely although study was overall nondiagnostic  Cardiac Evaluation:  TTE - 55%, nl LA and Telemetry - no a.fib  Other:   Transcranial Doppler bubble study to determine shunt an relationship to history of venous clotting was NEGATIVE, so she does not have an intracardiac  "or intrapulmonary shunt  Relevant Labwork:    Recent Labs  Lab 02/26/17  0740 02/26/17  1756   HEMOGLOBIN A1C  --  6.8 H   LDL CHOLESTEROL 60.0 L  --    HDL 78 H  --    TRIGLYCERIDES 85  --    CHOLESTEROL 155  --      Hypercoagulable & autoimmune w/u completed in 3/2016 w/ complete negative APLS labwork (DRVVT/LA, anticardiolipin, beta 2)      I independently viewed the above imaging studies in addition to reviewing the report.  I reviewed the above labwork.    Review of Systems  Msk: negative for muscle pain  Skin: negative for pruritis  Neuro: + for headache for the past day, not taken anything to make it better  All others negative    Past Medical History  Past Medical History:   Diagnosis Date    Asthma     Dyspnea on exertion     Hx of long term use of blood thinners     Hypertension 3/8/2016    PE (pulmonary embolism)     Hx of multiple DVT/PE    Presence of IVC filter     Scleroderma     Type 2 diabetes mellitus without complication      Family History  No relevant history   Social History  Never Smoker     Medication List with Changes/Refills   Current Medications    ALBUTEROL (PROVENTIL) 2.5 MG /3 ML (0.083 %) NEBULIZER SOLUTION    Take 3 mLs (2.5 mg total) by nebulization every 6 (six) hours as needed for Wheezing.    ASPIRIN (ECOTRIN) 81 MG EC TABLET    Take 1 tablet (81 mg total) by mouth once daily.    CYANOCOBALAMIN (VITAMIN B-12) 1000 MCG TABLET    Take 100 mcg by mouth once daily.    DABIGATRAN ETEXILATE (PRADAXA) 150 MG CAP    Take 1 capsule (150 mg total) by mouth 2 (two) times daily. "Do NOT break, chew, or open capsules."    FERROUS SULFATE 325 (65 FE) MG EC TABLET    Take 1 tablet (325 mg total) by mouth once daily.    FLUTICASONE-SALMETEROL 250-50 MCG/DOSE (ADVAIR) 250-50 MCG/DOSE DISKUS INHALER    Inhale 1 puff into the lungs 2 (two) times daily.    LISINOPRIL (PRINIVIL,ZESTRIL) 20 MG TABLET    Take 1 tablet (20 mg total) by mouth once daily.    METFORMIN (GLUCOPHAGE) 500 MG TABLET    " "Take 1 tablet (500 mg total) by mouth 2 (two) times daily with meals.    TRIAMTERENE-HYDROCHLOROTHIAZIDE 37.5-25 MG (MAXZIDE-25) 37.5-25 MG PER TABLET    Take 1 tablet by mouth once daily.       EXAM  Vital Signs:Blood pressure (!) 177/103, pulse 87, height 5' 4" (1.626 m), weight 77.3 kg (170 lb 6.7 oz).  General: well appearing without discomfort   Mental Status:alert, oriented to person - place - age - month   Language: able to name, repeat, comprehend commands   Cranial Nerves: EOMI, PERRL, no facial asymmetry, tongue to midline, palate midline  Motor: 5/5 power in all extremities, normal tone; mild dexterity deficit in left  Hand, otherwise no pronator drift  Sensory: intact light touch bilaterally, intact proprioception bilaterally , except for patch of skin on dorsal surface of left hand  Coordination: no ataxia on finger-to-nose or heel-to-shin testing; no truncal ataxia  Gait & Stance: normal    NIH Stroke Scale:  Interval: baseline (upon arrival/admit)  Level of Consciousness: 0 - alert  LOC Questions: 0 - answers both correctly  LOC Commands: 0 - performs both correctly  Best Gaze: 0 - normal  Visual: 0 - no visual loss  Facial Palsy: 0 - normal  Motor Left Arm: 0 - no drift  Motor Right Arm: 0 - no drift  Motor Left Le - no drift  Motor Right Le - no drift  Limb Ataxia: 0 - absent  Sensory: 1 - mild to moderate loss  Best Language: 0 - no aphasia  Dysarthria: 0 - normal articulation  Extinction and Inattention: 0 - no neglect  NIH Stroke Scale Total: 1  Modified Taylor Scale:   Timeline: 30 day follow up  Modified Taylor Score: 1 - no significant disability          MoCA not performed    ___________________  ASSESSMENT & PLAN    Cerebral infarction due to thrombosis of right carotid artery  Etiology: Other Uncommon Cause Evident - R ICA Fibromuscular dysplasia w/ underlying dissection   MJ Absent -- CE Absent --  Absent -- Other Major: FMD  · Diagnostic Orders: CTA has been ordered by " Cardiology to re-evaluate the R ICA lesion; CTA was attempted during hospitalization but IV infiltrated  · Secondary stroke prevention: Continue Dabigatran, original plan was for lifelong OAC per hospital medicine/[facility] plans given history of PE/DVT; patient is currently taking ASA 81 daily which was added by Cardiology on a visit on 3/13, possibly due to their consideration of underlying atherosclerosis rather than FMD w/ dissection  · Blood pressure goal <140/90 mmHg   · Stroke education administered    Essential hypertension  Goal < 140/90    Fibromuscular dysplasia of cervicocranial artery  Discussion w/ Dr. Novak and Dr. Riley after review of all available vessel imaging, this seems to be the most plausible explanation for the irregularity of the vessel seen during the hospitalization    Internal carotid artery dissection  With underlying FMD w/ thrombosis       MD Chivo  Vascular Neurology  Office [phone]  Fax [phone]

## 2017-03-21 DIAGNOSIS — D50.0 IRON DEFICIENCY ANEMIA DUE TO CHRONIC BLOOD LOSS: ICD-10-CM

## 2017-03-21 LAB
CARDIOLIPIN IGG SER IA-ACNC: <9.4 GPL
CARDIOLIPIN IGM SER IA-ACNC: <9.4 MPL

## 2017-03-21 RX ORDER — HEPARIN 100 UNIT/ML
500 SYRINGE INTRAVENOUS
Status: CANCELLED | OUTPATIENT
Start: 2017-03-30

## 2017-03-21 RX ORDER — SODIUM CHLORIDE 0.9 % (FLUSH) 0.9 %
10 SYRINGE (ML) INJECTION
Status: CANCELLED | OUTPATIENT
Start: 2017-03-23

## 2017-03-21 RX ORDER — HEPARIN 100 UNIT/ML
500 SYRINGE INTRAVENOUS
Status: CANCELLED | OUTPATIENT
Start: 2017-03-23

## 2017-03-21 RX ORDER — SODIUM CHLORIDE 0.9 % (FLUSH) 0.9 %
10 SYRINGE (ML) INJECTION
Status: CANCELLED | OUTPATIENT
Start: 2017-03-30

## 2017-03-23 LAB
APTT HEX PL PPP: NEGATIVE S
APTT PROTEIN C ACTIVATOR+FV DP/APTT PPP: 121 %
PROT S ACT/NOR PPP: 82 %

## 2017-03-24 ENCOUNTER — CLINICAL SUPPORT (OUTPATIENT)
Dept: REHABILITATION | Facility: HOSPITAL | Age: 52
End: 2017-03-24
Attending: PSYCHIATRY & NEUROLOGY
Payer: COMMERCIAL

## 2017-03-24 DIAGNOSIS — Z78.9 IMPAIRED MOBILITY AND ADLS: ICD-10-CM

## 2017-03-24 DIAGNOSIS — R27.8 LOSS OF COORDINATION: Primary | ICD-10-CM

## 2017-03-24 DIAGNOSIS — Z74.09 IMPAIRED MOBILITY AND ADLS: ICD-10-CM

## 2017-03-24 LAB — LA PPP-IMP: NEGATIVE

## 2017-03-24 PROCEDURE — 97530 THERAPEUTIC ACTIVITIES: CPT | Mod: PO

## 2017-03-24 PROCEDURE — 97110 THERAPEUTIC EXERCISES: CPT | Mod: PO

## 2017-03-24 NOTE — PROGRESS NOTES
"Patient:  Lyssa Gandara  Clinic #:  [id number]   Date of Note: 03/24/2017   Referring Physician:  Dillan Fritz MD  Diagnosis: CVA due to thrombosis of R carotid artery   OT diagnosis: impaired fine and gross motor coordination, impaired sensation     Start Time: 0900  End Time: 0945  Total Time: 45 min  Group Time: 0  Visit # 2       Subjective: "Every chance I get I work it." pt referring to exercising L UE. Pt reports L hand continues to feel numb.  Pt reports she is able to better recognize when she she is holding things in her L hand, hand is able to recognize hot water.  She states she was not able to put her hand in a pony-tail this morning because she had a difficult time feeling the hair tie.  During sensory bin ax, pt reports she is not able to recognize the different textures, but knows something is in her hand.  Pt reports she plans to return to work April 3rd.        Pain: 0 out of 10     Objective:   Patient seen by OT this session. Treatment  consist of the following:  Therapeutic exercises and Therapeutic activities  Ergometer x 5 minutes forwards and 5 minutes backwards to maximize UE strength and endurance.  R hand green digi-flex composite and individual digit settings x 2 min each to improve  and digit strength.  Sensory bins for L hand sensory stimulation of multiple textures.  Feel and find bag using L hand to identify items to maximize somatosensory processing.  Pt using L hand to replace pegs to and from pegboard with hand gripper to maximize hand strength and functional use.  Pt provided with pink theraputty for home use and instructed on hand and digit exercises to perform with putty.  Pt demonstrated and verbalized understanding of HEP.       Assessment:  Pt demos improving L hand strength with therapeutic exercises.  Impaired L hand sensation appears to be the most limiting deficit.  Pt is able to recognize when objects are in her L hand and when she is holding something, but " cannot identify what the object is or identify textures.  Pt completed feel and find ax with poor accuracy.  Pt will continue to benefit from skilled OT intervention.    Patient continues to demonstrate limitations with ability to work and independent self care due to the following problems/impairements:     PROBLEM LIST/IMPAIRMENTS:   Impaired fine motor coordination, impaired gross motor coordination, impaired sensation, decreased work ability and self care      LTG GOALS: Time frame: 8 weeks   1. Pt will return to work  2. Pt will demonstrate increased sensation and awareness of objects in hand   3. Pt will demonstrate improved tracking to the L and compensatory strategies for visual deficits   4. 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 Pt IND with HEP   6. CMS - hand function survey CJ for self care  STG Goals:  Time frame: 4 weeks   1. 9 hole peg test < 1 min on L hand   2. L  strength increased by 10#   3. Pt will verbalize/demonstatre improved clothing fastener and laces management         PLAN:   Patient/caregiver understands and agrees with plan of care.     Outpatient occupational therapy 2  times weekly for 8 weeks to include: pt ed, hep, therapeutic exercises, therapeutic activity, ADLs, neuromuscular re-education, joint mobilizations, modalities prn, 3/8/17-5/5/17    CAL Garcia  I certify that I was present in the room directing the student in service delivery and guiding them using my skilled judgment. As the co-signing therapist I have reviewed the students documentation and am responsible for the treatment, assessment, and plan.     Alis Fuller, OT

## 2017-03-27 ENCOUNTER — INFUSION (OUTPATIENT)
Dept: INFUSION THERAPY | Facility: HOSPITAL | Age: 52
End: 2017-03-27
Attending: INTERNAL MEDICINE
Payer: COMMERCIAL

## 2017-03-27 ENCOUNTER — CLINICAL SUPPORT (OUTPATIENT)
Dept: REHABILITATION | Facility: HOSPITAL | Age: 52
End: 2017-03-27
Attending: PSYCHIATRY & NEUROLOGY
Payer: COMMERCIAL

## 2017-03-27 ENCOUNTER — TELEPHONE (OUTPATIENT)
Dept: INTERNAL MEDICINE | Facility: CLINIC | Age: 52
End: 2017-03-27

## 2017-03-27 DIAGNOSIS — D50.0 IRON DEFICIENCY ANEMIA DUE TO CHRONIC BLOOD LOSS: Primary | ICD-10-CM

## 2017-03-27 DIAGNOSIS — R27.8 LOSS OF COORDINATION: ICD-10-CM

## 2017-03-27 DIAGNOSIS — Z74.09 IMPAIRED MOBILITY AND ADLS: ICD-10-CM

## 2017-03-27 DIAGNOSIS — Z78.9 IMPAIRED MOBILITY AND ADLS: ICD-10-CM

## 2017-03-27 DIAGNOSIS — R29.898 LEFT ARM WEAKNESS: ICD-10-CM

## 2017-03-27 PROCEDURE — 63600175 PHARM REV CODE 636 W HCPCS: Performed by: INTERNAL MEDICINE

## 2017-03-27 PROCEDURE — 97110 THERAPEUTIC EXERCISES: CPT | Mod: PO

## 2017-03-27 PROCEDURE — 25000003 PHARM REV CODE 250: Performed by: INTERNAL MEDICINE

## 2017-03-27 PROCEDURE — 97022 WHIRLPOOL THERAPY: CPT | Mod: PO

## 2017-03-27 PROCEDURE — 96365 THER/PROPH/DIAG IV INF INIT: CPT

## 2017-03-27 PROCEDURE — 97530 THERAPEUTIC ACTIVITIES: CPT | Mod: PO

## 2017-03-27 RX ADMIN — SODIUM CHLORIDE: 9 INJECTION, SOLUTION INTRAVENOUS at 04:03

## 2017-03-27 RX ADMIN — FERRIC CARBOXYMALTOSE INJECTION 750 MG: 50 INJECTION, SOLUTION INTRAVENOUS at 04:03

## 2017-03-27 NOTE — TELEPHONE ENCOUNTER
Spoke with pt and she was having an depression moment. The pt was asked to come in to speak with  the appt was made for 3/27/16.

## 2017-03-27 NOTE — PROGRESS NOTES
Patient:  Lyssa Gandara  Clinic #:  [id number]   Date of Note: 03/27/2017   Referring Physician:  Dillan Fritz MD  Diagnosis: CVA due to thrombosis of R carotid artery   OT diagnosis: impaired fine and gross motor coordination, impaired sensation     Start Time: 0815  End Time: 0900  Total Time: 45 min  Group Time: 0  Visit # 3       Subjective: Pt reports she has been feeling different textured items at home for sensory stimulation and has been using the theraputty for hand exercises.  She reports improved tactile recognition and hand strength.        Pain: 0 out of 10     Objective:   Patient seen by OT this session. Treatment  consist of the following:  FOT, Therapeutic exercises and Therapeutic activities  L hand FOT x 15 minutes, 80 air speed, for sensory stimulation. Pt rolling L UE from finger tips to elbow for deep pressure stimulation.  L hand green digi-flex composite and individual digit settings x 2 min each to improve  and digit strength.  L hand blue clothespin lateral pinch, 2-point pinch, and 3-point pinch x 1 min each to maximize functional pinch and digit strength.  Pt completed multiple fine motor activities to maximize L hand coordination, dexterity, and in-hand manipulation skills.       Assessment:  Impaired L hand sensation appears to be the most limiting deficit.  L hand strength is improving.  L fine motor skills are improving, with in-hand manipulation most appearing most difficult.  Pt is complaint with HEP and is highly motivated to return to PLOF.  Pt will continue to benefit from skilled OT intervention.  Patient continues to demonstrate limitations with ability to work and independent self care due to the following problems/impairements:     PROBLEM LIST/IMPAIRMENTS:   Impaired fine motor coordination, impaired gross motor coordination, impaired sensation, decreased work ability and self care      LTG GOALS: Time frame: 8 weeks   1. Pt will return to work  2. Pt will  demonstrate increased sensation and awareness of objects in hand   3. Pt will demonstrate improved tracking to the L and compensatory strategies for visual deficits   4. 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 Pt IND with HEP   6. CMS - hand function survey CJ for self care  STG Goals:  Time frame: 4 weeks   1. 9 hole peg test < 1 min on L hand   2. L  strength increased by 10#   3. Pt will verbalize/demonstatre improved clothing fastener and laces management         PLAN:   Patient/caregiver understands and agrees with plan of care.     Outpatient occupational therapy 2  times weekly for 8 weeks to include: pt ed, hep, therapeutic exercises, therapeutic activity, ADLs, neuromuscular re-education, joint mobilizations, modalities prn, 3/8/17-5/5/17    CAL Garcia  I certify that I was present in the room directing the student in service delivery and guiding them using my skilled judgment. As the co-signing therapist I have reviewed the students documentation and am responsible for the treatment, assessment, and plan.

## 2017-03-27 NOTE — MR AVS SNAPSHOT
Patient Information     Patient Name Sex     Lyssa Gandara Female 1965      Visit Information        Provider Department Dept Phone Center    3/27/2017 3:00 PM N[facility], CHEMO [facility] [phone] [facility]      Patient Instructions     None      Your Current Medications Are     albuterol (PROVENTIL) 2.5 mg /3 mL (0.083 %) nebulizer solution    aspirin (ECOTRIN) 81 MG EC tablet    cyanocobalamin (VITAMIN B-12) 1000 MCG tablet    dabigatran etexilate (PRADAXA) 150 mg Cap    ferrous sulfate 325 (65 FE) MG EC tablet    fluticasone-salmeterol 250-50 mcg/dose (ADVAIR) 250-50 mcg/dose diskus inhaler    lisinopril (PRINIVIL,ZESTRIL) 20 MG tablet    metformin (GLUCOPHAGE) 500 MG tablet    triamterene-hydrochlorothiazide 37.5-25 mg (MAXZIDE-25) 37.5-25 mg per tablet      Facility-Administered Medications     ferric carboxymaltose (INJECTAFER) 750 mg in sodium chloride 0.9% 250 mL IVPB (ready to mix system)    sodium chloride 0.9% 100 mL flush bag      Appointments for Next Year     3/28/2017  7:45 AM CT CTA (30 min.) [facility] CT1 64- LIMIT 450 LBS    Please fast for 4 hours prior to appointment. Arrive at check-in approximately 30 minutes before your scheduled appointment time. Bring all outside medical records and imaging, along with a list of your current medications and insurance card.    2nd floor    3/28/2017  8:40 AM ESTABLISHED PATIENT (20 min.) [facility] Gerard Joyce MD    Arrive at check-in approximately 15 minutes before your scheduled appointment time. Bring all outside medical records and imaging, along with a list of your current medications and insurance card.    [facility] Bldg.    3/29/2017  8:15 AM EST OCC THERAPY - 45 MINS (45 min.) [facility]    Arrive at check-in approximately 15 minutes before your scheduled appointment time. Bring all outside  medical records and imaging, along with a list of your current medications and insurance card.    4/3/2017  3:00 PM INFUSION 120 MIN (120 min.) [facility], CHE[facility]    Arrive at check-in approximately 15 minutes before your scheduled appointment time. Bring all outside medical records and imaging, along with a list of your current medications and insurance card.    [facility], 5th Floor    4/7/2017 10:40 AM ESTABLISHED PATIENT (20 min.) [facility] Gerard Joyce MD    Arrive at check-in approximately 15 minutes before your scheduled appointment time. Bring all outside medical records and imaging, along with a list of your current medications and insurance card.    [facility] Bldg.      Allergies     Plasma, Human, Normal       Medications You Received from 03/26/2017 1740 to 03/27/2017 1740        Date/Time Order Dose Route Action     03/27/2017 1603 ferric carboxymaltose (INJECTAFER) 750 mg in sodium chloride 0.9% 250 mL IVPB (ready to mix system) 750 mg Intravenous New Bag     03/27/2017 1603 sodium chloride 0.9% 100 mL flush bag   Intravenous New Bag      Current Discharge Medication List     Cannot display discharge medications since this is not an admission.

## 2017-03-27 NOTE — PLAN OF CARE
Problem: Patient Care Overview  Goal: Plan of Care Review  Outcome: Ongoing (interventions implemented as appropriate)  Tolerated iron infusion without difficulty. No complaints voiced. Pt observed for one hour post infusion. VSS. Instructed to notify MD with any concerns or problems. Pt verbalized understanding.

## 2017-03-27 NOTE — PLAN OF CARE
Problem: Patient Care Overview  Goal: Individualization & Mutuality  Outcome: Ongoing (interventions implemented as appropriate)  1. PIV  2. Likes warm blankets

## 2017-03-27 NOTE — TELEPHONE ENCOUNTER
----- Message from Kameron Greenberg sent at 3/27/2017 10:54 AM CDT -----  Contact: Pt   Pt would like a call back from nurse    Pt is requesting to be seen ASAP.  States she recently had a stroke and feelings of depression have overcome her     Pt can be reached at [phone]

## 2017-03-28 ENCOUNTER — OFFICE VISIT (OUTPATIENT)
Dept: INTERNAL MEDICINE | Facility: CLINIC | Age: 52
End: 2017-03-28
Payer: COMMERCIAL

## 2017-03-28 ENCOUNTER — HOSPITAL ENCOUNTER (EMERGENCY)
Facility: HOSPITAL | Age: 52
Discharge: HOME | End: 2017-03-28
Attending: EMERGENCY MEDICINE | Admitting: EMERGENCY MEDICINE
Payer: COMMERCIAL

## 2017-03-28 ENCOUNTER — HOSPITAL ENCOUNTER (OUTPATIENT)
Dept: RADIOLOGY | Facility: HOSPITAL | Age: 52
Discharge: HOME | End: 2017-03-28
Attending: INTERNAL MEDICINE
Payer: COMMERCIAL

## 2017-03-28 VITALS — SYSTOLIC BLOOD PRESSURE: 200 MMHG | DIASTOLIC BLOOD PRESSURE: 120 MMHG | HEART RATE: 88 BPM

## 2017-03-28 VITALS
TEMPERATURE: 98 F | HEIGHT: 64 IN | WEIGHT: 167 LBS | HEART RATE: 87 BPM | SYSTOLIC BLOOD PRESSURE: 179 MMHG | BODY MASS INDEX: 28.51 KG/M2 | RESPIRATION RATE: 17 BRPM | OXYGEN SATURATION: 98 % | DIASTOLIC BLOOD PRESSURE: 86 MMHG

## 2017-03-28 DIAGNOSIS — F32.9 REACTIVE DEPRESSION: ICD-10-CM

## 2017-03-28 DIAGNOSIS — I63.031 CEREBROVASCULAR ACCIDENT (CVA) DUE TO THROMBOSIS OF RIGHT CAROTID ARTERY: ICD-10-CM

## 2017-03-28 DIAGNOSIS — R29.898 LEFT ARM WEAKNESS: ICD-10-CM

## 2017-03-28 DIAGNOSIS — E11.9 TYPE 2 DIABETES MELLITUS WITHOUT COMPLICATION, WITHOUT LONG-TERM CURRENT USE OF INSULIN: ICD-10-CM

## 2017-03-28 DIAGNOSIS — F32.A DEPRESSION, UNSPECIFIED DEPRESSION TYPE: Primary | ICD-10-CM

## 2017-03-28 DIAGNOSIS — I65.21 CAROTID ARTERY STENOSIS, UNILATERAL, RIGHT: ICD-10-CM

## 2017-03-28 DIAGNOSIS — M34.9 SCLERODERMA: ICD-10-CM

## 2017-03-28 DIAGNOSIS — I10 ESSENTIAL HYPERTENSION: ICD-10-CM

## 2017-03-28 DIAGNOSIS — I63.031 CEREBRAL INFARCTION DUE TO THROMBOSIS OF RIGHT CAROTID ARTERY: ICD-10-CM

## 2017-03-28 DIAGNOSIS — R03.0 ELEVATED BLOOD PRESSURE READING: Primary | ICD-10-CM

## 2017-03-28 DIAGNOSIS — I10 ESSENTIAL HYPERTENSION: Primary | ICD-10-CM

## 2017-03-28 DIAGNOSIS — I26.99 OTHER PULMONARY EMBOLISM WITHOUT ACUTE COR PULMONALE, UNSPECIFIED CHRONICITY: ICD-10-CM

## 2017-03-28 LAB
CREAT SERPL-MCNC: 1 MG/DL (ref 0.5–1.4)
SAMPLE: NORMAL

## 2017-03-28 PROCEDURE — 25500020 PHARM REV CODE 255: Performed by: INTERNAL MEDICINE

## 2017-03-28 PROCEDURE — 3077F SYST BP >= 140 MM HG: CPT | Mod: S$GLB,,, | Performed by: FAMILY MEDICINE

## 2017-03-28 PROCEDURE — 70498 CT ANGIOGRAPHY NECK: CPT | Mod: TC

## 2017-03-28 PROCEDURE — 99215 OFFICE O/P EST HI 40 MIN: CPT | Mod: S$GLB,,, | Performed by: FAMILY MEDICINE

## 2017-03-28 PROCEDURE — 3044F HG A1C LEVEL LT 7.0%: CPT | Mod: S$GLB,,, | Performed by: FAMILY MEDICINE

## 2017-03-28 PROCEDURE — 99283 EMERGENCY DEPT VISIT LOW MDM: CPT | Mod: ,,, | Performed by: EMERGENCY MEDICINE

## 2017-03-28 PROCEDURE — 70498 CT ANGIOGRAPHY NECK: CPT | Mod: 26,,, | Performed by: RADIOLOGY

## 2017-03-28 PROCEDURE — 99999 PR PBB SHADOW E&M-EST. PATIENT-LVL II: CPT | Mod: PBBFAC,,, | Performed by: FAMILY MEDICINE

## 2017-03-28 PROCEDURE — 25000003 PHARM REV CODE 250: Performed by: EMERGENCY MEDICINE

## 2017-03-28 PROCEDURE — 99283 EMERGENCY DEPT VISIT LOW MDM: CPT

## 2017-03-28 PROCEDURE — 3080F DIAST BP >= 90 MM HG: CPT | Mod: S$GLB,,, | Performed by: FAMILY MEDICINE

## 2017-03-28 PROCEDURE — 4010F ACE/ARB THERAPY RXD/TAKEN: CPT | Mod: S$GLB,,, | Performed by: FAMILY MEDICINE

## 2017-03-28 PROCEDURE — 2022F DILAT RTA XM EVC RTNOPTHY: CPT | Mod: S$GLB,,, | Performed by: FAMILY MEDICINE

## 2017-03-28 RX ORDER — TRIAMTERENE AND HYDROCHLOROTHIAZIDE 37.5; 25 MG/1; MG/1
1 CAPSULE ORAL ONCE
Status: COMPLETED | OUTPATIENT
Start: 2017-03-28 | End: 2017-03-28

## 2017-03-28 RX ORDER — CITALOPRAM 10 MG/1
10 TABLET ORAL DAILY
Qty: 30 TABLET | Refills: 11 | Status: SHIPPED | OUTPATIENT
Start: 2017-03-28 | End: 2018-07-04

## 2017-03-28 RX ORDER — LISINOPRIL 20 MG/1
20 TABLET ORAL
Status: COMPLETED | OUTPATIENT
Start: 2017-03-28 | End: 2017-03-28

## 2017-03-28 RX ADMIN — TRIAMTERENE AND HYDROCHLOROTHIAZIDE 1 CAPSULE: 25; 37.5 CAPSULE ORAL at 01:03

## 2017-03-28 RX ADMIN — LISINOPRIL 20 MG: 20 TABLET ORAL at 01:03

## 2017-03-28 RX ADMIN — IOHEXOL 75 ML: 350 INJECTION, SOLUTION INTRAVENOUS at 09:03

## 2017-03-28 NOTE — PROGRESS NOTES
Patient transferred to ER per Dr Joyce s order c/o hypertension /110. Security called at 10:20am security arrived at 10:45am patient transferred safely to ER nurse

## 2017-03-28 NOTE — PROGRESS NOTES
"Subjective:       Patient ID: Lyssa Gandara is a 51 y.o. female.    Chief Complaint: No chief complaint on file.  Lyssa Gandara 51 y.o. female is here for office visit to review care and physical exam, appt made for depression.  Pt reports being overwhelmed after recent CVA.  Reports has been asked to return to work after recent CVA, doesn't thin is ready, still having residual symptoms and further w/u needed.  Has seen Neuro and Cardiology.  Had CTA today, as reason for being 45 minutes late today for appt.  Note didn't use BP meds today because "told not to take anything by mouth."        HPI  Review of Systems   Constitutional: Negative for activity change, fatigue, fever and unexpected weight change.   HENT: Negative for congestion, hearing loss, postnasal drip and rhinorrhea.    Eyes: Negative for redness and visual disturbance.   Respiratory: Negative for chest tightness, shortness of breath and wheezing.    Cardiovascular: Negative for chest pain, palpitations and leg swelling.   Gastrointestinal: Negative for abdominal distention.   Genitourinary: Negative for decreased urine volume, dysuria, flank pain, hematuria, pelvic pain and urgency.   Musculoskeletal: Negative for back pain, gait problem, joint swelling and neck stiffness.   Skin: Negative for color change, rash and wound.   Neurological: Positive for weakness. Negative for dizziness, syncope and headaches.   Psychiatric/Behavioral: Negative for behavioral problems, confusion and sleep disturbance. The patient is not nervous/anxious.        Objective:      Physical Exam   Constitutional: She is oriented to person, place, and time. She appears well-developed and well-nourished. No distress.   HENT:   Head: Normocephalic.   Mouth/Throat: No oropharyngeal exudate.   Eyes: EOM are normal. Pupils are equal, round, and reactive to light. No scleral icterus.   Neck: Neck supple. No JVD present. No thyromegaly present.   Cardiovascular: " Normal rate, regular rhythm and normal heart sounds.  Exam reveals no gallop and no friction rub.    No murmur heard.  Pulmonary/Chest: Effort normal and breath sounds normal. She has no wheezes. She has no rales.   Abdominal: Soft. Bowel sounds are normal. She exhibits no distension and no mass. There is no tenderness. There is no guarding.   Musculoskeletal: Normal range of motion. She exhibits no edema.   Lymphadenopathy:     She has no cervical adenopathy.   Neurological: She is alert and oriented to person, place, and time. She has normal reflexes. She displays normal reflexes. No cranial nerve deficit. She exhibits normal muscle tone.   Skin: Skin is warm. No rash noted. No erythema.   Psychiatric: She has a normal mood and affect. Thought content normal.       Assessment:       No diagnosis found.    Plan:       Diagnoses and all orders for this visit:    Essential hypertension  - Called report to ER, will be transferred by Nurse  Other pulmonary embolism without acute cor pulmonale, unspecified chronicity  - Chart reviewed, has CTA results per [facility] pending  Scleroderma  - chart reviewed, followed by Rheumatology  Type 2 diabetes mellitus without complication, without long-term current use of insulin  - last A1c 6.8, last LDL 60  Left arm weakness  - Chart reviewed, ER for BP control  Cerebral infarction due to thrombosis of right carotid artery 2/26/17  - Chart reviewed, ER for BP control  Reactive depression  - Discussed, recommend low dose SSRI after ER d/c

## 2017-03-28 NOTE — ED TRIAGE NOTES
"Pt sent to ED from Dr. Joyce's office for hypertension. Hx of hypertension, did not take medication this morning. Pt states blood pressure was in 200s at Dr. Joyce's office. C/o headache x few days, states "its stress. I'm stressed out, I have to go back to work on Monday and I'm not mentally prepared to do that yet." Denies chest pain. Denies shortness of breath. Hx of CVA 2/26, right upper extremity weakness.   "

## 2017-03-28 NOTE — ED PROVIDER NOTES
"Encounter Date: 3/28/2017       History     Chief Complaint   Patient presents with    Hypertension     sent from dr joyce's office, didn't take meds had ct scan, i have a headache     Review of patient's allergies indicates:   Allergen Reactions    Plasma, human, normal      HPI Comments: 50 y/o F never-smoker with h/o HTN, NIDDM on metformin (last a1c 6.8), CVA with residual LUE numbness and weakness, PE with danette filter and on pradaxa, asthma, scleroderma sent by PCP Dr. Joyce 2/2 elevated BP. Pt was having routine neck imaging performed today, per her ?vascular MD, for which she was instructed to not take her BP meds lisinopril 20 and triamterine-hctz today. Was sent to ED when BP was noted to be in 200s SBP. States she has been placed on depression med, for recent feelings of "aggravation" and depression r/t finances. Pt has no focal complaints besides a mild gradual onset L sided frontal HA x 3d without notable focal neuro changes. No CP, SOB, pre-syncope, n/v, dizziness, SI, intox, or other complaints. Has been taking her BP meds up until yesterday, and has enough refills left.      The history is provided by the patient and medical records.     Past Medical History:   Diagnosis Date    Asthma     Dyspnea on exertion     Hx of long term use of blood thinners     Hypertension 3/8/2016    PE (pulmonary embolism)     Hx of multiple DVT/PE    Presence of IVC filter     Scleroderma     Type 2 diabetes mellitus without complication      Past Surgical History:   Procedure Laterality Date    DANETTE FILTER PLACEMENT       Family History   Problem Relation Age of Onset    Breast cancer Mother     Hypertension Father     No Known Problems Brother     No Known Problems Maternal Grandmother     No Known Problems Maternal Grandfather     No Known Problems Paternal Grandmother     No Known Problems Paternal Grandfather     Diabetes Mellitus Maternal Uncle     No Known Problems Sister     No " Known Problems Maternal Aunt     No Known Problems Paternal Aunt     No Known Problems Paternal Uncle     Psoriasis Neg Hx     Rheum arthritis Neg Hx     Thyroid disease Neg Hx     Lupus Neg Hx     Anemia Neg Hx     Arrhythmia Neg Hx     Asthma Neg Hx     Clotting disorder Neg Hx     Fainting Neg Hx     Heart attack Neg Hx     Heart disease Neg Hx     Heart failure Neg Hx     Hyperlipidemia Neg Hx     Stroke Neg Hx     Atrial Septal Defect Neg Hx      Social History   Substance Use Topics    Smoking status: Never Smoker    Smokeless tobacco: Not on file    Alcohol use 1.8 oz/week     3 Cans of beer, 0 Standard drinks or equivalent per week      Comment: occ     Review of Systems   Constitutional: Negative for chills, diaphoresis, fatigue and fever.   HENT: Negative for congestion, rhinorrhea, sinus pressure and sore throat.    Eyes: Negative for visual disturbance.   Respiratory: Negative for cough and shortness of breath.    Cardiovascular: Negative for chest pain and palpitations.   Gastrointestinal: Negative for abdominal pain, blood in stool, constipation, diarrhea, nausea and vomiting.   Genitourinary: Negative for difficulty urinating, dysuria, flank pain, frequency, hematuria, pelvic pain, urgency, vaginal bleeding and vaginal discharge.   Musculoskeletal: Negative for back pain and neck pain.   Skin: Negative for rash.   Neurological: Positive for weakness, numbness and headaches. Negative for dizziness, tremors, seizures, syncope, facial asymmetry, speech difficulty and light-headedness.   Hematological: Does not bruise/bleed easily.   Psychiatric/Behavioral: Positive for dysphoric mood. Negative for behavioral problems, confusion, hallucinations and suicidal ideas.       Physical Exam   Initial Vitals   BP Pulse Resp Temp SpO2   03/28/17 1050 03/28/17 1050 03/28/17 1050 03/28/17 1050 03/28/17 1050   202/98 83 18 98.9 °F (37.2 °C) 98 %     Physical Exam    Nursing note and vitals  reviewed.  Constitutional: She appears well-developed and well-nourished. She is not diaphoretic. No distress.   V well appearing obese middle aged BF in NAD   HENT:   Head: Normocephalic and atraumatic.   Mouth/Throat: Oropharynx is clear and moist. No oropharyngeal exudate.   Eyes: EOM are normal. Pupils are equal, round, and reactive to light. No scleral icterus.   Neck: Normal range of motion. Neck supple. No tracheal deviation present. No JVD present.   Cardiovascular: Normal rate, regular rhythm and intact distal pulses. Exam reveals no gallop and no friction rub.    No murmur heard.  Pulmonary/Chest: Breath sounds normal. No stridor. She has no wheezes. She has no rhonchi. She has no rales.   Abdominal: Soft. There is no tenderness.   Musculoskeletal: She exhibits no edema or tenderness.   Neurological: She is alert and oriented to person, place, and time. She has normal strength. She displays no atrophy, no tremor and normal reflexes. A sensory deficit (SILT dec to LUE, pt reports similar to prior CVA) is present. No cranial nerve deficit. She exhibits normal muscle tone. She displays a negative Romberg sign. She displays no seizure activity. Coordination and gait (narrow based steady) normal. GCS eye subscore is 4. GCS verbal subscore is 5. GCS motor subscore is 6.   Reflex Scores:       Patellar reflexes are 2+ on the right side and 2+ on the left side.  Neg dysmetria, ddk, pronator drift   Skin: Skin is warm and dry. No rash noted.   Psychiatric: She has a normal mood and affect. Thought content normal.         ED Course   Procedures  Labs Reviewed - No data to display                APC / Resident Notes:   HO-3 MDM: Pt sent from PCP clinic today for elev BP likely 2/2 not taking her meds this AM (states she was instructed not to, for routine imaging this AM). Only c/o a mild L sided gradual onset HA which has been intermittent and not worsening, without assoc focal neuro complaints. I-stat Cr there was  1.0, and pt has no CP/SOB, focal complaints, or neuro deficits besides residual numbness LUE. We do not see clear e/o HTN emergency, thus no clear reason for emergent w/u here for her BP- which began 200s SBP, dec to 150s/100s without any intervention. Will give her a dose of both her BP meds here, instruct to follow low-salt diet, f/u on psychiatric care to help reduce stress, and to f/u with PCP and other MDs for re-eval. Strict return precautions given. Case and plan d/w staff Dr. Mi.  Honorio Ricci MD  3/28/17 12:58 PM           Attending Attestation:             Attending ED Notes:   Patient was sent to the emergency because of an elevated blood pressure; she had her last pill yesterday patient was evaluated in the ED was not felt that there was a need for blood work or imaging; patient was treated with medication and with improvement patient was subsequently discharged to follow-up as needed          ED Course     Clinical Impression:   The primary encounter diagnosis was Elevated blood pressure reading. A diagnosis of Essential hypertension was also pertinent to this visit.    Disposition:   Disposition: Discharged  Condition: Stable       Honorio Ricci MD  Resident  03/28/17 1302       Alexis Cordero MD  04/02/17 [id number]

## 2017-03-28 NOTE — MR AVS SNAPSHOT
[facility]  [address]  [facility] [address]  Phone: [phone]  Fax: [phone]                  Lyssa Gandara   3/28/2017 8:40 AM   Office Visit    Description:  Female : 1965   Provider:  Gerard Joyce MD   Department:  [facility]           Diagnoses this Visit        Comments    Essential hypertension    -  Primary     Other pulmonary embolism without acute cor pulmonale, unspecified chronicity         Scleroderma         Type 2 diabetes mellitus without complication, without long-term current use of insulin         Left arm weakness         Cerebral infarction due to thrombosis of right carotid artery         Reactive depression                To Do List           Future Appointments        Provider Department Dept Phone    3/29/2017 8:15 AM Alis Fuller OT [facility] [phone]    4/3/2017 3:00 PM SHAAN CHEMO [facility] [phone]    2017 10:40 AM Gerard Joyce MD [facility] [place] [phone]      Goals (5 Years of Data)              16    COMPLETED: Patient/caregiver will have an action plan in place to manage and prevent complications of DM, Asthma, Long-term anti-coagulant, HTN therapy prior to discharge from [facility].   No change  On track    Notes - Note edited  2016  2:36 PM by Angelica Lombardo RN    Overall Time to Completion  2 months from 10/24/2016    Short Term Goals  Patient/caregiver will discuss health care needs with Physician and care team during visits or using Patient Portal 2 weeks  Interventions   · Collaborate with Physician as appropriate to meet patient needs.  · Empower patient/caregiver to discuss treatment plan with Physician/care team.  · Facilitate to Medical appointments.  · Provide contact information for [facility] contact information.  · Provide contact information for Outpatient Case Management contact  information.  · Assess for availability of working blood pressure cuff in home setting.  · Assess for availability of working glucometer in home setting.   Status  · Partially met    Patient/caregiver will verbalize 4 signs and symptoms of Hypertension Diabetes Asthma Deep Vein Thrombosis within 1 month.   Interventions   · Refer to Clinical Pharmacist.  · Refer to Dietician.  · Facilitate to referral to Diabetic educator.  · Facilitate to referral to Endocrinologist.  · Encourage Dietary Compliance.  · Encourage compliance with annual vaccinations.  · Encourage compliance with preventive screenings.  · Mail Blood glucose logs for home use.  · Mail Blood pressure logs for home use.   Status  · Partially met    Patient/caregiver will verbalize 3 ways of preventing complications due to disease process within 1 month.  Interventions   · Empower patient/caregiver to discuss treatment plan with Physician/care team.  · Encourage Dietary Compliance.  · Encourage Medication Compliance.   Status  · Partially met       Clinical Reference Documents Added to Patient Instructions       Document    ASTHMA, CONTROLLING YOUR TRIGGERS: ALLERGENS (ENGLISH)    ASTHMA, UNDERSTANDING (ENGLISH)    BLOOD SUGAR, LOW; HYPOGLYCEMIA (ENGLISH)    DIABETES, HEALTHY MEALS FOR (ENGLISH)    DIABETES, LONG-TERM COMPLICATIONS (ENGLISH)    DIABETES, MEAL PLANNING (ENGLISH)    DIABETES, SERVING AND PORTION SIZES, LEARNING ABOUT (ENGLISH)    DIABETES: EXAMS AND TESTS (ENGLISH)    DIET, LOW-SALT (ENGLISH)    EATING HEART-HEALTHY FOODS (ENGLISH)    HIGH BLOOD PRESSURE, WHAT IS?  (ENGLISH)    HYPERGLYCEMIA (HIGH BLOOD SUGAR) (ENGLISH)    HYPERTENSION, ESTABLISHED (ENGLISH)    HYPOTENSION, ALL CAUSES (ENGLISH)           Clinical Reference Documents Added to Patient Instructions       Document    BLOOD SUGAR LOG, USING A (ENGLISH)    DIABETES, LONG-TERM COMPLICATIONS (ENGLISH)    DIABETES, MANAGING: THE A1C TEST (ENGLISH)    DIABETES, MEAL PLANNING  "(ENGLISH)              Follow-Up and Disposition     Return in about 1 week (around 4/4/2017).      [facility]     [facility] Nurse Care Line - 24/7 Assistance  Registered nurses in the [facility] provide clinical advisement, health education, appointment booking, and other advisory services.  Call for this free service at [phone].             Medications           Message regarding Medications     Verify the changes and/or additions to your medication regime listed below are the same as discussed with your clinician today.  If any of these changes or additions are incorrect, please notify your healthcare provider.             Verify that the below list of medications is an accurate representation of the medications you are currently taking.  If none reported, the list may be blank. If incorrect, please contact your healthcare provider. Carry this list with you in case of emergency.           Current Medications     albuterol (PROVENTIL) 2.5 mg /3 mL (0.083 %) nebulizer solution Take 3 mLs (2.5 mg total) by nebulization every 6 (six) hours as needed for Wheezing.    aspirin (ECOTRIN) 81 MG EC tablet Take 1 tablet (81 mg total) by mouth once daily.    cyanocobalamin (VITAMIN B-12) 1000 MCG tablet Take 100 mcg by mouth once daily.    dabigatran etexilate (PRADAXA) 150 mg Cap Take 1 capsule (150 mg total) by mouth 2 (two) times daily. "Do NOT break, chew, or open capsules."    ferrous sulfate 325 (65 FE) MG EC tablet Take 1 tablet (325 mg total) by mouth once daily.    fluticasone-salmeterol 250-50 mcg/dose (ADVAIR) 250-50 mcg/dose diskus inhaler Inhale 1 puff into the lungs 2 (two) times daily.    lisinopril (PRINIVIL,ZESTRIL) 20 MG tablet Take 1 tablet (20 mg total) by mouth once daily.    metformin (GLUCOPHAGE) 500 MG tablet Take 1 tablet (500 mg total) by mouth 2 (two) times daily with meals.    triamterene-hydrochlorothiazide 37.5-25 mg (MAXZIDE-25) 37.5-25 mg per tablet Take 1 tablet " by mouth once daily.           Clinical Reference Information           Your Vitals Were     BP Pulse Last Period             200/120 88 (LMP Unknown)         Blood Pressure          Most Recent Value    BP  (!)  200/120      Allergies as of 3/28/2017     Plasma, Human, Normal      Immunizations Administered on Date of Encounter - 3/28/2017     None      Language Assistance Services     ATTENTION: Language assistance services are available, free of charge. Please call [phone].      ATENCIÓN: Si habla bryanañol, tiene a babb disposición servicios gratuitos de asistencia lingüística. Llame al [phone].     [facility] Ý: N?u b?n nói Ti?ng Vi?t, có các d?ch v? h? tr? ngôn ng? mi?n phí dành cho b?n. G?i s? [phone].         [facility] complies with applicable Federal civil rights laws and does not discriminate on the basis of race, color, national origin, age, disability, or sex.

## 2017-03-28 NOTE — DISCHARGE INSTRUCTIONS
"Future Appointments  Date Time Provider Department Center   3/29/2017 8:15 AM Alis Fuller, [facility]   4/3/2017 3:00 PM [facility], CHEMO [facility]   4/7/2017 10:40 AM Gerard Joyce MD [facility]           Discharge Instructions for High Blood Pressure (Hypertension)  You have been diagnosed with high blood pressure (also called hypertension). This means the force of blood against your artery walls is too strong. It also means your heart is working hard to move blood. High blood pressure usually has no symptoms, but over time, it can damage your heart, blood vessels, eyes, kidneys, and other organs. With help from your doctor, you can manage your blood pressure and protect your health.  Taking medicine  · Learn to take your own blood pressure. Keep a record of your results. Ask your doctor which readings mean that you need medical attention.  · Take your blood pressure medicine exactly as directed. Dont skip doses. Missing doses can cause your blood pressure to get out of control.  · If you do miss a dose (or doses) check with your healthcare provider about what to do.  · Avoid medicine that contain heart stimulants, including over-the-counter drugs. Check for warnings about high blood pressure on the label. Ask the pharmacist before purchasing something you haven't used before  · Check with your doctor or pharmacist before taking a decongestant. Some decongestants can worsen high blood pressure.  Lifestyle changes  · Maintain a healthy weight. Get help to lose any extra pounds.  · Cut back on salt.  ¨ Limit canned, dried, packaged, and fast foods.  ¨ Dont add salt to your food at the table.  ¨ Season foods with herbs instead of salt when you cook.  ¨ Request no added salt when you go to a restaurant.  ¨ The American Heart Associations (AHA) "ideal" sodium intake recommendation is 1,500 milligrams per day.  However, since American's eat so much salt, the AHA says a positive " change can occur by cutting back to even 2,400 milligrams of sodium a day.   · Follow the DASH (Dietary Approaches to Stop Hypertension) eating plan. This plan recommends vegetables, fruits, whole gains, and other heart healthy foods.  · Begin an exercise program. Ask your doctor how to get started. The American Heart Association recommends aerobic exercise 3 to 4 times a week for an average of 40 minutes at a time, with your doctor's approval. Simple activities like walking or gardening can help.  · Break the smoking habit. Enroll in a stop-smoking program to improve your chances of success. Ask your healthcare provider about programs and medicines to help you stop smoking.  · Limit drinks that contain caffeine (coffee, black or green tea, cola) to 2 per day.  · Never take stimulants such as amphetamines or cocaine; these drugs can be deadly for someone with high blood pressure.  · Control your stress. Learn stress-management techniques.  · Limit alcohol to no more than 1 drink a day for women and 2 drinks a day for men.  Follow-up care  Make a follow-up appointment as directed by our staff.     When to seek medical care  Call your doctor immediately or seek emergency care if you have any of the following:  · Chest pain or shortness of breath (call 911)  · Moderate to severe headache  · Weakness in the muscles of your face, arms, or legs  · Trouble speaking  · Extreme drowsiness  · Confusion  · Fainting or dizziness  · Pulsating or rushing sound in your ears  · Unexplained nosebleed  · Weakness, tingling, or numbness of your face, arms, or legs  · Change in vision  · Blood pressure measured at home that is greater than 180/110   Date Last Reviewed: 4/27/2016  © 4200-7394 [employer]. [address] [place] [address]. All rights reserved. This information is not intended as a substitute for professional medical care. Always follow your healthcare professional's instructions.

## 2017-03-28 NOTE — ED AVS SNAPSHOT
[facility]  [address] [facility] [address]               Lyssa Gandara   3/28/2017 11:52 AM   ED    Description:  Female : 1965   Department:  [facility]           Your Care was Coordinated By:     Provider Role From To    Alexis Cordero MD Attending Provider 17 1216 --    Honorio Ricci MD Resident 17 1159 --      Reason for Visit     Hypertension           Diagnoses this Visit        Comments    Elevated blood pressure reading    -  Primary     Essential hypertension           ED Disposition     None           To Do List           Follow-up Information     Go to [facility].    Specialty:  Emergency Medicine    Why:  If symptoms worsen    Contact information:    [address] [facility] [id number] [phone]        Schedule an appointment as soon as possible for a visit with Gerard Joyce MD.    Specialty:  Family Medicine    Contact information:    [address]  [place] [address]  [phone]        [facility]     [facility] Care [facility] -  Assistance  Registered nurses in the [facility] provide clinical advisement, health education, appointment booking, and other advisory services.  Call for this free service at [phone].             Medications           Message regarding Medications     Verify the changes and/or additions to your medication regime listed below are the same as discussed with your clinician today.  If any of these changes or additions are incorrect, please notify your healthcare provider.        These medications were administered today        Dose Freq    lisinopril tablet 20 mg 20 mg ED 1 Time    Sig: Take 1 tablet (20 mg total) by mouth ED 1 Time.    Class: Normal    Route: Oral    triamterene-hydrochlorothiazide 37.5-25 mg per capsule 1 capsule 1 capsule Once    Sig: Take 1 capsule by mouth once.     "Class: Normal    Route: Oral           Verify that the below list of medications is an accurate representation of the medications you are currently taking.  If none reported, the list may be blank. If incorrect, please contact your healthcare provider. Carry this list with you in case of emergency.           Current Medications     albuterol (PROVENTIL) 2.5 mg /3 mL (0.083 %) nebulizer solution Take 3 mLs (2.5 mg total) by nebulization every 6 (six) hours as needed for Wheezing.    aspirin (ECOTRIN) 81 MG EC tablet Take 1 tablet (81 mg total) by mouth once daily.    cyanocobalamin (VITAMIN B-12) 1000 MCG tablet Take 100 mcg by mouth once daily.    dabigatran etexilate (PRADAXA) 150 mg Cap Take 1 capsule (150 mg total) by mouth 2 (two) times daily. "Do NOT break, chew, or open capsules."    fluticasone-salmeterol 250-50 mcg/dose (ADVAIR) 250-50 mcg/dose diskus inhaler Inhale 1 puff into the lungs 2 (two) times daily.    lisinopril (PRINIVIL,ZESTRIL) 20 MG tablet Take 1 tablet (20 mg total) by mouth once daily.    metformin (GLUCOPHAGE) 500 MG tablet Take 1 tablet (500 mg total) by mouth 2 (two) times daily with meals.    triamterene-hydrochlorothiazide 37.5-25 mg (MAXZIDE-25) 37.5-25 mg per tablet Take 1 tablet by mouth once daily.    ferrous sulfate 325 (65 FE) MG EC tablet Take 1 tablet (325 mg total) by mouth once daily.    lisinopril tablet 20 mg Take 1 tablet (20 mg total) by mouth ED 1 Time.    triamterene-hydrochlorothiazide 37.5-25 mg per capsule 1 capsule Take 1 capsule by mouth once.           Clinical Reference Information           Your Vitals Were     BP Pulse Temp Resp Height Weight    159/106 82 98.9 °F (37.2 °C) (Oral) 11 5' 4" (1.626 m) 75.8 kg (167 lb)    Last Period SpO2 BMI          (LMP Unknown) 98% 28.67 kg/m2        Allergies as of 3/28/2017        Reactions    Plasma, Human, Normal       Immunizations Administered on Date of Encounter - 3/28/2017     None      ED Micro, Lab, POCT     None    " "  ED Imaging Orders     None        Discharge Instructions       Future Appointments  Date Time Provider Department Center   3/29/2017 8:15 AM Alis Fuller, OT [facility]   4/3/2017 3:00 PM [facility], CHEMO [facility]   4/7/2017 10:40 AM Geradr Joyce MD [facility]           Discharge Instructions for High Blood Pressure (Hypertension)  You have been diagnosed with high blood pressure (also called hypertension). This means the force of blood against your artery walls is too strong. It also means your heart is working hard to move blood. High blood pressure usually has no symptoms, but over time, it can damage your heart, blood vessels, eyes, kidneys, and other organs. With help from your doctor, you can manage your blood pressure and protect your health.  Taking medicine  · Learn to take your own blood pressure. Keep a record of your results. Ask your doctor which readings mean that you need medical attention.  · Take your blood pressure medicine exactly as directed. Dont skip doses. Missing doses can cause your blood pressure to get out of control.  · If you do miss a dose (or doses) check with your healthcare provider about what to do.  · Avoid medicine that contain heart stimulants, including over-the-counter drugs. Check for warnings about high blood pressure on the label. Ask the pharmacist before purchasing something you haven't used before  · Check with your doctor or pharmacist before taking a decongestant. Some decongestants can worsen high blood pressure.  Lifestyle changes  · Maintain a healthy weight. Get help to lose any extra pounds.  · Cut back on salt.  ¨ Limit canned, dried, packaged, and fast foods.  ¨ Dont add salt to your food at the table.  ¨ Season foods with herbs instead of salt when you cook.  ¨ Request no added salt when you go to a restaurant.  ¨ The American Heart Associations (AHA) "ideal" sodium intake recommendation is 1,500 milligrams per day. "  However, since American's eat so much salt, the AHA says a positive change can occur by cutting back to even 2,400 milligrams of sodium a day.   · Follow the DASH (Dietary Approaches to Stop Hypertension) eating plan. This plan recommends vegetables, fruits, whole gains, and other heart healthy foods.  · Begin an exercise program. Ask your doctor how to get started. The American Heart Association recommends aerobic exercise 3 to 4 times a week for an average of 40 minutes at a time, with your doctor's approval. Simple activities like walking or gardening can help.  · Break the smoking habit. Enroll in a stop-smoking program to improve your chances of success. Ask your healthcare provider about programs and medicines to help you stop smoking.  · Limit drinks that contain caffeine (coffee, black or green tea, cola) to 2 per day.  · Never take stimulants such as amphetamines or cocaine; these drugs can be deadly for someone with high blood pressure.  · Control your stress. Learn stress-management techniques.  · Limit alcohol to no more than 1 drink a day for women and 2 drinks a day for men.  Follow-up care  Make a follow-up appointment as directed by our staff.     When to seek medical care  Call your doctor immediately or seek emergency care if you have any of the following:  · Chest pain or shortness of breath (call 911)  · Moderate to severe headache  · Weakness in the muscles of your face, arms, or legs  · Trouble speaking  · Extreme drowsiness  · Confusion  · Fainting or dizziness  · Pulsating or rushing sound in your ears  · Unexplained nosebleed  · Weakness, tingling, or numbness of your face, arms, or legs  · Change in vision  · Blood pressure measured at home that is greater than 180/110   Date Last Reviewed: 4/27/2016  © 2490-6987 [employer] [address] [place] [address], [place] [address]. All rights reserved. This information is not intended as a substitute for professional medical care. Always  follow your healthcare professional's instructions.          Your Scheduled Appointments     Mar 29, 2017  8:15 AM CDT   Established Occupational Therapy with Alis Fuller OT   [facility]    [address]  [place] [address]   [phone]            Apr 03, 2017  3:00 PM CDT   Infusion 120 Min with [facility], CHEMO   [facility] ([facility])    [address] [facility] [address] [phone]            Apr 07, 2017 10:40 AM CDT   Established Patient Visit with Gerard Joyce MD   [facility] ([facility])    [address]  [place] [address] [phone]               [facility] complies with applicable Federal civil rights laws and does not discriminate on the basis of race, color, national origin, age, disability, or sex.        Language Assistance Services     ATTENTION: Language assistance services are available, free of charge. Please call [phone].      ATENCIÓN: Si habla español, tiene a babb disposición servicios gratuitos de asistencia lingüística. Llame al [phone].     CHÚ Ý: N?u b?n nói Ti?ng Vi?t, có các d?ch v? h? tr? ngôn ng? mi?n phí dành cho b?n. G?i s? [phone].

## 2017-03-29 ENCOUNTER — CLINICAL SUPPORT (OUTPATIENT)
Dept: REHABILITATION | Facility: HOSPITAL | Age: 52
End: 2017-03-29
Attending: PSYCHIATRY & NEUROLOGY
Payer: COMMERCIAL

## 2017-03-29 ENCOUNTER — TELEPHONE (OUTPATIENT)
Dept: CARDIOLOGY | Facility: CLINIC | Age: 52
End: 2017-03-29

## 2017-03-29 DIAGNOSIS — R27.8 LOSS OF COORDINATION: ICD-10-CM

## 2017-03-29 DIAGNOSIS — R29.898 LEFT ARM WEAKNESS: ICD-10-CM

## 2017-03-29 DIAGNOSIS — Z74.09 IMPAIRED MOBILITY AND ADLS: ICD-10-CM

## 2017-03-29 DIAGNOSIS — Z78.9 IMPAIRED MOBILITY AND ADLS: ICD-10-CM

## 2017-03-29 PROCEDURE — 97110 THERAPEUTIC EXERCISES: CPT | Mod: PO

## 2017-03-29 PROCEDURE — 97022 WHIRLPOOL THERAPY: CPT | Mod: PO

## 2017-03-29 NOTE — PROGRESS NOTES
Patient:  Lyssa Gandara  Clinic #:  [id number]   Date of Note: 03/29/2017   Referring Physician:  Dillan Fritz MD  Diagnosis: CVA due to thrombosis of R carotid artery   OT diagnosis: impaired fine and gross motor coordination, impaired sensation     Start Time: 0815  End Time: 0900  Total Time: 45 min  Group Time: 0  Visit # 4       Subjective: Pt reports she had and appointment with her PCP yesterday and ended up at the ER because her pressure was so high. Pt reports she is feeling well today. She plans to return to work the 2nd week of April now to allow more time for recovery.  She states her speech is what she is most concerned about for return to work.           Pain: 0 out of 10     Objective:   Patient seen by OT this session. Treatment  consist of the following:  FOT, Therapeutic exercises   L hand FOT x 15 minutes, 80 air speed, for sensory stimulation; pt manipulating various textured and weighted objects in FOT.   L hand green digi-flex composite and individual digit settings x 2 min each to improve  and digit strength.  L hand blue clothespin lateral pinch, 2-point pinch, and 3-point pinch x 2 min each to maximize functional pinch and digit strength.  Pt completed multiple gross motor ax with small weighted ball to maximize L hand coordination, speed, and control.      Assessment:  Impaired L hand sensation appears to be the most limiting deficit but it is gradually improving.  L hand strength and gross motor skills are improving.  Pt is complaint with HEP and is highly motivated to return to OF.  Pt will continue to benefit from skilled OT intervention.  Patient continues to demonstrate limitations with ability to work and independent self care due to the following problems/impairements:     PROBLEM LIST/IMPAIRMENTS:   Impaired fine motor coordination, impaired gross motor coordination, impaired sensation, decreased work ability and self care      LTG GOALS: Time frame: 8 weeks   1. Pt  will return to work  2. Pt will demonstrate increased sensation and awareness of objects in hand   3. Pt will demonstrate improved tracking to the L and compensatory strategies for visual deficits   4. 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 Pt IND with HEP   6. CMS - hand function survey CJ for self care  STG Goals:  Time frame: 4 weeks   1. 9 hole peg test < 1 min on L hand   2. L  strength increased by 10#   3. Pt will verbalize/demonstatre improved clothing fastener and laces management         PLAN:   Patient/caregiver understands and agrees with plan of care.     Outpatient occupational therapy 2  times weekly for 8 weeks to include: pt ed, hep, therapeutic exercises, therapeutic activity, ADLs, neuromuscular re-education, joint mobilizations, modalities prn, 3/8/17-5/5/17    CAL Garcia  I certify that I was present in the room directing the student in service delivery and guiding them using my skilled judgment. As the co-signing therapist I have reviewed the students documentation and am responsible for the treatment, assessment, and plan.

## 2017-03-29 NOTE — TELEPHONE ENCOUNTER
----- Message from Davey Morris MD sent at 3/29/2017  8:21 AM CDT -----  The CTA shows evidence of FMD (fibromuscular dysplasia)  Continue the same medication  I will discus your case with the Vascular surgery doctors and update with the plan.

## 2017-03-29 NOTE — TELEPHONE ENCOUNTER
Spoke with patient in regards to her CTA results.    Informed patient to continue same medications and Dr. saldivar discuss your case with the Vascular surgery doctors and update patient with  the plan.     Patient verbalized understanding and would like to know more information about FMD (fibromuscular dysplasia) .    Please Advise.

## 2017-03-30 LAB
MPNR  FINAL DIAGNOSIS: NORMAL
MPNR  SPECIMEN TYPE: NORMAL

## 2017-03-31 ENCOUNTER — TELEPHONE (OUTPATIENT)
Dept: CARDIOLOGY | Facility: CLINIC | Age: 52
End: 2017-03-31

## 2017-03-31 DIAGNOSIS — I77.3 FIBROMUSCULAR DYSPLASIA: Primary | ICD-10-CM

## 2017-03-31 PROCEDURE — 99496 TRANSJ CARE MGMT HIGH F2F 7D: CPT | Mod: S$GLB,,, | Performed by: FAMILY MEDICINE

## 2017-03-31 NOTE — TELEPHONE ENCOUNTER
Spoke with patient she has been scheduled to see vascular surgery with Dr Mechelle Flores 4/5 at 9:15 am.    Patient verbalized understanding.

## 2017-04-03 ENCOUNTER — INFUSION (OUTPATIENT)
Dept: INFUSION THERAPY | Facility: HOSPITAL | Age: 52
End: 2017-04-03
Attending: INTERNAL MEDICINE
Payer: COMMERCIAL

## 2017-04-03 VITALS
HEART RATE: 93 BPM | RESPIRATION RATE: 16 BRPM | TEMPERATURE: 98 F | DIASTOLIC BLOOD PRESSURE: 92 MMHG | SYSTOLIC BLOOD PRESSURE: 147 MMHG

## 2017-04-03 DIAGNOSIS — D50.0 IRON DEFICIENCY ANEMIA DUE TO CHRONIC BLOOD LOSS: Primary | ICD-10-CM

## 2017-04-03 PROCEDURE — 25000003 PHARM REV CODE 250: Performed by: INTERNAL MEDICINE

## 2017-04-03 PROCEDURE — 63600175 PHARM REV CODE 636 W HCPCS: Performed by: INTERNAL MEDICINE

## 2017-04-03 PROCEDURE — 96365 THER/PROPH/DIAG IV INF INIT: CPT

## 2017-04-03 PROCEDURE — 96367 TX/PROPH/DG ADDL SEQ IV INF: CPT

## 2017-04-03 RX ORDER — SODIUM CHLORIDE 0.9 % (FLUSH) 0.9 %
10 SYRINGE (ML) INJECTION
Status: DISCONTINUED | OUTPATIENT
Start: 2017-04-03 | End: 2017-04-03 | Stop reason: HOSPADM

## 2017-04-03 RX ADMIN — SODIUM CHLORIDE, PRESERVATIVE FREE 10 ML: 5 INJECTION INTRAVENOUS at 04:04

## 2017-04-03 RX ADMIN — FERRIC CARBOXYMALTOSE INJECTION 750 MG: 50 INJECTION, SOLUTION INTRAVENOUS at 03:04

## 2017-04-03 RX ADMIN — SODIUM CHLORIDE: 9 INJECTION, SOLUTION INTRAVENOUS at 03:04

## 2017-04-03 NOTE — PLAN OF CARE
Problem: Patient Care Overview  Goal: Discharge Needs Assessment  Outcome: Ongoing (interventions implemented as appropriate)  1647-Patient tolerated treatment well. Discharged without complaints or S/S of adverse event. AVS given.  Instructed to call provider for any questions or concerns.

## 2017-04-03 NOTE — PLAN OF CARE
Problem: Patient Care Overview  Goal: Individualization & Mutuality  Outcome: Ongoing (interventions implemented as appropriate)  1530-Labs , hx, and medications reviewed. Assessment completed. Discussed plan of care with patient. Patient in agreement. Chair reclined and warm blanket and snack offered.

## 2017-04-04 ENCOUNTER — TELEPHONE (OUTPATIENT)
Dept: INTERNAL MEDICINE | Facility: CLINIC | Age: 52
End: 2017-04-04

## 2017-04-04 DIAGNOSIS — I77.3 FIBROMUSCULAR DYSPLASIA OF CERVICOCRANIAL ARTERY: Primary | ICD-10-CM

## 2017-04-04 NOTE — TELEPHONE ENCOUNTER
Patient sent in her [facility] papers on today.  I'm having them brought over to Medical Records on today.  CAS Joyce.

## 2017-04-05 ENCOUNTER — INITIAL CONSULT (OUTPATIENT)
Dept: VASCULAR SURGERY | Facility: CLINIC | Age: 52
End: 2017-04-05
Payer: COMMERCIAL

## 2017-04-05 ENCOUNTER — HOSPITAL ENCOUNTER (OUTPATIENT)
Dept: VASCULAR SURGERY | Facility: CLINIC | Age: 52
Discharge: HOME | End: 2017-04-05
Attending: SURGERY
Payer: COMMERCIAL

## 2017-04-05 VITALS
WEIGHT: 169 LBS | TEMPERATURE: 97 F | DIASTOLIC BLOOD PRESSURE: 94 MMHG | HEART RATE: 85 BPM | HEIGHT: 64 IN | SYSTOLIC BLOOD PRESSURE: 158 MMHG | BODY MASS INDEX: 28.85 KG/M2

## 2017-04-05 DIAGNOSIS — I10 ESSENTIAL HYPERTENSION: ICD-10-CM

## 2017-04-05 DIAGNOSIS — I65.29 CAROTID STENOSIS: ICD-10-CM

## 2017-04-05 DIAGNOSIS — I77.3 FIBROMUSCULAR DYSPLASIA OF CERVICOCRANIAL ARTERY: ICD-10-CM

## 2017-04-05 DIAGNOSIS — Z79.01 LONG-TERM (CURRENT) USE OF ANTICOAGULANTS: ICD-10-CM

## 2017-04-05 DIAGNOSIS — E11.9 TYPE 2 DIABETES MELLITUS WITHOUT COMPLICATION, WITHOUT LONG-TERM CURRENT USE OF INSULIN: ICD-10-CM

## 2017-04-05 DIAGNOSIS — I63.031 CEREBRAL INFARCTION DUE TO THROMBOSIS OF RIGHT CAROTID ARTERY: ICD-10-CM

## 2017-04-05 DIAGNOSIS — I77.3 FIBROMUSCULAR DYSPLASIA OF CERVICOCRANIAL ARTERY: Primary | ICD-10-CM

## 2017-04-05 PROCEDURE — 99245 OFF/OP CONSLTJ NEW/EST HI 55: CPT | Mod: S$GLB,,, | Performed by: SURGERY

## 2017-04-05 PROCEDURE — 93880 EXTRACRANIAL BILAT STUDY: CPT | Mod: S$GLB,,, | Performed by: SURGERY

## 2017-04-05 PROCEDURE — 99999 PR PBB SHADOW E&M-EST. PATIENT-LVL III: CPT | Mod: PBBFAC,,, | Performed by: SURGERY

## 2017-04-05 RX ORDER — CLOPIDOGREL BISULFATE 75 MG/1
75 TABLET ORAL DAILY
Qty: 90 TABLET | Refills: 6 | Status: SHIPPED | OUTPATIENT
Start: 2017-04-05 | End: 2017-04-13 | Stop reason: SDUPTHER

## 2017-04-05 RX ORDER — SIMVASTATIN 20 MG/1
20 TABLET, FILM COATED ORAL NIGHTLY
Qty: 90 TABLET | Refills: 3 | Status: SHIPPED | OUTPATIENT
Start: 2017-04-05 | End: 2017-05-19 | Stop reason: SDUPTHER

## 2017-04-05 NOTE — PROGRESS NOTES
Lyssa Gandara  04/05/2017  Referred by: Dr. Mariscal  HPI:  Patient is a 51 y.o. female HTN, DM, history of multiple DVTs/PEs with Morven filter placement in 1994, who recently had a stroke (right MCA involving majority of right parietal lobe) on 2/26/17 who is here today for evaluation of Fibromuscular Dysplasia with right sided carotid stenosis. Pt. Is taking ASA and Pradaxa.  She was taking Xarelto at the time of the stroke but no anti-platelet  Pt. Had coagulopathy workup per Dr. Mariscal which was negative.     No MI  + stroke: 2/26/17 d/t thrombosis of right ICA; residual left hand weakness  Tobacco use: Never  DVTs/PEs: multiple 1991, 1992, 1993, 1994 with subsequent Danette filter placement.  Hypercoagulable w/u reportedly negative but was told it may have been OCPs (?)    Asked to see by Dr Davey Morris  PMD id Dr KATHY Joyce    Past Medical History:   Diagnosis Date    Asthma     Dyspnea on exertion     Hx of long term use of blood thinners     Hypertension 3/8/2016    PE (pulmonary embolism)     Hx of multiple DVT/PE    Presence of IVC filter     Scleroderma     Type 2 diabetes mellitus without complication      Past Surgical History:   Procedure Laterality Date    DANETTE FILTER PLACEMENT       Family History   Problem Relation Age of Onset    Breast cancer Mother     Hypertension Father     No Known Problems Brother     No Known Problems Maternal Grandmother     No Known Problems Maternal Grandfather     No Known Problems Paternal Grandmother     No Known Problems Paternal Grandfather     Diabetes Mellitus Maternal Uncle     No Known Problems Sister     No Known Problems Maternal Aunt     No Known Problems Paternal Aunt     No Known Problems Paternal Uncle     Psoriasis Neg Hx     Rheum arthritis Neg Hx     Thyroid disease Neg Hx     Lupus Neg Hx     Anemia Neg Hx     Arrhythmia Neg Hx     Asthma Neg Hx     Clotting disorder Neg Hx     Fainting Neg Hx      "Heart attack Neg Hx     Heart disease Neg Hx     Heart failure Neg Hx     Hyperlipidemia Neg Hx     Stroke Neg Hx     Atrial Septal Defect Neg Hx      Social History     Social History    Marital status: Single     Spouse name: N/A    Number of children: N/A    Years of education: N/A     Occupational History    Not on file.     Social History Main Topics    Smoking status: Never Smoker    Smokeless tobacco: Not on file    Alcohol use 1.8 oz/week     3 Cans of beer, 0 Standard drinks or equivalent per week      Comment: occ    Drug use: No    Sexual activity: Not on file     Other Topics Concern    Not on file     Social History Narrative     Current Outpatient Prescriptions on File Prior to Visit   Medication Sig    albuterol (PROVENTIL) 2.5 mg /3 mL (0.083 %) nebulizer solution Take 3 mLs (2.5 mg total) by nebulization every 6 (six) hours as needed for Wheezing.    aspirin (ECOTRIN) 81 MG EC tablet Take 1 tablet (81 mg total) by mouth once daily.    citalopram (CELEXA) 10 MG tablet Take 1 tablet (10 mg total) by mouth once daily.    cyanocobalamin (VITAMIN B-12) 1000 MCG tablet Take 100 mcg by mouth once daily.    dabigatran etexilate (PRADAXA) 150 mg Cap Take 1 capsule (150 mg total) by mouth 2 (two) times daily. "Do NOT break, chew, or open capsules."    ferrous sulfate 325 (65 FE) MG EC tablet Take 1 tablet (325 mg total) by mouth once daily.    fluticasone-salmeterol 250-50 mcg/dose (ADVAIR) 250-50 mcg/dose diskus inhaler Inhale 1 puff into the lungs 2 (two) times daily.    lisinopril (PRINIVIL,ZESTRIL) 20 MG tablet Take 1 tablet (20 mg total) by mouth once daily.    metformin (GLUCOPHAGE) 500 MG tablet Take 1 tablet (500 mg total) by mouth 2 (two) times daily with meals.    triamterene-hydrochlorothiazide 37.5-25 mg (MAXZIDE-25) 37.5-25 mg per tablet Take 1 tablet by mouth once daily.     No current facility-administered medications on file prior to visit.        REVIEW OF " SYSTEMS:  General: negative; ENT: negative; Allergy and Immunology: negative; Hematological and Lymphatic: Negative; Endocrine: negative; Respiratory: no cough, shortness of breath, or wheezing; Cardiovascular: no chest pain or dyspnea on exertion; Gastrointestinal: no abdominal pain/back, change in bowel habits, or bloody stools + jaw pain with difficulty swallowing x 2 days; Genito-Urinary: no dysuria, trouble voiding, or hematuria; Musculoskeletal: negative  Neurological: no TIA or stroke symptoms currently    PHYSICAL EXAM:   Right Arm BP - Sittin/83 (17 0958)  Left Arm BP - Sittin/94 (17 0958)  Pulse: 85  Temp: 97.2 °F (36.2 °C)      General appearance:  Alert, well-appearing, and in no distress.  Oriented to person, place, and time   Neurological: Normal speech, no focal findings noted; CN II - XII grossly intact           Musculoskeletal: Digits/nail without cyanosis/clubbing.  Normal muscle strength/tone.                 Neck: Supple, no significant adenopathy; thyroid is not enlarged                  Right carotid bruit can be auscultated; left carotid bruit not present per ausculation                Chest:  Clear to auscultation, no wheezes, rales or rhonchi, symmetric air entry     No use of accessory muscles             Cardiac: Normal rate and regular rhythm, S1 and S2 normal; PMI non-displaced          Abdomen: Soft, nontender, nondistended, no masses or organomegaly     No rebound tenderness noted; bowel sounds normal     No Pulsatile aortic mass  palpable.     No groin adenopathy      Extremities:  Bilateral +2 radial pulse     2+ femoral pulses bilaterally      Popliteal pulses nonpalpable     2+ pedal pulses palpable.     No pedal edema     No ulcerations    LAB RESULTS:  Lab Results   Component Value Date    K 3.6 2017    K 3.8 2017    K 3.6 2017    CREATININE 1.0 2017    CREATININE 1.1 2017    CREATININE 1.1 2017     Lab Results    Component Value Date    WBC 4.70 03/02/2017    WBC 5.98 03/01/2017    WBC 7.84 02/28/2017    HCT 29.1 (L) 03/02/2017    HCT 28.9 (L) 03/01/2017    HCT 27.2 (L) 02/28/2017     (H) 03/02/2017     (H) 03/01/2017     (H) 02/28/2017     Lab Results   Component Value Date    HGBA1C 6.8 (H) 02/26/2017    HGBA1C 6.8 (H) 11/04/2016    HGBA1C 9.1 (H) 08/05/2016     IMAGING:  Carotid duplex 4/5/17  Right ICA: 172 cm/s  Ratio:        3.1  60-79% with turbulence in the distal ICA suggest possible FMD  Left ICA:   <40%     MRI brain Feb 2016:  acute infarct of the right MCA distribution involving the majority of the right parietal lobe with additional scattered foci of infarcts involving the right caudate head, right occipital lobe, right temporal lobe, right subinsular cortex, and right posterior frontal lobe    IMP/PLAN:  51 y.o. female with a history of HTN, DM, history of multiple DVTs/PEs with Ratna filter placement in 1994, who recently had a stroke on 2/26/17 who is here today for evaluation of Fibromuscular Dysplasia with right sided carotid stenosis  Reviewed CTA and angio from stroke in Feb 2016 in detail -- CTA images are suggestive of FMD in R ICA  Cont ASA 81 po qd  Cont pradaxa given h/o multiple DVTs    Start statin    Options are carotid angio with PTA/possible stent of R ICA (wallstent) w MoMa for proximal protection vs. Medical management.  Intervention is likely best to prevent recurrent event but there is a risk of dissection given how tortuous the R ICA is  She needs best medical therapy: she needs dual anti-platelet (plavix + ASA) and statin therapy; cont anticoagulation for her hypercoagulability    Fu in 6 months w new carotid us  Check PRU    Jason Flores MD FACS  Vascular/Endovascular Surgery

## 2017-04-05 NOTE — LETTER
April 5, 2017      Davey Morris MD  [address] [facility] [address]           [facility] - Vascular Surgery  [address]  [place] [address]  Phone: [phone]  Fax: [phone]          Patient: Lyssa Gandara   MR Number: [id number]   YOB: 1965   Date of Visit: 4/5/2017       Dear Dr. Davey Morris:    Thank you for referring Lyssa Gandara to me for evaluation. Attached you will find relevant portions of my assessment and plan of care.    If you have questions, please do not hesitate to call me. I look forward to following Lyssa Gandara along with you.    Sincerely,    Jason Flores MD    Enclosure  CC:  No Recipients    If you would like to receive this communication electronically, please contact [email or web address] or [phone] to request more information on [employer] access.    For providers and/or their staff who would like to refer a patient to [facility], please contact us through our one-stop-shop provider referral line, [facility], at [phone].    If you feel you have received this communication in error or would no longer like to receive these types of communications, please e-mail [email or web address]

## 2017-04-12 DIAGNOSIS — I10 ESSENTIAL HYPERTENSION: ICD-10-CM

## 2017-04-12 DIAGNOSIS — I65.21 CAROTID ARTERY STENOSIS, UNILATERAL, RIGHT: ICD-10-CM

## 2017-04-12 DIAGNOSIS — E11.9 TYPE 2 DIABETES MELLITUS WITHOUT COMPLICATION, WITHOUT LONG-TERM CURRENT USE OF INSULIN: ICD-10-CM

## 2017-04-12 DIAGNOSIS — I77.3 FIBROMUSCULAR DYSPLASIA OF CERVICOCRANIAL ARTERY: ICD-10-CM

## 2017-04-12 DIAGNOSIS — J45.52 SEVERE PERSISTENT ASTHMA WITH STATUS ASTHMATICUS: Chronic | ICD-10-CM

## 2017-04-12 RX ORDER — DABIGATRAN ETEXILATE 150 MG/1
150 CAPSULE ORAL 2 TIMES DAILY
Qty: 180 CAPSULE | Refills: 4 | Status: SHIPPED | OUTPATIENT
Start: 2017-04-12 | End: 2017-04-13 | Stop reason: SDUPTHER

## 2017-04-12 RX ORDER — FLUTICASONE PROPIONATE AND SALMETEROL 250; 50 UG/1; UG/1
1 POWDER RESPIRATORY (INHALATION) 2 TIMES DAILY
Qty: 60 EACH | Refills: 3 | Status: SHIPPED | OUTPATIENT
Start: 2017-04-12 | End: 2017-05-19 | Stop reason: SDUPTHER

## 2017-04-12 RX ORDER — METFORMIN HYDROCHLORIDE 500 MG/1
500 TABLET ORAL 2 TIMES DAILY WITH MEALS
Qty: 180 TABLET | Refills: 4 | Status: ON HOLD | OUTPATIENT
Start: 2017-04-12 | End: 2017-12-31 | Stop reason: HOSPADM

## 2017-04-12 RX ORDER — TRIAMTERENE/HYDROCHLOROTHIAZID 37.5-25 MG
1 TABLET ORAL DAILY
Qty: 90 TABLET | Refills: 4 | Status: SHIPPED | OUTPATIENT
Start: 2017-04-12 | End: 2019-11-25 | Stop reason: SDUPTHER

## 2017-04-12 RX ORDER — LISINOPRIL 20 MG/1
20 TABLET ORAL DAILY
Qty: 90 TABLET | Refills: 4 | Status: SHIPPED | OUTPATIENT
Start: 2017-04-12 | End: 2017-04-13 | Stop reason: SDUPTHER

## 2017-04-12 NOTE — TELEPHONE ENCOUNTER
Spoke with Medical Records department and they are working the [facility] paper work for this pt.

## 2017-04-13 NOTE — TELEPHONE ENCOUNTER
Spoke with pt and informed her that her [facility] paper work was signed and ready to go. The stated that has been at work since 4/10/17 this week.

## 2017-04-13 NOTE — TELEPHONE ENCOUNTER
Spoke with pt and informed her that her [facility] paper has been faxed over

## 2017-04-15 RX ORDER — DABIGATRAN ETEXILATE 150 MG/1
150 CAPSULE ORAL 2 TIMES DAILY
Qty: 180 CAPSULE | Refills: 4 | Status: ON HOLD | OUTPATIENT
Start: 2017-04-15 | End: 2017-11-30

## 2017-04-15 RX ORDER — ASPIRIN 81 MG/1
81 TABLET ORAL DAILY
Qty: 30 TABLET | Refills: 11 | Status: SHIPPED | OUTPATIENT
Start: 2017-04-15 | End: 2017-05-19 | Stop reason: SDUPTHER

## 2017-04-15 RX ORDER — CLOPIDOGREL BISULFATE 75 MG/1
75 TABLET ORAL DAILY
Qty: 90 TABLET | Refills: 6 | Status: SHIPPED | OUTPATIENT
Start: 2017-04-15 | End: 2017-06-19 | Stop reason: SDUPTHER

## 2017-04-15 RX ORDER — LISINOPRIL 20 MG/1
20 TABLET ORAL DAILY
Qty: 90 TABLET | Refills: 4 | Status: SHIPPED | OUTPATIENT
Start: 2017-04-15 | End: 2019-11-25 | Stop reason: SDUPTHER

## 2017-04-21 ENCOUNTER — PATIENT MESSAGE (OUTPATIENT)
Dept: HEMATOLOGY/ONCOLOGY | Facility: CLINIC | Age: 52
End: 2017-04-21

## 2017-04-21 DIAGNOSIS — D50.0 IRON DEFICIENCY ANEMIA DUE TO CHRONIC BLOOD LOSS: Primary | ICD-10-CM

## 2017-04-25 ENCOUNTER — DOCUMENTATION ONLY (OUTPATIENT)
Dept: REHABILITATION | Facility: HOSPITAL | Age: 52
End: 2017-04-25

## 2017-04-25 NOTE — PROGRESS NOTES
Outpatient Occupational Therapy Discharge Summary      Name: Lyssa Gandara  Referring Physician: No ref. provider found   Initial visit: 3/8/17  Date of Last visit: 3/29/17  Total Visits Received: 4  Missed Visits: 0    Pain Scale: No pain    LTG GOALS: Time frame: 8 weeks   1. Pt will return to work  2. Pt will demonstrate increased sensation and awareness of objects in hand   3. Pt will demonstrate improved tracking to the L and compensatory strategies for visual deficits   4. 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 Pt IND with HEP   6. CMS - hand function survey CJ for self care  STG Goals:  Time frame: 4 weeks   1. 9 hole peg test < 1 min on L hand   2. L  strength increased by 10#   3. Pt will verbalize/demonstatre improved clothing fastener and laces management   Discharge reason: Pt. Called and message left on 4/7/17.Pt has not rescheduled further follow up sessions.      Plan:This patient is discharged from OP Occupational Therapy Services.

## 2017-04-26 ENCOUNTER — HOSPITAL ENCOUNTER (EMERGENCY)
Facility: HOSPITAL | Age: 52
Discharge: HOME | End: 2017-04-26
Attending: EMERGENCY MEDICINE | Admitting: EMERGENCY MEDICINE
Payer: COMMERCIAL

## 2017-04-26 VITALS
HEART RATE: 86 BPM | WEIGHT: 170 LBS | HEIGHT: 64 IN | TEMPERATURE: 98 F | RESPIRATION RATE: 18 BRPM | OXYGEN SATURATION: 100 % | DIASTOLIC BLOOD PRESSURE: 76 MMHG | SYSTOLIC BLOOD PRESSURE: 162 MMHG | BODY MASS INDEX: 29.02 KG/M2

## 2017-04-26 DIAGNOSIS — M79.605 LEFT LEG PAIN: ICD-10-CM

## 2017-04-26 DIAGNOSIS — J06.9 UPPER RESPIRATORY TRACT INFECTION, UNSPECIFIED TYPE: Primary | ICD-10-CM

## 2017-04-26 PROCEDURE — 99284 EMERGENCY DEPT VISIT MOD MDM: CPT

## 2017-04-26 PROCEDURE — 99285 EMERGENCY DEPT VISIT HI MDM: CPT | Mod: ,,, | Performed by: PHYSICIAN ASSISTANT

## 2017-04-26 RX ORDER — NAPROXEN 500 MG/1
500 TABLET ORAL 2 TIMES DAILY
Qty: 14 TABLET | Refills: 0 | Status: ON HOLD | OUTPATIENT
Start: 2017-04-26 | End: 2017-12-31 | Stop reason: HOSPADM

## 2017-04-26 NOTE — ED NOTES
Patient identifiers verified and correct for Lyssa Gandara.    LOC: The patient is awake, alert and aware of environment with an appropriate affect, the patient is oriented x 3 and speaking appropriately.  APPEARANCE: Patient resting comfortably and in no acute distress, patient is clean and well groomed, patient's clothing is properly fastened.  SKIN: The skin is warm and dry, color consistent with ethnicity, patient has normal skin turgor and moist mucus membranes, skin intact, no breakdown or bruising noted.  MUSCULOSKELETAL: Patient moving all extremities spontaneously, no obvious swelling or deformities noted.  RESPIRATORY: Airway is open and patent, respirations are spontaneous, patient has a normal effort and rate, no accessory muscle use noted, diminished bilateral breath sounds noted to lower lobes.  CARDIAC: Patient has a normal rate and regular rhythm, no peripheral edema noted, capillary refill < 3 seconds.  ABDOMEN: Soft and non tender to palpation, no distention noted, normoactive bowel sounds present in all four quadrants.  NEUROLOGIC: PERRL, 3mm bilaterally, eyes open spontaneously, behavior appropriate to situation, follows commands, facial expression symmetrical, bilateral hand grasp equal and even, purposeful motor response noted, normal sensation in all extremities when touched with a finger.

## 2017-04-26 NOTE — ED TRIAGE NOTES
"Pt presents to the ED c/o a sore throat x2 days, left ear is ringing x2 days, left leg pain x4 days--took Tylenol with some relief, but states it is no longer working. Pt states,  "I don't feel well." Hx stroke Feb. 2017.   "

## 2017-04-26 NOTE — DISCHARGE INSTRUCTIONS
Return to the ER for any change or worsening of symptoms, including those listed below.      Take Flonase OTC for 3 days.       Viral Upper Respiratory Illness (Adult)  You have a viral upper respiratory illness (URI), which is another term for the common cold. This illness is contagious during the first few days. It is spread through the air by coughing and sneezing. It may also be spread by direct contact (touching the sick person and then touching your own eyes, nose, or mouth). Frequent handwashing will decrease risk of spread. Most viral illnesses go away within 7 to 10 days with rest and simple home remedies. Sometimes the illness may last for several weeks. Antibiotics will not kill a virus, and they are generally not prescribed for this condition.    Home care  · If symptoms are severe, rest at home for the first 2 to 3 days. When you resume activity, don't let yourself get too tired.  · Avoid being exposed to cigarette smoke (yours or others).  · You may use acetaminophen or ibuprofen to control pain and fever, unless another medicine was prescribed. (Note: If you have chronic liver or kidney disease, have ever had a stomach ulcer or gastrointestinal bleeding, or are taking blood-thinning medicines, talk with your healthcare provider before using these medicines.) Aspirin should never be given to anyone under 18 years of age who is ill with a viral infection or fever. It may cause severe liver or brain damage.  · Your appetite may be poor, so a light diet is fine. Avoid dehydration by drinking 6 to 8 glasses of fluids per day (water, soft drinks, juices, tea, or soup). Extra fluids will help loosen secretions in the nose and lungs.  · Over-the-counter cold medicines will not shorten the length of time youre sick, but they may be helpful for the following symptoms: cough, sore throat, and nasal and sinus congestion. (Note: Do not use decongestants if you have high blood pressure.)  Follow-up care  Follow  up with your healthcare provider, or as advised.  When to seek medical advice  Call your healthcare provider right away if any of these occur:  · Cough with lots of colored sputum (mucus)  · Severe headache; face, neck, or ear pain  · Difficulty swallowing due to throat pain  · Fever of 100.4°F (38°C)  Call 911, or get immediate medical care  Call emergency services right away if any of these occur:  · Chest pain, shortness of breath, wheezing, or difficulty breathing  · Coughing up blood  · Inability to swallow due to throat pain  Date Last Reviewed: 9/13/2015  © 9675-7617 [employer]. [address] [place] [address], [place] [address]. All rights reserved. This information is not intended as a substitute for professional medical care. Always follow your healthcare professional's instructions.

## 2017-04-26 NOTE — ED AVS SNAPSHOT
[facility]  [address]  [facility] [address]               Lyssa Gandara   2017  1:49 PM   ED    Description:  Female : 1965   Department:  [facility]           Your Care was Coordinated By:     Provider Role From To    Armond Falk III, MD Attending Provider 17 1407 --    OLAYINKA Garcia Physician Assistant 17 1407 17 1656    OLAYINKA Garcia Physician Assistant 17 1655 --    Valerio Kraft MD ED Temporary Attending 17 1716 --      Reason for Visit     Generalized Body Aches           Diagnoses this Visit        Comments    Upper respiratory tract infection, unspecified type    -  Primary     Left leg pain           ED Disposition     None           To Do List           Follow-up Information     Follow up with Gerard Joyce MD. Call in 1 day.    Specialty:  Family Medicine    Why:  To discuss ER visit and schedule follow up appointment within 1 week    Contact information:    [address]  [facility] [address]  [phone]        [facility]     [facility] Nurse Care Line - 24 Assistance  Unless otherwise directed by your provider, please contact [facility], our nurse care line that is available for  assistance.     Registered nurses in the [facility] provide: appointment scheduling, clinical advisement, health education, and other advisory services.  Call: [phone] (toll free)               Medications           Message regarding Medications     Verify the changes and/or additions to your medication regime listed below are the same as discussed with your clinician today.  If any of these changes or additions are incorrect, please notify your healthcare provider.             Verify that the below list of medications is an accurate representation of the medications you are currently taking.  If none reported, the list may be blank. If incorrect,  "please contact your healthcare provider. Carry this list with you in case of emergency.           Current Medications     albuterol (PROVENTIL) 2.5 mg /3 mL (0.083 %) nebulizer solution Take 3 mLs (2.5 mg total) by nebulization every 6 (six) hours as needed for Wheezing.    aspirin (ECOTRIN) 81 MG EC tablet Take 1 tablet (81 mg total) by mouth once daily.    citalopram (CELEXA) 10 MG tablet Take 1 tablet (10 mg total) by mouth once daily.    clopidogrel (PLAVIX) 75 mg tablet Take 1 tablet (75 mg total) by mouth once daily.    cyanocobalamin (VITAMIN B-12) 1000 MCG tablet Take 100 mcg by mouth once daily.    dabigatran etexilate (PRADAXA) 150 mg Cap Take 1 capsule (150 mg total) by mouth 2 (two) times daily. "Do NOT break, chew, or open capsules."    ferrous sulfate 325 (65 FE) MG EC tablet Take 1 tablet (325 mg total) by mouth once daily.    fluticasone-salmeterol 250-50 mcg/dose (ADVAIR) 250-50 mcg/dose diskus inhaler Inhale 1 puff into the lungs 2 (two) times daily.    lisinopril (PRINIVIL,ZESTRIL) 20 MG tablet Take 1 tablet (20 mg total) by mouth once daily.    metformin (GLUCOPHAGE) 500 MG tablet Take 1 tablet (500 mg total) by mouth 2 (two) times daily with meals.    simvastatin (ZOCOR) 20 MG tablet Take 1 tablet (20 mg total) by mouth every evening.    triamterene-hydrochlorothiazide 37.5-25 mg (MAXZIDE-25) 37.5-25 mg per tablet Take 1 tablet by mouth once daily.           Clinical Reference Information           Your Vitals Were     BP Pulse Temp Resp Height Weight    129/63 84 98.1 °F (36.7 °C) (Oral) 18 5' 4" (1.626 m) 77.1 kg (170 lb)    Last Period SpO2 BMI          02/26/2017 100% 29.18 kg/m2        Allergies as of 4/26/2017        Reactions    Plasma, Human, Normal       Immunizations Administered on Date of Encounter - 4/26/2017     None      ED Micro, Lab, POCT     None      ED Imaging Orders     Start Ordered       Status Ordering Provider    04/26/17 1441 04/26/17 1440   Lower Extremity Veins Left "  1 time imaging      Final result         Discharge Instructions       Return to the ER for any change or worsening of symptoms, including those listed below.      Take Flonase OTC for 3 days.       Viral Upper Respiratory Illness (Adult)  You have a viral upper respiratory illness (URI), which is another term for the common cold. This illness is contagious during the first few days. It is spread through the air by coughing and sneezing. It may also be spread by direct contact (touching the sick person and then touching your own eyes, nose, or mouth). Frequent handwashing will decrease risk of spread. Most viral illnesses go away within 7 to 10 days with rest and simple home remedies. Sometimes the illness may last for several weeks. Antibiotics will not kill a virus, and they are generally not prescribed for this condition.    Home care  · If symptoms are severe, rest at home for the first 2 to 3 days. When you resume activity, don't let yourself get too tired.  · Avoid being exposed to cigarette smoke (yours or others).  · You may use acetaminophen or ibuprofen to control pain and fever, unless another medicine was prescribed. (Note: If you have chronic liver or kidney disease, have ever had a stomach ulcer or gastrointestinal bleeding, or are taking blood-thinning medicines, talk with your healthcare provider before using these medicines.) Aspirin should never be given to anyone under 18 years of age who is ill with a viral infection or fever. It may cause severe liver or brain damage.  · Your appetite may be poor, so a light diet is fine. Avoid dehydration by drinking 6 to 8 glasses of fluids per day (water, soft drinks, juices, tea, or soup). Extra fluids will help loosen secretions in the nose and lungs.  · Over-the-counter cold medicines will not shorten the length of time youre sick, but they may be helpful for the following symptoms: cough, sore throat, and nasal and sinus congestion. (Note: Do not use  decongestants if you have high blood pressure.)  Follow-up care  Follow up with your healthcare provider, or as advised.  When to seek medical advice  Call your healthcare provider right away if any of these occur:  · Cough with lots of colored sputum (mucus)  · Severe headache; face, neck, or ear pain  · Difficulty swallowing due to throat pain  · Fever of 100.4°F (38°C)  Call 911, or get immediate medical care  Call emergency services right away if any of these occur:  · Chest pain, shortness of breath, wheezing, or difficulty breathing  · Coughing up blood  · Inability to swallow due to throat pain  Date Last Reviewed: 9/13/2015  © 6945-3756 [employer]. [address] [place] [address], [place] [address]. All rights reserved. This information is not intended as a substitute for professional medical care. Always follow your healthcare professional's instructions.              Your Scheduled Appointments     May 16, 2017  8:00 AM CDT   Non-Fasting Lab with LAB, HEMONC CANCER BLDG   [facility] ([facility])    [address]  [place] [address] [phone]            May 16, 2017  9:00 AM CDT   Established Patient Visit with Sera Koehler MD   [facility] ([facility])    [address] [place] [address] [phone]               [facility] complies with applicable Federal civil rights laws and does not discriminate on the basis of race, color, national origin, age, disability, or sex.        Language Assistance Services     ATTENTION: Language assistance services are available, free of charge. Please call [phone].      ATENCIÓN: Si habla español, tiene a babb disposición servicios gratuitos de asistencia lingüística. Llame al [phone].     CHÚ Ý: N?u b?n nói Ti?ng Vi?t, có các d?ch v? h? tr? ngôn ng? mi?n phí dành cho b?n. G?i s? [phone].

## 2017-04-26 NOTE — ED PROVIDER NOTES
"Encounter Date: 4/26/2017    SCRIBE #1 NOTE: I, Jamari Gonsalez, am scribing for, and in the presence of,  Armond Falk MD. I have scribed the following portions of the note - the APC attestation.       History     Chief Complaint   Patient presents with    Generalized Body Aches     L leg swelling with pain, ears ringing, throat hurts     Review of patient's allergies indicates:   Allergen Reactions    Plasma, human, normal      HPI Comments: 50 y/o female with history of DM, HTN, Asthma, Scleroderma, multiple DVT/PE with IVC filter placed 1994 presents to the ER with chief complaint of "not feeling well".  She has sore throat, boady aches, and left ankle swelling. She reports sore throat x 2 days, but denies difficulty swelling. She has associated nasal congestion, cough, sneezing, and left ear ringing x 3 days. She denies nausea, vomiting, fever, CP.  She is having some SOB, which feels like her asthma.  She is using her nebulizer machine 3 times daily with improvement for the last 2 days.       She has a tightness sensation in the left leg, which feels like blood clots in the past.  She reports having a stroke February 26, 2017. She had numbness on the left side of her body at that time and a fall, which brought her to the ER.    She was on Xarelto prior to having a stroke.  She is now taking plavix, ASA 81 mg, and pradaxa.   She denies additional complaints at this time.      Past Medical History:   Diagnosis Date    Asthma     Dyspnea on exertion     Hx of long term use of blood thinners     Hypertension 3/8/2016    PE (pulmonary embolism)     Hx of multiple DVT/PE    Presence of IVC filter     Scleroderma     Type 2 diabetes mellitus without complication      Past Surgical History:   Procedure Laterality Date    DANETTE FILTER PLACEMENT         Social History   Substance Use Topics    Smoking status: Never Smoker    Smokeless tobacco: None    Alcohol use 1.8 oz/week     3 Cans of beer, 0 " Standard drinks or equivalent per week      Comment: occ     Review of Systems   Constitutional: Negative for chills and fever.   HENT: Positive for sore throat. Negative for ear discharge, ear pain and trouble swallowing.    Respiratory: Positive for cough. Negative for chest tightness, shortness of breath and wheezing.    Cardiovascular: Negative for chest pain.   Gastrointestinal: Negative for abdominal pain, nausea and vomiting.   Genitourinary: Negative for dysuria.   Musculoskeletal: Positive for arthralgias, joint swelling and myalgias. Negative for back pain.   Skin: Negative for rash.   Neurological: Negative for dizziness, syncope, weakness and light-headedness.   Hematological: Does not bruise/bleed easily.   Psychiatric/Behavioral: Negative for confusion.       Physical Exam   Initial Vitals   BP Pulse Resp Temp SpO2   04/26/17 1139 04/26/17 1139 04/26/17 1139 04/26/17 1139 04/26/17 1139   129/63 84 18 98.1 °F (36.7 °C) 100 %     Physical Exam    Constitutional: She appears well-developed and well-nourished.   HENT:   Head: Atraumatic.   Nose: Rhinorrhea present. No mucosal edema or sinus tenderness.   Mouth/Throat: Oropharynx is clear and moist. No trismus in the jaw. Uvula swelling ( mild) present. No oropharyngeal exudate, posterior oropharyngeal edema or posterior oropharyngeal erythema.   Eyes: Conjunctivae and EOM are normal. Pupils are equal, round, and reactive to light.   Neck: Normal range of motion. Neck supple.   Cardiovascular: Normal rate and regular rhythm.   Pulmonary/Chest: Breath sounds normal. No respiratory distress. She has no wheezes. She has no rales.   Abdominal: Soft. Bowel sounds are normal. There is no tenderness.   Musculoskeletal:        Left ankle: She exhibits normal range of motion, no ecchymosis and no deformity. No lateral malleolus ( mild swelling above the lateral malleolus.  no erythema.  ) tenderness found. Achilles tendon normal.        Left lower leg: She exhibits  tenderness (posterior and lateral calf.  no erythema. ).   Neurological: She is alert and oriented to person, place, and time. She has normal strength. No cranial nerve deficit.   Skin: No rash noted.   Psychiatric: She has a normal mood and affect.         ED Course   Procedures  Labs Reviewed - No data to display          Medical Decision Making:   History:   Old Medical Records: I decided to obtain old medical records.  Independently Interpreted Test(s):   I have ordered and independently interpreted X-rays - see summary below.       <> Summary of X-Ray Reading(s): Lower ext US: lindsay chronic non-occlusive dvt  Clinical Tests:   Radiological Study: Ordered  Other:   I discussed test(s) with the performing physician.       <> Summary of the Findings: Lower ext US: lindsay chronic non-occlusive dvt       APC / Resident Notes:   With chief complaint of URI symptoms as well as left lower leg pain.  The patient has an IVC filter due to multiple DVT and PEs in the past.  She is also on anticoagulant therapy, recently changed since a stroke in February.  The patient was concerned she might have a blood clot in her leg causing pain today.  We will repeat ultrasound.  The patient has  URI symptoms on exam without evidence of bacterial infection.  Her lungs are clear.  She is afebrile with stable vital signs.  I do not suspect pneumonia, acute otitis media, strep pharyngitis, or bacterial sinusitis.  She is instructed to take Flonase ×3 days, and Tylenol as needed for pain.  Patient's ultrasound shows a chronic nonocclusive DVT of the superficial and popliteal veins.  We do not see an indication for any change in medications or emergent intervention at this time.  The patient is discharged with plan for close follow-up with her primary care physician.  She is given an Ace wrap and prescription for naproxen as her pain is mostly in the ankle when she walks.  This is possibly a tendinitis of the ankle.  There is no erythema or  significant joint swelling and I do not suspect infectious process or gout.  The patient is comfortable discharge instructions.  She is given specific return precautions.I discussed the care of this patient with my supervising MD.  I discussed the care of this patient with my supervising MD.         Scribe Attestation:   Scribe #1: I performed the above scribed service and the documentation accurately describes the services I performed. I attest to the accuracy of the note.    Attending Attestation:     Physician Attestation Statement for NP/PA:   I discussed this assessment and plan of this patient with the NP/PA, but I did not personally examine the patient. The face to face encounter was performed by the NP/PA.    Other NP/PA Attestation Additions:    History of Present Illness: Leg pain and swelling         Physician Attestation for Scribe:  Physician Attestation Statement for Scribe #1: I, Armond Falk MD, reviewed documentation, as scribed by Jamari Gonsalez in my presence, and it is both accurate and complete.                 ED Course     Clinical Impression:   The primary encounter diagnosis was Upper respiratory tract infection, unspecified type. A diagnosis of Left leg pain was also pertinent to this visit.          OLAYINKA Garcia  04/26/17 2101       Armond Falk III, MD  04/27/17 2034

## 2017-05-16 ENCOUNTER — LAB VISIT (OUTPATIENT)
Dept: LAB | Facility: HOSPITAL | Age: 52
End: 2017-05-16
Attending: INTERNAL MEDICINE
Payer: COMMERCIAL

## 2017-05-16 DIAGNOSIS — D50.0 IRON DEFICIENCY ANEMIA DUE TO CHRONIC BLOOD LOSS: ICD-10-CM

## 2017-05-16 LAB
BASOPHILS # BLD AUTO: 0.02 K/UL
BASOPHILS NFR BLD: 0.3 %
DIFFERENTIAL METHOD: ABNORMAL
EOSINOPHIL # BLD AUTO: 0.3 K/UL
EOSINOPHIL NFR BLD: 3.8 %
ERYTHROCYTE [DISTWIDTH] IN BLOOD BY AUTOMATED COUNT: 19.6 %
FERRITIN SERPL-MCNC: 256 NG/ML
HCT VFR BLD AUTO: 34 %
HGB BLD-MCNC: 10.9 G/DL
LYMPHOCYTES # BLD AUTO: 2.1 K/UL
LYMPHOCYTES NFR BLD: 29.7 %
MCH RBC QN AUTO: 27.9 PG
MCHC RBC AUTO-ENTMCNC: 32.1 %
MCV RBC AUTO: 87 FL
MONOCYTES # BLD AUTO: 0.4 K/UL
MONOCYTES NFR BLD: 4.9 %
NEUTROPHILS # BLD AUTO: 4.4 K/UL
NEUTROPHILS NFR BLD: 61.2 %
PLATELET # BLD AUTO: 223 K/UL
PMV BLD AUTO: 9.7 FL
RBC # BLD AUTO: 3.91 M/UL
WBC # BLD AUTO: 7.11 K/UL

## 2017-05-16 PROCEDURE — 82728 ASSAY OF FERRITIN: CPT

## 2017-05-16 PROCEDURE — 36415 COLL VENOUS BLD VENIPUNCTURE: CPT

## 2017-05-16 PROCEDURE — 85025 COMPLETE CBC W/AUTO DIFF WBC: CPT

## 2017-05-19 ENCOUNTER — OFFICE VISIT (OUTPATIENT)
Dept: INTERNAL MEDICINE | Facility: CLINIC | Age: 52
End: 2017-05-19
Payer: COMMERCIAL

## 2017-05-19 VITALS — DIASTOLIC BLOOD PRESSURE: 82 MMHG | HEART RATE: 78 BPM | SYSTOLIC BLOOD PRESSURE: 138 MMHG

## 2017-05-19 DIAGNOSIS — I63.031 CEREBRAL INFARCTION DUE TO THROMBOSIS OF RIGHT CAROTID ARTERY: ICD-10-CM

## 2017-05-19 DIAGNOSIS — E11.9 TYPE 2 DIABETES MELLITUS WITHOUT COMPLICATION, WITHOUT LONG-TERM CURRENT USE OF INSULIN: ICD-10-CM

## 2017-05-19 DIAGNOSIS — I77.3 FIBROMUSCULAR DYSPLASIA OF CERVICOCRANIAL ARTERY: ICD-10-CM

## 2017-05-19 DIAGNOSIS — I26.99 OTHER PULMONARY EMBOLISM WITHOUT ACUTE COR PULMONALE, UNSPECIFIED CHRONICITY: ICD-10-CM

## 2017-05-19 DIAGNOSIS — I65.21 CAROTID ARTERY STENOSIS, UNILATERAL, RIGHT: ICD-10-CM

## 2017-05-19 DIAGNOSIS — E11.9 TYPE 2 DIABETES MELLITUS WITHOUT COMPLICATION, WITHOUT LONG-TERM CURRENT USE OF INSULIN: Primary | ICD-10-CM

## 2017-05-19 DIAGNOSIS — Z00.00 PREVENTATIVE HEALTH CARE: ICD-10-CM

## 2017-05-19 DIAGNOSIS — I10 ESSENTIAL HYPERTENSION: Primary | ICD-10-CM

## 2017-05-19 DIAGNOSIS — M34.9 SCLERODERMA: ICD-10-CM

## 2017-05-19 DIAGNOSIS — J45.52 SEVERE PERSISTENT ASTHMA WITH STATUS ASTHMATICUS: Chronic | ICD-10-CM

## 2017-05-19 PROCEDURE — 99215 OFFICE O/P EST HI 40 MIN: CPT | Mod: S$GLB,,, | Performed by: FAMILY MEDICINE

## 2017-05-19 PROCEDURE — 99999 PR PBB SHADOW E&M-EST. PATIENT-LVL II: CPT | Mod: PBBFAC,,, | Performed by: FAMILY MEDICINE

## 2017-05-19 PROCEDURE — 4010F ACE/ARB THERAPY RXD/TAKEN: CPT | Mod: S$GLB,,, | Performed by: FAMILY MEDICINE

## 2017-05-19 PROCEDURE — 3075F SYST BP GE 130 - 139MM HG: CPT | Mod: S$GLB,,, | Performed by: FAMILY MEDICINE

## 2017-05-19 PROCEDURE — 3044F HG A1C LEVEL LT 7.0%: CPT | Mod: S$GLB,,, | Performed by: FAMILY MEDICINE

## 2017-05-19 PROCEDURE — 1160F RVW MEDS BY RX/DR IN RCRD: CPT | Mod: S$GLB,,, | Performed by: FAMILY MEDICINE

## 2017-05-19 PROCEDURE — 3079F DIAST BP 80-89 MM HG: CPT | Mod: S$GLB,,, | Performed by: FAMILY MEDICINE

## 2017-05-19 RX ORDER — FLUTICASONE PROPIONATE AND SALMETEROL 250; 50 UG/1; UG/1
1 POWDER RESPIRATORY (INHALATION) 2 TIMES DAILY
Qty: 180 EACH | Refills: 3 | Status: SHIPPED | OUTPATIENT
Start: 2017-05-19 | End: 2018-09-06 | Stop reason: SDUPTHER

## 2017-05-19 RX ORDER — ASPIRIN 81 MG/1
81 TABLET ORAL DAILY
Qty: 90 TABLET | Refills: 3 | Status: SHIPPED | OUTPATIENT
Start: 2017-05-19 | End: 2017-06-19 | Stop reason: SDUPTHER

## 2017-05-19 RX ORDER — SIMVASTATIN 20 MG/1
20 TABLET, FILM COATED ORAL NIGHTLY
Qty: 90 TABLET | Refills: 3 | Status: SHIPPED | OUTPATIENT
Start: 2017-05-19 | End: 2019-01-04

## 2017-05-19 NOTE — MR AVS SNAPSHOT
[facility]  [address]  [facility] [address]  Phone: [phone]  Fax: [phone]                  Lyssa Gandara   2017 8:00 AM   Office Visit    Description:  Female : 1965   Provider:  Gerard Joyce MD   Department:  [facility]           Diagnoses this Visit        Comments    Essential hypertension    -  Primary     Scleroderma         Other pulmonary embolism without acute cor pulmonale, unspecified chronicity         Type 2 diabetes mellitus without complication, without long-term current use of insulin         Cerebral infarction due to thrombosis of right carotid artery         Carotid artery stenosis, unilateral, right         Severe persistent asthma with status asthmaticus     pt reported that she used to take advair but could not refill due to price, provided savings card, stated she will refill today    Fibromuscular dysplasia of cervicocranial artery         Preventative health care                To Do List           Goals (5 Years of Data)              16    COMPLETED: Patient/caregiver will have an action plan in place to manage and prevent complications of DM, Asthma, Long-term anti-coagulant, HTN therapy prior to discharge from [facility].   No change  On track    Notes - Note edited  2016  2:36 PM by Angelica Lombardo RN    Overall Time to Completion  2 months from 10/24/2016    Short Term Goals  Patient/caregiver will discuss health care needs with Physician and care team during visits or using Patient Portal 2 weeks  Interventions   · Collaborate with Physician as appropriate to meet patient needs.  · Empower patient/caregiver to discuss treatment plan with Physician/care team.  · Facilitate to Medical appointments.  · Provide contact information for [facility] contact information.  · Provide contact information for Outpatient Case Management contact information.  · Assess for availability of  working blood pressure cuff in home setting.  · Assess for availability of working glucometer in home setting.   Status  · Partially met    Patient/caregiver will verbalize 4 signs and symptoms of Hypertension Diabetes Asthma Deep Vein Thrombosis within 1 month.   Interventions   · Refer to Clinical Pharmacist.  · Refer to Dietician.  · Facilitate to referral to Diabetic educator.  · Facilitate to referral to Endocrinologist.  · Encourage Dietary Compliance.  · Encourage compliance with annual vaccinations.  · Encourage compliance with preventive screenings.  · Mail Blood glucose logs for home use.  · Mail Blood pressure logs for home use.   Status  · Partially met    Patient/caregiver will verbalize 3 ways of preventing complications due to disease process within 1 month.  Interventions   · Empower patient/caregiver to discuss treatment plan with Physician/care team.  · Encourage Dietary Compliance.  · Encourage Medication Compliance.   Status  · Partially met       Clinical Reference Documents Added to Patient Instructions       Document    ASTHMA, CONTROLLING YOUR TRIGGERS: ALLERGENS (ENGLISH)    ASTHMA, UNDERSTANDING (ENGLISH)    BLOOD SUGAR, LOW; HYPOGLYCEMIA (ENGLISH)    DIABETES, HEALTHY MEALS FOR (ENGLISH)    DIABETES, LONG-TERM COMPLICATIONS (ENGLISH)    DIABETES, MEAL PLANNING (ENGLISH)    DIABETES, SERVING AND PORTION SIZES, LEARNING ABOUT (ENGLISH)    DIABETES: EXAMS AND TESTS (ENGLISH)    DIET, LOW-SALT (ENGLISH)    EATING HEART-HEALTHY FOODS (ENGLISH)    HIGH BLOOD PRESSURE, WHAT IS?  (ENGLISH)    HYPERGLYCEMIA (HIGH BLOOD SUGAR) (ENGLISH)    HYPERTENSION, ESTABLISHED (ENGLISH)    HYPOTENSION, ALL CAUSES (ENGLISH)           Clinical Reference Documents Added to Patient Instructions       Document    BLOOD SUGAR LOG, USING A (ENGLISH)    DIABETES, LONG-TERM COMPLICATIONS (ENGLISH)    DIABETES, MANAGING: THE A1C TEST (ENGLISH)    DIABETES, MEAL PLANNING (ENGLISH)              Follow-Up and Disposition      Return in about 3 months (around 8/19/2017), or if symptoms worsen or fail to improve.    Follow-up and Disposition History       These Medications        Disp Refills Start End    aspirin (ECOTRIN) 81 MG EC tablet 90 tablet 3 5/19/2017     Take 1 tablet (81 mg total) by mouth once daily. - Oral    Pharmacy: [facility] - [address] Ph #: [phone]       fluticasone-salmeterol 250-50 mcg/dose (ADVAIR) 250-50 mcg/dose diskus inhaler 180 each 3 5/19/2017 5/19/2018    Inhale 1 puff into the lungs 2 (two) times daily. - Inhalation    Pharmacy: [facility] - [address] Ph #: [phone]       simvastatin (ZOCOR) 20 MG tablet 90 tablet 3 5/19/2017 5/19/2018    Take 1 tablet (20 mg total) by mouth every evening. - Oral    Pharmacy: [facility] - [address] Ph #: [phone]         [facility]     [facility] Nurse Care Line - 24/7 Assistance  Unless otherwise directed by your provider, please contact [facility], our nurse care line that is available for 24/7 assistance.     Registered nurses in the [facility] Center provide: appointment scheduling, clinical advisement, health education, and other advisory services.  Call: [phone] (toll free)               Medications           Message regarding Medications     Verify the changes and/or additions to your medication regime listed below are the same as discussed with your clinician today.  If any of these changes or additions are incorrect, please notify your healthcare provider.             Verify that the below list of medications is an accurate representation of the medications you are currently taking.  If none reported, the list may be blank. If incorrect, please contact your healthcare provider. Carry this list with you in case of emergency.           Current Medications     albuterol (PROVENTIL)  "2.5 mg /3 mL (0.083 %) nebulizer solution Take 3 mLs (2.5 mg total) by nebulization every 6 (six) hours as needed for Wheezing.    aspirin (ECOTRIN) 81 MG EC tablet Take 1 tablet (81 mg total) by mouth once daily.    citalopram (CELEXA) 10 MG tablet Take 1 tablet (10 mg total) by mouth once daily.    clopidogrel (PLAVIX) 75 mg tablet Take 1 tablet (75 mg total) by mouth once daily.    cyanocobalamin (VITAMIN B-12) 1000 MCG tablet Take 100 mcg by mouth once daily.    dabigatran etexilate (PRADAXA) 150 mg Cap Take 1 capsule (150 mg total) by mouth 2 (two) times daily. "Do NOT break, chew, or open capsules."    ferrous sulfate 325 (65 FE) MG EC tablet Take 1 tablet (325 mg total) by mouth once daily.    fluticasone-salmeterol 250-50 mcg/dose (ADVAIR) 250-50 mcg/dose diskus inhaler Inhale 1 puff into the lungs 2 (two) times daily.    lisinopril (PRINIVIL,ZESTRIL) 20 MG tablet Take 1 tablet (20 mg total) by mouth once daily.    metformin (GLUCOPHAGE) 500 MG tablet Take 1 tablet (500 mg total) by mouth 2 (two) times daily with meals.    naproxen (NAPROSYN) 500 MG tablet Take 1 tablet (500 mg total) by mouth 2 (two) times daily.    simvastatin (ZOCOR) 20 MG tablet Take 1 tablet (20 mg total) by mouth every evening.    triamterene-hydrochlorothiazide 37.5-25 mg (MAXZIDE-25) 37.5-25 mg per tablet Take 1 tablet by mouth once daily.           Clinical Reference Information           Your Vitals Were     BP Pulse Last Period             138/82 78 02/26/2017         Blood Pressure          Most Recent Value    BP  138/82      Allergies as of 5/19/2017     Plasma, Human, Normal      Immunizations Administered on Date of Encounter - 5/19/2017     None      Orders Placed During Today's Visit      Normal Orders This Visit    Ambulatory Referral to Rheumatology     Future Labs/Procedures Expected by Expires    Mammo Digital Screening Bilateral With CAD  5/19/2017 7/19/2018      Language Assistance Services     ATTENTION: Language " assistance services are available, free of charge. Please call [phone].      ATENCIÓN: Si habla cristian, tiene a babb disposición servicios gratuitos de asistencia lingüística. Llame al [phone].     CHÚ Ý: N?u b?n nói Ti?ng Vi?t, có các d?ch v? h? tr? ngôn ng? mi?n phí dành cho b?n. G?i s? [phone].         [facility] complies with applicable Federal civil rights laws and does not discriminate on the basis of race, color, national origin, age, disability, or sex.

## 2017-05-19 NOTE — PROGRESS NOTES
Subjective:       Patient ID: Lyssa Gandara is a 52 y.o. female.    Chief Complaint: No chief complaint on file.  Lyssa Gandara 52 y.o. female is here for office visit to review care and physical exam, had recent ER visit URI symptoms and leg tightness.  Feeling better as far as URI, leg tightness improved, walking a lot.  HAd sensation improving, speach is fine.  Notes diet not so good, gaining weight, neef med refils, did use BP meds today      HPI  Review of Systems   Constitutional: Negative for activity change, fatigue, fever and unexpected weight change.   HENT: Negative for congestion, hearing loss, postnasal drip and rhinorrhea.    Eyes: Negative for redness and visual disturbance.   Respiratory: Negative for chest tightness, shortness of breath and wheezing.    Cardiovascular: Negative for chest pain, palpitations and leg swelling.   Gastrointestinal: Negative for abdominal distention.   Genitourinary: Negative for decreased urine volume, dysuria, flank pain, hematuria, pelvic pain and urgency.   Musculoskeletal: Negative for back pain, gait problem, joint swelling and neck stiffness.   Skin: Negative for color change, rash and wound.   Neurological: Negative for dizziness, syncope, weakness and headaches.   Psychiatric/Behavioral: Negative for behavioral problems, confusion and sleep disturbance. The patient is not nervous/anxious.        Objective:      Physical Exam   Constitutional: She is oriented to person, place, and time. She appears well-developed and well-nourished. No distress.   HENT:   Head: Normocephalic.   Mouth/Throat: No oropharyngeal exudate.   Eyes: EOM are normal. Pupils are equal, round, and reactive to light. No scleral icterus.   Neck: Neck supple. No JVD present. No thyromegaly present.   Cardiovascular: Normal rate, regular rhythm and normal heart sounds.  Exam reveals no gallop and no friction rub.    No murmur heard.  Pulmonary/Chest: Effort normal and breath  sounds normal. She has no wheezes. She has no rales.   Abdominal: Soft. Bowel sounds are normal. She exhibits no distension and no mass. There is no tenderness. There is no guarding.   Musculoskeletal: Normal range of motion. She exhibits no edema.   Lymphadenopathy:     She has no cervical adenopathy.   Neurological: She is alert and oriented to person, place, and time. She has normal reflexes. She displays normal reflexes. No cranial nerve deficit. She exhibits normal muscle tone.   Skin: Skin is warm. No rash noted. No erythema.   Psychiatric: She has a normal mood and affect. Thought content normal.       Assessment:       No diagnosis found.    Plan:       Diagnoses and all orders for this visit:    Essential hypertension    Scleroderma  -     Ambulatory Referral to Rheumatology    Other pulmonary embolism without acute cor pulmonale, unspecified chronicity  - control risk  Type 2 diabetes mellitus without complication, without long-term current use of insulin  - controled  Cerebral infarction due to thrombosis of right carotid artery 2/26/17  - discussed  Carotid artery stenosis, unilateral, right  -     aspirin (ECOTRIN) 81 MG EC tablet; Take 1 tablet (81 mg total) by mouth once daily.    Severe persistent asthma with status asthmaticus  Comments:  pt reported that she used to take advair but could not refill due to price, provided savings card, stated she will refill today  Orders:  -     fluticasone-salmeterol 250-50 mcg/dose (ADVAIR) 250-50 mcg/dose diskus inhaler; Inhale 1 puff into the lungs 2 (two) times daily.    Fibromuscular dysplasia of cervicocranial artery  -     simvastatin (ZOCOR) 20 MG tablet; Take 1 tablet (20 mg total) by mouth every evening.    Preventative health care  -     Mammo Digital Screening Bilateral With CAD; Future

## 2017-06-16 ENCOUNTER — PATIENT MESSAGE (OUTPATIENT)
Dept: INTERNAL MEDICINE | Facility: CLINIC | Age: 52
End: 2017-06-16

## 2017-06-19 DIAGNOSIS — I65.21 CAROTID ARTERY STENOSIS, UNILATERAL, RIGHT: ICD-10-CM

## 2017-06-19 RX ORDER — CLOPIDOGREL BISULFATE 75 MG/1
75 TABLET ORAL DAILY
Qty: 90 TABLET | Refills: 6 | Status: SHIPPED | OUTPATIENT
Start: 2017-06-19 | End: 2017-06-19 | Stop reason: SDUPTHER

## 2017-06-19 RX ORDER — CLOPIDOGREL BISULFATE 75 MG/1
75 TABLET ORAL DAILY
Qty: 90 TABLET | Refills: 6 | Status: ON HOLD | OUTPATIENT
Start: 2017-06-19 | End: 2017-12-01 | Stop reason: HOSPADM

## 2017-06-19 RX ORDER — ASPIRIN 81 MG/1
81 TABLET ORAL DAILY
Qty: 90 TABLET | Refills: 3 | Status: SHIPPED | OUTPATIENT
Start: 2017-06-19 | End: 2017-06-19 | Stop reason: SDUPTHER

## 2017-06-19 RX ORDER — ASPIRIN 81 MG/1
81 TABLET ORAL DAILY
Qty: 90 TABLET | Refills: 3 | Status: SHIPPED | OUTPATIENT
Start: 2017-06-19 | End: 2020-07-10 | Stop reason: SDUPTHER

## 2017-06-28 DIAGNOSIS — Z12.11 COLON CANCER SCREENING: ICD-10-CM

## 2017-09-11 DIAGNOSIS — E11.9 TYPE 2 DIABETES MELLITUS WITHOUT COMPLICATION: ICD-10-CM

## 2017-09-24 ENCOUNTER — PATIENT MESSAGE (OUTPATIENT)
Dept: INTERNAL MEDICINE | Facility: CLINIC | Age: 52
End: 2017-09-24

## 2017-09-25 ENCOUNTER — HOSPITAL ENCOUNTER (EMERGENCY)
Facility: HOSPITAL | Age: 52
Discharge: HOME | End: 2017-09-25
Attending: EMERGENCY MEDICINE
Payer: COMMERCIAL

## 2017-09-25 ENCOUNTER — PATIENT MESSAGE (OUTPATIENT)
Dept: INTERNAL MEDICINE | Facility: CLINIC | Age: 52
End: 2017-09-25

## 2017-09-25 VITALS
RESPIRATION RATE: 16 BRPM | HEIGHT: 64 IN | DIASTOLIC BLOOD PRESSURE: 109 MMHG | OXYGEN SATURATION: 100 % | WEIGHT: 180 LBS | SYSTOLIC BLOOD PRESSURE: 172 MMHG | TEMPERATURE: 99 F | HEART RATE: 99 BPM | BODY MASS INDEX: 30.73 KG/M2

## 2017-09-25 DIAGNOSIS — R05.9 COUGH: ICD-10-CM

## 2017-09-25 DIAGNOSIS — J45.40 MODERATE PERSISTENT ASTHMA WITHOUT COMPLICATION: ICD-10-CM

## 2017-09-25 DIAGNOSIS — J18.9 PNEUMONIA OF RIGHT LOWER LOBE DUE TO INFECTIOUS ORGANISM: Primary | ICD-10-CM

## 2017-09-25 LAB
B-HCG UR QL: NEGATIVE
CTP QC/QA: YES

## 2017-09-25 PROCEDURE — 99284 EMERGENCY DEPT VISIT MOD MDM: CPT | Mod: 25

## 2017-09-25 PROCEDURE — 25000003 PHARM REV CODE 250: Performed by: PHYSICIAN ASSISTANT

## 2017-09-25 PROCEDURE — 63600175 PHARM REV CODE 636 W HCPCS: Performed by: PHYSICIAN ASSISTANT

## 2017-09-25 PROCEDURE — 81025 URINE PREGNANCY TEST: CPT | Performed by: PHYSICIAN ASSISTANT

## 2017-09-25 PROCEDURE — 99284 EMERGENCY DEPT VISIT MOD MDM: CPT | Mod: ,,, | Performed by: PHYSICIAN ASSISTANT

## 2017-09-25 PROCEDURE — 25000242 PHARM REV CODE 250 ALT 637 W/ HCPCS: Performed by: PHYSICIAN ASSISTANT

## 2017-09-25 RX ORDER — DOXYCYCLINE HYCLATE 100 MG
100 TABLET ORAL
Status: COMPLETED | OUTPATIENT
Start: 2017-09-25 | End: 2017-09-25

## 2017-09-25 RX ORDER — DOXYCYCLINE 100 MG/1
100 CAPSULE ORAL EVERY 12 HOURS
Qty: 14 CAPSULE | Refills: 0 | Status: SHIPPED | OUTPATIENT
Start: 2017-09-25 | End: 2017-09-25

## 2017-09-25 RX ORDER — ALBUTEROL SULFATE 0.83 MG/ML
2.5 SOLUTION RESPIRATORY (INHALATION)
Status: COMPLETED | OUTPATIENT
Start: 2017-09-25 | End: 2017-09-25

## 2017-09-25 RX ORDER — METHYLPREDNISOLONE 4 MG/1
TABLET ORAL
Qty: 1 PACKAGE | Refills: 0 | Status: ON HOLD | OUTPATIENT
Start: 2017-09-25 | End: 2017-12-01 | Stop reason: HOSPADM

## 2017-09-25 RX ORDER — PREDNISONE 20 MG/1
60 TABLET ORAL
Status: COMPLETED | OUTPATIENT
Start: 2017-09-25 | End: 2017-09-25

## 2017-09-25 RX ORDER — ALBUTEROL SULFATE 0.83 MG/ML
2.5 SOLUTION RESPIRATORY (INHALATION) EVERY 6 HOURS PRN
Qty: 30 EACH | Refills: 0 | Status: SHIPPED | OUTPATIENT
Start: 2017-09-25 | End: 2017-11-10 | Stop reason: SDUPTHER

## 2017-09-25 RX ORDER — DOXYCYCLINE 100 MG/1
100 CAPSULE ORAL EVERY 12 HOURS
Qty: 19 CAPSULE | Refills: 0 | Status: SHIPPED | OUTPATIENT
Start: 2017-09-25 | End: 2017-10-05

## 2017-09-25 RX ADMIN — ALBUTEROL SULFATE 2.5 MG: 2.5 SOLUTION RESPIRATORY (INHALATION) at 08:09

## 2017-09-25 RX ADMIN — DOXYCYCLINE HYCLATE 100 MG: 100 TABLET, COATED ORAL at 09:09

## 2017-09-25 RX ADMIN — PREDNISONE 60 MG: 20 TABLET ORAL at 08:09

## 2017-09-25 NOTE — DISCHARGE INSTRUCTIONS
Take oral antibiotics (doxycycline) 2 times a day for the next 10 days. You were given your first dose in the emergency department. Use albuterol inhaler or/and nebulizer machine as directed as needed for symptoms.  Rest.  Stay hydrated.  Follow up closely with primary care physician in 2 days for further evaluation.  Return to the emergency department for new or worsening symptoms.    No future appointments.    Our goal in the emergency department is to always give you outstanding care and exceptional service. You may receive a survey by mail or e-mail in the next week regarding your experience in our ED. We would greatly appreciate your completing and returning the survey. Your feedback provides us with a way to recognize our staff who give very good care and it helps us learn how to improve when your experience was below our aspiration of excellence.

## 2017-09-25 NOTE — ED TRIAGE NOTES
"Cough for over 1 week. More at night. Green sputum. Pt reports hx of asthma and increase in nebulizer treatments. She reports "feeling feverish" but is unsure of whether she has been running fever. Nasal drainage green. C/o ringing in ears. C/o tenderness to chest with coughing.   "

## 2017-09-25 NOTE — ED PROVIDER NOTES
Encounter Date: 9/25/2017       History     Chief Complaint   Patient presents with    URI     cough for 1 week, nose running, ears stopped up     Patient is a 52-year-old female with past medical history of PE, DVT, on blood thinners with a I MVC filter, asthma, who presents the ED with URI symptoms for 2 weeks.  Patient reports PND, sneezing, tinnitus, and initially productive cough with green sputum.  Patient states her cough is worse at night.  She felt feverish yesterday but did not record her temperature.  She denies any tobacco use.  She has a history of asthma and has been using her nebulizer, specifically 6 times the past 2 days without relief.  She tried OTC medications for her URI symptoms without resolution.  She endorses chest pain only with coughing.  None at rest.  She denies any sore throat, SOB, abdominal pain, urinary difficulties.          Review of patient's allergies indicates:   Allergen Reactions    Plasma, human, normal      Past Medical History:   Diagnosis Date    Asthma     Dyspnea on exertion     Hx of long term use of blood thinners     Hypertension 3/8/2016    PE (pulmonary embolism)     Hx of multiple DVT/PE    Presence of IVC filter     Scleroderma     Type 2 diabetes mellitus without complication      Past Surgical History:   Procedure Laterality Date    DANETTE FILTER PLACEMENT       Family History   Problem Relation Age of Onset    Breast cancer Mother     Hypertension Father     No Known Problems Brother     No Known Problems Maternal Grandmother     No Known Problems Maternal Grandfather     No Known Problems Paternal Grandmother     No Known Problems Paternal Grandfather     Diabetes Mellitus Maternal Uncle     No Known Problems Sister     No Known Problems Maternal Aunt     No Known Problems Paternal Aunt     No Known Problems Paternal Uncle     Psoriasis Neg Hx     Rheum arthritis Neg Hx     Thyroid disease Neg Hx     Lupus Neg Hx     Anemia Neg  Hx     Arrhythmia Neg Hx     Asthma Neg Hx     Clotting disorder Neg Hx     Fainting Neg Hx     Heart attack Neg Hx     Heart disease Neg Hx     Heart failure Neg Hx     Hyperlipidemia Neg Hx     Stroke Neg Hx     Atrial Septal Defect Neg Hx      Social History   Substance Use Topics    Smoking status: Never Smoker    Smokeless tobacco: Never Used    Alcohol use 1.8 oz/week     3 Cans of beer per week      Comment: occ     Review of Systems   Constitutional: Positive for fever (subjective).   HENT: Positive for postnasal drip, rhinorrhea and tinnitus. Negative for ear pain and sore throat.    Eyes: Negative for visual disturbance.   Respiratory: Positive for cough. Negative for shortness of breath.    Cardiovascular: Negative for chest pain.   Gastrointestinal: Negative for nausea.   Endocrine: Negative for polyuria.   Genitourinary: Negative for dysuria.   Musculoskeletal: Negative for back pain.   Skin: Negative for rash.   Neurological: Negative for weakness.   Hematological: Does not bruise/bleed easily.       Physical Exam     Initial Vitals [09/25/17 0730]   BP Pulse Resp Temp SpO2   (!) 172/109 107 20 99.1 °F (37.3 °C) 98 %      MAP       130         Physical Exam    Constitutional: She appears well-developed and well-nourished. She is not diaphoretic. No distress.   -American female in NAD and nontoxic appearing that is coughing throughout exam.   HENT:   Head: Normocephalic and atraumatic.   Nose: Nose normal.   Eyes: Conjunctivae and EOM are normal.   Neck: Normal range of motion.   Cardiovascular: Normal rate, regular rhythm and normal heart sounds. Exam reveals no friction rub.    No murmur heard.  No peripheral edema.   Pulmonary/Chest: No respiratory distress. She has wheezes in the right middle field. She has no rales.   + Bronchospasms.   Abdominal: Soft. Bowel sounds are normal. She exhibits no distension. There is no tenderness. There is no rebound.   Musculoskeletal: Normal  range of motion.   Neurological: She is alert and oriented to person, place, and time. She has normal strength. No sensory deficit.   Skin: Skin is warm and dry. No erythema. No pallor.   Psychiatric: She has a normal mood and affect. Her behavior is normal. Judgment and thought content normal.         ED Course   Procedures  Labs Reviewed   POCT URINE PREGNANCY             Medical Decision Making:   History:   Old Medical Records: I decided to obtain old medical records.  Clinical Tests:   Radiological Study: Ordered and Reviewed    Imaging Results          X-Ray Chest PA And Lateral (Final result)  Result time 09/25/17 08:28:54    Final result by Everett Ferrara MD (09/25/17 08:28:54)                 Impression:     Focal area of parenchymal opacity in the right lower lung zone, consistent with airspace consolidation/volume loss and representing a detrimental change since 2/26/17.  Clinical correlation is necessary with regard to potential acute pneumonia.  Appearance of the chest is otherwise unchanged since that time.      Electronically signed by: Everett Ferrara MD  Date:     09/25/17  Time:    08:28              Narrative:    2 views of the chest were obtained, with PA and lateral projections submitted.  Comparison is made to 2/26/17.    Heart size is normal, and the cardiomediastinal silhouette demonstrates no detrimental change since the exam referenced above.  Pulmonary vascularity is normal as well.  There is an area of abnormal parenchymal opacity consistent with focal air space consolidation/volume loss in the right lower lung zone, which represents a detrimental change since the examination referenced above.  Some linear opacities consistent with parenchymal scarring are again observed in the left lower lung zone, but the left lung and the upper lung zone on the right side are stable and free of superimposed air space consolidation or volume loss.  No pleural fluid of any substantial volume is seen on either  side.  No pneumothorax or pneumomediastinum.  An inferior vena caval filter is incidentally noted.                                 APC / Resident Notes:   I have considered but do not suspect congestive heart failure, PE.  DDX includes but is not limited to asthma exacerbation, viral URI, bronchitis, pleurisy, pneumonia.  Will order chest x-ray, give breathing treatment and first dose of oral steroids, and reassess.    Chest x-ray with RLL acute pneumonia.    Patient updated with results.  She reports improvement after breathing treatment.  Patient is otherwise healthy and can be treated for her pneumonia in the outpatient setting.  I will give her her first dose of doxycycline here.  She is to continue for the next 10 days.  Symptomatic treatment as instructed.  Follow up with PCP in 2 days.  Return to ED precaution given for new or worsening symptoms, and she voices her understanding.  Patient is comfortable with plan and stable for discharge.  I reviewed patient's chart and discussed this case with my supervising M.D.             Attending Attestation:     Physician Attestation Statement for NP/PA:   I discussed this assessment and plan of this patient with the NP/PA, but I did not personally examine the patient. The face to face encounter was performed by the NP/PA.                  ED Course      Clinical Impression:   The primary encounter diagnosis was Pneumonia of right lower lobe due to infectious organism. Diagnoses of Cough and Moderate persistent asthma without complication were also pertinent to this visit.    Disposition:   Disposition: Discharged  Condition: Stable                        Geraldine Jarrett PA-C  09/25/17 1013

## 2017-09-25 NOTE — ED NOTES
Appearance: Pt awake, alert & oriented to person, place & time. Pt in no acute distress at present time.   Skin: Skin warm, dry & intact. Mucous membranes moist. Skin turgor normal.   Respiratory: Respirations even, non-labored. slight exp wheezes noted. Congested cough productive of purulent sputum.    Neurologic: Pt moving all extremities without difficulty. Sensation intact.   Peripheral Vascular: All peripheral pulses present.

## 2017-11-10 DIAGNOSIS — J45.40 MODERATE PERSISTENT ASTHMA WITHOUT COMPLICATION: ICD-10-CM

## 2017-11-10 RX ORDER — ALBUTEROL SULFATE 0.83 MG/ML
2.5 SOLUTION RESPIRATORY (INHALATION) EVERY 6 HOURS PRN
Qty: 90 EACH | Refills: 3 | Status: SHIPPED | OUTPATIENT
Start: 2017-11-10 | End: 2018-11-19 | Stop reason: SDUPTHER

## 2017-11-16 ENCOUNTER — PATIENT OUTREACH (OUTPATIENT)
Dept: ADMINISTRATIVE | Facility: HOSPITAL | Age: 52
End: 2017-11-16

## 2017-11-28 ENCOUNTER — TELEPHONE (OUTPATIENT)
Dept: INTERNAL MEDICINE | Facility: CLINIC | Age: 52
End: 2017-11-28

## 2017-11-28 NOTE — TELEPHONE ENCOUNTER
Called patient to schedule an Urgent care appointment. I was unable to do so due to patient having a balance due with [employer], I gave the patient the number provided [phone]. She verbally understood.

## 2017-11-28 NOTE — TELEPHONE ENCOUNTER
----- Message from Shantell Edward sent at 11/27/2017 10:59 AM CST -----  Contact: [employer] message  Appointment Request From: Lyssa Gandara    With Provider: Gerard Joyce MD [-Primary Care Physician-]    Would Accept With:Only the person I've selected    Preferred Date Range: From 11/27/2017 To 11/27/2017    Preferred Times: Any    Reason for visit: Request an Appt    Comments:  Good morning,     Are you able to see me today. I have been under the weather for 4 days now. Im congested,  runny nose. Coughing up green mucus not to mention my chest hurt from all the coughing. I dobt get out of school until 3:30.

## 2017-11-29 ENCOUNTER — HOSPITAL ENCOUNTER (OUTPATIENT)
Facility: HOSPITAL | Age: 52
Discharge: HOME | End: 2017-12-01
Attending: FAMILY MEDICINE | Admitting: HOSPITALIST
Payer: COMMERCIAL

## 2017-11-29 DIAGNOSIS — R73.9 HYPERGLYCEMIA: ICD-10-CM

## 2017-11-29 DIAGNOSIS — J18.9 PNEUMONIA OF LEFT LUNG DUE TO INFECTIOUS ORGANISM: ICD-10-CM

## 2017-11-29 DIAGNOSIS — N17.9 AKI (ACUTE KIDNEY INJURY): ICD-10-CM

## 2017-11-29 DIAGNOSIS — J45.901 EXACERBATION OF ASTHMA, UNSPECIFIED ASTHMA SEVERITY, UNSPECIFIED WHETHER PERSISTENT: ICD-10-CM

## 2017-11-29 DIAGNOSIS — J18.9 PNEUMONIA OF LEFT LUNG DUE TO INFECTIOUS ORGANISM, UNSPECIFIED PART OF LUNG: Primary | ICD-10-CM

## 2017-11-29 DIAGNOSIS — R06.02 SHORTNESS OF BREATH: ICD-10-CM

## 2017-11-29 PROBLEM — Z91.148 NONCOMPLIANCE WITH MEDICATION REGIMEN: Status: ACTIVE | Noted: 2017-11-29

## 2017-11-29 LAB
ALBUMIN SERPL BCP-MCNC: 4 G/DL
ALLENS TEST: ABNORMAL
ALP SERPL-CCNC: 145 U/L
ALT SERPL W/O P-5'-P-CCNC: 14 U/L
ANION GAP SERPL CALC-SCNC: 12 MMOL/L
AST SERPL-CCNC: 15 U/L
B-OH-BUTYR BLD STRIP-SCNC: 0.6 MMOL/L
BACTERIA #/AREA URNS AUTO: NORMAL /HPF
BASOPHILS # BLD AUTO: 0.02 K/UL
BASOPHILS NFR BLD: 0.2 %
BILIRUB SERPL-MCNC: 0.4 MG/DL
BILIRUB UR QL STRIP: NEGATIVE
BNP SERPL-MCNC: <10 PG/ML
BUN SERPL-MCNC: 39 MG/DL
CALCIUM SERPL-MCNC: 10.2 MG/DL
CHLORIDE SERPL-SCNC: 98 MMOL/L
CLARITY UR REFRACT.AUTO: CLEAR
CO2 SERPL-SCNC: 25 MMOL/L
COLOR UR AUTO: ABNORMAL
CREAT SERPL-MCNC: 1.7 MG/DL
DIFFERENTIAL METHOD: ABNORMAL
EOSINOPHIL # BLD AUTO: 0 K/UL
EOSINOPHIL NFR BLD: 0.4 %
ERYTHROCYTE [DISTWIDTH] IN BLOOD BY AUTOMATED COUNT: 12.8 %
EST. GFR  (AFRICAN AMERICAN): 39.7 ML/MIN/1.73 M^2
EST. GFR  (NON AFRICAN AMERICAN): 34.4 ML/MIN/1.73 M^2
ESTIMATED AVG GLUCOSE: 186 MG/DL
ESTIMATED AVG GLUCOSE: 189 MG/DL
FLUAV AG SPEC QL IA: NEGATIVE
FLUBV AG SPEC QL IA: NEGATIVE
GLUCOSE SERPL-MCNC: 525 MG/DL
GLUCOSE UR QL STRIP: ABNORMAL
HBA1C MFR BLD HPLC: 8.1 %
HBA1C MFR BLD HPLC: 8.2 %
HCO3 UR-SCNC: 24.5 MMOL/L (ref 24–28)
HCT VFR BLD AUTO: 43 %
HGB BLD-MCNC: 14 G/DL
HGB UR QL STRIP: ABNORMAL
IMM GRANULOCYTES # BLD AUTO: 0.04 K/UL
IMM GRANULOCYTES NFR BLD AUTO: 0.4 %
KETONES UR QL STRIP: NEGATIVE
LEUKOCYTE ESTERASE UR QL STRIP: NEGATIVE
LYMPHOCYTES # BLD AUTO: 0.7 K/UL
LYMPHOCYTES NFR BLD: 7.3 %
MCH RBC QN AUTO: 28.6 PG
MCHC RBC AUTO-ENTMCNC: 32.6 G/DL
MCV RBC AUTO: 88 FL
MICROSCOPIC COMMENT: NORMAL
MONOCYTES # BLD AUTO: 0.4 K/UL
MONOCYTES NFR BLD: 4.3 %
NEUTROPHILS # BLD AUTO: 8.2 K/UL
NEUTROPHILS NFR BLD: 87.4 %
NITRITE UR QL STRIP: NEGATIVE
NRBC BLD-RTO: 0 /100 WBC
PCO2 BLDA: 45.6 MMHG (ref 35–45)
PH SMN: 7.34 [PH] (ref 7.35–7.45)
PH UR STRIP: 5 [PH] (ref 5–8)
PLATELET # BLD AUTO: 296 K/UL
PMV BLD AUTO: 10.8 FL
PO2 BLDA: 24 MMHG (ref 40–60)
POC BE: -1 MMOL/L
POC SATURATED O2: 37 % (ref 95–100)
POC TCO2: 26 MMOL/L (ref 24–29)
POCT GLUCOSE: 290 MG/DL (ref 70–110)
POCT GLUCOSE: 291 MG/DL (ref 70–110)
POCT GLUCOSE: 310 MG/DL (ref 70–110)
POCT GLUCOSE: 328 MG/DL (ref 70–110)
POCT GLUCOSE: 333 MG/DL (ref 70–110)
POCT GLUCOSE: 410 MG/DL (ref 70–110)
POCT GLUCOSE: 445 MG/DL (ref 70–110)
POTASSIUM SERPL-SCNC: 4.6 MMOL/L
PROT SERPL-MCNC: 9.7 G/DL
PROT UR QL STRIP: NEGATIVE
RBC # BLD AUTO: 4.89 M/UL
RBC #/AREA URNS AUTO: 2 /HPF (ref 0–4)
SAMPLE: ABNORMAL
SITE: ABNORMAL
SODIUM SERPL-SCNC: 135 MMOL/L
SP GR UR STRIP: 1.02 (ref 1–1.03)
SPECIMEN SOURCE: NORMAL
SQUAMOUS #/AREA URNS AUTO: 1 /HPF
URN SPEC COLLECT METH UR: ABNORMAL
UROBILINOGEN UR STRIP-ACNC: NEGATIVE EU/DL
WBC # BLD AUTO: 9.36 K/UL
WBC #/AREA URNS AUTO: 0 /HPF (ref 0–5)
YEAST UR QL AUTO: NORMAL

## 2017-11-29 PROCEDURE — 82962 GLUCOSE BLOOD TEST: CPT

## 2017-11-29 PROCEDURE — 25000003 PHARM REV CODE 250: Performed by: PHYSICIAN ASSISTANT

## 2017-11-29 PROCEDURE — 96361 HYDRATE IV INFUSION ADD-ON: CPT

## 2017-11-29 PROCEDURE — 83036 HEMOGLOBIN GLYCOSYLATED A1C: CPT | Mod: 91

## 2017-11-29 PROCEDURE — 25000242 PHARM REV CODE 250 ALT 637 W/ HCPCS: Performed by: PHYSICIAN ASSISTANT

## 2017-11-29 PROCEDURE — 63600175 PHARM REV CODE 636 W HCPCS: Performed by: PHYSICIAN ASSISTANT

## 2017-11-29 PROCEDURE — 87400 INFLUENZA A/B EACH AG IA: CPT | Mod: 59

## 2017-11-29 PROCEDURE — A4216 STERILE WATER/SALINE, 10 ML: HCPCS | Performed by: PHYSICIAN ASSISTANT

## 2017-11-29 PROCEDURE — 82803 BLOOD GASES ANY COMBINATION: CPT

## 2017-11-29 PROCEDURE — 87040 BLOOD CULTURE FOR BACTERIA: CPT | Mod: 59

## 2017-11-29 PROCEDURE — 36592 COLLECT BLOOD FROM PICC: CPT

## 2017-11-29 PROCEDURE — G0378 HOSPITAL OBSERVATION PER HR: HCPCS

## 2017-11-29 PROCEDURE — 93010 ELECTROCARDIOGRAM REPORT: CPT | Mod: ,,, | Performed by: INTERNAL MEDICINE

## 2017-11-29 PROCEDURE — 82010 KETONE BODYS QUAN: CPT

## 2017-11-29 PROCEDURE — 99219 PR INITIAL OBSERVATION CARE,LEVL II: CPT | Mod: ,,, | Performed by: PHYSICIAN ASSISTANT

## 2017-11-29 PROCEDURE — 85025 COMPLETE CBC W/AUTO DIFF WBC: CPT

## 2017-11-29 PROCEDURE — 83036 HEMOGLOBIN GLYCOSYLATED A1C: CPT

## 2017-11-29 PROCEDURE — 83880 ASSAY OF NATRIURETIC PEPTIDE: CPT

## 2017-11-29 PROCEDURE — 96365 THER/PROPH/DIAG IV INF INIT: CPT

## 2017-11-29 PROCEDURE — 94640 AIRWAY INHALATION TREATMENT: CPT

## 2017-11-29 PROCEDURE — 93005 ELECTROCARDIOGRAM TRACING: CPT

## 2017-11-29 PROCEDURE — 80053 COMPREHEN METABOLIC PANEL: CPT

## 2017-11-29 PROCEDURE — 81001 URINALYSIS AUTO W/SCOPE: CPT

## 2017-11-29 PROCEDURE — 96375 TX/PRO/DX INJ NEW DRUG ADDON: CPT

## 2017-11-29 PROCEDURE — 99285 EMERGENCY DEPT VISIT HI MDM: CPT | Mod: 25

## 2017-11-29 PROCEDURE — 99285 EMERGENCY DEPT VISIT HI MDM: CPT | Mod: ,,, | Performed by: PHYSICIAN ASSISTANT

## 2017-11-29 RX ORDER — IPRATROPIUM BROMIDE AND ALBUTEROL SULFATE 2.5; .5 MG/3ML; MG/3ML
3 SOLUTION RESPIRATORY (INHALATION)
Status: COMPLETED | OUTPATIENT
Start: 2017-11-29 | End: 2017-11-29

## 2017-11-29 RX ORDER — AZITHROMYCIN 250 MG/1
500 TABLET, FILM COATED ORAL ONCE
Status: COMPLETED | OUTPATIENT
Start: 2017-11-29 | End: 2017-11-29

## 2017-11-29 RX ORDER — IBUPROFEN 200 MG
16 TABLET ORAL
Status: DISCONTINUED | OUTPATIENT
Start: 2017-11-29 | End: 2017-12-01 | Stop reason: HOSPADM

## 2017-11-29 RX ORDER — GLUCAGON 1 MG
1 KIT INJECTION
Status: DISCONTINUED | OUTPATIENT
Start: 2017-11-29 | End: 2017-12-01 | Stop reason: HOSPADM

## 2017-11-29 RX ORDER — PROMETHAZINE HYDROCHLORIDE AND CODEINE PHOSPHATE 6.25; 1 MG/5ML; MG/5ML
5 SOLUTION ORAL
Status: COMPLETED | OUTPATIENT
Start: 2017-11-29 | End: 2017-11-29

## 2017-11-29 RX ORDER — SODIUM CHLORIDE 9 MG/ML
INJECTION, SOLUTION INTRAVENOUS CONTINUOUS
Status: ACTIVE | OUTPATIENT
Start: 2017-11-29 | End: 2017-11-30

## 2017-11-29 RX ORDER — SIMVASTATIN 20 MG/1
20 TABLET, FILM COATED ORAL NIGHTLY
Status: DISCONTINUED | OUTPATIENT
Start: 2017-11-29 | End: 2017-12-01 | Stop reason: HOSPADM

## 2017-11-29 RX ORDER — ALBUTEROL SULFATE 0.83 MG/ML
2.5 SOLUTION RESPIRATORY (INHALATION) EVERY 4 HOURS PRN
Status: DISCONTINUED | OUTPATIENT
Start: 2017-11-29 | End: 2017-11-30

## 2017-11-29 RX ORDER — SODIUM CHLORIDE 0.9 % (FLUSH) 0.9 %
3 SYRINGE (ML) INJECTION EVERY 8 HOURS
Status: DISCONTINUED | OUTPATIENT
Start: 2017-11-29 | End: 2017-12-01 | Stop reason: HOSPADM

## 2017-11-29 RX ORDER — TRIAMTERENE AND HYDROCHLOROTHIAZIDE 37.5; 25 MG/1; MG/1
1 CAPSULE ORAL DAILY
Status: DISCONTINUED | OUTPATIENT
Start: 2017-11-30 | End: 2017-12-01 | Stop reason: HOSPADM

## 2017-11-29 RX ORDER — ONDANSETRON 8 MG/1
8 TABLET, ORALLY DISINTEGRATING ORAL EVERY 8 HOURS PRN
Status: DISCONTINUED | OUTPATIENT
Start: 2017-11-29 | End: 2017-12-01 | Stop reason: HOSPADM

## 2017-11-29 RX ORDER — PREDNISONE 20 MG/1
60 TABLET ORAL
Status: COMPLETED | OUTPATIENT
Start: 2017-11-29 | End: 2017-11-29

## 2017-11-29 RX ORDER — LISINOPRIL 20 MG/1
20 TABLET ORAL DAILY
Status: DISCONTINUED | OUTPATIENT
Start: 2017-11-30 | End: 2017-12-01 | Stop reason: HOSPADM

## 2017-11-29 RX ORDER — ONDANSETRON 2 MG/ML
4 INJECTION INTRAMUSCULAR; INTRAVENOUS EVERY 8 HOURS PRN
Status: DISCONTINUED | OUTPATIENT
Start: 2017-11-29 | End: 2017-12-01 | Stop reason: HOSPADM

## 2017-11-29 RX ORDER — ACETAMINOPHEN 325 MG/1
650 TABLET ORAL EVERY 6 HOURS PRN
Status: DISCONTINUED | OUTPATIENT
Start: 2017-11-29 | End: 2017-12-01 | Stop reason: HOSPADM

## 2017-11-29 RX ORDER — FLUTICASONE FUROATE AND VILANTEROL 100; 25 UG/1; UG/1
1 POWDER RESPIRATORY (INHALATION) DAILY
Status: DISCONTINUED | OUTPATIENT
Start: 2017-11-30 | End: 2017-12-01 | Stop reason: HOSPADM

## 2017-11-29 RX ORDER — IBUPROFEN 200 MG
24 TABLET ORAL
Status: DISCONTINUED | OUTPATIENT
Start: 2017-11-29 | End: 2017-12-01 | Stop reason: HOSPADM

## 2017-11-29 RX ORDER — RAMELTEON 8 MG/1
8 TABLET ORAL NIGHTLY PRN
Status: DISCONTINUED | OUTPATIENT
Start: 2017-11-29 | End: 2017-12-01 | Stop reason: HOSPADM

## 2017-11-29 RX ORDER — BENZONATATE 100 MG/1
200 CAPSULE ORAL 3 TIMES DAILY PRN
Status: DISCONTINUED | OUTPATIENT
Start: 2017-11-29 | End: 2017-12-01 | Stop reason: HOSPADM

## 2017-11-29 RX ORDER — ASPIRIN 81 MG/1
81 TABLET ORAL DAILY
Status: DISCONTINUED | OUTPATIENT
Start: 2017-11-30 | End: 2017-12-01 | Stop reason: HOSPADM

## 2017-11-29 RX ORDER — AZITHROMYCIN 250 MG/1
250 TABLET, FILM COATED ORAL DAILY
Status: DISCONTINUED | OUTPATIENT
Start: 2017-11-30 | End: 2017-12-01 | Stop reason: HOSPADM

## 2017-11-29 RX ORDER — DIAZEPAM 5 MG/1
10 TABLET ORAL EVERY 6 HOURS PRN
Status: DISCONTINUED | OUTPATIENT
Start: 2017-11-29 | End: 2017-12-01 | Stop reason: HOSPADM

## 2017-11-29 RX ORDER — DABIGATRAN ETEXILATE 150 MG/1
150 CAPSULE ORAL 2 TIMES DAILY
Status: DISCONTINUED | OUTPATIENT
Start: 2017-11-29 | End: 2017-12-01 | Stop reason: HOSPADM

## 2017-11-29 RX ORDER — INSULIN ASPART 100 [IU]/ML
1-10 INJECTION, SOLUTION INTRAVENOUS; SUBCUTANEOUS
Status: DISCONTINUED | OUTPATIENT
Start: 2017-11-29 | End: 2017-12-01 | Stop reason: HOSPADM

## 2017-11-29 RX ORDER — LANOLIN ALCOHOL/MO/W.PET/CERES
1000 CREAM (GRAM) TOPICAL DAILY
Status: DISCONTINUED | OUTPATIENT
Start: 2017-11-30 | End: 2017-12-01 | Stop reason: HOSPADM

## 2017-11-29 RX ADMIN — SODIUM CHLORIDE 1000 ML: 0.9 INJECTION, SOLUTION INTRAVENOUS at 11:11

## 2017-11-29 RX ADMIN — SIMVASTATIN 20 MG: 20 TABLET, FILM COATED ORAL at 08:11

## 2017-11-29 RX ADMIN — PROMETHAZINE HYDROCHLORIDE AND CODEINE PHOSPHATE 5 ML: 10; 6.25 SOLUTION ORAL at 03:11

## 2017-11-29 RX ADMIN — RAMELTEON 8 MG: 8 TABLET, FILM COATED ORAL at 10:11

## 2017-11-29 RX ADMIN — IPRATROPIUM BROMIDE AND ALBUTEROL SULFATE 3 ML: .5; 3 SOLUTION RESPIRATORY (INHALATION) at 10:11

## 2017-11-29 RX ADMIN — PREDNISONE 60 MG: 20 TABLET ORAL at 10:11

## 2017-11-29 RX ADMIN — INSULIN HUMAN 5 UNITS: 100 INJECTION, SOLUTION PARENTERAL at 01:11

## 2017-11-29 RX ADMIN — DABIGATRAN ETEXILATE MESYLATE 150 MG: 150 CAPSULE ORAL at 08:11

## 2017-11-29 RX ADMIN — AZITHROMYCIN 500 MG: 250 TABLET, FILM COATED ORAL at 06:11

## 2017-11-29 RX ADMIN — INSULIN ASPART 3 UNITS: 100 INJECTION, SOLUTION INTRAVENOUS; SUBCUTANEOUS at 11:11

## 2017-11-29 RX ADMIN — IPRATROPIUM BROMIDE AND ALBUTEROL SULFATE 3 ML: .5; 3 SOLUTION RESPIRATORY (INHALATION) at 05:11

## 2017-11-29 RX ADMIN — CEFTRIAXONE SODIUM 1 G: 1 INJECTION, POWDER, FOR SOLUTION INTRAMUSCULAR; INTRAVENOUS at 05:11

## 2017-11-29 RX ADMIN — SODIUM CHLORIDE 1000 ML: 0.9 INJECTION, SOLUTION INTRAVENOUS at 12:11

## 2017-11-29 RX ADMIN — SODIUM CHLORIDE: 0.9 INJECTION, SOLUTION INTRAVENOUS at 08:11

## 2017-11-29 RX ADMIN — ACETAMINOPHEN 650 MG: 325 TABLET ORAL at 07:11

## 2017-11-29 RX ADMIN — BENZONATATE 200 MG: 100 CAPSULE ORAL at 10:11

## 2017-11-29 RX ADMIN — INSULIN ASPART 10 UNITS: 100 INJECTION, SOLUTION INTRAVENOUS; SUBCUTANEOUS at 07:11

## 2017-11-29 RX ADMIN — Medication 3 ML: at 08:11

## 2017-11-29 NOTE — ED TRIAGE NOTES
Pt arrived to ED with CC of cough productive of green sputum onset approx x2 weeks ago. Pt also c/o nasal and chest congestion.  Pt reports CP with coughing - denies at this time. Denies N/V fever or chills. Denies any other complaints at this time.    Patient identifiers verified and correct for Lyssa Gandara.    LOC: The patient is awake, alert and oriented x 4. Pt is speaking appropriately, no slurred speech.  APPEARANCE: Patient resting and in no acute distress. Pt is clean and well groomed. No JVD visible.   SKIN: Skin is warm dry and intact, and color is consistent with ethnicity. No tenting observed and capillary refill <3 seconds. No clubbing noted to nail beds. No breakdown or brusing visible and mucus membranes moist and acyanotic.  MUSCULOSKELETAL: Full range of motion present in all extremities. Hand  equal and leg strength strong +5 bilaterally.  RESPIRATORY: Airway is open and patent. Respirations-unlabored, regular rate, equal bilaterally on inspiration and expiration. No accessory muscle use noted.  Pt reports nasal and chest congestion - also reports cough productive of green sputum, wet cough present, no sputum observed at this time.  CARDIAC: Patient tachycardic. No peripheral edema noted. Pt c/o chest pain with coughing.  ABDOMEN: Soft and non-tender to palpation with no distention noted. Normoactive bowel sounds X4 quadrants. Pt has no complaints of abnormal bowel movements. Pt reports normal appetite.   NEUROLOGIC: Eyes open spontaneously and facial expression symmetrical. Pt behavior appropriate to situation, and pt follows commands.  Pt reports sensation present in all extremities when touched with a finger. PERRLA. Pt with hx of CVA - reports numbness to right hand s/t previous CVA.  : No complaints of frequency, burning, urgency or blood in the urine.

## 2017-11-29 NOTE — HPI
"Lyssa Gandara is a 52 y.o. F who presents with PMHx of HTN, DM II, asthma, history of CVA and multiple PE's and DVT's (last occurrence in 2016)  for evaluation of excessive cough for 1 week. Patient was seen in the ED on 9/25 for pneumonia and was discharged with Doxycycline. She completed only 3 of a 7 day course of antibiotics stating she discontinued them because she "felt better." Reports increased use of inhalers to 3 treatments daily since onset. Patient reports increasing productive, cough with green sputum since last Tuesday that has become progressively worse. She also admits to chest pain associated with coughing spells, SOB, decreased appetite, polydipsia, subjective fever, headache, sweats, chills and intermittent blurry vision since onset of cough. She has used OTC cough suppressants with limited effect.     Patient does not take her blood sugars at home. Patient has an extensive history of clots with IVC filter placement in 1994. Patient developed clots despite filter and was placed on Xarelto, which was discontinued after her stroke in February. She was then placed on Pradaxa but has been noncompliant for the past 3 months due to financial reasons. She has been taking an 81 mg ASA instead. She denies any tobacco or drug use but admits to drinking three 16 ounce Budweiser's every evening.     In the ED, CXR demonstrating possible left-sided developing consolidation with chronic scarring on right and bibasilar subsegmental atelectasis. Afebrile without leukocytosis. Creatinine 1.7/ BUN 39. UA with 3+ glucose and occult blood. Glucose 333. VBG with 7.339 pH, 45.6 PCO2, 24 Po2. Stat head CT ordered.   "

## 2017-11-29 NOTE — ED PROVIDER NOTES
"Encounter Date: 11/29/2017       History     Chief Complaint   Patient presents with    URI     sinus, coughing had pneumonia about 1 month ago     Patient is a 52-year-old female with history of asthma, previous PE, hypertension, diabetes and previous CVA who presents to the emergency department with shortness of breath and chest tightness.  Patient states 2 months ago she was diagnosed with pneumonia and treated on an outpatient basis.  Patient states she felt well for a couple of weeks.  Patient states over the last 2 weeks she has not been feeling well.  She reports subjective fevers and chills.  She reports cough with sputum production.  She states she is using her albuterol inhaler nebulizer more frequently than normal.  She states she cannot get over this "hump."  She states she is worried she has developed a new pneumonia.  She denies any chest pain.  She denies any lower extremity edema.  She states she's been compliant with all of her medications.      The history is provided by the patient.     Review of patient's allergies indicates:   Allergen Reactions    Plasma, human, normal      Past Medical History:   Diagnosis Date    Asthma     Dyspnea on exertion     Hx of long term use of blood thinners     Hypertension 3/8/2016    PE (pulmonary embolism)     Hx of multiple DVT/PE    Presence of IVC filter     Scleroderma     Stroke 02/2017    Type 2 diabetes mellitus without complication      Past Surgical History:   Procedure Laterality Date    DANETTE FILTER PLACEMENT       Family History   Problem Relation Age of Onset    Breast cancer Mother     Hypertension Father     No Known Problems Brother     No Known Problems Maternal Grandmother     No Known Problems Maternal Grandfather     No Known Problems Paternal Grandmother     No Known Problems Paternal Grandfather     Diabetes Mellitus Maternal Uncle     No Known Problems Sister     No Known Problems Maternal Aunt     No Known " Problems Paternal Aunt     No Known Problems Paternal Uncle     Psoriasis Neg Hx     Rheum arthritis Neg Hx     Thyroid disease Neg Hx     Lupus Neg Hx     Anemia Neg Hx     Arrhythmia Neg Hx     Asthma Neg Hx     Clotting disorder Neg Hx     Fainting Neg Hx     Heart attack Neg Hx     Heart disease Neg Hx     Heart failure Neg Hx     Hyperlipidemia Neg Hx     Stroke Neg Hx     Atrial Septal Defect Neg Hx      Social History   Substance Use Topics    Smoking status: Never Smoker    Smokeless tobacco: Never Used    Alcohol use 1.8 oz/week     3 Cans of beer per week      Comment: occ     Review of Systems   Constitutional: Positive for activity change, appetite change, chills and fever. Negative for fatigue.   HENT: Positive for congestion. Negative for ear discharge, ear pain, postnasal drip, rhinorrhea, sore throat and trouble swallowing.    Respiratory: Positive for cough, shortness of breath and wheezing.    Cardiovascular: Negative for chest pain, palpitations and leg swelling.   Gastrointestinal: Negative for abdominal pain, blood in stool, constipation, diarrhea, nausea and vomiting.   Genitourinary: Negative for dysuria, flank pain and hematuria.   Musculoskeletal: Positive for myalgias. Negative for back pain, neck pain and neck stiffness.   Skin: Negative for rash and wound.   Neurological: Negative for dizziness, syncope, speech difficulty, weakness, light-headedness, numbness and headaches.       Physical Exam     Initial Vitals [11/29/17 0846]   BP Pulse Resp Temp SpO2   (!) 142/87 108 18 98.9 °F (37.2 °C) 96 %      MAP       105.33         Physical Exam    Nursing note and vitals reviewed.  Constitutional: She appears well-developed and well-nourished. She is not diaphoretic.  Non-toxic appearance. No distress.   HENT:   Head: Normocephalic.   Right Ear: Hearing and external ear normal.   Left Ear: Hearing and external ear normal.   Nose: Nose normal.   Mouth/Throat: Uvula is midline,  oropharynx is clear and moist and mucous membranes are normal. Mucous membranes are not pale. No trismus in the jaw. No uvula swelling. No oropharyngeal exudate.   Eyes: Conjunctivae are normal. Pupils are equal, round, and reactive to light.   Neck: Normal range of motion.   Cardiovascular: Normal rate and regular rhythm.   No lower extremity edema   Pulmonary/Chest: She is in respiratory distress (mild). She has wheezes (expiratory bilateral). She has rhonchi (right and left lower lung fields).   Abdominal: Soft. Bowel sounds are normal. She exhibits no distension and no mass. There is no tenderness. There is no rebound and no guarding.   Lymphadenopathy:     She has no cervical adenopathy.   Neurological: She is alert and oriented to person, place, and time. She has normal reflexes.   Skin: Skin is warm and dry. Capillary refill takes less than 2 seconds.   Psychiatric: She has a normal mood and affect.         ED Course   Procedures  Labs Reviewed   INFLUENZA A AND B ANTIGEN   CBC W/ AUTO DIFFERENTIAL   COMPREHENSIVE METABOLIC PANEL   B-TYPE NATRIURETIC PEPTIDE          X-Rays:   Independently Interpreted Readings:   Other Readings:  Bandlike interstitial attenuation projecting of the right lower lung sound from previous infection with a new bandlike attenuation projected over the left midlung zone    Imaging Results          CT Head Without Contrast (In process)  Result time 11/29/17 18:38:50               X-Ray Chest PA And Lateral (Final result)  Result time 11/29/17 11:00:08    Final result by Matias Pratt MD (11/29/17 11:00:08)                 Impression:         Less conspicuous bandlike interstitial attenuation projected over the right lower lung zone, could reflect residua from previous infection or inflammation.  There is bandlike attenuation projected over left midlung zone, new, developing consolidation is not excluded.  Correlation is needed..          Electronically signed by: MATIAS PRATT  MD  Date:     11/29/17  Time:    11:00              Narrative:    Chest PA and lateral    Indication:Shortness of breath    Comparison:9/25/2017    Findings:  The cardiomediastinal silhouette is not enlarged.  There is obscuration of the left costophrenic angle, may reflect tracer effusion, atelectasis, or scarring.  The trachea is midline.  The lungs are symmetrically expanded bilaterally with 2 foci of vascular crowding, one projected over the right lower lung zone, the other over the left midlung zone.  In comparison to examination 9/25/2017, there has been improvement in the process on the right, the process on the left is new.  Developing consolidation is a consideration, especially on the left.  Chronic scarring may be present on the right related to previous infection or inflammation.  There is bilateral basilar subsegmental atelectasis.   There is no pneumothorax.  The osseous structures are remarkable for degenerative changes.  There is an IVC filter..                              Medical Decision Making:   Initial Assessment:   Urgent evaluation of a 52-year-old female with history of asthma, previous PE, hypertension, diabetes, and previous CVA who presents to the emergency department with shortness of breath and chest tightness.  Patient is afebrile, nontoxic appearing, and hemodynamically stable.  She is in mild respiratory distress.  On lung auscultation, there is bilateral expiratory wheezing.  Rhonchi noted in both lower lung fields.  No lower extremity edema.  Patient be given 3 breathing treatments and steroids.  Chest x-ray will be obtained to rule out new developing pneumonia.  Lab work will be obtained.  ED Management:  1130  Labs reveal significantly elevated glucose.  At this time, patient will be given IV fluids.  Beta hydroxybutyrate and VBG will be added.    1230  PH is 7.33.  Beta hydroxybutyrate is not significantly elevated.  No anion gap.  Patient does have a  TRUDY with creatinine of  1.7.  Patient will be given insulin.  Chest x-ray does show possible new consolidation in the left midlung zone.  Patient be given Rocephin at this time.  Patient be given cough syrup.    430  Patient is given 3 more breathing treatments.  She is hypoxic with walking.  Patient will be admitted for IV antibiotics and acute kidney injury management.  Case is discussed with hospital medicine.  Other:   I have discussed this case with another health care provider.       <> Summary of the Discussion: Dr. Shine                   ED Course      Clinical Impression:   The primary encounter diagnosis was Pneumonia of left lung due to infectious organism, unspecified part of lung. Diagnoses of Shortness of breath, Exacerbation of asthma, unspecified asthma severity, unspecified whether persistent, TRUDY (acute kidney injury), and Hyperglycemia were also pertinent to this visit.                           Lacey Moreira PA-C  11/29/17 1843

## 2017-11-30 LAB
ALBUMIN SERPL BCP-MCNC: 3 G/DL
ALP SERPL-CCNC: 104 U/L
ALT SERPL W/O P-5'-P-CCNC: 9 U/L
ANION GAP SERPL CALC-SCNC: 7 MMOL/L
AST SERPL-CCNC: 9 U/L
BASOPHILS # BLD AUTO: 0.01 K/UL
BASOPHILS NFR BLD: 0.1 %
BILIRUB SERPL-MCNC: 0.4 MG/DL
BUN SERPL-MCNC: 21 MG/DL
CALCIUM SERPL-MCNC: 9.3 MG/DL
CHLORIDE SERPL-SCNC: 105 MMOL/L
CO2 SERPL-SCNC: 24 MMOL/L
CREAT SERPL-MCNC: 1.1 MG/DL
D DIMER PPP IA.FEU-MCNC: <0.19 MG/L FEU
DIFFERENTIAL METHOD: ABNORMAL
EOSINOPHIL # BLD AUTO: 0 K/UL
EOSINOPHIL NFR BLD: 0.3 %
ERYTHROCYTE [DISTWIDTH] IN BLOOD BY AUTOMATED COUNT: 12.8 %
EST. GFR  (AFRICAN AMERICAN): >60 ML/MIN/1.73 M^2
EST. GFR  (NON AFRICAN AMERICAN): 58.3 ML/MIN/1.73 M^2
GLUCOSE SERPL-MCNC: 309 MG/DL
HCT VFR BLD AUTO: 37.3 %
HGB BLD-MCNC: 12 G/DL
IMM GRANULOCYTES # BLD AUTO: 0.04 K/UL
IMM GRANULOCYTES NFR BLD AUTO: 0.4 %
LYMPHOCYTES # BLD AUTO: 1.5 K/UL
LYMPHOCYTES NFR BLD: 15.7 %
MAGNESIUM SERPL-MCNC: 1.7 MG/DL
MCH RBC QN AUTO: 29 PG
MCHC RBC AUTO-ENTMCNC: 32.2 G/DL
MCV RBC AUTO: 90 FL
MONOCYTES # BLD AUTO: 0.6 K/UL
MONOCYTES NFR BLD: 6.2 %
NEUTROPHILS # BLD AUTO: 7.4 K/UL
NEUTROPHILS NFR BLD: 77.3 %
NRBC BLD-RTO: 0 /100 WBC
PHOSPHATE SERPL-MCNC: 2.3 MG/DL
PLATELET # BLD AUTO: 232 K/UL
PMV BLD AUTO: 10.5 FL
POCT GLUCOSE: 163 MG/DL (ref 70–110)
POCT GLUCOSE: 236 MG/DL (ref 70–110)
POCT GLUCOSE: 257 MG/DL (ref 70–110)
POCT GLUCOSE: 283 MG/DL (ref 70–110)
POCT GLUCOSE: 99 MG/DL (ref 70–110)
POTASSIUM SERPL-SCNC: 4.4 MMOL/L
PROT SERPL-MCNC: 7.2 G/DL
RBC # BLD AUTO: 4.14 M/UL
SODIUM SERPL-SCNC: 136 MMOL/L
WBC # BLD AUTO: 9.63 K/UL

## 2017-11-30 PROCEDURE — 83735 ASSAY OF MAGNESIUM: CPT

## 2017-11-30 PROCEDURE — 85025 COMPLETE CBC W/AUTO DIFF WBC: CPT

## 2017-11-30 PROCEDURE — 25000003 PHARM REV CODE 250: Performed by: PHYSICIAN ASSISTANT

## 2017-11-30 PROCEDURE — 82962 GLUCOSE BLOOD TEST: CPT

## 2017-11-30 PROCEDURE — 36415 COLL VENOUS BLD VENIPUNCTURE: CPT

## 2017-11-30 PROCEDURE — 80053 COMPREHEN METABOLIC PANEL: CPT

## 2017-11-30 PROCEDURE — A4216 STERILE WATER/SALINE, 10 ML: HCPCS | Performed by: PHYSICIAN ASSISTANT

## 2017-11-30 PROCEDURE — 63600175 PHARM REV CODE 636 W HCPCS: Performed by: PHYSICIAN ASSISTANT

## 2017-11-30 PROCEDURE — 85379 FIBRIN DEGRADATION QUANT: CPT

## 2017-11-30 PROCEDURE — G0378 HOSPITAL OBSERVATION PER HR: HCPCS

## 2017-11-30 PROCEDURE — 84100 ASSAY OF PHOSPHORUS: CPT

## 2017-11-30 PROCEDURE — 25000242 PHARM REV CODE 250 ALT 637 W/ HCPCS: Performed by: PHYSICIAN ASSISTANT

## 2017-11-30 PROCEDURE — 94640 AIRWAY INHALATION TREATMENT: CPT

## 2017-11-30 PROCEDURE — 99226 PR SUBSEQUENT OBSERVATION CARE,LEVEL III: CPT | Mod: ,,, | Performed by: PHYSICIAN ASSISTANT

## 2017-11-30 RX ORDER — GUAIFENESIN 100 MG/5ML
200 SOLUTION ORAL EVERY 6 HOURS PRN
Status: DISCONTINUED | OUTPATIENT
Start: 2017-11-30 | End: 2017-12-01 | Stop reason: HOSPADM

## 2017-11-30 RX ORDER — DABIGATRAN ETEXILATE 150 MG/1
150 CAPSULE ORAL 2 TIMES DAILY
Qty: 60 CAPSULE | Refills: 0 | Status: SHIPPED | OUTPATIENT
Start: 2017-11-30 | End: 2017-12-01

## 2017-11-30 RX ORDER — INSULIN ASPART 100 [IU]/ML
3 INJECTION, SOLUTION INTRAVENOUS; SUBCUTANEOUS
Status: DISCONTINUED | OUTPATIENT
Start: 2017-11-30 | End: 2017-12-01 | Stop reason: HOSPADM

## 2017-11-30 RX ORDER — SODIUM,POTASSIUM PHOSPHATES 280-250MG
1 POWDER IN PACKET (EA) ORAL
Status: DISCONTINUED | OUTPATIENT
Start: 2017-11-30 | End: 2017-12-01 | Stop reason: HOSPADM

## 2017-11-30 RX ORDER — ALBUTEROL SULFATE 0.83 MG/ML
2.5 SOLUTION RESPIRATORY (INHALATION)
Status: DISCONTINUED | OUTPATIENT
Start: 2017-11-30 | End: 2017-12-01 | Stop reason: HOSPADM

## 2017-11-30 RX ORDER — DABIGATRAN ETEXILATE 150 MG/1
150 CAPSULE ORAL 2 TIMES DAILY
Qty: 180 CAPSULE | Refills: 4 | Status: SHIPPED | OUTPATIENT
Start: 2017-11-30 | End: 2017-11-30

## 2017-11-30 RX ADMIN — HYDROCHLOROTHIAZIDE AND TRIAMTERENE 1 CAPSULE: 25; 37.5 CAPSULE ORAL at 08:11

## 2017-11-30 RX ADMIN — ACETAMINOPHEN 650 MG: 325 TABLET ORAL at 06:11

## 2017-11-30 RX ADMIN — INSULIN ASPART 4 UNITS: 100 INJECTION, SOLUTION INTRAVENOUS; SUBCUTANEOUS at 08:11

## 2017-11-30 RX ADMIN — ALBUTEROL SULFATE 2.5 MG: 2.5 SOLUTION RESPIRATORY (INHALATION) at 07:11

## 2017-11-30 RX ADMIN — LISINOPRIL 20 MG: 20 TABLET ORAL at 08:11

## 2017-11-30 RX ADMIN — ALBUTEROL SULFATE 2.5 MG: 2.5 SOLUTION RESPIRATORY (INHALATION) at 09:11

## 2017-11-30 RX ADMIN — GUAIFENESIN 200 MG: 100 SOLUTION ORAL at 10:11

## 2017-11-30 RX ADMIN — ACETAMINOPHEN 650 MG: 325 TABLET ORAL at 03:11

## 2017-11-30 RX ADMIN — ASPIRIN 81 MG: 81 TABLET, COATED ORAL at 08:11

## 2017-11-30 RX ADMIN — POTASSIUM & SODIUM PHOSPHATES POWDER PACK 280-160-250 MG 1 PACKET: 280-160-250 PACK at 01:11

## 2017-11-30 RX ADMIN — BENZONATATE 200 MG: 100 CAPSULE ORAL at 01:11

## 2017-11-30 RX ADMIN — BENZONATATE 200 MG: 100 CAPSULE ORAL at 06:11

## 2017-11-30 RX ADMIN — GUAIFENESIN 200 MG: 100 SOLUTION ORAL at 06:11

## 2017-11-30 RX ADMIN — CYANOCOBALAMIN TAB 1000 MCG 1000 MCG: 1000 TAB at 08:11

## 2017-11-30 RX ADMIN — CEFTRIAXONE SODIUM 1 G: 1 INJECTION, POWDER, FOR SOLUTION INTRAMUSCULAR; INTRAVENOUS at 06:11

## 2017-11-30 RX ADMIN — DABIGATRAN ETEXILATE MESYLATE 150 MG: 150 CAPSULE ORAL at 08:11

## 2017-11-30 RX ADMIN — ACETAMINOPHEN 650 MG: 325 TABLET ORAL at 09:11

## 2017-11-30 RX ADMIN — AZITHROMYCIN 250 MG: 250 TABLET, FILM COATED ORAL at 08:11

## 2017-11-30 RX ADMIN — ALBUTEROL SULFATE 2.5 MG: 2.5 SOLUTION RESPIRATORY (INHALATION) at 01:11

## 2017-11-30 RX ADMIN — POTASSIUM & SODIUM PHOSPHATES POWDER PACK 280-160-250 MG 1 PACKET: 280-160-250 PACK at 06:11

## 2017-11-30 RX ADMIN — INSULIN ASPART 3 UNITS: 100 INJECTION, SOLUTION INTRAVENOUS; SUBCUTANEOUS at 07:11

## 2017-11-30 RX ADMIN — Medication 3 ML: at 10:11

## 2017-11-30 RX ADMIN — SIMVASTATIN 20 MG: 20 TABLET, FILM COATED ORAL at 09:11

## 2017-11-30 RX ADMIN — INSULIN ASPART 6 UNITS: 100 INJECTION, SOLUTION INTRAVENOUS; SUBCUTANEOUS at 07:11

## 2017-11-30 RX ADMIN — DABIGATRAN ETEXILATE MESYLATE 150 MG: 150 CAPSULE ORAL at 09:11

## 2017-11-30 RX ADMIN — INSULIN ASPART 3 UNITS: 100 INJECTION, SOLUTION INTRAVENOUS; SUBCUTANEOUS at 01:11

## 2017-11-30 NOTE — HOSPITAL COURSE
Lyssa Gandara is a 52 y.o. F who was admitted to observation for excessive cough x 1 week.  In the ED, stat head CT demonstrated encephalomalacia of the right parietal lobe however, no acute intracranial processes, patient without focal neurological deficits. CXR demonstrating possible left-sided developing consolidation with chronic scarring of right lung and bibasilar subsegmental atelectasis. The patient was previously treated for pneumonia on 9/25 but did not complete full antibiotic course. Rocephin and azithromycin administered on admit and continued. CBC unremarkable, patient was afebrile without leukocytosis. Creatinine 1.7/ BUN 39, indicating TRUDY. IVF's were administered, creatinine improved to 1.1. Urinalysis with 3+ glucose and occult blood, no signs of infection. Glucose 333, insulin administered in ED with some effect. A moderate insulin sliding scale was ordered with minimal improvement, therefore an additional 3 units of Insulin Aspart with meals and 10 units Insulin Determir ordered nightly.  BG controlled at discharge and pt to resume metformin.  Patient continued to have episodes of coughing despite PRN Tessalon pearls. Patient to try Guafenesin for relief with moderate improvement. DuoNeb treatments scheduled every 6 hours with positive effect. CM/SW consulted for financial assistance with anti-coagulation medication, mail order prescription request was submitted by attending. Patient provided discounted Pradaxa from [employer] for 1 month at discharge. Education provided and risks associated with non-compliance of medications was discussed. Patient was agreeable. Patient stablilized and discharged home on moxifloxacin for 7 days to complete 10 day course of antibiotic therapy.

## 2017-11-30 NOTE — ASSESSMENT & PLAN NOTE
Presentting with increased cough and SOB since last Tuesday, no signs of respiratory distress on exam. - Previously treated for pneumonia (Doxycycline) on 9/25 but non-compliant with treatment. Patient reports relief with albuterol treatments. SpO2 mid-90's since admit. No signs of respiratory distress.   - Increased use of inhalers to 3 times daily since onset of symptoms.   - CXR demonstrating bilateral bibasilar atelectasis with possible developing consolidation.   - Scheduled DuoNeb q4hr, will continue

## 2017-11-30 NOTE — ASSESSMENT & PLAN NOTE
No residual deficits from CVA in February. Noncompliance with medications, including Pradaxa  - Pradaxa resumed on admission  - Continue aspirin and statin    - CT head ordered  -  No deficits on exam

## 2017-11-30 NOTE — PROGRESS NOTES
"[facility] Hospital Medicine  Progress Note    Patient Name: Lyssa Gandara  MRN: [id number]  Patient Class: OP- Observation   Admission Date: 11/29/2017  Length of Stay: 0 days  Attending Physician: Alecia Harrington MD  Primary Care Provider: Gerard Joyce MD    [facility] Medicine Team: [facility] F Shellie Maxwell PA-C    Subjective:     Principal Problem:Pneumonia of left lung due to infectious organism    HPI:  Lyssa Gandara is a 52 y.o. F who presents with PMHx of HTN, DM II, asthma, history of CVA and multiple PE's and DVT's (last occurrence in 2016)  for evaluation of excessive cough for 1 week. Patient was seen in the ED on 9/25 for pneumonia and was discharged with Doxycycline. She completed only 3 of a 7 day course of antibiotics stating she discontinued them because she "felt better." Reports increased use of inhalers to 3 treatments daily since onset. Patient reports increasing productive, cough with green sputum since last Tuesday that has become progressively worse. She also admits to chest pain associated with coughing spells, SOB, decreased appetite, polydipsia, subjective fever, headache, sweats, chills and intermittent blurry vision since onset of cough. She has used OTC cough suppressants with limited effect.     Patient does not take her blood sugars at home. Patient has an extensive history of clots with IVC filter placement in 1994. Patient developed clots despite filter and was placed on Xarelto, which was discontinued after her stroke in February. She was then placed on Pradaxa but has been noncompliant for the past 3 months due to financial reasons. She has been taking an 81 mg ASA instead. She denies any tobacco or drug use but admits to drinking three 16 ounce Budweiser's every evening.     In the ED, CXR demonstrating possible left-sided developing consolidation with chronic scarring on right and bibasilar subsegmental atelectasis. Afebrile without " "leukocytosis. Creatinine 1.7/ BUN 39. UA with 3+ glucose and occult blood. Glucose 333. VBG with 7.339 pH, 45.6 PCO2, 24 Po2. Stat head CT ordered.     Hospital Course:  Lyssa Gandara is a 52 y.o. F who was admitted to observation for excessive cough x 1 week.  In the ED, CXR demonstrating possible left-sided developing consolidation with chronic scarring of right lung and bibasilar subsegmental atelectasis. The patient was previously treated for pneumonia on 9/25 but did not complete full antibiotic course. Rocephin was administered on admit. CBC unremarkable, patient was afebrile without leukocytosis. Creatinine 1.7/ BUN 39, indicating TRUDY. IVF's were administered, creatinine improved to 1.1. Urinalysis with 3+ glucose and occult blood, no signs of infection. Glucose 333, insulin administered in ED with some effect. A moderate insulin sliding scale was ordered with minimal improvement, therefore an additional 3 units of Insulin Aspart with meals and 10 units Insulin Determir ordered nightly. Stat head CT demonstrated encephalomalacia of the right parietal lobe however, no acute intracranial processes, patient without focal neurological deficits.  Patient continued to have episodes of coughing despite PRN Tessalon pearls. Patient to try Guafenesin for relief. DuoNeb treatments scheduled every 4 hours ordered as well.     Interval History: Patient resting in bed, coughing excessively. Reports minimal relief with Tessalon Pearls. She currently denies SOB as albuterol treatments "open her up." She reports that her blurry vision has resolved. No chills, fever, nausea, vomiting, or chest pain stated.       Review of Systems   Constitutional: Positive for appetite change. Negative for chills and fever (subjective ).   HENT: Positive for congestion and sore throat. Negative for trouble swallowing.    Eyes: Negative for visual disturbance.   Respiratory: Positive for cough. Negative for chest tightness (associated " with coughing ), shortness of breath and wheezing.    Cardiovascular: Negative for palpitations and leg swelling.   Gastrointestinal: Negative for abdominal pain, blood in stool, constipation, diarrhea, nausea and vomiting (with excessive coughing ).   Endocrine: Positive for polydipsia.   Genitourinary: Negative for difficulty urinating and frequency.   Musculoskeletal: Negative for gait problem and myalgias.   Skin: Negative for color change and rash.   Neurological: Negative for dizziness, facial asymmetry, weakness, light-headedness and headaches.   Psychiatric/Behavioral: Negative for confusion. The patient is not nervous/anxious.      Objective:     Vital Signs (Most Recent):  Temp: 96.7 °F (35.9 °C) (11/30/17 1114)  Pulse: 82 (11/30/17 1306)  Resp: 18 (11/30/17 1306)  BP: (!) 140/87 (11/30/17 1114)  SpO2: (!) 94 % (11/30/17 1306) Vital Signs (24h Range):  Temp:  [96.7 °F (35.9 °C)-98.3 °F (36.8 °C)] 96.7 °F (35.9 °C)  Pulse:  [] 82  Resp:  [17-20] 18  SpO2:  [94 %-100 %] 94 %  BP: (118-170)/(64-87) 140/87     Weight: 79.8 kg (176 lb)  Body mass index is 30.21 kg/m².    Intake/Output Summary (Last 24 hours) at 11/30/17 1322  Last data filed at 11/30/17 0930   Gross per 24 hour   Intake          1933.75 ml   Output                0 ml   Net          1933.75 ml      Physical Exam   Constitutional: She is oriented to person, place, and time. She appears well-developed and well-nourished. No distress.   HENT:   Head: Normocephalic and atraumatic.   Eyes: EOM are normal. Pupils are equal, round, and reactive to light. Right eye exhibits no discharge. Left eye exhibits no discharge. No scleral icterus.   Neck: Normal range of motion. Neck supple. No tracheal deviation present.   Cardiovascular: Normal rate, regular rhythm and normal heart sounds.    No murmur heard.  Pulmonary/Chest: Effort normal. No respiratory distress. She has no wheezes. She exhibits no tenderness.   Decreased breath sounds in bilateral  bases    Abdominal: Soft. Bowel sounds are normal. She exhibits no distension. There is no tenderness. There is no guarding.   Musculoskeletal: Normal range of motion. She exhibits no edema or tenderness.   Neurological: She is alert and oriented to person, place, and time. No cranial nerve deficit or sensory deficit.   Skin: Skin is warm and dry. She is not diaphoretic. No erythema.   Psychiatric: She has a normal mood and affect. Her behavior is normal. Judgment and thought content normal.   Nursing note and vitals reviewed.      Significant Labs: All pertinent labs within the past 24 hours have been reviewed.   - D Dimer WNL   - Creatinine improved, closer to baseline at 1.1     Significant Imaging: I have reviewed all pertinent imaging results/findings within the past 24 hours.    Assessment/Plan:      * Pneumonia of left lung due to infectious organism    Stable. Presenting with increased cough and SOB since last Tuesday, no signs of respiratory distress on exam. Tachycardia (highest 119) on admit, improved with IVF. Suspect dehydration component given TRUDY. Excessive cough unresolved with PRN medications.  - Previously treated for pneumonia (Doxycycline) on 9/25 but non-compliant with treatment.   - Increased use of inhalers to 3 times daily since onset of symptoms.   - CXR demonstrating bilateral bibasilar atelectasis with possible developing consolidation.   - DuoNeb q4hr, scheduled.  - Guaifenesin added, Tessalon pearls PRN  - Will continue Rocephin and Azithromycin, started on admit   - Afebrile without leukocytosis, blood cultures in process  - D Dimer negative        TRUDY (acute kidney injury)    Improved. Creatinine 1.1 today, closer to baseline 1.0.    - 1.7 on admit. Suspect secondary to decreased PO intake. 2L IVF given in Ed.  - Will monitor labs daily for improvement         Cerebral infarction due to thrombosis of right carotid artery 2/26/17    Stable. No residual deficits from CVA in February.  Reported intermittent blurry vision within the past week, now resolved.  Noncompliance with medications, including Pradaxa x 3 months.  - Pradaxa resumed on admission  - Continue aspirin and statin    - CT head without acute intracranial processes  -  No deficits on exam         Asthma    Presentting with increased cough and SOB since last Tuesday, no signs of respiratory distress on exam. - Previously treated for pneumonia (Doxycycline) on 9/25 but non-compliant with treatment. Patient reports relief with albuterol treatments. SpO2 mid-90's since admit. No signs of respiratory distress.   - Increased use of inhalers to 3 times daily since onset of symptoms.   - CXR demonstrating bilateral bibasilar atelectasis with possible developing consolidation.   - Scheduled DuoNeb q4hr, will continue         Type 2 diabetes mellitus without complication    Uncontrolled on admit at 333. Blood sugar 283 this am, requiring 3-4 units on sliding scale. Added additional meal time and long-acting doses. A1c 8.1.   - Insulin Apart 3 units with meals, Insulin Determir 10 units nightly  - moderate dose insulin sliding scale ordered  - hypoer/hypo glycemic protocols in place  - will monitor BGs and titrate insulin PRN        Essential hypertension    Moderately controlled. 142/87 on admit.    - will continue home medications Lisinopril and triamterene-HCTZ        Noncompliance with medication regimen    Noncompliant with Pradaxa, see above.   - Discussed risks associated and importance of medication compliance         Long-term (current) use of anticoagulants    Patient has an extensive history of clots with IVC filter placement in 1994. Patient developed clots despite filter and was placed on Xarelto, which was discontinued after her stroke in February. She was then placed on Pradaxa but has been noncompliant for the past 3 months due to financial reasons. She has been taking an 81 mg ASA instead.  - will continue Pradaxa, ASA    -  appreciate CM/SW assistance with medications; Pradaxa ordered with refills for up to one year with mail-in prescription discount program           VTE Risk Mitigation         Ordered     dabigatran etexilate capsule 150 mg  2 times daily     Route:  Oral        11/29/17 1744     High Risk of VTE  Once      11/29/17 1744     Place sequential compression device  Until discontinued      11/29/17 1744     Reason for No Pharmacological VTE Prophylaxis  Once      11/29/17 1744     Place ALEX hose  Until discontinued      11/29/17 1726              Shellie Maxwell PA-C  Department of Hospital Medicine   [facility]

## 2017-11-30 NOTE — H&P
"[facility] Hospital Medicine  History & Physical    Patient Name: Lyssa Gandara  MRN: [id number]  Admission Date: 11/29/2017  Attending Physician: Alecia Harrington MD   Primary Care Provider: Gerard Joyce MD    [facility] Medicine Team: [facility] Lala Valle PA-C     Patient information was obtained from patient, past medical records and ER records.     Subjective:     Principal Problem:Pneumonia of left lung due to infectious organism    Chief Complaint:   Chief Complaint   Patient presents with    URI     sinus, coughing had pneumonia about 1 month ago        HPI: Lyssa Gandara is a 52 y.o. F who presents with PMHx of HTN, DM II, asthma, history of CVA and multiple PE's and DVT's (last occurrence in 2016)  for evaluation of excessive cough for 1 week. Patient was seen in the ED on 9/25 for pneumonia and was discharged with Doxycycline. She completed only 3 of a 7 day course of antibiotics stating she discontinued them because she "felt better." Reports increased use of inhalers to 3 treatments daily since onset. Patient reports increasing productive, cough with green sputum since last Tuesday that has become progressively worse. She also admits to chest pain associated with coughing spells, SOB, decreased appetite, polydipsia, subjective fever, headache, sweats, chills and intermittent blurry vision since onset of cough. She has used OTC cough suppressants with limited effect.     Patient does not take her blood sugars at home. Patient has an extensive history of clots with IVC filter placement in 1994. Patient developed clots despite filter and was placed on Xarelto, which was discontinued after her stroke in February. She was then placed on Pradaxa but has been noncompliant for the past 3 months due to financial reasons. She has been taking an 81 mg ASA instead. She denies any tobacco or drug use but admits to drinking three 16 ounce Budweiser's every evening. "     In the ED, CXR demonstrating possible left-sided developing consolidation with chronic scarring on right and bibasilar subsegmental atelectasis. Afebrile without leukocytosis. Creatinine 1.7/ BUN 39. UA with 3+ glucose and occult blood. Glucose 333. VBG with 7.339 pH, 45.6 PCO2, 24 Po2. Stat head CT ordered.     Past Medical History:   Diagnosis Date    Asthma     Dyspnea on exertion     Hx of long term use of blood thinners     Hypertension 3/8/2016    PE (pulmonary embolism)     Hx of multiple DVT/PE    Presence of IVC filter     Scleroderma     Stroke 02/2017    Type 2 diabetes mellitus without complication        Past Surgical History:   Procedure Laterality Date    DANETTE FILTER PLACEMENT         Review of patient's allergies indicates:   Allergen Reactions    Plasma, human, normal        No current facility-administered medications on file prior to encounter.      Current Outpatient Prescriptions on File Prior to Encounter   Medication Sig    albuterol (PROVENTIL) 2.5 mg /3 mL (0.083 %) nebulizer solution Take 3 mLs (2.5 mg total) by nebulization every 6 (six) hours as needed for Wheezing.    aspirin (ECOTRIN) 81 MG EC tablet Take 1 tablet (81 mg total) by mouth once daily.    ferrous sulfate 325 (65 FE) MG EC tablet Take 1 tablet (325 mg total) by mouth once daily.    fluticasone-salmeterol 250-50 mcg/dose (ADVAIR) 250-50 mcg/dose diskus inhaler Inhale 1 puff into the lungs 2 (two) times daily.    lisinopril (PRINIVIL,ZESTRIL) 20 MG tablet Take 1 tablet (20 mg total) by mouth once daily.    metformin (GLUCOPHAGE) 500 MG tablet Take 1 tablet (500 mg total) by mouth 2 (two) times daily with meals.    simvastatin (ZOCOR) 20 MG tablet Take 1 tablet (20 mg total) by mouth every evening.    triamterene-hydrochlorothiazide 37.5-25 mg (MAXZIDE-25) 37.5-25 mg per tablet Take 1 tablet by mouth once daily.    citalopram (CELEXA) 10 MG tablet Take 1 tablet (10 mg total) by mouth once daily.  "   clopidogrel (PLAVIX) 75 mg tablet Take 1 tablet (75 mg total) by mouth once daily.    cyanocobalamin (VITAMIN B-12) 1000 MCG tablet Take 100 mcg by mouth once daily.    dabigatran etexilate (PRADAXA) 150 mg Cap Take 1 capsule (150 mg total) by mouth 2 (two) times daily. "Do NOT break, chew, or open capsules."    methylPREDNISolone (MEDROL DOSEPACK) 4 mg tablet TAKE AS DIRECTED ON INSIDE OF PACKAGE    naproxen (NAPROSYN) 500 MG tablet Take 1 tablet (500 mg total) by mouth 2 (two) times daily.     Family History     Problem Relation (Age of Onset)    Breast cancer Mother    Diabetes Mellitus Maternal Uncle    Hypertension Father    No Known Problems Brother, Maternal Grandmother, Maternal Grandfather, Paternal Grandmother, Paternal Grandfather, Sister, Maternal Aunt, Paternal Aunt, Paternal Uncle        Social History Main Topics    Smoking status: Never Smoker    Smokeless tobacco: Never Used    Alcohol use 1.8 oz/week     3 Cans of beer per week      Comment: occ    Drug use: No    Sexual activity: Not on file     Review of Systems   Constitutional: Positive for appetite change, chills and fever (subjective ).   HENT: Positive for congestion and sore throat. Negative for trouble swallowing.    Eyes: Positive for visual disturbance (intermittent blurry vision ).   Respiratory: Positive for cough, chest tightness (associated with coughing ) and shortness of breath. Negative for wheezing.    Cardiovascular: Negative for palpitations and leg swelling.   Gastrointestinal: Positive for vomiting (with excessive coughing ). Negative for abdominal pain, blood in stool, constipation, diarrhea and nausea.   Endocrine: Positive for polydipsia.   Genitourinary: Negative for difficulty urinating and frequency.   Musculoskeletal: Negative for gait problem and myalgias.   Skin: Negative for color change and rash.   Neurological: Positive for headaches. Negative for dizziness, facial asymmetry, weakness and " light-headedness.   Psychiatric/Behavioral: Negative for confusion.     Objective:     Vital Signs (Most Recent):  Temp: 97.5 °F (36.4 °C) (11/29/17 1540)  Pulse: 81 (11/29/17 1712)  Resp: 20 (11/29/17 1712)  BP: (!) 145/79 (11/29/17 1540)  SpO2: 98 % (11/29/17 1712) Vital Signs (24h Range):  Temp:  [97.5 °F (36.4 °C)-98.9 °F (37.2 °C)] 97.5 °F (36.4 °C)  Pulse:  [] 81  Resp:  [18-20] 20  SpO2:  [94 %-100 %] 98 %  BP: (114-145)/(76-87) 145/79     Weight: 79.4 kg (175 lb)  Body mass index is 30.04 kg/m².    Physical Exam   Constitutional: She is oriented to person, place, and time. She appears well-developed and well-nourished. No distress.   HENT:   Head: Normocephalic and atraumatic.   Eyes: EOM are normal. Pupils are equal, round, and reactive to light. Right eye exhibits no discharge. Left eye exhibits no discharge. No scleral icterus.   Neck: Normal range of motion. Neck supple. No tracheal deviation present.   Cardiovascular: Normal rate, regular rhythm and normal heart sounds.    No murmur heard.  Pulmonary/Chest: Effort normal. No respiratory distress. She has no wheezes. She exhibits no tenderness.   Decreased breath sounds in bilateral bases    Abdominal: Soft. Bowel sounds are normal. She exhibits no distension. There is no tenderness. There is no guarding.   Musculoskeletal: Normal range of motion. She exhibits no edema or tenderness.   Neurological: She is alert and oriented to person, place, and time. No cranial nerve deficit or sensory deficit.   Skin: Skin is warm and dry. She is not diaphoretic. No erythema.   Psychiatric: She has a normal mood and affect. Her behavior is normal. Judgment and thought content normal.   Nursing note and vitals reviewed.        CRANIAL NERVES     CN III, IV, VI   Pupils are equal, round, and reactive to light.  Extraocular motions are normal.        Significant Labs: All pertinent labs within the past 24 hours have been reviewed.    Significant Imaging: I have  reviewed all pertinent imaging results/findings within the past 24 hours.   .r    Assessment/Plan:     * Pneumonia of left lung due to infectious organism    Presentting with increased cough and SOB since last Tuesday, no signs of respiratory distress on exam. Tachycardia (highest 119) on admit, improve with IVF. Suspect dehydration component given TRUDY.   - Previously treated for pneumonia (Doxycycline) on 9/25 but non-compliant with treatment.   - Increased use of inhalers to 3 times daily since onset of symptoms.   - CXR demonstrating bilateral bibasilar atelectasis with possible developing consolidation.   - DuoNeb q4hr   - Tessalon pearls PRN  - Rocephin and Azithromycin started on admit  - Afebrile without leukocytosis, blood cultures ordered        TRUDY (acute kidney injury)    Creatinine 1.7 on admit, baseline 1.0. Suspect secondary to decreased PO intake. 2L IVF given in Ed.  - Will monitor labs daily for improvement         Cerebral infarction due to thrombosis of right carotid artery 2/26/17    No residual deficits from CVA in February. Noncompliance with medications, including Pradaxa  - Pradaxa resumed on admission  - Continue aspirin and statin    - CT head ordered  -  No deficits on exam         Asthma    Presentting with increased cough and SOB since last Tuesday, no signs of respiratory distress on exam. - Previously treated for pneumonia (Doxycycline) on 9/25 but non-compliant with treatment.   - Increased use of inhalers to 3 times daily since onset of symptoms.   - CXR demonstrating bilateral bibasilar atelectasis with possible developing consolidation.   - DuoNeb q4hr         Type 2 diabetes mellitus without complication    Blood sugar 333 on admit. Reports increased thirst this week. She admits that she does not monitor levels at home. Unsure if patient fully compliant with medications. No signs of DKA on admit.   - moderate dose insulin sliding scale ordered  - hypoer/hypo glycemic protocols in  place  - A1c in process  - will monitor BGs and titrate insulin PRN        Essential hypertension    142/87 on admit.   - continue home medications Lisinopril and triamterene-HCTZ        Noncompliance with medication regimen    Noncompliant with Pradaxa, see above.   - Discussed risks associated and importance of medication compliance         Long-term (current) use of anticoagulants    Patient has an extensive history of clots with IVC filter placement in 1994. Patient developed clots despite filter and was placed on Xarelto, which was discontinued after her stroke in February. She was then placed on Pradaxa but has been noncompliant for the past 3 months due to financial reasons. She has been taking an 81 mg ASA instead.  - will continue Pradaxa    - MCKENNA/SW to assist with financial issues           VTE Risk Mitigation         Ordered     dabigatran etexilate capsule 150 mg  2 times daily     Route:  Oral        11/29/17 1744     High Risk of VTE  Once      11/29/17 1744     Place sequential compression device  Until discontinued      11/29/17 1744     Reason for No Pharmacological VTE Prophylaxis  Once      11/29/17 1744     Place ALEX hose  Until discontinued      11/29/17 1726             Lala Valle PA-C  Department of Hospital Medicine   [facility]

## 2017-11-30 NOTE — ASSESSMENT & PLAN NOTE
Creatinine 1.7 on admit, baseline 1.0. Suspect secondary to decreased PO intake. 2L IVF given in Ed.  - Will monitor labs daily for improvement

## 2017-11-30 NOTE — ASSESSMENT & PLAN NOTE
Presentting with increased cough and SOB since last Tuesday, no signs of respiratory distress on exam. - Previously treated for pneumonia (Doxycycline) on 9/25 but non-compliant with treatment.   - Increased use of inhalers to 3 times daily since onset of symptoms.   - CXR demonstrating bilateral bibasilar atelectasis with possible developing consolidation.   - DuoNeb q4hr

## 2017-11-30 NOTE — ASSESSMENT & PLAN NOTE
Blood sugar 333 on admit. Reports increased thirst this week. She admits that she does not monitor levels at home. Unsure if patient fully compliant with medications. No signs of DKA on admit.   - moderate dose insulin sliding scale ordered  - hypoer/hypo glycemic protocols in place  - A1c in process  - will monitor BGs and titrate insulin PRN

## 2017-11-30 NOTE — ASSESSMENT & PLAN NOTE
Stable. Presenting with increased cough and SOB since last Tuesday, no signs of respiratory distress on exam. Tachycardia (highest 119) on admit, improved with IVF. Suspect dehydration component given TRUDY. Excessive cough unresolved with PRN medications.  - Previously treated for pneumonia (Doxycycline) on 9/25 but non-compliant with treatment.   - Increased use of inhalers to 3 times daily since onset of symptoms.   - CXR demonstrating bilateral bibasilar atelectasis with possible developing consolidation.   - DuoNeb q4hr, scheduled.  - Guaifenesin added, Tessalon pearls PRN  - Will continue Rocephin and Azithromycin, started on admit   - Afebrile without leukocytosis, blood cultures in process  - D Dimer negative

## 2017-11-30 NOTE — ASSESSMENT & PLAN NOTE
Presentting with increased cough and SOB since last Tuesday, no signs of respiratory distress on exam. Tachycardia (highest 119) on admit, improve with IVF. Suspect dehydration component given TRUDY.   - Previously treated for pneumonia (Doxycycline) on 9/25 but non-compliant with treatment.   - Increased use of inhalers to 3 times daily since onset of symptoms.   - CXR demonstrating bilateral bibasilar atelectasis with possible developing consolidation.   - DuoNeb q4hr   - Tessalon pearls PRN  - Rocephin and Azithromycin started on admit  - Afebrile without leukocytosis, blood cultures ordered

## 2017-11-30 NOTE — ASSESSMENT & PLAN NOTE
Improved. Creatinine 1.1 today, closer to baseline 1.0.    - 1.7 on admit. Suspect secondary to decreased PO intake. 2L IVF given in Ed.  - Will monitor labs daily for improvement

## 2017-11-30 NOTE — ASSESSMENT & PLAN NOTE
Patient has an extensive history of clots with IVC filter placement in 1994. Patient developed clots despite filter and was placed on Xarelto, which was discontinued after her stroke in February. She was then placed on Pradaxa but has been noncompliant for the past 3 months due to financial reasons. She has been taking an 81 mg ASA instead.  - will continue Pradaxa    - MCKENNA/CHERYLE to assist with financial issues

## 2017-11-30 NOTE — PLAN OF CARE
Problem: Patient Care Overview  Goal: Plan of Care Review  Outcome: Ongoing (interventions implemented as appropriate)  Patient AAOx4. Safety maintained. Bed locked in low with 2 side rails up. Call light within reach. Blood glucose monitoring ongoing. PRN pain medication given for generalized body aches.

## 2017-11-30 NOTE — SUBJECTIVE & OBJECTIVE
"Past Medical History:   Diagnosis Date    Asthma     Dyspnea on exertion     Hx of long term use of blood thinners     Hypertension 3/8/2016    PE (pulmonary embolism)     Hx of multiple DVT/PE    Presence of IVC filter     Scleroderma     Stroke 02/2017    Type 2 diabetes mellitus without complication        Past Surgical History:   Procedure Laterality Date    DANETTE FILTER PLACEMENT         Review of patient's allergies indicates:   Allergen Reactions    Plasma, human, normal        No current facility-administered medications on file prior to encounter.      Current Outpatient Prescriptions on File Prior to Encounter   Medication Sig    albuterol (PROVENTIL) 2.5 mg /3 mL (0.083 %) nebulizer solution Take 3 mLs (2.5 mg total) by nebulization every 6 (six) hours as needed for Wheezing.    aspirin (ECOTRIN) 81 MG EC tablet Take 1 tablet (81 mg total) by mouth once daily.    ferrous sulfate 325 (65 FE) MG EC tablet Take 1 tablet (325 mg total) by mouth once daily.    fluticasone-salmeterol 250-50 mcg/dose (ADVAIR) 250-50 mcg/dose diskus inhaler Inhale 1 puff into the lungs 2 (two) times daily.    lisinopril (PRINIVIL,ZESTRIL) 20 MG tablet Take 1 tablet (20 mg total) by mouth once daily.    metformin (GLUCOPHAGE) 500 MG tablet Take 1 tablet (500 mg total) by mouth 2 (two) times daily with meals.    simvastatin (ZOCOR) 20 MG tablet Take 1 tablet (20 mg total) by mouth every evening.    triamterene-hydrochlorothiazide 37.5-25 mg (MAXZIDE-25) 37.5-25 mg per tablet Take 1 tablet by mouth once daily.    citalopram (CELEXA) 10 MG tablet Take 1 tablet (10 mg total) by mouth once daily.    clopidogrel (PLAVIX) 75 mg tablet Take 1 tablet (75 mg total) by mouth once daily.    cyanocobalamin (VITAMIN B-12) 1000 MCG tablet Take 100 mcg by mouth once daily.    dabigatran etexilate (PRADAXA) 150 mg Cap Take 1 capsule (150 mg total) by mouth 2 (two) times daily. "Do NOT break, chew, or open capsules."    " methylPREDNISolone (MEDROL DOSEPACK) 4 mg tablet TAKE AS DIRECTED ON INSIDE OF PACKAGE    naproxen (NAPROSYN) 500 MG tablet Take 1 tablet (500 mg total) by mouth 2 (two) times daily.     Family History     Problem Relation (Age of Onset)    Breast cancer Mother    Diabetes Mellitus Maternal Uncle    Hypertension Father    No Known Problems Brother, Maternal Grandmother, Maternal Grandfather, Paternal Grandmother, Paternal Grandfather, Sister, Maternal Aunt, Paternal Aunt, Paternal Uncle        Social History Main Topics    Smoking status: Never Smoker    Smokeless tobacco: Never Used    Alcohol use 1.8 oz/week     3 Cans of beer per week      Comment: occ    Drug use: No    Sexual activity: Not on file     Review of Systems   Constitutional: Positive for appetite change, chills and fever (subjective ).   HENT: Positive for congestion and sore throat. Negative for trouble swallowing.    Eyes: Positive for visual disturbance (intermittent blurry vision ).   Respiratory: Positive for cough, chest tightness (associated with coughing ) and shortness of breath. Negative for wheezing.    Cardiovascular: Negative for palpitations and leg swelling.   Gastrointestinal: Positive for vomiting (with excessive coughing ). Negative for abdominal pain, blood in stool, constipation, diarrhea and nausea.   Endocrine: Positive for polydipsia.   Genitourinary: Negative for difficulty urinating and frequency.   Musculoskeletal: Negative for gait problem and myalgias.   Skin: Negative for color change and rash.   Neurological: Positive for headaches. Negative for dizziness, facial asymmetry, weakness and light-headedness.   Psychiatric/Behavioral: Negative for confusion.     Objective:     Vital Signs (Most Recent):  Temp: 97.5 °F (36.4 °C) (11/29/17 1540)  Pulse: 81 (11/29/17 1712)  Resp: 20 (11/29/17 1712)  BP: (!) 145/79 (11/29/17 1540)  SpO2: 98 % (11/29/17 1712) Vital Signs (24h Range):  Temp:  [97.5 °F (36.4 °C)-98.9 °F (37.2  °C)] 97.5 °F (36.4 °C)  Pulse:  [] 81  Resp:  [18-20] 20  SpO2:  [94 %-100 %] 98 %  BP: (114-145)/(76-87) 145/79     Weight: 79.4 kg (175 lb)  Body mass index is 30.04 kg/m².    Physical Exam   Constitutional: She is oriented to person, place, and time. She appears well-developed and well-nourished. No distress.   HENT:   Head: Normocephalic and atraumatic.   Eyes: EOM are normal. Pupils are equal, round, and reactive to light. Right eye exhibits no discharge. Left eye exhibits no discharge. No scleral icterus.   Neck: Normal range of motion. Neck supple. No tracheal deviation present.   Cardiovascular: Normal rate, regular rhythm and normal heart sounds.    No murmur heard.  Pulmonary/Chest: Effort normal. No respiratory distress. She has no wheezes. She exhibits no tenderness.   Decreased breath sounds in bilateral bases    Abdominal: Soft. Bowel sounds are normal. She exhibits no distension. There is no tenderness. There is no guarding.   Musculoskeletal: Normal range of motion. She exhibits no edema or tenderness.   Neurological: She is alert and oriented to person, place, and time. No cranial nerve deficit or sensory deficit.   Skin: Skin is warm and dry. She is not diaphoretic. No erythema.   Psychiatric: She has a normal mood and affect. Her behavior is normal. Judgment and thought content normal.   Nursing note and vitals reviewed.        CRANIAL NERVES     CN III, IV, VI   Pupils are equal, round, and reactive to light.  Extraocular motions are normal.        Significant Labs: All pertinent labs within the past 24 hours have been reviewed.    Significant Imaging: I have reviewed all pertinent imaging results/findings within the past 24 hours.   .r

## 2017-11-30 NOTE — PLAN OF CARE
11/30/17 0945   Discharge Assessment   Assessment Type Discharge Planning Assessment   Confirmed/corrected address and phone number on facesheet? Yes   Assessment information obtained from? Patient   Expected Length of Stay (days) 2   Communicated expected length of stay with patient/caregiver yes   Prior to hospitilization cognitive status: Alert/Oriented   Prior to hospitalization functional status: Independent   Current cognitive status: Alert/Oriented   Current Functional Status: Independent   Lives With other relative(s);grandparent(s)  (2 aunts & grandmother)   Able to Return to Prior Arrangements yes   Is patient able to care for self after discharge? Yes   Patient's perception of discharge disposition home or selfcare   Readmission Within The Last 30 Days no previous admission in last 30 days   Patient currently being followed by outpatient case management? No   Patient currently receives any other outside agency services? No   Equipment Currently Used at Home nebulizer;glucometer   Do you have any problems affording any of your prescribed medications? Yes   If yes, what medications? pradaxa   Is the patient taking medications as prescribed? no   If no, which medications is patient not taking? pradaxa   Does the patient have transportation home? Yes   Transportation Available car   Does the patient receive services at [facility]? No   Discharge Plan A Home with family   Discharge Plan B Home Health   Patient/Family In Agreement With Plan yes     Patient resting quietly in bed when CM rounded. No family at the bedside. Patient was admitted with pneumonia. Orders noted for nebs IV ceftriaxone. Patient has a history of multiple PE/DVTs & stated that she has been unable to afford her Pradaxa for the past 3 months. Patient stated that the prescription costs $60 per month at [facility] but would be free from mail order [facility]. Patient stated that her PCP did not want to order her pradaxa because he was not  the prescribing MD. MCKENNA informed Dr. Harrington of above. Patient stated that she has a glucometer & takes metformin but does not check her blood sugar. Patient's blood sugar was 525 in the ED. CM encouraged the patient to check her sugar once a day. Patient stated that her nebulizer is not working properly & requested another. MCKENNA informed Shellie BHAGAT ([id number]), of above. Patient lives with her 2 aunts & grandmother and is independent of all ADLs. Plan to discharge patient home with support or home with home health when medically stable. Patient denied the need for assistance with transportation at time of discharge. Hospital follow up appointment scheduled for the patient with Dr. Gerard Joyce (PCP) on 12/15/17 at 1500. Will continue to follow.

## 2017-11-30 NOTE — ASSESSMENT & PLAN NOTE
Noncompliant with Pradaxa, see above.   - Discussed risks associated and importance of medication compliance

## 2017-11-30 NOTE — ASSESSMENT & PLAN NOTE
Uncontrolled on admit at 333. Blood sugar 283 this am, requiring 3-4 units on sliding scale. Added additional meal time and long-acting doses. A1c 8.1.   - Insulin Apart 3 units with meals, Insulin Determir 10 units nightly  - moderate dose insulin sliding scale ordered  - hypoer/hypo glycemic protocols in place  - will monitor BGs and titrate insulin PRN

## 2017-11-30 NOTE — SUBJECTIVE & OBJECTIVE
"Interval History: Patient resting in bed, coughing excessively. Reports minimal relief with Tessalon Pearls. She currently denies SOB as albuterol treatments "open her up." She reports that her blurry vision has resolved. No chills, fever, nausea, vomiting, or chest pain stated.       Review of Systems   Constitutional: Positive for appetite change. Negative for chills and fever (subjective ).   HENT: Positive for congestion and sore throat. Negative for trouble swallowing.    Eyes: Negative for visual disturbance.   Respiratory: Positive for cough. Negative for chest tightness (associated with coughing ), shortness of breath and wheezing.    Cardiovascular: Negative for palpitations and leg swelling.   Gastrointestinal: Negative for abdominal pain, blood in stool, constipation, diarrhea, nausea and vomiting (with excessive coughing ).   Endocrine: Positive for polydipsia.   Genitourinary: Negative for difficulty urinating and frequency.   Musculoskeletal: Negative for gait problem and myalgias.   Skin: Negative for color change and rash.   Neurological: Negative for dizziness, facial asymmetry, weakness, light-headedness and headaches.   Psychiatric/Behavioral: Negative for confusion. The patient is not nervous/anxious.      Objective:     Vital Signs (Most Recent):  Temp: 96.7 °F (35.9 °C) (11/30/17 1114)  Pulse: 82 (11/30/17 1306)  Resp: 18 (11/30/17 1306)  BP: (!) 140/87 (11/30/17 1114)  SpO2: (!) 94 % (11/30/17 1306) Vital Signs (24h Range):  Temp:  [96.7 °F (35.9 °C)-98.3 °F (36.8 °C)] 96.7 °F (35.9 °C)  Pulse:  [] 82  Resp:  [17-20] 18  SpO2:  [94 %-100 %] 94 %  BP: (118-170)/(64-87) 140/87     Weight: 79.8 kg (176 lb)  Body mass index is 30.21 kg/m².    Intake/Output Summary (Last 24 hours) at 11/30/17 1322  Last data filed at 11/30/17 0930   Gross per 24 hour   Intake          1933.75 ml   Output                0 ml   Net          1933.75 ml      Physical Exam   Constitutional: She is oriented to " person, place, and time. She appears well-developed and well-nourished. No distress.   HENT:   Head: Normocephalic and atraumatic.   Eyes: EOM are normal. Pupils are equal, round, and reactive to light. Right eye exhibits no discharge. Left eye exhibits no discharge. No scleral icterus.   Neck: Normal range of motion. Neck supple. No tracheal deviation present.   Cardiovascular: Normal rate, regular rhythm and normal heart sounds.    No murmur heard.  Pulmonary/Chest: Effort normal. No respiratory distress. She has no wheezes. She exhibits no tenderness.   Decreased breath sounds in bilateral bases    Abdominal: Soft. Bowel sounds are normal. She exhibits no distension. There is no tenderness. There is no guarding.   Musculoskeletal: Normal range of motion. She exhibits no edema or tenderness.   Neurological: She is alert and oriented to person, place, and time. No cranial nerve deficit or sensory deficit.   Skin: Skin is warm and dry. She is not diaphoretic. No erythema.   Psychiatric: She has a normal mood and affect. Her behavior is normal. Judgment and thought content normal.   Nursing note and vitals reviewed.      Significant Labs: All pertinent labs within the past 24 hours have been reviewed.   - D Dimer WNL   - Creatinine improved, closer to baseline at 1.1     Significant Imaging: I have reviewed all pertinent imaging results/findings within the past 24 hours.

## 2017-11-30 NOTE — ASSESSMENT & PLAN NOTE
Stable. No residual deficits from CVA in February. Reported intermittent blurry vision within the past week, now resolved.  Noncompliance with medications, including Pradaxa x 3 months.  - Pradaxa resumed on admission  - Continue aspirin and statin    - CT head without acute intracranial processes  -  No deficits on exam

## 2017-11-30 NOTE — ASSESSMENT & PLAN NOTE
Patient has an extensive history of clots with IVC filter placement in 1994. Patient developed clots despite filter and was placed on Xarelto, which was discontinued after her stroke in February. She was then placed on Pradaxa but has been noncompliant for the past 3 months due to financial reasons. She has been taking an 81 mg ASA instead.  - will continue Pradaxa, ASA    - appreciate CM/SW assistance with medications; Pradaxa ordered with refills for up to one year with mail-in prescription discount program

## 2017-12-01 ENCOUNTER — TELEPHONE (OUTPATIENT)
Dept: PHARMACY | Facility: CLINIC | Age: 52
End: 2017-12-01

## 2017-12-01 VITALS
SYSTOLIC BLOOD PRESSURE: 138 MMHG | BODY MASS INDEX: 30.05 KG/M2 | OXYGEN SATURATION: 98 % | WEIGHT: 176 LBS | DIASTOLIC BLOOD PRESSURE: 75 MMHG | HEIGHT: 64 IN | HEART RATE: 76 BPM | RESPIRATION RATE: 16 BRPM | TEMPERATURE: 97 F

## 2017-12-01 PROBLEM — N17.9 AKI (ACUTE KIDNEY INJURY): Status: RESOLVED | Noted: 2017-11-29 | Resolved: 2017-12-01

## 2017-12-01 PROBLEM — Z91.148 NONCOMPLIANCE WITH MEDICATION REGIMEN: Status: RESOLVED | Noted: 2017-11-29 | Resolved: 2017-12-01

## 2017-12-01 LAB
ALBUMIN SERPL BCP-MCNC: 2.8 G/DL
ALP SERPL-CCNC: 92 U/L
ALT SERPL W/O P-5'-P-CCNC: 12 U/L
ANION GAP SERPL CALC-SCNC: 9 MMOL/L
AST SERPL-CCNC: 16 U/L
BASOPHILS # BLD AUTO: 0.03 K/UL
BASOPHILS NFR BLD: 0.4 %
BILIRUB SERPL-MCNC: 0.4 MG/DL
BUN SERPL-MCNC: 19 MG/DL
CALCIUM SERPL-MCNC: 8.9 MG/DL
CHLORIDE SERPL-SCNC: 107 MMOL/L
CO2 SERPL-SCNC: 22 MMOL/L
CREAT SERPL-MCNC: 1 MG/DL
DIFFERENTIAL METHOD: ABNORMAL
EOSINOPHIL # BLD AUTO: 0.2 K/UL
EOSINOPHIL NFR BLD: 3 %
ERYTHROCYTE [DISTWIDTH] IN BLOOD BY AUTOMATED COUNT: 13.1 %
EST. GFR  (AFRICAN AMERICAN): >60 ML/MIN/1.73 M^2
EST. GFR  (NON AFRICAN AMERICAN): >60 ML/MIN/1.73 M^2
GLUCOSE SERPL-MCNC: 125 MG/DL
HCT VFR BLD AUTO: 36.8 %
HGB BLD-MCNC: 11.9 G/DL
IMM GRANULOCYTES # BLD AUTO: 0.04 K/UL
IMM GRANULOCYTES NFR BLD AUTO: 0.5 %
LYMPHOCYTES # BLD AUTO: 2.5 K/UL
LYMPHOCYTES NFR BLD: 32.4 %
MAGNESIUM SERPL-MCNC: 1.7 MG/DL
MCH RBC QN AUTO: 29.5 PG
MCHC RBC AUTO-ENTMCNC: 32.3 G/DL
MCV RBC AUTO: 91 FL
MONOCYTES # BLD AUTO: 0.5 K/UL
MONOCYTES NFR BLD: 6.5 %
NEUTROPHILS # BLD AUTO: 4.4 K/UL
NEUTROPHILS NFR BLD: 57.2 %
NRBC BLD-RTO: 0 /100 WBC
PHOSPHATE SERPL-MCNC: 3.4 MG/DL
PLATELET # BLD AUTO: 155 K/UL
PMV BLD AUTO: 11.8 FL
POCT GLUCOSE: 131 MG/DL (ref 70–110)
POCT GLUCOSE: 167 MG/DL (ref 70–110)
POTASSIUM SERPL-SCNC: 4.7 MMOL/L
PROT SERPL-MCNC: 6.8 G/DL
RBC # BLD AUTO: 4.04 M/UL
SODIUM SERPL-SCNC: 138 MMOL/L
WBC # BLD AUTO: 7.69 K/UL

## 2017-12-01 PROCEDURE — 36415 COLL VENOUS BLD VENIPUNCTURE: CPT

## 2017-12-01 PROCEDURE — 63600175 PHARM REV CODE 636 W HCPCS: Performed by: HOSPITALIST

## 2017-12-01 PROCEDURE — 84100 ASSAY OF PHOSPHORUS: CPT

## 2017-12-01 PROCEDURE — 25000003 PHARM REV CODE 250: Performed by: PHYSICIAN ASSISTANT

## 2017-12-01 PROCEDURE — 94640 AIRWAY INHALATION TREATMENT: CPT

## 2017-12-01 PROCEDURE — 82962 GLUCOSE BLOOD TEST: CPT

## 2017-12-01 PROCEDURE — 85025 COMPLETE CBC W/AUTO DIFF WBC: CPT

## 2017-12-01 PROCEDURE — A4216 STERILE WATER/SALINE, 10 ML: HCPCS | Performed by: PHYSICIAN ASSISTANT

## 2017-12-01 PROCEDURE — 80053 COMPREHEN METABOLIC PANEL: CPT

## 2017-12-01 PROCEDURE — 25000242 PHARM REV CODE 250 ALT 637 W/ HCPCS: Performed by: PHYSICIAN ASSISTANT

## 2017-12-01 PROCEDURE — 99217 PR OBSERVATION CARE DISCHARGE: CPT | Mod: ,,, | Performed by: PHYSICIAN ASSISTANT

## 2017-12-01 PROCEDURE — 83735 ASSAY OF MAGNESIUM: CPT

## 2017-12-01 PROCEDURE — G0378 HOSPITAL OBSERVATION PER HR: HCPCS

## 2017-12-01 RX ORDER — DABIGATRAN ETEXILATE 150 MG/1
150 CAPSULE ORAL 2 TIMES DAILY
Qty: 60 CAPSULE | Refills: 0 | Status: ON HOLD | OUTPATIENT
Start: 2017-12-01 | End: 2018-07-02

## 2017-12-01 RX ORDER — GUAIFENESIN 100 MG/5ML
200 SOLUTION ORAL EVERY 6 HOURS PRN
Refills: 0 | COMMUNITY
Start: 2017-12-01 | End: 2017-12-11

## 2017-12-01 RX ORDER — MOXIFLOXACIN HYDROCHLORIDE 400 MG/1
400 TABLET ORAL DAILY
Qty: 7 TABLET | Refills: 0 | Status: SHIPPED | OUTPATIENT
Start: 2017-12-02 | End: 2017-12-09

## 2017-12-01 RX ORDER — AMOXICILLIN 250 MG
1 CAPSULE ORAL DAILY
Status: DISCONTINUED | OUTPATIENT
Start: 2017-12-01 | End: 2017-12-01 | Stop reason: HOSPADM

## 2017-12-01 RX ORDER — POLYETHYLENE GLYCOL 3350 17 G/17G
17 POWDER, FOR SOLUTION ORAL DAILY
Status: DISCONTINUED | OUTPATIENT
Start: 2017-12-01 | End: 2017-12-01 | Stop reason: HOSPADM

## 2017-12-01 RX ORDER — AZITHROMYCIN 250 MG/1
250 TABLET, FILM COATED ORAL DAILY
Qty: 2 TABLET | Refills: 0 | Status: CANCELLED | OUTPATIENT
Start: 2017-12-02

## 2017-12-01 RX ADMIN — POTASSIUM & SODIUM PHOSPHATES POWDER PACK 280-160-250 MG 1 PACKET: 280-160-250 PACK at 08:12

## 2017-12-01 RX ADMIN — INSULIN ASPART 3 UNITS: 100 INJECTION, SOLUTION INTRAVENOUS; SUBCUTANEOUS at 08:12

## 2017-12-01 RX ADMIN — FLUTICASONE FUROATE AND VILANTEROL TRIFENATATE 1 PUFF: 100; 25 POWDER RESPIRATORY (INHALATION) at 01:12

## 2017-12-01 RX ADMIN — ALBUTEROL SULFATE 2.5 MG: 2.5 SOLUTION RESPIRATORY (INHALATION) at 04:12

## 2017-12-01 RX ADMIN — DABIGATRAN ETEXILATE MESYLATE 150 MG: 150 CAPSULE ORAL at 08:12

## 2017-12-01 RX ADMIN — INSULIN ASPART 3 UNITS: 100 INJECTION, SOLUTION INTRAVENOUS; SUBCUTANEOUS at 01:12

## 2017-12-01 RX ADMIN — Medication 3 ML: at 06:12

## 2017-12-01 RX ADMIN — ALBUTEROL SULFATE 2.5 MG: 2.5 SOLUTION RESPIRATORY (INHALATION) at 03:12

## 2017-12-01 RX ADMIN — AZITHROMYCIN 250 MG: 250 TABLET, FILM COATED ORAL at 08:12

## 2017-12-01 RX ADMIN — ASPIRIN 81 MG: 81 TABLET, COATED ORAL at 08:12

## 2017-12-01 RX ADMIN — ALBUTEROL SULFATE 2.5 MG: 2.5 SOLUTION RESPIRATORY (INHALATION) at 08:12

## 2017-12-01 RX ADMIN — BENZONATATE 200 MG: 100 CAPSULE ORAL at 01:12

## 2017-12-01 RX ADMIN — GUAIFENESIN 200 MG: 100 SOLUTION ORAL at 04:12

## 2017-12-01 RX ADMIN — GUAIFENESIN 200 MG: 100 SOLUTION ORAL at 11:12

## 2017-12-01 RX ADMIN — HYDROCHLOROTHIAZIDE AND TRIAMTERENE 1 CAPSULE: 25; 37.5 CAPSULE ORAL at 08:12

## 2017-12-01 RX ADMIN — CYANOCOBALAMIN TAB 1000 MCG 1000 MCG: 1000 TAB at 08:12

## 2017-12-01 RX ADMIN — POTASSIUM & SODIUM PHOSPHATES POWDER PACK 280-160-250 MG 1 PACKET: 280-160-250 PACK at 12:12

## 2017-12-01 RX ADMIN — CEFTRIAXONE 1 G: 1 INJECTION, SOLUTION INTRAVENOUS at 11:12

## 2017-12-01 RX ADMIN — LISINOPRIL 20 MG: 20 TABLET ORAL at 08:12

## 2017-12-01 NOTE — NURSING
Pt dc per MD orders. Dc and prescription instructions given.  Pt verbalizes understanding. PIV removed, catheter tip intact. Vss. No sign of distress noted.pt dc ambulatory with steady gait.

## 2017-12-01 NOTE — PLAN OF CARE
Problem: Patient Care Overview  Goal: Plan of Care Review  Outcome: Outcome(s) achieved Date Met: 12/01/17  POC reviewed with pt. Safety precautions maintained. Bed in low position,wheels locked. Side rails up x 2. Call light within reach. Pt free of falls. Pt denies pain.

## 2017-12-01 NOTE — ASSESSMENT & PLAN NOTE
Controlled. 138/75 on day of diacharge.    - continue home medications Lisinopril and triamterene-HCTZ

## 2017-12-01 NOTE — ASSESSMENT & PLAN NOTE
Stable. Presenting with increased cough and SOB since last Tuesday, no signs of respiratory distress on exam. Tachycardia (highest 119) on admit, improved with IVF. Suspect dehydration component given TRUDY. Excessive cough unresolved with PRN medications. Previously treated for pneumonia (Doxycycline) on 9/25 but non-compliant with treatment.  D Dimer negative. Patient remained afebrile without leukocytosis   - Guaifenesin PRN for coughing  - Complete Azithromycin x 2 days  - Start Moxifloxacin x 7 days at discharge

## 2017-12-01 NOTE — ASSESSMENT & PLAN NOTE
Stable. No residual deficits from CVA in February. Reported intermittent blurry vision within the past week, now resolved.  Noncompliance with medications, including Pradaxa x 3 months. CT head without acute intracranial processes. No deficits on exam throughout hospital course.  - Pradaxa was resumed on admission, patient to resume as outpatient   - Continue aspirin and statin

## 2017-12-01 NOTE — ASSESSMENT & PLAN NOTE
Patient has an extensive history of clots with IVC filter placement in 1994. Patient developed clots despite filter and was placed on Xarelto, which was discontinued after her stroke in February. She was then placed on Pradaxa but has been noncompliant for the past 3 months due to financial reasons. She has been taking an 81 mg ASA instead.  - will continue Pradaxa, ASA    - appreciate CM/SW assistance with medications; Pradaxa ordered with refills for up to one year with mail-in prescription discount program and 1 month discounted supply made available at [facility] pharmacy prior to discharge.

## 2017-12-01 NOTE — DISCHARGE SUMMARY
"[facility] Hospital Medicine  Discharge Summary      Patient Name: Lyssa Gandara  MRN: [id number]  Admission Date: 11/29/2017  Hospital Length of Stay: 0 days  Discharge Date and Time:  12/01/2017 3:32 PM  Attending Physician: Lucila att. providers found   Discharging Provider: Shellie Maxwell PA-C  Primary Care Provider: Gerard Joyce MD  [facility] Medicine Team: [facility] F Shellie Maxwell PA-C    HPI:   Lyssa Gandara is a 52 y.o. F who presents with PMHx of HTN, DM II, asthma, history of CVA and multiple PE's and DVT's (last occurrence in 2016)  for evaluation of excessive cough for 1 week. Patient was seen in the ED on 9/25 for pneumonia and was discharged with Doxycycline. She completed only 3 of a 7 day course of antibiotics stating she discontinued them because she "felt better." Reports increased use of inhalers to 3 treatments daily since onset. Patient reports increasing productive, cough with green sputum since last Tuesday that has become progressively worse. She also admits to chest pain associated with coughing spells, SOB, decreased appetite, polydipsia, subjective fever, headache, sweats, chills and intermittent blurry vision since onset of cough. She has used OTC cough suppressants with limited effect.     Patient does not take her blood sugars at home. Patient has an extensive history of clots with IVC filter placement in 1994. Patient developed clots despite filter and was placed on Xarelto, which was discontinued after her stroke in February. She was then placed on Pradaxa but has been noncompliant for the past 3 months due to financial reasons. She has been taking an 81 mg ASA instead. She denies any tobacco or drug use but admits to drinking three 16 ounce Budweiser's every evening.     In the ED, CXR demonstrating possible left-sided developing consolidation with chronic scarring on right and bibasilar subsegmental atelectasis. Afebrile without " leukocytosis. Creatinine 1.7/ BUN 39. UA with 3+ glucose and occult blood. Glucose 333. VBG with 7.339 pH, 45.6 PCO2, 24 Po2. Stat head CT ordered.     * No surgery found *      Hospital Course:   Lyssa Gandara is a 52 y.o. F who was admitted to observation for excessive cough x 1 week.  In the ED, stat head CT demonstrated encephalomalacia of the right parietal lobe however, no acute intracranial processes, patient without focal neurological deficits. CXR demonstrating possible left-sided developing consolidation with chronic scarring of right lung and bibasilar subsegmental atelectasis. The patient was previously treated for pneumonia on 9/25 but did not complete full antibiotic course. Rocephin and azithromycin administered on admit and continued. CBC unremarkable, patient was afebrile without leukocytosis. Creatinine 1.7/ BUN 39, indicating TRUDY. IVF's were administered, creatinine improved to 1.1. Urinalysis with 3+ glucose and occult blood, no signs of infection. Glucose 333, insulin administered in ED with some effect. A moderate insulin sliding scale was ordered with minimal improvement, therefore an additional 3 units of Insulin Aspart with meals and 10 units Insulin Determir ordered nightly.  BG controlled at discharge and pt to resume metformin.  Patient continued to have episodes of coughing despite PRN Tessalon pearls. Patient to try Guafenesin for relief with moderate improvement. DuoNeb treatments scheduled every 6 hours with positive effect. CM/SW consulted for financial assistance with anti-coagulation medication, mail order prescription request was submitted by attending. Patient provided discounted Pradaxa from [address] for 1 month at discharge. Education provided and risks associated with non-compliance of medications was discussed. Patient was agreeable. Patient stablilized and discharged home on moxifloxacin for 7 days to complete 10 day course of antibiotic therapy.     Consults:     *  Pneumonia of left lung due to infectious organism    Stable. Presenting with increased cough and SOB since last Tuesday, no signs of respiratory distress on exam. Tachycardia (highest 119) on admit, improved with IVF. Suspect dehydration component given TRUDY. Excessive cough unresolved with PRN medications. Previously treated for pneumonia (Doxycycline) on 9/25 but non-compliant with treatment.  D Dimer negative. Patient remained afebrile without leukocytosis   - Guaifenesin PRN for coughing  - Complete Azithromycin x 2 days  - Start Moxifloxacin x 7 days at discharge          Cerebral infarction due to thrombosis of right carotid artery 2/26/17    Stable. No residual deficits from CVA in February. Reported intermittent blurry vision within the past week, now resolved.  Noncompliance with medications, including Pradaxa x 3 months. CT head without acute intracranial processes. No deficits on exam throughout hospital course.  - Pradaxa was resumed on admission, patient to resume as outpatient   - Continue aspirin and statin          Asthma    Presentting with increased cough and SOB since last Tuesday, no signs of respiratory distress on exam.   - Previously treated for pneumonia (Doxycycline) on 9/25 but non-compliant with treatment.   - Patient reports relief with albuterol treatments.   - SpO2 mid-90's since admit. Patient remains without signs of respiratory distress.   - Home nebulizer ordered at discharge        Type 2 diabetes mellitus without complication    Uncontrolled on admit at 333. Blood sugar 283 this am, requiring 3-4 units on sliding scale. Added additional meal time and long-acting doses. A1c 8.1.   - Insulin Apart 3 units with meals, Insulin Determir 10 units nightly and moderate dose insulin sliding scale used as inpatient   - continue PO anti-hyperglycemics as outpatient   - encouraged to monitor blood glucose levels at home         Essential hypertension    Controlled. 138/75 on day of diacharge.   "  - continue home medications Lisinopril and triamterene-HCTZ        Long-term (current) use of anticoagulants    Patient has an extensive history of clots with IVC filter placement in 1994. Patient developed clots despite filter and was placed on Xarelto, which was discontinued after her stroke in February. She was then placed on Pradaxa but has been noncompliant for the past 3 months due to financial reasons. She has been taking an 81 mg ASA instead.  - will continue Pradaxa, ASA    - appreciate CM/SW assistance with medications; Pradaxa ordered with refills for up to one year with mail-in prescription discount program and 1 month discounted supply made available at [facility] pharmacy prior to discharge.           Final Active Diagnoses:    Diagnosis Date Noted POA    PRINCIPAL PROBLEM:  Pneumonia of left lung due to infectious organism [J18.9] 11/29/2017 Yes    Cerebral infarction due to thrombosis of right carotid artery 2/26/17 [I63.031] 02/26/2017 Yes    Asthma [J45.909]  Yes    Type 2 diabetes mellitus without complication [E11.9]  Yes    Essential hypertension [I10] 03/08/2016 Yes    Long-term (current) use of anticoagulants [Z79.01] 03/08/2016 Not Applicable      Problems Resolved During this Admission:    Diagnosis Date Noted Date Resolved POA    TRUDY (acute kidney injury) [N17.9] 11/29/2017 12/01/2017 Yes    Noncompliance with medication regimen [Z91.14] 11/29/2017 12/01/2017 Not Applicable       Discharged Condition: good    Disposition: Home or Self Care    Follow Up:  Follow-up Information     Gerard Joyce MD On 12/15/2017.    Specialty:  Family Medicine  Why:  at 3:00pm  Contact information:  [address]  [facility] [address]  [phone]                 Patient Instructions:     NEBULIZER KIT (SUPPLIES) FOR HOME USE   Order Specific Question Answer Comments   Height: 5' 4" (1.626 m)    Weight: 79.8 kg (176 lb)    Does patient have medical equipment at home? nebulizer    Does patient " "have medical equipment at home? glucometer    Length of need (1-99 months): 99    Mask or Mouthpiece? Mouthpiece      Diet Diabetic 1800 Calories     Activity as tolerated     Call MD for:  temperature >100.4     Call MD for:  persistent nausea and vomiting or diarrhea     Call MD for:  difficulty breathing or increased cough     Call MD for:  severe persistent headache     Call MD for:  persistent dizziness, light-headedness, or visual disturbances     Call MD for:  increased confusion or weakness         Significant Diagnostic Studies: Labs: All labs within the past 24 hours have been reviewed    Pending Diagnostic Studies:     None         Medications:  Reconciled Home Medications:   Discharge Medication List as of 12/1/2017  2:48 PM      START taking these medications    Details   guaifenesin 100 mg/5 ml (ROBITUSSIN) 100 mg/5 mL syrup Take 10 mLs (200 mg total) by mouth every 6 (six) hours as needed for Cough or Congestion., Starting Fri 12/1/2017, Until Mon 12/11/2017, OTC      moxifloxacin (AVELOX) 400 mg tablet Take 1 tablet (400 mg total) by mouth once daily., Starting Sat 12/2/2017, Until Sat 12/9/2017, Normal         CONTINUE these medications which have CHANGED    Details   dabigatran etexilate (PRADAXA) 150 mg Cap Take 1 capsule (150 mg total) by mouth 2 (two) times daily. "Do NOT break, chew, or open capsules.", Starting Fri 12/1/2017, Normal         CONTINUE these medications which have NOT CHANGED    Details   albuterol (PROVENTIL) 2.5 mg /3 mL (0.083 %) nebulizer solution Take 3 mLs (2.5 mg total) by nebulization every 6 (six) hours as needed for Wheezing., Starting Fri 11/10/2017, Until Sat 11/10/2018, Normal      aspirin (ECOTRIN) 81 MG EC tablet Take 1 tablet (81 mg total) by mouth once daily., Starting Mon 6/19/2017, Normal      fluticasone-salmeterol 250-50 mcg/dose (ADVAIR) 250-50 mcg/dose diskus inhaler Inhale 1 puff into the lungs 2 (two) times daily., Starting 5/19/2017, Until Sat 5/19/18, " Normal      lisinopril (PRINIVIL,ZESTRIL) 20 MG tablet Take 1 tablet (20 mg total) by mouth once daily., Starting 4/15/2017, Until Discontinued, Normal      metformin (GLUCOPHAGE) 500 MG tablet Take 1 tablet (500 mg total) by mouth 2 (two) times daily with meals., Starting 4/12/2017, Until Discontinued, Normal      simvastatin (ZOCOR) 20 MG tablet Take 1 tablet (20 mg total) by mouth every evening., Starting 5/19/2017, Until Sat 5/19/18, Normal      triamterene-hydrochlorothiazide 37.5-25 mg (MAXZIDE-25) 37.5-25 mg per tablet Take 1 tablet by mouth once daily., Starting 4/12/2017, Until Discontinued, Normal      citalopram (CELEXA) 10 MG tablet Take 1 tablet (10 mg total) by mouth once daily., Starting 3/28/2017, Until u 4/27/17, Normal      cyanocobalamin (VITAMIN B-12) 1000 MCG tablet Take 100 mcg by mouth once daily., Until Discontinued, Historical Med      ferrous sulfate 325 (65 FE) MG EC tablet Take 1 tablet (325 mg total) by mouth once daily., Starting 3/2/2017, Until Discontinued, Normal      naproxen (NAPROSYN) 500 MG tablet Take 1 tablet (500 mg total) by mouth 2 (two) times daily., Starting 4/26/2017, Until Discontinued, Print         STOP taking these medications       clopidogrel (PLAVIX) 75 mg tablet Comments:   Reason for Stopping:         methylPREDNISolone (MEDROL DOSEPACK) 4 mg tablet Comments:   Reason for Stopping:               Indwelling Lines/Drains at time of discharge:   Lines/Drains/Airways          No matching active lines, drains, or airways          Time spent on the discharge of patient: >30 minutes  Patient was seen and examined on the date of discharge and determined to be suitable for discharge.         Shellie Maxwell PA-C  Department of Hospital Medicine  [facility]

## 2017-12-01 NOTE — PLAN OF CARE
Problem: Patient Care Overview  Goal: Plan of Care Review  Outcome: Ongoing (interventions implemented as appropriate)  Pt with no s/s hypo or hyperglycemia. Pt with no falls or injuries this shift. Pt cont on iv antibiotics for pneumonia. Breathing treatments q6 hrs. Denies SOB. Pt reports full relief from pain post po tylenol.

## 2017-12-01 NOTE — ASSESSMENT & PLAN NOTE
Presentting with increased cough and SOB since last Tuesday, no signs of respiratory distress on exam.   - Previously treated for pneumonia (Doxycycline) on 9/25 but non-compliant with treatment.   - Patient reports relief with albuterol treatments.   - SpO2 mid-90's since admit. Patient remains without signs of respiratory distress.   - Home nebulizer ordered at discharge

## 2017-12-01 NOTE — PLAN OF CARE
CM informed the patient that Shellie BHAGAT ([id number]), found out that her Pradaxa will cost $39.99 at [facility]. Patient in agreement to have 1 month supply prescription sent to [facility] & remainder to be sent to [facility]. Order noted for a nebulizer for home use. Patient in agreement with plan to discharge patient home with no needs today. Patient denied the need for assistance with transportation at time of discharge. Will continue to follow.

## 2017-12-01 NOTE — PLAN OF CARE
12/01/2017      Lyssa Gandara  [address] [facility] [address]          [facility] Medicine Dept.  [facility] [address] [facility] [address] [phone] after hours  [phone] fax Lyssa Gandara has been hospitalized at the [facility] since 11/29/2017.  Please excuse the patient from duties.  Patient may return on 12/05/17.  No restrictions.     Please contact me if you have any questions.                  __________________________  Jessica Ricketts PA-C  12/01/2017

## 2017-12-01 NOTE — ASSESSMENT & PLAN NOTE
Uncontrolled on admit at 333. Blood sugar 283 this am, requiring 3-4 units on sliding scale. Added additional meal time and long-acting doses. A1c 8.1.   - Insulin Apart 3 units with meals, Insulin Determir 10 units nightly and moderate dose insulin sliding scale used as inpatient   - continue PO anti-hyperglycemics as outpatient   - encouraged to monitor blood glucose levels at home

## 2017-12-04 DIAGNOSIS — Z13.5 DIABETIC RETINOPATHY SCREENING: ICD-10-CM

## 2017-12-04 LAB
BACTERIA BLD CULT: NORMAL
BACTERIA BLD CULT: NORMAL

## 2017-12-04 NOTE — PLAN OF CARE
12/04/17 0809   Final Note   Assessment Type Final Discharge Note     Patient discharged home with no needs 12/1/17.

## 2017-12-15 ENCOUNTER — OFFICE VISIT (OUTPATIENT)
Dept: INTERNAL MEDICINE | Facility: CLINIC | Age: 52
End: 2017-12-15
Payer: COMMERCIAL

## 2017-12-15 VITALS — BODY MASS INDEX: 24.94 KG/M2 | HEIGHT: 67 IN | WEIGHT: 158.94 LBS

## 2017-12-15 DIAGNOSIS — E11.9 TYPE 2 DIABETES MELLITUS WITHOUT COMPLICATION, WITHOUT LONG-TERM CURRENT USE OF INSULIN: Primary | ICD-10-CM

## 2017-12-15 DIAGNOSIS — J18.9 PNEUMONIA DUE TO INFECTIOUS ORGANISM, UNSPECIFIED LATERALITY, UNSPECIFIED PART OF LUNG: ICD-10-CM

## 2017-12-15 PROCEDURE — 99213 OFFICE O/P EST LOW 20 MIN: CPT | Mod: S$GLB,,, | Performed by: FAMILY MEDICINE

## 2017-12-15 PROCEDURE — 99999 PR PBB SHADOW E&M-EST. PATIENT-LVL III: CPT | Mod: PBBFAC,,, | Performed by: FAMILY MEDICINE

## 2017-12-15 RX ORDER — LANCETS
1 EACH MISCELLANEOUS
Qty: 200 EACH | Refills: 11 | Status: SHIPPED | OUTPATIENT
Start: 2017-12-15 | End: 2019-11-25 | Stop reason: SDUPTHER

## 2017-12-15 RX ORDER — METFORMIN HYDROCHLORIDE 1000 MG/1
1000 TABLET ORAL 2 TIMES DAILY WITH MEALS
Qty: 180 TABLET | Refills: 3 | Status: SHIPPED | OUTPATIENT
Start: 2017-12-15 | End: 2019-11-25 | Stop reason: ALTCHOICE

## 2017-12-15 NOTE — PROGRESS NOTES
Subjective:       Patient ID: Lyssa Gandara is a 52 y.o. female.    Chief Complaint: No chief complaint on file.  Lyssa Gandara 52 y.o. female is here for office visit to review care and physical exam, reports was in hosp with Pneumonia, had wheezing, improved with abx and steroids.  Needs lancets and strips.  Diet could bbe better and does have soft drinks.      HPI  Review of Systems   Constitutional: Negative for activity change, fatigue, fever and unexpected weight change.   HENT: Negative for congestion, hearing loss, postnasal drip and rhinorrhea.    Eyes: Negative for redness and visual disturbance.   Respiratory: Negative for chest tightness, shortness of breath and wheezing.    Cardiovascular: Negative for chest pain, palpitations and leg swelling.   Gastrointestinal: Negative for abdominal distention.   Genitourinary: Negative for decreased urine volume, dysuria, flank pain, hematuria, pelvic pain and urgency.   Musculoskeletal: Negative for back pain, gait problem, joint swelling and neck stiffness.   Skin: Negative for color change, rash and wound.   Neurological: Negative for dizziness, syncope, weakness and headaches.   Psychiatric/Behavioral: Negative for behavioral problems, confusion and sleep disturbance. The patient is not nervous/anxious.        Objective:      Physical Exam   Constitutional: She is oriented to person, place, and time. She appears well-developed and well-nourished. No distress.   HENT:   Head: Normocephalic.   Mouth/Throat: No oropharyngeal exudate.   Eyes: EOM are normal. Pupils are equal, round, and reactive to light. No scleral icterus.   Neck: Neck supple. No JVD present. No thyromegaly present.   Cardiovascular: Normal rate, regular rhythm and normal heart sounds.  Exam reveals no gallop and no friction rub.    No murmur heard.  Pulmonary/Chest: Effort normal and breath sounds normal. She has no wheezes. She has no rales.   Abdominal: Soft. Bowel sounds are  normal. She exhibits no distension and no mass. There is no tenderness. There is no guarding.   Musculoskeletal: Normal range of motion. She exhibits no edema.   Lymphadenopathy:     She has no cervical adenopathy.   Neurological: She is alert and oriented to person, place, and time. She has normal reflexes. She displays normal reflexes. No cranial nerve deficit. She exhibits normal muscle tone.   Skin: Skin is warm. No rash noted. No erythema.   Psychiatric: She has a normal mood and affect. Thought content normal.       Assessment:       No diagnosis found.    Plan:       Lyssa was seen today for hospital follow up.    Diagnoses and all orders for this visit:    Type 2 diabetes mellitus without complication, without long-term current use of insulin  - discussed diet and titrating CBGs  Pneumonia due to infectious organism, unspecified laterality, unspecified part of lung  - Chart reviewed  Other orders  -     blood sugar diagnostic Strp; 200 strips by Misc.(Non-Drug; Combo Route) route 2 (two) times daily.  -     metFORMIN (GLUCOPHAGE) 1000 MG tablet; Take 1 tablet (1,000 mg total) by mouth 2 (two) times daily with meals.

## 2017-12-23 ENCOUNTER — HOSPITAL ENCOUNTER (EMERGENCY)
Facility: HOSPITAL | Age: 52
Discharge: HOME | End: 2017-12-23
Attending: EMERGENCY MEDICINE
Payer: COMMERCIAL

## 2017-12-23 VITALS
OXYGEN SATURATION: 97 % | WEIGHT: 179.25 LBS | TEMPERATURE: 100 F | HEIGHT: 64 IN | HEART RATE: 88 BPM | SYSTOLIC BLOOD PRESSURE: 161 MMHG | DIASTOLIC BLOOD PRESSURE: 89 MMHG | BODY MASS INDEX: 30.6 KG/M2 | RESPIRATION RATE: 16 BRPM

## 2017-12-23 DIAGNOSIS — R05.9 COUGH: ICD-10-CM

## 2017-12-23 DIAGNOSIS — J45.901 EXACERBATION OF ASTHMA, UNSPECIFIED ASTHMA SEVERITY, UNSPECIFIED WHETHER PERSISTENT: ICD-10-CM

## 2017-12-23 DIAGNOSIS — J06.9 UPPER RESPIRATORY TRACT INFECTION, UNSPECIFIED TYPE: Primary | ICD-10-CM

## 2017-12-23 LAB
ALBUMIN SERPL BCP-MCNC: 3.9 G/DL
ALP SERPL-CCNC: 115 U/L
ALT SERPL W/O P-5'-P-CCNC: 17 U/L
ANION GAP SERPL CALC-SCNC: 12 MMOL/L
AST SERPL-CCNC: 26 U/L
BASOPHILS # BLD AUTO: 0.02 K/UL
BASOPHILS NFR BLD: 0.5 %
BILIRUB SERPL-MCNC: 0.4 MG/DL
BUN SERPL-MCNC: 9 MG/DL
CALCIUM SERPL-MCNC: 9.8 MG/DL
CHLORIDE SERPL-SCNC: 101 MMOL/L
CO2 SERPL-SCNC: 27 MMOL/L
CREAT SERPL-MCNC: 1.2 MG/DL
DIFFERENTIAL METHOD: ABNORMAL
EOSINOPHIL # BLD AUTO: 0.2 K/UL
EOSINOPHIL NFR BLD: 3.4 %
ERYTHROCYTE [DISTWIDTH] IN BLOOD BY AUTOMATED COUNT: 13.3 %
EST. GFR  (AFRICAN AMERICAN): >60 ML/MIN/1.73 M^2
EST. GFR  (NON AFRICAN AMERICAN): 52.5 ML/MIN/1.73 M^2
FLUAV AG SPEC QL IA: NEGATIVE
FLUBV AG SPEC QL IA: NEGATIVE
GLUCOSE SERPL-MCNC: 133 MG/DL
HCT VFR BLD AUTO: 41 %
HGB BLD-MCNC: 13.4 G/DL
IMM GRANULOCYTES # BLD AUTO: 0.01 K/UL
IMM GRANULOCYTES NFR BLD AUTO: 0.2 %
LYMPHOCYTES # BLD AUTO: 0.7 K/UL
LYMPHOCYTES NFR BLD: 16.7 %
MCH RBC QN AUTO: 29.5 PG
MCHC RBC AUTO-ENTMCNC: 32.7 G/DL
MCV RBC AUTO: 90 FL
MONOCYTES # BLD AUTO: 0.6 K/UL
MONOCYTES NFR BLD: 12.6 %
NEUTROPHILS # BLD AUTO: 2.9 K/UL
NEUTROPHILS NFR BLD: 66.6 %
NRBC BLD-RTO: 0 /100 WBC
PLATELET # BLD AUTO: 208 K/UL
PMV BLD AUTO: 10.8 FL
POTASSIUM SERPL-SCNC: 3.8 MMOL/L
PROT SERPL-MCNC: 9 G/DL
RBC # BLD AUTO: 4.55 M/UL
SODIUM SERPL-SCNC: 140 MMOL/L
SPECIMEN SOURCE: NORMAL
WBC # BLD AUTO: 4.38 K/UL

## 2017-12-23 PROCEDURE — 96361 HYDRATE IV INFUSION ADD-ON: CPT

## 2017-12-23 PROCEDURE — 94640 AIRWAY INHALATION TREATMENT: CPT

## 2017-12-23 PROCEDURE — 96360 HYDRATION IV INFUSION INIT: CPT

## 2017-12-23 PROCEDURE — 87400 INFLUENZA A/B EACH AG IA: CPT | Mod: 59

## 2017-12-23 PROCEDURE — 85025 COMPLETE CBC W/AUTO DIFF WBC: CPT

## 2017-12-23 PROCEDURE — 25000003 PHARM REV CODE 250: Performed by: EMERGENCY MEDICINE

## 2017-12-23 PROCEDURE — 99284 EMERGENCY DEPT VISIT MOD MDM: CPT | Mod: 25

## 2017-12-23 PROCEDURE — 25000242 PHARM REV CODE 250 ALT 637 W/ HCPCS: Performed by: EMERGENCY MEDICINE

## 2017-12-23 PROCEDURE — 99284 EMERGENCY DEPT VISIT MOD MDM: CPT | Mod: ,,, | Performed by: EMERGENCY MEDICINE

## 2017-12-23 PROCEDURE — 80053 COMPREHEN METABOLIC PANEL: CPT

## 2017-12-23 PROCEDURE — 63600175 PHARM REV CODE 636 W HCPCS: Performed by: EMERGENCY MEDICINE

## 2017-12-23 RX ORDER — IPRATROPIUM BROMIDE AND ALBUTEROL SULFATE 2.5; .5 MG/3ML; MG/3ML
3 SOLUTION RESPIRATORY (INHALATION)
Status: COMPLETED | OUTPATIENT
Start: 2017-12-23 | End: 2017-12-23

## 2017-12-23 RX ORDER — PREDNISONE 20 MG/1
40 TABLET ORAL DAILY
Qty: 8 TABLET | Refills: 0 | Status: SHIPPED | OUTPATIENT
Start: 2017-12-24 | End: 2017-12-28

## 2017-12-23 RX ORDER — ACETAMINOPHEN 500 MG
1000 TABLET ORAL
Status: COMPLETED | OUTPATIENT
Start: 2017-12-23 | End: 2017-12-23

## 2017-12-23 RX ORDER — PREDNISONE 20 MG/1
40 TABLET ORAL DAILY
Qty: 10 TABLET | Refills: 0 | Status: SHIPPED | OUTPATIENT
Start: 2017-12-24 | End: 2017-12-23

## 2017-12-23 RX ORDER — PREDNISONE 20 MG/1
40 TABLET ORAL
Status: COMPLETED | OUTPATIENT
Start: 2017-12-23 | End: 2017-12-23

## 2017-12-23 RX ORDER — IBUPROFEN 400 MG/1
400 TABLET ORAL
Status: COMPLETED | OUTPATIENT
Start: 2017-12-23 | End: 2017-12-23

## 2017-12-23 RX ADMIN — IPRATROPIUM BROMIDE AND ALBUTEROL SULFATE 3 ML: .5; 3 SOLUTION RESPIRATORY (INHALATION) at 03:12

## 2017-12-23 RX ADMIN — IBUPROFEN 400 MG: 400 TABLET, FILM COATED ORAL at 03:12

## 2017-12-23 RX ADMIN — PREDNISONE 40 MG: 20 TABLET ORAL at 04:12

## 2017-12-23 RX ADMIN — IPRATROPIUM BROMIDE AND ALBUTEROL SULFATE 3 ML: .5; 3 SOLUTION RESPIRATORY (INHALATION) at 04:12

## 2017-12-23 RX ADMIN — SODIUM CHLORIDE 500 ML: 900 INJECTION, SOLUTION INTRAVENOUS at 03:12

## 2017-12-23 RX ADMIN — ACETAMINOPHEN 1000 MG: 500 TABLET ORAL at 03:12

## 2017-12-23 NOTE — ED PROVIDER NOTES
Encounter Date: 12/23/2017    SCRIBE #1 NOTE: I, Rosa Ruby, am scribing for, and in the presence of,  Dr. Morgan. I have scribed the entire note.       History     Chief Complaint   Patient presents with    Cough     Treated for pneumonia 3 weeks ago.      Time patient was seen by the provider: 2:42 PM      The patient is a 52 y.o. year old female with a history of recent PNA, asthma, multiple DVTs and PEs on Pradaxa, DM2, scleroderma, and HTN that presents to the ED complaining of myalgias and an intermittent dry cough x3 days. She reports a cough with associated wheezes and chest pain. She notes a subjective fever, chills, and left sided back pain which she has had in the past . Patient states she has been compliant with her Avalox since her recent ED visit for PNA and has been feeling fine. She reports positive sick contacts with her grandchildren. NKDA reported.        The history is provided by the patient and medical records.     Review of patient's allergies indicates:   Allergen Reactions    Plasma, human, normal      Past Medical History:   Diagnosis Date    Asthma     Dyspnea on exertion     Hx of long term use of blood thinners     Hypertension 3/8/2016    PE (pulmonary embolism)     Hx of multiple DVT/PE    Presence of IVC filter     Scleroderma     Stroke 02/2017    Type 2 diabetes mellitus without complication      Past Surgical History:   Procedure Laterality Date    DANETTE FILTER PLACEMENT       Family History   Problem Relation Age of Onset    Breast cancer Mother     Hypertension Father     No Known Problems Brother     No Known Problems Maternal Grandmother     No Known Problems Maternal Grandfather     No Known Problems Paternal Grandmother     No Known Problems Paternal Grandfather     Diabetes Mellitus Maternal Uncle     No Known Problems Sister     No Known Problems Maternal Aunt     No Known Problems Paternal Aunt     No Known Problems Paternal Uncle      Psoriasis Neg Hx     Rheum arthritis Neg Hx     Thyroid disease Neg Hx     Lupus Neg Hx     Anemia Neg Hx     Arrhythmia Neg Hx     Asthma Neg Hx     Clotting disorder Neg Hx     Fainting Neg Hx     Heart attack Neg Hx     Heart disease Neg Hx     Heart failure Neg Hx     Hyperlipidemia Neg Hx     Stroke Neg Hx     Atrial Septal Defect Neg Hx      Social History   Substance Use Topics    Smoking status: Never Smoker    Smokeless tobacco: Never Used    Alcohol use 1.8 oz/week     3 Cans of beer per week      Comment: occ     Review of Systems   Constitutional: Positive for chills and fever.   HENT: Negative for nosebleeds.    Eyes: Negative for visual disturbance.   Respiratory: Positive for cough and wheezing.    Cardiovascular: Positive for chest pain.   Gastrointestinal: Negative for vomiting.   Genitourinary: Negative for hematuria.   Musculoskeletal: Positive for back pain (left-sided) and myalgias.   Skin: Negative for rash.   Neurological: Negative for seizures.       Physical Exam     Initial Vitals [12/23/17 1348]   BP Pulse Resp Temp SpO2   (!) 205/100 103 16 100 °F (37.8 °C) 96 %      MAP       135         Physical Exam    Nursing note and vitals reviewed.  Constitutional: She appears well-developed and well-nourished. She is not diaphoretic. No distress.   Cardiovascular: Normal rate, regular rhythm, normal heart sounds and intact distal pulses.   Pulmonary/Chest:   Some decreased air entry bilaterally with expiratory wheezing bilaterally.   Abdominal: Soft. There is no tenderness. There is no rebound and no guarding.   Skin: Skin is warm and dry. No rash noted. No erythema.         ED Course   Procedures  Labs Reviewed   CBC W/ AUTO DIFFERENTIAL - Abnormal; Notable for the following:        Result Value    Lymph # 0.7 (*)     Lymph% 16.7 (*)     All other components within normal limits   COMPREHENSIVE METABOLIC PANEL - Abnormal; Notable for the following:     Glucose 133 (*)     Total  Protein 9.0 (*)     eGFR if non  52.5 (*)     All other components within normal limits   INFLUENZA A AND B ANTIGEN          X-Rays:   Independently Interpreted Readings:   Chest X-Ray: Some scarring on the right lower lobe, similar to her previous x-rays,     Medical Decision Making:   History:   Old Medical Records: I decided to obtain old medical records.  Old Records Summarized: other records.       <> Summary of Records: Patient with a hx of asthma, multiple DVTs and Pe's on Pradaxa, she has an IVC filter, Dm2, scleroderma, HTN. Patient was admitted to the hospital on November 29th for PNA. She was discharged on Avalox. She was seen by her PCP on the 15th of December where she noted improvement of her symptoms.  Initial Assessment:   Patient with a hx of asthma, DVT, PE, with recent admission for pneumonia . Patient completed her Moxifloxacin dose and felt completely better afterwards. She presents with 3 days of non-productive cough, wheezing, fever, myalgias. Patient is wheezy on exam.  Differentials include Influenza, URI, asthma exacerbation, pneumonia   Will do a chest x-ray, do Duo-Nebs, flue swab, and reassess.  Clinical Tests:   Lab Tests: Ordered and Reviewed  Radiological Study: Ordered and Reviewed            Scribe Attestation:   Scribe #1: I performed the above scribed service and the documentation accurately describes the services I performed. I attest to the accuracy of the note.  Comments: I, Dr. oSnja Morgan, personally performed the services described in this documentation. All medical record entries made by the scribe were at my direction and in my presence.  I have reviewed the chart and agree that the record reflects my personal performance and is accurate and complete. Sonja Morgan MD.  11:46 AM 12/24/2017      Attending Attestation:             Attending ED Notes:   4:07 PM  Flu swab is negative, normal WBC count of 4.38, normal CMP, x-ray shows some scarring in the right  lower lobe that is similar to her previous x-rays.    Patient is still mildly wheezy after duo-neb. She was discharged with a course of steroids and advised to continue inhaler at home. Patient was also advised to call her PCP if her blood sugar increases or to return to the ED  for any worsening symptoms.          ED Course      Clinical Impression:   The primary encounter diagnosis was Upper respiratory tract infection, unspecified type. Diagnoses of Cough and Exacerbation of asthma, unspecified asthma severity, unspecified whether persistent were also pertinent to this visit.    Disposition:   Disposition: Discharged  Condition: Stable                        Sonja Morgan MD  12/24/17 8120

## 2017-12-23 NOTE — ED TRIAGE NOTES
Pt presents to the ED c/o generalized body aches and a cough since Thursday. +fever, chills. Took tylenol 4 hours PTA. Denies n/v/d.

## 2017-12-23 NOTE — DISCHARGE INSTRUCTIONS
You can take an extra metformin 500mg in the afternoon if your sugar is high  Call your MD for help with sugar management  Return if any worsening symptoms

## 2017-12-27 ENCOUNTER — HOSPITAL ENCOUNTER (EMERGENCY)
Facility: HOSPITAL | Age: 52
Discharge: HOME | End: 2017-12-27
Attending: FAMILY MEDICINE
Payer: COMMERCIAL

## 2017-12-27 VITALS
RESPIRATION RATE: 20 BRPM | OXYGEN SATURATION: 95 % | BODY MASS INDEX: 31.92 KG/M2 | WEIGHT: 187 LBS | TEMPERATURE: 99 F | SYSTOLIC BLOOD PRESSURE: 116 MMHG | HEIGHT: 64 IN | DIASTOLIC BLOOD PRESSURE: 75 MMHG | HEART RATE: 108 BPM

## 2017-12-27 DIAGNOSIS — R06.00 DYSPNEA: ICD-10-CM

## 2017-12-27 DIAGNOSIS — R73.9 HYPERGLYCEMIA: ICD-10-CM

## 2017-12-27 DIAGNOSIS — R05.9 COUGH: Primary | ICD-10-CM

## 2017-12-27 DIAGNOSIS — N17.9 AKI (ACUTE KIDNEY INJURY): ICD-10-CM

## 2017-12-27 LAB
ANION GAP SERPL CALC-SCNC: 10 MMOL/L
BASOPHILS # BLD AUTO: 0.02 K/UL
BASOPHILS NFR BLD: 0.2 %
BNP SERPL-MCNC: 12 PG/ML
BUN SERPL-MCNC: 24 MG/DL
CALCIUM SERPL-MCNC: 9.8 MG/DL
CHLORIDE SERPL-SCNC: 98 MMOL/L
CO2 SERPL-SCNC: 27 MMOL/L
CREAT SERPL-MCNC: 1.5 MG/DL
DIFFERENTIAL METHOD: ABNORMAL
EOSINOPHIL # BLD AUTO: 0 K/UL
EOSINOPHIL NFR BLD: 0 %
ERYTHROCYTE [DISTWIDTH] IN BLOOD BY AUTOMATED COUNT: 12.9 %
EST. GFR  (AFRICAN AMERICAN): 46.2 ML/MIN/1.73 M^2
EST. GFR  (NON AFRICAN AMERICAN): 40.1 ML/MIN/1.73 M^2
GLUCOSE SERPL-MCNC: 432 MG/DL
HCT VFR BLD AUTO: 41.4 %
HGB BLD-MCNC: 13.3 G/DL
IMM GRANULOCYTES # BLD AUTO: 0.03 K/UL
IMM GRANULOCYTES NFR BLD AUTO: 0.3 %
INR PPP: 0.9
LYMPHOCYTES # BLD AUTO: 1.4 K/UL
LYMPHOCYTES NFR BLD: 15.3 %
MCH RBC QN AUTO: 28.7 PG
MCHC RBC AUTO-ENTMCNC: 32.1 G/DL
MCV RBC AUTO: 89 FL
MONOCYTES # BLD AUTO: 0.7 K/UL
MONOCYTES NFR BLD: 7.2 %
NEUTROPHILS # BLD AUTO: 7.1 K/UL
NEUTROPHILS NFR BLD: 77 %
NRBC BLD-RTO: 0 /100 WBC
PLATELET # BLD AUTO: 226 K/UL
PMV BLD AUTO: 10.9 FL
POCT GLUCOSE: 309 MG/DL (ref 70–110)
POCT GLUCOSE: 400 MG/DL (ref 70–110)
POTASSIUM SERPL-SCNC: 4.3 MMOL/L
PROTHROMBIN TIME: 10.1 SEC
RBC # BLD AUTO: 4.63 M/UL
SODIUM SERPL-SCNC: 135 MMOL/L
TROPONIN I SERPL DL<=0.01 NG/ML-MCNC: <0.006 NG/ML
WBC # BLD AUTO: 9.27 K/UL

## 2017-12-27 PROCEDURE — 85610 PROTHROMBIN TIME: CPT

## 2017-12-27 PROCEDURE — 96361 HYDRATE IV INFUSION ADD-ON: CPT

## 2017-12-27 PROCEDURE — 99284 EMERGENCY DEPT VISIT MOD MDM: CPT | Mod: 25

## 2017-12-27 PROCEDURE — 82962 GLUCOSE BLOOD TEST: CPT

## 2017-12-27 PROCEDURE — 94640 AIRWAY INHALATION TREATMENT: CPT

## 2017-12-27 PROCEDURE — 80048 BASIC METABOLIC PNL TOTAL CA: CPT

## 2017-12-27 PROCEDURE — 93010 ELECTROCARDIOGRAM REPORT: CPT | Mod: ,,, | Performed by: INTERNAL MEDICINE

## 2017-12-27 PROCEDURE — 85025 COMPLETE CBC W/AUTO DIFF WBC: CPT

## 2017-12-27 PROCEDURE — 96360 HYDRATION IV INFUSION INIT: CPT

## 2017-12-27 PROCEDURE — 99284 EMERGENCY DEPT VISIT MOD MDM: CPT | Mod: ,,, | Performed by: EMERGENCY MEDICINE

## 2017-12-27 PROCEDURE — 83880 ASSAY OF NATRIURETIC PEPTIDE: CPT

## 2017-12-27 PROCEDURE — 84484 ASSAY OF TROPONIN QUANT: CPT

## 2017-12-27 PROCEDURE — 25000003 PHARM REV CODE 250: Performed by: STUDENT IN AN ORGANIZED HEALTH CARE EDUCATION/TRAINING PROGRAM

## 2017-12-27 PROCEDURE — 25000242 PHARM REV CODE 250 ALT 637 W/ HCPCS: Performed by: FAMILY MEDICINE

## 2017-12-27 RX ORDER — IBUPROFEN 600 MG/1
600 TABLET ORAL
Status: COMPLETED | OUTPATIENT
Start: 2017-12-27 | End: 2017-12-27

## 2017-12-27 RX ORDER — IPRATROPIUM BROMIDE AND ALBUTEROL SULFATE 2.5; .5 MG/3ML; MG/3ML
3 SOLUTION RESPIRATORY (INHALATION)
Status: COMPLETED | OUTPATIENT
Start: 2017-12-27 | End: 2017-12-27

## 2017-12-27 RX ORDER — CODEINE PHOSPHATE AND GUAIFENESIN 10; 100 MG/5ML; MG/5ML
5 SOLUTION ORAL 3 TIMES DAILY PRN
Qty: 75 ML | Refills: 0 | Status: SHIPPED | OUTPATIENT
Start: 2017-12-27 | End: 2018-01-01

## 2017-12-27 RX ADMIN — IPRATROPIUM BROMIDE AND ALBUTEROL SULFATE 3 ML: .5; 3 SOLUTION RESPIRATORY (INHALATION) at 11:12

## 2017-12-27 RX ADMIN — SODIUM CHLORIDE 1000 ML: 0.9 INJECTION, SOLUTION INTRAVENOUS at 01:12

## 2017-12-27 RX ADMIN — IBUPROFEN 600 MG: 600 TABLET, FILM COATED ORAL at 12:12

## 2017-12-27 RX ADMIN — IPRATROPIUM BROMIDE AND ALBUTEROL SULFATE 3 ML: .5; 3 SOLUTION RESPIRATORY (INHALATION) at 12:12

## 2017-12-27 NOTE — PROVIDER PROGRESS NOTES - EMERGENCY DEPT.
Encounter Date: 12/27/2017    ED Physician Progress Notes             Sinus tachycardia.  Nonspecific T-wave changes.  Unchanged from prior EKG.  No STEMI

## 2017-12-27 NOTE — ED PROVIDER NOTES
Encounter Date: 12/27/2017       History     Chief Complaint   Patient presents with    Muscle Pain     Seen on Saturday    Cough    Chills     HPI     51yo woman with hx DM, asthma, HTN, PE/DVT on pradaxa, CVA, recent admission November 2017 for pneumonia presenting with cough. States that about a week ago she developed dry cough with subjective fevers, came to ED on 12/23 and was discharged with a course of prednisone. Notes worsening of her cough which is now productive of green sputum, shortness of breath, as well as body aches and continued subjective fevers. Home inhalers have not been helpful. States this morning while getting dressed she got very out of breath. Denies nausea, vomiting, diarrhea, chest pain.       Review of patient's allergies indicates:   Allergen Reactions    Plasma, human, normal      Past Medical History:   Diagnosis Date    Asthma     Dyspnea on exertion     Hx of long term use of blood thinners     Hypertension 3/8/2016    PE (pulmonary embolism)     Hx of multiple DVT/PE    Presence of IVC filter     Scleroderma     Stroke 02/2017    Type 2 diabetes mellitus without complication      Past Surgical History:   Procedure Laterality Date    DANETTE FILTER PLACEMENT       Family History   Problem Relation Age of Onset    Breast cancer Mother     Hypertension Father     No Known Problems Brother     No Known Problems Maternal Grandmother     No Known Problems Maternal Grandfather     No Known Problems Paternal Grandmother     No Known Problems Paternal Grandfather     Diabetes Mellitus Maternal Uncle     No Known Problems Sister     No Known Problems Maternal Aunt     No Known Problems Paternal Aunt     No Known Problems Paternal Uncle     Psoriasis Neg Hx     Rheum arthritis Neg Hx     Thyroid disease Neg Hx     Lupus Neg Hx     Anemia Neg Hx     Arrhythmia Neg Hx     Asthma Neg Hx     Clotting disorder Neg Hx     Fainting Neg Hx     Heart attack Neg  Hx     Heart disease Neg Hx     Heart failure Neg Hx     Hyperlipidemia Neg Hx     Stroke Neg Hx     Atrial Septal Defect Neg Hx      Social History   Substance Use Topics    Smoking status: Never Smoker    Smokeless tobacco: Never Used    Alcohol use 1.8 oz/week     3 Cans of beer per week      Comment: occ     Review of Systems   Constitutional: Negative for fever.   HENT: Positive for congestion. Negative for sore throat.    Respiratory: Positive for cough and shortness of breath.    Cardiovascular: Negative for chest pain.   Gastrointestinal: Negative for abdominal pain, diarrhea, nausea and vomiting.   Genitourinary: Negative for dysuria.   Musculoskeletal: Positive for myalgias.   Skin: Negative for rash.   All other systems reviewed and are negative.      Physical Exam     Initial Vitals [12/27/17 0959]   BP Pulse Resp Temp SpO2   137/83 (!) 128 18 99.1 °F (37.3 °C) 97 %      MAP       101         Physical Exam    Nursing note and vitals reviewed.  Constitutional: She appears well-developed and well-nourished.   HENT:   Head: Normocephalic and atraumatic.   Mouth/Throat: Oropharynx is clear and moist. No oropharyngeal exudate.   Eyes: Conjunctivae and EOM are normal. Pupils are equal, round, and reactive to light.   Neck: Normal range of motion.   Cardiovascular: Regular rhythm, normal heart sounds and intact distal pulses. Tachycardia present.    Pulmonary/Chest: Breath sounds normal. No respiratory distress. She has no wheezes. She has no rhonchi. She has no rales.   Abdominal: Soft. She exhibits no distension. There is no tenderness.   Musculoskeletal: Normal range of motion. She exhibits no edema.   Neurological: She is alert and oriented to person, place, and time.   Skin: Skin is warm and dry. No rash noted.   Psychiatric: She has a normal mood and affect.         ED Course   Procedures  Labs Reviewed   CBC W/ AUTO DIFFERENTIAL - Abnormal; Notable for the following:        Result Value    Gran%  77.0 (*)     Lymph% 15.3 (*)     All other components within normal limits   BASIC METABOLIC PANEL - Abnormal; Notable for the following:     Sodium 135 (*)     Glucose 432 (*)     BUN, Bld 24 (*)     Creatinine 1.5 (*)     eGFR if  46.2 (*)     eGFR if non  40.1 (*)     All other components within normal limits   POCT GLUCOSE - Abnormal; Notable for the following:     POCT Glucose 400 (*)     All other components within normal limits   B-TYPE NATRIURETIC PEPTIDE   TROPONIN I   PROTIME-INR   POCT GLUCOSE MONITORING CONTINUOUS   POCT GLUCOSE MONITORING CONTINUOUS          MDM: Kathi Watts MD HO-II 11:29 AM  This is a 53yo woman with hx DM, asthma, HTN, PE, DVT, CVA presenting with cough and shortness of breath for about one week. Differential includes URI, influenza, asthma exacerbation, pneumonia, pleural effusion, CHF, pneumothorax, PE. Do not suspect ACS/MI. Breath sounds are clear and she is not hypoxic. Do not suspect PE at this time. Will get labs, CXR, provide duoneb and ibuprofen for body aches.     Update: NICCI JaquezII 12:49 PM  HR has lowered from 120 on arrival to <110. Labs reviewed,  with Ag 10, Cr 1.5,  trop negative and BNP 12. CXR shows no change since prior study, blunting of the costophrenic angles consistent with scarring, no pleural effusion or focal consolidations. EKG shows sinus tach 119 bpm, with normal axis, normal intervals, diffuse T wave inversions similar to prior, no STEMI. Providing IVF for hyperglycemia and TRUDY.     Update: GUSTAVO Jaquez-II 3:03 PM  Patient feeling well, still with cough but improved breathing. Repeat BG after fluids 309. Will discharge with guaifenesin-codeine as she states tessalon pearls have not helped much. Suspect cough related to post viral syndrome, provided return precautions and encouraged PCP follow up.                            ED Course      Clinical Impression:   The primary encounter diagnosis  was Cough. Diagnoses of Dyspnea, Hyperglycemia, and TRUDY (acute kidney injury) were also pertinent to this visit.                           Kathi Watts MD  Resident  12/27/17 9370

## 2017-12-27 NOTE — PROVIDER PROGRESS NOTES - EMERGENCY DEPT.
Encounter Date: 12/27/2017    ED Physician Progress Notes        Physician Note:   Time patient was seen by the provider: 10:49 AM  The patient is a 52 y.o. female with hx of asthma, HTN, LEÓN, and DM that presents to the ED with a complaint of myalgia with associated productive cough and chills. Patient reports no improvement of symptoms since recent ED visit 4 days ago. She endorses diaphoresis and SOB. She denies hemoptysis and emesis.        Patient is a 52 y.o. female with multiple medical problems and chronic respiratory disease with protracted course of dyspnea, SOB,  tachycardia and productive cough. Please review her chart for recent admission and clinic visits. Most recently she was seen in Emergency room 4 days ago for her typical respiratory exacerbation. Treated with steroids, breathing treatment and she does not improve. She continues to be tachycardic and tachypneic despite her home management regimen. I am concerned about the possibility of pulmonary HTN or other cardiac involvements. Will obtain appropriate labs. Respiratory treatment will be chest X-ray, place cardiac unit and turn over to main ED for further disposition.     I, Chastity Rodriguez, am scribing for, and in the presence of, Dr. Guevara. I performed the above scribed service and the documentation accurately describes the services I performed. I attest to the accuracy of the note.

## 2017-12-27 NOTE — ED TRIAGE NOTES
Presents to ER with continued cough and chest congestion.  States that she has been sick since Thanksgiving, has made multiple trips to the ER and is still no better.  States that she is coughing up greenish colored sputum and that she has already taken 3 breathing treatments this am in an effort to go to work.    GENERAL: The patient is well-developed and well-nourished in no apparent distress. Alert and oriented x4.                                                HEENT: Head is normocephalic and atraumatic. Extraocular muscles are intact. Pupils are equal, round, and reactive to light and accommodation. Nares appeared normal. Mouth is well hydrated and without lesions. Mucous membranes are moist. Posterior pharynx clear of any exudate or lesions.    NECK: Supple. No carotid bruits. No lymphadenopathy or thyromegaly.    LUNGS: Inspiratory wheezing noted scattered throughout all lung fields and has a deep congested cough.      HEART: Regular rate and rhythm without murmur.     ABDOMEN: Soft, nontender, and nondistended. Positive bowel sounds. No hepatosplenomegaly was noted.     EXTREMITIES: Without any cyanosis, clubbing, rash, lesions or edema.     NEUROLOGIC: Cranial nerves II through XII are grossly intact.     PSYCHIATRIC: Flat affect, but denies suicidal or homicidal ideations.    SKIN: No ulceration or induration present.

## 2017-12-28 ENCOUNTER — PATIENT MESSAGE (OUTPATIENT)
Dept: INTERNAL MEDICINE | Facility: CLINIC | Age: 52
End: 2017-12-28

## 2017-12-29 ENCOUNTER — OFFICE VISIT (OUTPATIENT)
Dept: INTERNAL MEDICINE | Facility: CLINIC | Age: 52
End: 2017-12-29
Payer: COMMERCIAL

## 2017-12-29 VITALS
HEIGHT: 64 IN | BODY MASS INDEX: 29.35 KG/M2 | WEIGHT: 171.94 LBS | DIASTOLIC BLOOD PRESSURE: 76 MMHG | SYSTOLIC BLOOD PRESSURE: 102 MMHG

## 2017-12-29 DIAGNOSIS — J45.909 UNCOMPLICATED ASTHMA, UNSPECIFIED ASTHMA SEVERITY, UNSPECIFIED WHETHER PERSISTENT: ICD-10-CM

## 2017-12-29 DIAGNOSIS — R05.9 COUGH: ICD-10-CM

## 2017-12-29 DIAGNOSIS — J45.51: Primary | ICD-10-CM

## 2017-12-29 PROCEDURE — 99215 OFFICE O/P EST HI 40 MIN: CPT | Mod: S$GLB,,, | Performed by: FAMILY MEDICINE

## 2017-12-29 PROCEDURE — 99999 PR PBB SHADOW E&M-EST. PATIENT-LVL IV: CPT | Mod: PBBFAC,,, | Performed by: FAMILY MEDICINE

## 2017-12-29 RX ORDER — INSULIN GLARGINE 100 [IU]/ML
10 INJECTION, SOLUTION SUBCUTANEOUS NIGHTLY
Qty: 1 BOX | Refills: 0 | Status: ON HOLD | OUTPATIENT
Start: 2017-12-29 | End: 2017-12-31

## 2017-12-29 NOTE — PROGRESS NOTES
Subjective:       Patient ID: Lyssa Gandara is a 52 y.o. female.    Chief Complaint: Hospital Follow Up and Blood Sugar Problem  Lyssa Gandara 52 y.o. female is here for office visit to review care and physical exam, reports having Asthma attack, shawn to ER X 2.  CBGs up.  Last steroid last Wednesday.  CBGs have been high, CBG this Am 231.  Still has cough, wheezing improved.      HPI  Review of Systems   Constitutional: Negative for activity change, fatigue, fever and unexpected weight change.   HENT: Negative for congestion, hearing loss, postnasal drip and rhinorrhea.    Eyes: Negative for redness and visual disturbance.   Respiratory: Negative for chest tightness, shortness of breath and wheezing.    Cardiovascular: Negative for chest pain, palpitations and leg swelling.   Gastrointestinal: Negative for abdominal distention.   Genitourinary: Negative for decreased urine volume, dysuria, flank pain, hematuria, pelvic pain and urgency.   Musculoskeletal: Negative for back pain, gait problem, joint swelling and neck stiffness.   Skin: Negative for color change, rash and wound.   Neurological: Negative for dizziness, syncope, weakness and headaches.   Psychiatric/Behavioral: Negative for behavioral problems, confusion and sleep disturbance. The patient is not nervous/anxious.        Objective:      Physical Exam   Constitutional: She is oriented to person, place, and time. She appears well-developed and well-nourished. No distress.   HENT:   Head: Normocephalic.   Mouth/Throat: No oropharyngeal exudate.   Eyes: EOM are normal. Pupils are equal, round, and reactive to light. No scleral icterus.   Neck: Neck supple. No JVD present. No thyromegaly present.   Cardiovascular: Normal rate, regular rhythm and normal heart sounds.  Exam reveals no gallop and no friction rub.    No murmur heard.  Pulmonary/Chest: Effort normal and breath sounds normal. She has no wheezes. She has no rales.   Abdominal: Soft.  Bowel sounds are normal. She exhibits no distension and no mass. There is no tenderness. There is no guarding.   Musculoskeletal: Normal range of motion. She exhibits no edema.   Lymphadenopathy:     She has no cervical adenopathy.   Neurological: She is alert and oriented to person, place, and time. She has normal reflexes. She displays normal reflexes. No cranial nerve deficit. She exhibits normal muscle tone.   Skin: Skin is warm. No rash noted. No erythema.   Psychiatric: She has a normal mood and affect. Thought content normal.       Assessment:       No diagnosis found.    Plan:       Lyssa was seen today for hospital follow up and blood sugar problem.    Diagnoses and all orders for this visit:    Severe persistent asthma without status asthmaticus with acute exacerbation  -     NEBULIZER FOR HOME USE    Cough  - COnservative care advised, Neb tx and steam, otc as well  Uncomplicated asthma, unspecified asthma severity, unspecified whether persistent  - Discussed  Other orders  -     insulin glargine (LANTUS SOLOSTAR) 100 unit/mL (3 mL) InPn pen; Inject 10 Units into the skin every evening.       Breast pain in female

## 2017-12-30 ENCOUNTER — HOSPITAL ENCOUNTER (OUTPATIENT)
Facility: OTHER | Age: 52
Discharge: HOME | End: 2017-12-31
Attending: EMERGENCY MEDICINE | Admitting: INTERNAL MEDICINE
Payer: COMMERCIAL

## 2017-12-30 DIAGNOSIS — R00.0 SINUS TACHYCARDIA: ICD-10-CM

## 2017-12-30 DIAGNOSIS — R73.9 HYPERGLYCEMIA: Primary | ICD-10-CM

## 2017-12-30 DIAGNOSIS — R06.02 SOB (SHORTNESS OF BREATH): ICD-10-CM

## 2017-12-30 DIAGNOSIS — N28.9 ACUTE RENAL INSUFFICIENCY: ICD-10-CM

## 2017-12-30 DIAGNOSIS — J20.9 ACUTE BRONCHITIS, UNSPECIFIED ORGANISM: ICD-10-CM

## 2017-12-30 DIAGNOSIS — J45.901 EXACERBATION OF ASTHMA, UNSPECIFIED ASTHMA SEVERITY, UNSPECIFIED WHETHER PERSISTENT: ICD-10-CM

## 2017-12-30 LAB
ALBUMIN SERPL BCP-MCNC: 3.2 G/DL
ALP SERPL-CCNC: 86 U/L
ALT SERPL W/O P-5'-P-CCNC: 13 U/L
ANION GAP SERPL CALC-SCNC: 14 MMOL/L
AST SERPL-CCNC: 9 U/L
BASOPHILS # BLD AUTO: 0.01 K/UL
BASOPHILS NFR BLD: 0.1 %
BILIRUB SERPL-MCNC: 0.4 MG/DL
BUN SERPL-MCNC: 26 MG/DL
CALCIUM SERPL-MCNC: 9.6 MG/DL
CHLORIDE SERPL-SCNC: 101 MMOL/L
CO2 SERPL-SCNC: 21 MMOL/L
CREAT SERPL-MCNC: 1.8 MG/DL
DIFFERENTIAL METHOD: ABNORMAL
EOSINOPHIL # BLD AUTO: 0.1 K/UL
EOSINOPHIL NFR BLD: 0.9 %
ERYTHROCYTE [DISTWIDTH] IN BLOOD BY AUTOMATED COUNT: 13 %
EST. GFR  (AFRICAN AMERICAN): 37 ML/MIN/1.73 M^2
EST. GFR  (NON AFRICAN AMERICAN): 32 ML/MIN/1.73 M^2
GLUCOSE SERPL-MCNC: 386 MG/DL
HCT VFR BLD AUTO: 40.4 %
HGB BLD-MCNC: 13.4 G/DL
LYMPHOCYTES # BLD AUTO: 1.4 K/UL
LYMPHOCYTES NFR BLD: 14.8 %
MCH RBC QN AUTO: 29.4 PG
MCHC RBC AUTO-ENTMCNC: 33.2 G/DL
MCV RBC AUTO: 89 FL
MONOCYTES # BLD AUTO: 0.7 K/UL
MONOCYTES NFR BLD: 7.2 %
NEUTROPHILS # BLD AUTO: 7.3 K/UL
NEUTROPHILS NFR BLD: 76.4 %
PLATELET # BLD AUTO: 308 K/UL
PMV BLD AUTO: 10.5 FL
POCT GLUCOSE: 246 MG/DL (ref 70–110)
POCT GLUCOSE: 315 MG/DL (ref 70–110)
POCT GLUCOSE: 339 MG/DL (ref 70–110)
POTASSIUM SERPL-SCNC: 4.5 MMOL/L
PROT SERPL-MCNC: 8.2 G/DL
RBC # BLD AUTO: 4.56 M/UL
SODIUM SERPL-SCNC: 136 MMOL/L
WBC # BLD AUTO: 9.54 K/UL

## 2017-12-30 PROCEDURE — 25000003 PHARM REV CODE 250: Performed by: EMERGENCY MEDICINE

## 2017-12-30 PROCEDURE — 93005 ELECTROCARDIOGRAM TRACING: CPT

## 2017-12-30 PROCEDURE — 82962 GLUCOSE BLOOD TEST: CPT

## 2017-12-30 PROCEDURE — 85025 COMPLETE CBC W/AUTO DIFF WBC: CPT

## 2017-12-30 PROCEDURE — 96361 HYDRATE IV INFUSION ADD-ON: CPT

## 2017-12-30 PROCEDURE — 80053 COMPREHEN METABOLIC PANEL: CPT

## 2017-12-30 PROCEDURE — 25000242 PHARM REV CODE 250 ALT 637 W/ HCPCS: Performed by: EMERGENCY MEDICINE

## 2017-12-30 PROCEDURE — 99285 EMERGENCY DEPT VISIT HI MDM: CPT | Mod: 25

## 2017-12-30 PROCEDURE — 93010 ELECTROCARDIOGRAM REPORT: CPT | Mod: ,,, | Performed by: INTERNAL MEDICINE

## 2017-12-30 PROCEDURE — 94761 N-INVAS EAR/PLS OXIMETRY MLT: CPT

## 2017-12-30 PROCEDURE — 96374 THER/PROPH/DIAG INJ IV PUSH: CPT

## 2017-12-30 PROCEDURE — 94640 AIRWAY INHALATION TREATMENT: CPT

## 2017-12-30 PROCEDURE — 63600175 PHARM REV CODE 636 W HCPCS: Performed by: EMERGENCY MEDICINE

## 2017-12-30 RX ORDER — IPRATROPIUM BROMIDE AND ALBUTEROL SULFATE 2.5; .5 MG/3ML; MG/3ML
3 SOLUTION RESPIRATORY (INHALATION)
Status: COMPLETED | OUTPATIENT
Start: 2017-12-30 | End: 2017-12-30

## 2017-12-30 RX ADMIN — INSULIN HUMAN 6 UNITS: 100 INJECTION, SOLUTION PARENTERAL at 11:12

## 2017-12-30 RX ADMIN — SODIUM CHLORIDE 1000 ML: 0.9 INJECTION, SOLUTION INTRAVENOUS at 09:12

## 2017-12-30 RX ADMIN — IPRATROPIUM BROMIDE AND ALBUTEROL SULFATE 3 ML: .5; 3 SOLUTION RESPIRATORY (INHALATION) at 09:12

## 2017-12-31 VITALS
BODY MASS INDEX: 30.14 KG/M2 | HEIGHT: 64 IN | WEIGHT: 176.56 LBS | TEMPERATURE: 98 F | SYSTOLIC BLOOD PRESSURE: 143 MMHG | DIASTOLIC BLOOD PRESSURE: 80 MMHG | OXYGEN SATURATION: 97 % | HEART RATE: 105 BPM | RESPIRATION RATE: 18 BRPM

## 2017-12-31 PROBLEM — J45.901 ASTHMA EXACERBATION: Status: ACTIVE | Noted: 2017-12-31

## 2017-12-31 PROBLEM — J20.9 ACUTE BRONCHITIS: Status: ACTIVE | Noted: 2017-12-31

## 2017-12-31 PROBLEM — R73.9 HYPERGLYCEMIA: Status: ACTIVE | Noted: 2017-12-31

## 2017-12-31 LAB
ANION GAP SERPL CALC-SCNC: 12 MMOL/L
BASOPHILS # BLD AUTO: 0 K/UL
BASOPHILS NFR BLD: 0 %
BUN SERPL-MCNC: 20 MG/DL
CALCIUM SERPL-MCNC: 9.4 MG/DL
CHLORIDE SERPL-SCNC: 102 MMOL/L
CO2 SERPL-SCNC: 21 MMOL/L
CREAT SERPL-MCNC: 1.4 MG/DL
DIFFERENTIAL METHOD: ABNORMAL
EOSINOPHIL # BLD AUTO: 0 K/UL
EOSINOPHIL NFR BLD: 0 %
ERYTHROCYTE [DISTWIDTH] IN BLOOD BY AUTOMATED COUNT: 13.2 %
EST. GFR  (AFRICAN AMERICAN): 50 ML/MIN/1.73 M^2
EST. GFR  (NON AFRICAN AMERICAN): 44 ML/MIN/1.73 M^2
GLUCOSE SERPL-MCNC: 455 MG/DL
HCT VFR BLD AUTO: 36.9 %
HGB BLD-MCNC: 11.9 G/DL
LYMPHOCYTES # BLD AUTO: 0.5 K/UL
LYMPHOCYTES NFR BLD: 5.6 %
MCH RBC QN AUTO: 28.9 PG
MCHC RBC AUTO-ENTMCNC: 32.2 G/DL
MCV RBC AUTO: 90 FL
MONOCYTES # BLD AUTO: 0.1 K/UL
MONOCYTES NFR BLD: 1.6 %
NEUTROPHILS # BLD AUTO: 7.9 K/UL
NEUTROPHILS NFR BLD: 92 %
PLATELET # BLD AUTO: 325 K/UL
PMV BLD AUTO: 10.5 FL
POCT GLUCOSE: 204 MG/DL (ref 70–110)
POCT GLUCOSE: 268 MG/DL (ref 70–110)
POCT GLUCOSE: 410 MG/DL (ref 70–110)
POTASSIUM SERPL-SCNC: 5.2 MMOL/L
RBC # BLD AUTO: 4.12 M/UL
SODIUM SERPL-SCNC: 135 MMOL/L
WBC # BLD AUTO: 8.59 K/UL

## 2017-12-31 PROCEDURE — 94761 N-INVAS EAR/PLS OXIMETRY MLT: CPT

## 2017-12-31 PROCEDURE — 63600175 PHARM REV CODE 636 W HCPCS: Performed by: EMERGENCY MEDICINE

## 2017-12-31 PROCEDURE — 99900035 HC TECH TIME PER 15 MIN (STAT)

## 2017-12-31 PROCEDURE — 25000242 PHARM REV CODE 250 ALT 637 W/ HCPCS: Performed by: EMERGENCY MEDICINE

## 2017-12-31 PROCEDURE — 25000003 PHARM REV CODE 250: Performed by: EMERGENCY MEDICINE

## 2017-12-31 PROCEDURE — 99217 PR OBSERVATION CARE DISCHARGE: CPT | Mod: ,,, | Performed by: INTERNAL MEDICINE

## 2017-12-31 PROCEDURE — 85025 COMPLETE CBC W/AUTO DIFF WBC: CPT

## 2017-12-31 PROCEDURE — G0378 HOSPITAL OBSERVATION PER HR: HCPCS

## 2017-12-31 PROCEDURE — 94640 AIRWAY INHALATION TREATMENT: CPT

## 2017-12-31 PROCEDURE — 96361 HYDRATE IV INFUSION ADD-ON: CPT

## 2017-12-31 PROCEDURE — 25000242 PHARM REV CODE 250 ALT 637 W/ HCPCS: Performed by: PHYSICIAN ASSISTANT

## 2017-12-31 PROCEDURE — 99220 PR INITIAL OBSERVATION CARE,LEVL III: CPT | Mod: ,,, | Performed by: PHYSICIAN ASSISTANT

## 2017-12-31 PROCEDURE — 25000003 PHARM REV CODE 250: Performed by: PHYSICIAN ASSISTANT

## 2017-12-31 PROCEDURE — 80048 BASIC METABOLIC PNL TOTAL CA: CPT

## 2017-12-31 PROCEDURE — 63600175 PHARM REV CODE 636 W HCPCS: Performed by: PHYSICIAN ASSISTANT

## 2017-12-31 PROCEDURE — 36415 COLL VENOUS BLD VENIPUNCTURE: CPT

## 2017-12-31 RX ORDER — ZOLPIDEM TARTRATE 5 MG/1
5 TABLET ORAL NIGHTLY PRN
Status: DISCONTINUED | OUTPATIENT
Start: 2017-12-31 | End: 2017-12-31 | Stop reason: HOSPADM

## 2017-12-31 RX ORDER — LISINOPRIL 20 MG/1
20 TABLET ORAL DAILY
Status: DISCONTINUED | OUTPATIENT
Start: 2017-12-31 | End: 2017-12-31 | Stop reason: HOSPADM

## 2017-12-31 RX ORDER — ONDANSETRON 8 MG/1
8 TABLET, ORALLY DISINTEGRATING ORAL EVERY 8 HOURS PRN
Status: DISCONTINUED | OUTPATIENT
Start: 2017-12-31 | End: 2017-12-31 | Stop reason: HOSPADM

## 2017-12-31 RX ORDER — IPRATROPIUM BROMIDE AND ALBUTEROL SULFATE 2.5; .5 MG/3ML; MG/3ML
3 SOLUTION RESPIRATORY (INHALATION) EVERY 4 HOURS PRN
Status: DISCONTINUED | OUTPATIENT
Start: 2017-12-31 | End: 2017-12-31 | Stop reason: HOSPADM

## 2017-12-31 RX ORDER — DABIGATRAN ETEXILATE 75 MG/1
150 CAPSULE ORAL 2 TIMES DAILY
Status: DISCONTINUED | OUTPATIENT
Start: 2017-12-31 | End: 2017-12-31 | Stop reason: HOSPADM

## 2017-12-31 RX ORDER — ASPIRIN 81 MG/1
81 TABLET ORAL DAILY
Status: DISCONTINUED | OUTPATIENT
Start: 2017-12-31 | End: 2017-12-31 | Stop reason: HOSPADM

## 2017-12-31 RX ORDER — BENZONATATE 100 MG/1
100 CAPSULE ORAL 3 TIMES DAILY PRN
Qty: 15 CAPSULE | Refills: 0 | Status: SHIPPED | OUTPATIENT
Start: 2017-12-31 | End: 2018-01-10

## 2017-12-31 RX ORDER — IBUPROFEN 200 MG
16 TABLET ORAL
Status: DISCONTINUED | OUTPATIENT
Start: 2017-12-31 | End: 2017-12-31 | Stop reason: HOSPADM

## 2017-12-31 RX ORDER — DOXYCYCLINE HYCLATE 100 MG
100 TABLET ORAL EVERY 12 HOURS
Status: DISCONTINUED | OUTPATIENT
Start: 2017-12-31 | End: 2017-12-31 | Stop reason: HOSPADM

## 2017-12-31 RX ORDER — FLUTICASONE FUROATE AND VILANTEROL 100; 25 UG/1; UG/1
1 POWDER RESPIRATORY (INHALATION) DAILY
Status: DISCONTINUED | OUTPATIENT
Start: 2017-12-31 | End: 2017-12-31 | Stop reason: HOSPADM

## 2017-12-31 RX ORDER — FLUTICASONE PROPIONATE AND SALMETEROL 250; 50 UG/1; UG/1
1 POWDER RESPIRATORY (INHALATION) 2 TIMES DAILY
Status: DISCONTINUED | OUTPATIENT
Start: 2017-12-31 | End: 2017-12-31

## 2017-12-31 RX ORDER — IPRATROPIUM BROMIDE AND ALBUTEROL SULFATE 2.5; .5 MG/3ML; MG/3ML
3 SOLUTION RESPIRATORY (INHALATION) EVERY 4 HOURS
Status: DISCONTINUED | OUTPATIENT
Start: 2017-12-31 | End: 2017-12-31 | Stop reason: HOSPADM

## 2017-12-31 RX ORDER — SIMVASTATIN 10 MG/1
20 TABLET, FILM COATED ORAL NIGHTLY
Status: DISCONTINUED | OUTPATIENT
Start: 2017-12-31 | End: 2017-12-31 | Stop reason: HOSPADM

## 2017-12-31 RX ORDER — IBUPROFEN 200 MG
24 TABLET ORAL
Status: DISCONTINUED | OUTPATIENT
Start: 2017-12-31 | End: 2017-12-31 | Stop reason: HOSPADM

## 2017-12-31 RX ORDER — INSULIN ASPART 100 [IU]/ML
0-5 INJECTION, SOLUTION INTRAVENOUS; SUBCUTANEOUS
Status: DISCONTINUED | OUTPATIENT
Start: 2017-12-31 | End: 2017-12-31 | Stop reason: HOSPADM

## 2017-12-31 RX ORDER — PREDNISONE 20 MG/1
40 TABLET ORAL
Status: COMPLETED | OUTPATIENT
Start: 2017-12-31 | End: 2017-12-31

## 2017-12-31 RX ORDER — INSULIN GLARGINE 100 [IU]/ML
15 INJECTION, SOLUTION SUBCUTANEOUS NIGHTLY
Qty: 1 BOX | Refills: 0 | Status: SHIPPED | OUTPATIENT
Start: 2017-12-31 | End: 2018-09-06

## 2017-12-31 RX ORDER — BENZONATATE 100 MG/1
100 CAPSULE ORAL 3 TIMES DAILY PRN
Status: DISCONTINUED | OUTPATIENT
Start: 2017-12-31 | End: 2017-12-31 | Stop reason: HOSPADM

## 2017-12-31 RX ORDER — GLUCAGON 1 MG
1 KIT INJECTION
Status: DISCONTINUED | OUTPATIENT
Start: 2017-12-31 | End: 2017-12-31 | Stop reason: HOSPADM

## 2017-12-31 RX ORDER — DOXYCYCLINE HYCLATE 100 MG
100 TABLET ORAL
Status: COMPLETED | OUTPATIENT
Start: 2017-12-31 | End: 2017-12-31

## 2017-12-31 RX ORDER — IPRATROPIUM BROMIDE AND ALBUTEROL SULFATE 2.5; .5 MG/3ML; MG/3ML
3 SOLUTION RESPIRATORY (INHALATION)
Status: COMPLETED | OUTPATIENT
Start: 2017-12-31 | End: 2017-12-31

## 2017-12-31 RX ORDER — ACETAMINOPHEN 325 MG/1
650 TABLET ORAL EVERY 4 HOURS PRN
Status: DISCONTINUED | OUTPATIENT
Start: 2017-12-31 | End: 2017-12-31 | Stop reason: HOSPADM

## 2017-12-31 RX ORDER — SODIUM CHLORIDE 0.9 % (FLUSH) 0.9 %
5 SYRINGE (ML) INJECTION
Status: DISCONTINUED | OUTPATIENT
Start: 2017-12-31 | End: 2017-12-31 | Stop reason: HOSPADM

## 2017-12-31 RX ORDER — SODIUM CHLORIDE 9 MG/ML
INJECTION, SOLUTION INTRAVENOUS CONTINUOUS
Status: DISCONTINUED | OUTPATIENT
Start: 2017-12-31 | End: 2017-12-31

## 2017-12-31 RX ORDER — PREDNISONE 20 MG/1
40 TABLET ORAL DAILY
Status: DISCONTINUED | OUTPATIENT
Start: 2018-01-01 | End: 2017-12-31

## 2017-12-31 RX ADMIN — SODIUM CHLORIDE 1000 ML: 0.9 INJECTION, SOLUTION INTRAVENOUS at 12:12

## 2017-12-31 RX ADMIN — BENZONATATE 100 MG: 100 CAPSULE ORAL at 03:12

## 2017-12-31 RX ADMIN — INSULIN ASPART 5 UNITS: 100 INJECTION, SOLUTION INTRAVENOUS; SUBCUTANEOUS at 11:12

## 2017-12-31 RX ADMIN — PREDNISONE 40 MG: 20 TABLET ORAL at 01:12

## 2017-12-31 RX ADMIN — IPRATROPIUM BROMIDE AND ALBUTEROL SULFATE 3 ML: .5; 3 SOLUTION RESPIRATORY (INHALATION) at 09:12

## 2017-12-31 RX ADMIN — INSULIN ASPART 3 UNITS: 100 INJECTION, SOLUTION INTRAVENOUS; SUBCUTANEOUS at 08:12

## 2017-12-31 RX ADMIN — IPRATROPIUM BROMIDE AND ALBUTEROL SULFATE 3 ML: .5; 3 SOLUTION RESPIRATORY (INHALATION) at 12:12

## 2017-12-31 RX ADMIN — FLUTICASONE FUROATE AND VILANTEROL TRIFENATATE 1 PUFF: 100; 25 POWDER RESPIRATORY (INHALATION) at 09:12

## 2017-12-31 RX ADMIN — DABIGATRAN ETEXILATE MESYLATE 150 MG: 75 CAPSULE ORAL at 08:12

## 2017-12-31 RX ADMIN — IPRATROPIUM BROMIDE AND ALBUTEROL SULFATE 3 ML: .5; 3 SOLUTION RESPIRATORY (INHALATION) at 05:12

## 2017-12-31 RX ADMIN — DOXYCYCLINE HYCLATE 100 MG: 100 TABLET, COATED ORAL at 08:12

## 2017-12-31 RX ADMIN — BENZONATATE 100 MG: 100 CAPSULE ORAL at 11:12

## 2017-12-31 RX ADMIN — INSULIN ASPART 1 UNITS: 100 INJECTION, SOLUTION INTRAVENOUS; SUBCUTANEOUS at 03:12

## 2017-12-31 RX ADMIN — SODIUM CHLORIDE: 0.9 INJECTION, SOLUTION INTRAVENOUS at 02:12

## 2017-12-31 RX ADMIN — DOXYCYCLINE HYCLATE 100 MG: 100 TABLET, COATED ORAL at 01:12

## 2017-12-31 RX ADMIN — LISINOPRIL 20 MG: 20 TABLET ORAL at 11:12

## 2017-12-31 RX ADMIN — SIMVASTATIN 20 MG: 10 TABLET, FILM COATED ORAL at 03:12

## 2017-12-31 RX ADMIN — ASPIRIN 81 MG: 81 TABLET, COATED ORAL at 08:12

## 2017-12-31 NOTE — PROGRESS NOTES
Pt remains free from falls or injury. Vital signs montiored throughout night on room air. Positions self independently and up to restroom independently. No complaints of pain or nausea. Telemetry mainatined.Bed in low locked position and call light with in reach. Will continue to monitor.

## 2017-12-31 NOTE — PLAN OF CARE
12/31/17 1149   Final Note   Assessment Type Final Discharge Note   Discharge Disposition Home   What phone number can be called within the next 1-3 days to see how you are doing after discharge? [phone]   Discharge plans and expectations educations in teach back method with documentation complete? Yes   Right Care Referral Info   Post Acute Recommendation No Care

## 2017-12-31 NOTE — PROGRESS NOTES
Notified OLAYINKA Varner patient last blood sugar was checked 12/30 at 2350. As per Carisa, recheck pt blood sugar now and cover her with bedtime sliding scale. Will continue to monitor.

## 2017-12-31 NOTE — ED TRIAGE NOTES
Pt to ED with a complaint of coughing for the past week, vomiting for past 2 days, and difficulty with management of blood sugar.  Recent steroid treatment.  Blood sugar has been in the 400s for past few days.

## 2017-12-31 NOTE — ASSESSMENT & PLAN NOTE
-   - last A1C:   Lab Results   Component Value Date    HGBA1C 8.1 (H) 11/29/2017      - A1C pending for AM   - hold oral antidiabetic meds   - Diabetic diet   - SSRI with accuchekcs AC/HS

## 2017-12-31 NOTE — HPI
Ms. Lyssa Gandara, is a 52 y.o. female, with PMH of IDDM-2, asthma, PE/DVT (s/p Ratna filter placement, on Pradaxa & ASA), HTN, who presented to [facility] ED on 12/30/17 2/2 hyperglycemia. She states she started treatment with PO steroids a 4 days ago, and since that time her blood sugars have been elevated. She did start taking 10 units QD of Lantus yesterday after PCP visit. Associated symptoms include non-productive cough and nasal congestion x 9 days, and 3 days of emesis with eating. 3 days ago she was treated at [facility] ED, and had negative CXR, and was d/c'd to home with cough syrup and prednisone Rx. She had had PNA twice in the past two months.     The patient was evaluated in the ED, and noted to have an asthma exacerbation, as well as concern for a new PE. As she did have TRUDY, she was unable to have PE study at time of admission. She was admitted for further eval and treatment of the aforementationed conditions.

## 2017-12-31 NOTE — ED PROVIDER NOTES
"Encounter Date: 12/30/2017    SCRIBE #1 NOTE: I, Allen Rodriguez, am scribing for, and in the presence of, Dr. Diaz.       History     Chief Complaint   Patient presents with    Hyperglycemia     "I cant keep my sugar down, and Im vomiting"     9:19 PM    Patient is a 52 y.o. female with a history of asthma, DM, PE, and HTN who presents to the ED with complaint of hyperglycemia. She reports measuring elevated blood sugars for the past four days. She takes metformin for her DM, but reports difficulty controlling her sugars recently. She was seen by her PCP, Dr. Joyce, yesterday and was prescribed Lantus 10 mg qd, which she took yesterday and tonight, but reports no improvement to elevated blood sugar. She also complains of cough and nasal congestion for the last nine days. She was seen in the ED at [facility] seven days ago and again three days ago where she had a negative chest x-ray and was prescribed cough syrup and prednisone. She reports completing the prednisone three days ago. She reports no relief with cough syrup, but notes temporary relief with inhaler and breathing treatments that she has for asthma. She reports her sugars were running around 130 prior to onset of URI symptoms. She also complains of vomiting for the last two days, which occurs approximately 30 minutes after eating. She reports being diagnosed with pneumonia in her left lung this month and again last month. She reports receiving pneumonia vaccine last year, but denies getting the flu shot this year. She denies a history of heart disease. She denies use of tobacco.       The history is provided by the patient.     Review of patient's allergies indicates:   Allergen Reactions    Plasma, human, normal      Past Medical History:   Diagnosis Date    Asthma     Dyspnea on exertion     Hx of long term use of blood thinners     Hypertension 3/8/2016    PE (pulmonary embolism)     Hx of multiple DVT/PE    Presence of IVC filter  "    Scleroderma     Stroke 02/2017    Type 2 diabetes mellitus without complication      Past Surgical History:   Procedure Laterality Date    DANETTE FILTER PLACEMENT       Family History   Problem Relation Age of Onset    Breast cancer Mother     Hypertension Father     No Known Problems Brother     No Known Problems Maternal Grandmother     No Known Problems Maternal Grandfather     No Known Problems Paternal Grandmother     No Known Problems Paternal Grandfather     Diabetes Mellitus Maternal Uncle     No Known Problems Sister     No Known Problems Maternal Aunt     No Known Problems Paternal Aunt     No Known Problems Paternal Uncle     Psoriasis Neg Hx     Rheum arthritis Neg Hx     Thyroid disease Neg Hx     Lupus Neg Hx     Anemia Neg Hx     Arrhythmia Neg Hx     Asthma Neg Hx     Clotting disorder Neg Hx     Fainting Neg Hx     Heart attack Neg Hx     Heart disease Neg Hx     Heart failure Neg Hx     Hyperlipidemia Neg Hx     Stroke Neg Hx     Atrial Septal Defect Neg Hx      Social History   Substance Use Topics    Smoking status: Never Smoker    Smokeless tobacco: Never Used    Alcohol use 1.8 oz/week     3 Cans of beer per week      Comment: occ     Review of Systems   Constitutional: Negative for fever.   HENT: Positive for congestion. Negative for sore throat.    Respiratory: Positive for cough. Negative for shortness of breath.    Cardiovascular: Negative for chest pain.   Gastrointestinal: Positive for vomiting. Negative for nausea.   Genitourinary: Negative for dysuria.   Musculoskeletal: Negative for back pain.   Skin: Negative for rash.   Neurological: Negative for weakness.   Hematological: Does not bruise/bleed easily.   Psychiatric/Behavioral: Negative for confusion.       Physical Exam     Initial Vitals [12/30/17 2057]   BP Pulse Resp Temp SpO2   132/81 (!) 118 20 98.3 °F (36.8 °C) 97 %      MAP       98         Physical Exam    Nursing note and vitals  reviewed.  Constitutional: She appears well-developed and well-nourished.   Frequent non-productive cough during exam.    HENT:   Head: Normocephalic and atraumatic.   Dry mucous membranes.   Eyes: Conjunctivae and EOM are normal. Pupils are equal, round, and reactive to light.   Neck: Normal range of motion. Neck supple.   Cardiovascular: Normal heart sounds. Exam reveals no gallop and no friction rub.    No murmur heard.  Tachycardic rate. Regular rhythm.   Pulmonary/Chest: No respiratory distress. She has wheezes.   Good air exchange. Faint expiratory wheezes throughout. No dyspnea. Speaking in full sentences.   Abdominal: Soft. There is no tenderness.   Musculoskeletal: Normal range of motion. She exhibits no edema.   Neurological: She is alert and oriented to person, place, and time. She has normal strength.   Skin: Skin is warm and dry. No rash noted. No pallor.   Psychiatric: She has a normal mood and affect. Her behavior is normal. Judgment and thought content normal.         ED Course   Procedures  Labs Reviewed   CBC W/ AUTO DIFFERENTIAL - Abnormal; Notable for the following:        Result Value    Gran% 76.4 (*)     Lymph% 14.8 (*)     All other components within normal limits   COMPREHENSIVE METABOLIC PANEL - Abnormal; Notable for the following:     CO2 21 (*)     Glucose 386 (*)     BUN, Bld 26 (*)     Creatinine 1.8 (*)     Albumin 3.2 (*)     AST 9 (*)     eGFR if  37 (*)     eGFR if non  32 (*)     All other components within normal limits    Narrative:     Recoll. [id number] by COLETTE at 12/30/2017 22:00, reason: GROSS   HEMOLYSIS; DIANA NOTIFIED AND WILL COLLECT   POCT GLUCOSE - Abnormal; Notable for the following:     POCT Glucose 339 (*)     All other components within normal limits   POCT GLUCOSE - Abnormal; Notable for the following:     POCT Glucose 315 (*)     All other components within normal limits   POCT GLUCOSE - Abnormal; Notable for the following:     POCT  Glucose 246 (*)     All other components within normal limits   POCT GLUCOSE MONITORING CONTINUOUS   POCT GLUCOSE MONITORING CONTINUOUS   POCT GLUCOSE MONITORING CONTINUOUS     EKG Readings: (Independently Interpreted)   Sinus tachycardia. Rate of 105. T wave inversions in leads III and AVF.          Medical Decision Making:   Independently Interpreted Test(s):   I have ordered and independently interpreted EKG Reading(s) - see prior notes  Clinical Tests:   Lab Tests: Ordered and Reviewed  Medical Tests: Ordered and Reviewed  ED Management:  12:35 AM - Case discussed with Ms. Mckeon of the hospitalist service who will admit the patient to Dr. Catherine.            Scribe Attestation:   Scribe #1: I performed the above scribed service and the documentation accurately describes the services I performed. I attest to the accuracy of the note.    Attending Attestation:           Physician Attestation for Scribe:  Physician Attestation Statement for Scribe #1: I, Dr. Diaz, reviewed documentation, as scribed by Allen Rodriguez in my presence, and it is both accurate and complete.                 ED Course      Patient presents with shortness of breath and persistent cough.  She has been evaluated at emergency department twice and by her primary care physician yesterday, in the past week.  Diagnosed with viral URI given steroids however now is also developed elevated blood sugars and nausea/vomiting on exam she is initially has frequent coughing as well as tachycardia but is afebrile respiratory exam shows some mild wheezing she had recent chest x-rays without infiltrate.  I do not think this needs to be repeated she was given a nebulizer treatment with improvement in her cough however despite fluids and control of the bronchospasm she remains tachycardic.  She does have a history of PE but is compliant with her aspirin and Primaxin although the differential includes pulmonary wasn't her creatinine is currently elevated,  GFR decreased.  At this point she is still feeling dyspneic will need admission for further observation, nebulizer treatments.  If her creatinine improves she may benefit from a CTA to help exclude recurrent PE versus a V/Q scan otherwise.  Case discussed with the hospitalist service.  Also given doxycycline for the bronchitis which has not responded to symptomatically treatment and given a dose of prednisone.  The hyperglycemia has improved after IV fluids and insulin.  No evidence of DKA.    Clinical Impression:     1. Acute bronchitis, unspecified organism    2. SOB (shortness of breath)    3. Exacerbation of asthma, unspecified asthma severity, unspecified whether persistent    4. Hyperglycemia    5. Sinus tachycardia    6. Acute renal insufficiency                                 Pietro Diaz II, MD  12/31/17 0192

## 2017-12-31 NOTE — PLAN OF CARE
Met with patient at bedside to complete discharge assessment. Patient is independent of ADLs & denies any anticipated DC needs      12/31/17 0904   Discharge Assessment   Assessment Type Discharge Planning Assessment   Confirmed/corrected address and phone number on facesheet? Yes   Assessment information obtained from? Patient   Communicated expected length of stay with patient/caregiver no   Prior to hospitilization cognitive status: Alert/Oriented   Prior to hospitalization functional status: Independent   Current cognitive status: Alert/Oriented   Current Functional Status: Independent   Lives With grandparent(s);other relative(s)   Able to Return to Prior Arrangements yes   Is patient able to care for self after discharge? Yes   Patient's perception of discharge disposition home or selfcare   Readmission Within The Last 30 Days no previous admission in last 30 days   Patient currently being followed by outpatient case management? No   Patient currently receives any other outside agency services? No   Equipment Currently Used at Home nebulizer   Do you have any problems affording any of your prescribed medications? No   Is the patient taking medications as prescribed? yes   Does the patient have transportation home? Yes   Transportation Available family or friend will provide   Does the patient receive services at the [facility]? No   Discharge Plan A Home with family   Patient/Family In Agreement With Plan yes

## 2017-12-31 NOTE — DISCHARGE SUMMARY
Discharge Summary  Hospital Medicine      Admit Date: 12/30/2017    Discharge Date and Time: 12/31/2017 11:41 AM    Discharge Attending Physician: Gregory Catherine MD     Diagnoses:  Active Hospital Problems    Diagnosis  POA    *Acute bronchitis [J20.9]  Yes    Asthma exacerbation [J45.901]  Yes    Hyperglycemia [R73.9]  Yes    Type 2 diabetes mellitus without complication [E11.9]  Yes    Essential hypertension [I10]  Yes    Long-term (current) use of anticoagulants [Z79.01]  Not Applicable      Resolved Hospital Problems    Diagnosis Date Resolved POA   No resolved problems to display.       Discharged Condition: stable    Hospital Course: Ms. Lyssa Gandara, is a 52 y.o. female, with PMH of IDDM-2, asthma, PE/DVT (s/p [place] filter placement, on Pradaxa & ASA), HTN, who presented to [facility] ED on 12/30/17 2/2 hyperglycemia. She states she started treatment with PO steroids a 4 days ago, and since that time her blood sugars have been elevated. She did start taking 10 units QD of Lantus yesterday after PCP visit. Associated symptoms include non-productive cough and nasal congestion x 9 days, and 3 days of emesis with eating. 3 days ago she was treated at [facility] ED, and had negative CXR, and was d/c'd to home with cough syrup and prednisone Rx. She had had PNA twice in the past two months.      The patient was evaluated in the ED, and noted to have an asthma exacerbation, as well as concern for a new PE. As she did have TRUDY, she was unable to have PE study at time of admission. She was admitted for further eval and treatment of the aforementationed conditions.     Acute bronchitis     - suspect viral  - has completed course of ABX and Steroids recently  - pulmonary complaints are resolving  - cough better with Tessalon, will Rx.   - no concern of recurrent PE as anticoagulated with IVC filter and low pre-test prob.   - continue with symptomatic therapy         Asthma  exacerbation     - c/w inhalers  - has no wheezing on today's exam  - close follow up with Gerard Joyce MD for management.        DM2 with hyperglycemia due to steroids     - uncontrolled due to steroids  - has been suboptimally controlled on Metformin  - started on Lantus recently but remained hyperglycemic  - now with hypovolemia  - have adjusted insulin Rx.   - able to hydrate, strict diabetic diet advised.   - needs close follow up.   Lab Results   Component Value Date    HGBA1C 8.1 (H) 11/29/2017            Essential hypertension     - well controlled at this time   - continue home meds   - monitor        ARF:     Likely pre-renal given scenario  - advised to avoid NSAIDs  - hydrate orally  - received IVF while admitted  - repeat renal function improved     Consults: None    Significant Diagnostic Studies:   CBC:   Recent Labs  Lab 12/30/17  2133   WBC 9.54   RBC 4.56   HGB 13.4   HCT 40.4      MCV 89   MCH 29.4   MCHC 33.2       CMP:   Recent Labs  Lab 12/30/17  2219   *   CALCIUM 9.6   ALBUMIN 3.2*   PROT 8.2      K 4.5   CO2 21*      BUN 26*   CREATININE 1.8*   ALKPHOS 86   ALT 13   AST 9*   BILITOT 0.4       Special Treatments/Procedures: none    Disposition: Home or Self Care    Diet: diabetic    Activity: as tolerated    Patient Instructions:   Reconciled Home Medications:   Current Discharge Medication List      START taking these medications    Details   benzonatate (TESSALON) 100 MG capsule Take 1 capsule (100 mg total) by mouth 3 (three) times daily as needed for Cough.  Qty: 15 capsule, Refills: 0         CONTINUE these medications which have CHANGED    Details   insulin glargine (LANTUS SOLOSTAR) 100 unit/mL (3 mL) InPn pen Inject 15 Units into the skin every evening.  Qty: 1 Box, Refills: 0         CONTINUE these medications which have NOT CHANGED    Details   albuterol (PROVENTIL) 2.5 mg /3 mL (0.083 %) nebulizer solution Take 3 mLs (2.5 mg total) by nebulization every  "6 (six) hours as needed for Wheezing.  Qty: 90 each, Refills: 3    Associated Diagnoses: Moderate persistent asthma without complication      aspirin (ECOTRIN) 81 MG EC tablet Take 1 tablet (81 mg total) by mouth once daily.  Qty: 90 tablet, Refills: 3    Associated Diagnoses: Carotid artery stenosis, unilateral, right      blood sugar diagnostic Strp 200 strips by Misc.(Non-Drug; Combo Route) route 2 (two) times daily.  Qty: 200 strip, Refills: 12      citalopram (CELEXA) 10 MG tablet Take 1 tablet (10 mg total) by mouth once daily.  Qty: 30 tablet, Refills: 11    Associated Diagnoses: Depression, unspecified depression type      cyanocobalamin (VITAMIN B-12) 1000 MCG tablet Take 100 mcg by mouth once daily.      dabigatran etexilate (PRADAXA) 150 mg Cap Take 1 capsule (150 mg total) by mouth 2 (two) times daily. "Do NOT break, chew, or open capsules."  Qty: 60 capsule, Refills: 0      ferrous sulfate 325 (65 FE) MG EC tablet Take 1 tablet (325 mg total) by mouth once daily.  Qty: 30 tablet, Refills: 1      fluticasone-salmeterol 250-50 mcg/dose (ADVAIR) 250-50 mcg/dose diskus inhaler Inhale 1 puff into the lungs 2 (two) times daily.  Qty: 180 each, Refills: 3    Associated Diagnoses: Severe persistent asthma with status asthmaticus      guaifenesin-codeine 100-10 mg/5 ml (TUSSI-ORGANIDIN NR)  mg/5 mL syrup Take 5 mLs by mouth 3 (three) times daily as needed for Cough.  Qty: 75 mL, Refills: 0      lancets (ONETOUCH ULTRASOFT LANCETS) Misc 1 application by Misc.(Non-Drug; Combo Route) route 2 (two) times daily before meals.  Qty: 200 each, Refills: 11    Associated Diagnoses: Type 2 diabetes mellitus without complication, without long-term current use of insulin      lisinopril (PRINIVIL,ZESTRIL) 20 MG tablet Take 1 tablet (20 mg total) by mouth once daily.  Qty: 90 tablet, Refills: 4    Associated Diagnoses: Type 2 diabetes mellitus without complication, without long-term current use of insulin; Essential " hypertension      metFORMIN (GLUCOPHAGE) 1000 MG tablet Take 1 tablet (1,000 mg total) by mouth 2 (two) times daily with meals.  Qty: 180 tablet, Refills: 3      simvastatin (ZOCOR) 20 MG tablet Take 1 tablet (20 mg total) by mouth every evening.  Qty: 90 tablet, Refills: 3    Associated Diagnoses: Fibromuscular dysplasia of cervicocranial artery      triamterene-hydrochlorothiazide 37.5-25 mg (MAXZIDE-25) 37.5-25 mg per tablet Take 1 tablet by mouth once daily.  Qty: 90 tablet, Refills: 4    Associated Diagnoses: Essential hypertension         STOP taking these medications       naproxen (NAPROSYN) 500 MG tablet Comments:   Reason for Stopping:                 Discharge Procedure Orders  Diet Diabetic 2000 Calories     Call MD for:  temperature >100.4     Call MD for:  severe uncontrolled pain     Call MD for:  difficulty breathing or increased cough     Call MD for:  severe persistent headache     Call MD for:  increased confusion or weakness     No dressing needed     Activity as tolerated         Follow-up Information     Gerard Joyce MD In 1 week.    Specialty:  Family Medicine  Contact information:  [facility]  [facility] [address] [phone]

## 2017-12-31 NOTE — ASSESSMENT & PLAN NOTE
- patient with h/o of IDDM-2  - blood glucose 386 upon admission  - no gap / DKA at this time    - continue IV fluids, and administer SSRI to cover

## 2017-12-31 NOTE — H&P
"[facility]  Hospital Medicine  History & Physical    Patient Name: Lyssa Gandara  MRN: [id number]  Admission Date: 12/30/2017  Attending Physician: Gregory Catherine MD   Primary Care Provider: Gerard Joyce MD         Patient information was obtained from patient, past medical records and ER records.     Subjective:     Principal Problem:Acute bronchitis    Chief Complaint:   Chief Complaint   Patient presents with    Hyperglycemia     "I cant keep my sugar down, and Im vomiting"        HPI: Ms. Lyssa Gandara, is a 52 y.o. female, with PMH of IDDM-2, asthma, PE/DVT (s/p [place] filter placement, on Pradaxa & ASA), HTN, who presented to [facility] ED on 12/30/17 2/2 hyperglycemia. She states she started treatment with PO steroids a 4 days ago, and since that time her blood sugars have been elevated. She did start taking 10 units QD of Lantus yesterday after PCP visit. Associated symptoms include non-productive cough and nasal congestion x 9 days, and 3 days of emesis with eating. 3 days ago she was treated at [facility] ED, and had negative CXR, and was d/c'd to home with cough syrup and prednisone Rx. She had had PNA twice in the past two months.     The patient was evaluated in the ED, and noted to have an asthma exacerbation, as well as concern for a new PE. As she did have TRUDY, she was unable to have PE study at time of admission. She was admitted for further eval and treatment of the aforementationed conditions.     Past Medical History:   Diagnosis Date    Asthma     Dyspnea on exertion     Hx of long term use of blood thinners     Hypertension 3/8/2016    PE (pulmonary embolism)     Hx of multiple DVT/PE    Presence of IVC filter     Scleroderma     Stroke 02/2017    Type 2 diabetes mellitus without complication        Past Surgical History:   Procedure Laterality Date    DANETTE FILTER PLACEMENT         Review of patient's allergies " "indicates:   Allergen Reactions    Plasma, human, normal        No current facility-administered medications on file prior to encounter.      Current Outpatient Prescriptions on File Prior to Encounter   Medication Sig    albuterol (PROVENTIL) 2.5 mg /3 mL (0.083 %) nebulizer solution Take 3 mLs (2.5 mg total) by nebulization every 6 (six) hours as needed for Wheezing.    aspirin (ECOTRIN) 81 MG EC tablet Take 1 tablet (81 mg total) by mouth once daily.    blood sugar diagnostic Strp 200 strips by Misc.(Non-Drug; Combo Route) route 2 (two) times daily.    citalopram (CELEXA) 10 MG tablet Take 1 tablet (10 mg total) by mouth once daily.    cyanocobalamin (VITAMIN B-12) 1000 MCG tablet Take 100 mcg by mouth once daily.    dabigatran etexilate (PRADAXA) 150 mg Cap Take 1 capsule (150 mg total) by mouth 2 (two) times daily. "Do NOT break, chew, or open capsules."    ferrous sulfate 325 (65 FE) MG EC tablet Take 1 tablet (325 mg total) by mouth once daily.    fluticasone-salmeterol 250-50 mcg/dose (ADVAIR) 250-50 mcg/dose diskus inhaler Inhale 1 puff into the lungs 2 (two) times daily.    guaifenesin-codeine 100-10 mg/5 ml (TUSSI-ORGANIDIN NR)  mg/5 mL syrup Take 5 mLs by mouth 3 (three) times daily as needed for Cough.    insulin glargine (LANTUS SOLOSTAR) 100 unit/mL (3 mL) InPn pen Inject 10 Units into the skin every evening.    lancets (ONETOUCH ULTRASOFT LANCETS) Misc 1 application by Misc.(Non-Drug; Combo Route) route 2 (two) times daily before meals.    lisinopril (PRINIVIL,ZESTRIL) 20 MG tablet Take 1 tablet (20 mg total) by mouth once daily.    metFORMIN (GLUCOPHAGE) 1000 MG tablet Take 1 tablet (1,000 mg total) by mouth 2 (two) times daily with meals.    metformin (GLUCOPHAGE) 500 MG tablet Take 1 tablet (500 mg total) by mouth 2 (two) times daily with meals.    naproxen (NAPROSYN) 500 MG tablet Take 1 tablet (500 mg total) by mouth 2 (two) times daily.    simvastatin (ZOCOR) 20 MG tablet " Take 1 tablet (20 mg total) by mouth every evening.    triamterene-hydrochlorothiazide 37.5-25 mg (MAXZIDE-25) 37.5-25 mg per tablet Take 1 tablet by mouth once daily.     Family History     Problem Relation (Age of Onset)    Breast cancer Mother    Diabetes Mellitus Maternal Uncle    Hypertension Father    No Known Problems Brother, Maternal Grandmother, Maternal Grandfather, Paternal Grandmother, Paternal Grandfather, Sister, Maternal Aunt, Paternal Aunt, Paternal Uncle        Social History Main Topics    Smoking status: Never Smoker    Smokeless tobacco: Never Used    Alcohol use 1.8 oz/week     3 Cans of beer per week      Comment: occ    Drug use: No    Sexual activity: No     Review of Systems   Constitutional: Positive for appetite change (decreased), chills, fever and unexpected weight change (decreased). Negative for activity change and fatigue.   HENT: Negative for congestion, ear pain, postnasal drip, rhinorrhea, sinus pressure, sore throat and trouble swallowing.    Respiratory: Positive for cough, shortness of breath and wheezing. Negative for chest tightness.    Cardiovascular: Negative for chest pain, palpitations and leg swelling.   Gastrointestinal: Negative for abdominal pain, diarrhea, nausea and vomiting.   Skin: Negative for color change and pallor.   Neurological: Negative for dizziness, syncope, weakness, light-headedness and headaches.   Psychiatric/Behavioral: Negative for confusion and decreased concentration.     Objective:     Vital Signs (Most Recent):  Temp: 98.5 °F (36.9 °C) (12/31/17 0434)  Pulse: 100 (12/31/17 0600)  Resp: 16 (12/31/17 0534)  BP: 139/81 (12/31/17 0434)  SpO2: 96 % (12/31/17 0534) Vital Signs (24h Range):  Temp:  [98.3 °F (36.8 °C)-98.9 °F (37.2 °C)] 98.5 °F (36.9 °C)  Pulse:  [100-129] 100  Resp:  [16-41] 16  SpO2:  [94 %-98 %] 96 %  BP: (113-139)/(66-94) 139/81     Weight: 80.1 kg (176 lb 9.4 oz)  Body mass index is 30.31 kg/m².    Physical Exam    Constitutional: She is oriented to person, place, and time. She appears well-developed and well-nourished. No distress.   HENT:   Head: Normocephalic and atraumatic.   Eyes: Conjunctivae and EOM are normal. Pupils are equal, round, and reactive to light. No scleral icterus.   Neck: Normal range of motion. Neck supple. No JVD present. No tracheal deviation present.   Cardiovascular: Normal rate, regular rhythm, normal heart sounds and intact distal pulses.  Exam reveals no gallop and no friction rub.    No murmur heard.  Pulmonary/Chest: Effort normal and breath sounds normal. No stridor. No respiratory distress. She has no wheezes. She has no rales.   Abdominal: Soft. Bowel sounds are normal. She exhibits no distension. There is no tenderness. There is no guarding.   Neurological: She is alert and oriented to person, place, and time.   Skin: Skin is warm and dry. No rash noted. She is not diaphoretic. No erythema. No pallor.   Psychiatric: She has a normal mood and affect. Her behavior is normal. Judgment and thought content normal.   Nursing note and vitals reviewed.        CRANIAL NERVES     CN III, IV, VI   Pupils are equal, round, and reactive to light.  Extraocular motions are normal.        Significant Labs:   BMP:   Recent Labs  Lab 12/30/17  2219   *      K 4.5      CO2 21*   BUN 26*   CREATININE 1.8*   CALCIUM 9.6     CBC:   Recent Labs  Lab 12/30/17  2133   WBC 9.54   HGB 13.4   HCT 40.4        CMP:   Recent Labs  Lab 12/30/17  2219      K 4.5      CO2 21*   *   BUN 26*   CREATININE 1.8*   CALCIUM 9.6   PROT 8.2   ALBUMIN 3.2*   BILITOT 0.4   ALKPHOS 86   AST 9*   ALT 13   ANIONGAP 14   EGFRNONAA 32*     All pertinent labs within the past 24 hours have been reviewed.    Significant Imaging: I have reviewed all pertinent imaging results/findings within the past 24 hours.     Imaging Results    None          Assessment/Plan:     * Acute bronchitis    - likely  viral in nature, but could potentially be related to PE for which there is concern  - Doxycycline started in ED to treat   - PRN tessalon   - CTA in AM if Cr improves or consider VQ scan to r/o PE          Asthma exacerbation    - scheduled and PRN neb treatments   - PO prednisone   - monitor         Hyperglycemia    - patient with h/o of IDDM-2  - blood glucose 386 upon admission  - no gap / DKA at this time    - continue IV fluids, and administer SSRI to cover         Type 2 diabetes mellitus without complication    -   - last A1C:   Lab Results   Component Value Date    HGBA1C 8.1 (H) 11/29/2017      - A1C pending for AM   - hold oral antidiabetic meds   - Diabetic diet   - SSRI with accuchekcs AC/HS          Essential hypertension    - well controlled at this time   - continue home meds   - monitor         Long-term (current) use of anticoagulants    - 2/2 PE/DVT  - coag studies ordered   - continue oral anticoagulants             VTE Risk Mitigation         Ordered     dabigatran etexilate capsule 150 mg  2 times daily     Route:  Oral        12/31/17 0210     Medium Risk of VTE  Once      12/31/17 0044     Place sequential compression device  Until discontinued      12/31/17 0044             SHAD TomC  Department of Hospital Medicine   [facility]

## 2017-12-31 NOTE — PLAN OF CARE
Problem: Patient Care Overview  Goal: Plan of Care Review  Outcome: Outcome(s) achieved Date Met: 12/31/17  VU of DC instructions--  IV removed w/ cath tip intact, WNL. To be DCd home w/ family-- will be escorted downstairs via  transport team once dressed, ready & ride arrives. VSS on RA and afebrile. Tolerated ordered diet. BG monitored. Dr Catherine made aware of elevated BG.  Free from falls, injury, or skin breakdown this hospital admission.

## 2017-12-31 NOTE — SUBJECTIVE & OBJECTIVE
"Past Medical History:   Diagnosis Date    Asthma     Dyspnea on exertion     Hx of long term use of blood thinners     Hypertension 3/8/2016    PE (pulmonary embolism)     Hx of multiple DVT/PE    Presence of IVC filter     Scleroderma     Stroke 02/2017    Type 2 diabetes mellitus without complication        Past Surgical History:   Procedure Laterality Date    DANETTE FILTER PLACEMENT         Review of patient's allergies indicates:   Allergen Reactions    Plasma, human, normal        No current facility-administered medications on file prior to encounter.      Current Outpatient Prescriptions on File Prior to Encounter   Medication Sig    albuterol (PROVENTIL) 2.5 mg /3 mL (0.083 %) nebulizer solution Take 3 mLs (2.5 mg total) by nebulization every 6 (six) hours as needed for Wheezing.    aspirin (ECOTRIN) 81 MG EC tablet Take 1 tablet (81 mg total) by mouth once daily.    blood sugar diagnostic Strp 200 strips by Misc.(Non-Drug; Combo Route) route 2 (two) times daily.    citalopram (CELEXA) 10 MG tablet Take 1 tablet (10 mg total) by mouth once daily.    cyanocobalamin (VITAMIN B-12) 1000 MCG tablet Take 100 mcg by mouth once daily.    dabigatran etexilate (PRADAXA) 150 mg Cap Take 1 capsule (150 mg total) by mouth 2 (two) times daily. "Do NOT break, chew, or open capsules."    ferrous sulfate 325 (65 FE) MG EC tablet Take 1 tablet (325 mg total) by mouth once daily.    fluticasone-salmeterol 250-50 mcg/dose (ADVAIR) 250-50 mcg/dose diskus inhaler Inhale 1 puff into the lungs 2 (two) times daily.    guaifenesin-codeine 100-10 mg/5 ml (TUSSI-ORGANIDIN NR)  mg/5 mL syrup Take 5 mLs by mouth 3 (three) times daily as needed for Cough.    insulin glargine (LANTUS SOLOSTAR) 100 unit/mL (3 mL) InPn pen Inject 10 Units into the skin every evening.    lancets (ONETOUCH ULTRASOFT LANCETS) Misc 1 application by Misc.(Non-Drug; Combo Route) route 2 (two) times daily before meals.    " lisinopril (PRINIVIL,ZESTRIL) 20 MG tablet Take 1 tablet (20 mg total) by mouth once daily.    metFORMIN (GLUCOPHAGE) 1000 MG tablet Take 1 tablet (1,000 mg total) by mouth 2 (two) times daily with meals.    metformin (GLUCOPHAGE) 500 MG tablet Take 1 tablet (500 mg total) by mouth 2 (two) times daily with meals.    naproxen (NAPROSYN) 500 MG tablet Take 1 tablet (500 mg total) by mouth 2 (two) times daily.    simvastatin (ZOCOR) 20 MG tablet Take 1 tablet (20 mg total) by mouth every evening.    triamterene-hydrochlorothiazide 37.5-25 mg (MAXZIDE-25) 37.5-25 mg per tablet Take 1 tablet by mouth once daily.     Family History     Problem Relation (Age of Onset)    Breast cancer Mother    Diabetes Mellitus Maternal Uncle    Hypertension Father    No Known Problems Brother, Maternal Grandmother, Maternal Grandfather, Paternal Grandmother, Paternal Grandfather, Sister, Maternal Aunt, Paternal Aunt, Paternal Uncle        Social History Main Topics    Smoking status: Never Smoker    Smokeless tobacco: Never Used    Alcohol use 1.8 oz/week     3 Cans of beer per week      Comment: occ    Drug use: No    Sexual activity: No     Review of Systems   Constitutional: Positive for appetite change (decreased), chills, fever and unexpected weight change (decreased). Negative for activity change and fatigue.   HENT: Negative for congestion, ear pain, postnasal drip, rhinorrhea, sinus pressure, sore throat and trouble swallowing.    Respiratory: Positive for cough, shortness of breath and wheezing. Negative for chest tightness.    Cardiovascular: Negative for chest pain, palpitations and leg swelling.   Gastrointestinal: Negative for abdominal pain, diarrhea, nausea and vomiting.   Skin: Negative for color change and pallor.   Neurological: Negative for dizziness, syncope, weakness, light-headedness and headaches.   Psychiatric/Behavioral: Negative for confusion and decreased concentration.     Objective:     Vital Signs  (Most Recent):  Temp: 98.5 °F (36.9 °C) (12/31/17 0434)  Pulse: 100 (12/31/17 0600)  Resp: 16 (12/31/17 0534)  BP: 139/81 (12/31/17 0434)  SpO2: 96 % (12/31/17 0534) Vital Signs (24h Range):  Temp:  [98.3 °F (36.8 °C)-98.9 °F (37.2 °C)] 98.5 °F (36.9 °C)  Pulse:  [100-129] 100  Resp:  [16-41] 16  SpO2:  [94 %-98 %] 96 %  BP: (113-139)/(66-94) 139/81     Weight: 80.1 kg (176 lb 9.4 oz)  Body mass index is 30.31 kg/m².    Physical Exam   Constitutional: She is oriented to person, place, and time. She appears well-developed and well-nourished. No distress.   HENT:   Head: Normocephalic and atraumatic.   Eyes: Conjunctivae and EOM are normal. Pupils are equal, round, and reactive to light. No scleral icterus.   Neck: Normal range of motion. Neck supple. No JVD present. No tracheal deviation present.   Cardiovascular: Normal rate, regular rhythm, normal heart sounds and intact distal pulses.  Exam reveals no gallop and no friction rub.    No murmur heard.  Pulmonary/Chest: Effort normal and breath sounds normal. No stridor. No respiratory distress. She has no wheezes. She has no rales.   Abdominal: Soft. Bowel sounds are normal. She exhibits no distension. There is no tenderness. There is no guarding.   Neurological: She is alert and oriented to person, place, and time.   Skin: Skin is warm and dry. No rash noted. She is not diaphoretic. No erythema. No pallor.   Psychiatric: She has a normal mood and affect. Her behavior is normal. Judgment and thought content normal.   Nursing note and vitals reviewed.        CRANIAL NERVES     CN III, IV, VI   Pupils are equal, round, and reactive to light.  Extraocular motions are normal.        Significant Labs:   BMP:   Recent Labs  Lab 12/30/17  2219   *      K 4.5      CO2 21*   BUN 26*   CREATININE 1.8*   CALCIUM 9.6     CBC:   Recent Labs  Lab 12/30/17  2133   WBC 9.54   HGB 13.4   HCT 40.4        CMP:   Recent Labs  Lab 12/30/17  2219      K 4.5       CO2 21*   *   BUN 26*   CREATININE 1.8*   CALCIUM 9.6   PROT 8.2   ALBUMIN 3.2*   BILITOT 0.4   ALKPHOS 86   AST 9*   ALT 13   ANIONGAP 14   EGFRNONAA 32*     All pertinent labs within the past 24 hours have been reviewed.    Significant Imaging: I have reviewed all pertinent imaging results/findings within the past 24 hours.     Imaging Results    None

## 2017-12-31 NOTE — PLAN OF CARE
Problem: Patient Care Overview  Goal: Plan of Care Review  Outcome: Ongoing (interventions implemented as appropriate)  Pt on RA. Sats 96%. No distress noted. Aerosol tx tolerated well. Will continue to monitor.

## 2018-01-31 ENCOUNTER — TELEPHONE (OUTPATIENT)
Dept: INTERNAL MEDICINE | Facility: CLINIC | Age: 53
End: 2018-01-31

## 2018-02-12 ENCOUNTER — PATIENT MESSAGE (OUTPATIENT)
Dept: INTERNAL MEDICINE | Facility: CLINIC | Age: 53
End: 2018-02-12

## 2018-02-14 ENCOUNTER — OFFICE VISIT (OUTPATIENT)
Dept: INTERNAL MEDICINE | Facility: CLINIC | Age: 53
End: 2018-02-14
Payer: COMMERCIAL

## 2018-02-14 ENCOUNTER — HOSPITAL ENCOUNTER (OUTPATIENT)
Dept: RADIOLOGY | Facility: HOSPITAL | Age: 53
Discharge: HOME | End: 2018-02-14
Attending: FAMILY MEDICINE
Payer: COMMERCIAL

## 2018-02-14 VITALS
OXYGEN SATURATION: 98 % | DIASTOLIC BLOOD PRESSURE: 92 MMHG | WEIGHT: 177.69 LBS | SYSTOLIC BLOOD PRESSURE: 134 MMHG | BODY MASS INDEX: 30.5 KG/M2 | HEART RATE: 103 BPM

## 2018-02-14 DIAGNOSIS — J20.9 ACUTE BRONCHITIS, UNSPECIFIED ORGANISM: ICD-10-CM

## 2018-02-14 DIAGNOSIS — E11.9 TYPE 2 DIABETES MELLITUS WITHOUT COMPLICATION, WITHOUT LONG-TERM CURRENT USE OF INSULIN: ICD-10-CM

## 2018-02-14 DIAGNOSIS — R05.9 COUGH: ICD-10-CM

## 2018-02-14 DIAGNOSIS — R93.89 ABNORMAL CXR: ICD-10-CM

## 2018-02-14 DIAGNOSIS — I26.99 OTHER PULMONARY EMBOLISM WITHOUT ACUTE COR PULMONALE, UNSPECIFIED CHRONICITY: ICD-10-CM

## 2018-02-14 DIAGNOSIS — I10 ESSENTIAL HYPERTENSION: Primary | ICD-10-CM

## 2018-02-14 DIAGNOSIS — M34.9 SCLERODERMA: ICD-10-CM

## 2018-02-14 DIAGNOSIS — J18.9 PNEUMONIA DUE TO INFECTIOUS ORGANISM, UNSPECIFIED LATERALITY, UNSPECIFIED PART OF LUNG: ICD-10-CM

## 2018-02-14 DIAGNOSIS — I63.031 CEREBRAL INFARCTION DUE TO THROMBOSIS OF RIGHT CAROTID ARTERY: ICD-10-CM

## 2018-02-14 DIAGNOSIS — I77.71 INTERNAL CAROTID ARTERY DISSECTION: ICD-10-CM

## 2018-02-14 PROCEDURE — 99999 PR PBB SHADOW E&M-EST. PATIENT-LVL IV: CPT | Mod: PBBFAC,,, | Performed by: FAMILY MEDICINE

## 2018-02-14 PROCEDURE — 71046 X-RAY EXAM CHEST 2 VIEWS: CPT | Mod: TC

## 2018-02-14 PROCEDURE — 99215 OFFICE O/P EST HI 40 MIN: CPT | Mod: 25,S$GLB,, | Performed by: FAMILY MEDICINE

## 2018-02-14 PROCEDURE — 3008F BODY MASS INDEX DOCD: CPT | Mod: S$GLB,,, | Performed by: FAMILY MEDICINE

## 2018-02-14 PROCEDURE — 96372 THER/PROPH/DIAG INJ SC/IM: CPT | Mod: S$GLB,,, | Performed by: FAMILY MEDICINE

## 2018-02-14 PROCEDURE — 71046 X-RAY EXAM CHEST 2 VIEWS: CPT | Mod: 26,,, | Performed by: RADIOLOGY

## 2018-02-14 RX ORDER — AZITHROMYCIN 250 MG/1
TABLET, FILM COATED ORAL
Qty: 5 TABLET | Refills: 0 | Status: SHIPPED | OUTPATIENT
Start: 2018-02-14 | End: 2018-03-19

## 2018-02-14 RX ORDER — CEFTRIAXONE 1 G/1
1 INJECTION, POWDER, FOR SOLUTION INTRAMUSCULAR; INTRAVENOUS
Status: COMPLETED | OUTPATIENT
Start: 2018-02-14 | End: 2018-02-14

## 2018-02-14 RX ADMIN — CEFTRIAXONE 1 G: 1 INJECTION, POWDER, FOR SOLUTION INTRAMUSCULAR; INTRAVENOUS at 11:02

## 2018-02-14 NOTE — PROGRESS NOTES
Subjective:       Patient ID: Lyssa Gandara is a 52 y.o. female.    Chief Complaint: Chills; Fever; Cough; and Chest Congestion  Lyssa Gandara 52 y.o. female is here for office visit to review care and physical exam, reports not feeling well, has been having productive cough last few days, green mucus from lungs noted? No SOB or increase worrisome fatigue noted.          HPI  Review of Systems   Constitutional: Negative for activity change, fatigue, fever and unexpected weight change.   HENT: Negative for congestion, hearing loss, postnasal drip and rhinorrhea.    Eyes: Negative for redness and visual disturbance.   Respiratory: Positive for cough. Negative for chest tightness, shortness of breath and wheezing.    Cardiovascular: Negative for chest pain, palpitations and leg swelling.   Gastrointestinal: Negative for abdominal distention.   Genitourinary: Negative for decreased urine volume, dysuria, flank pain, hematuria, pelvic pain and urgency.   Musculoskeletal: Negative for back pain, gait problem, joint swelling and neck stiffness.   Skin: Negative for color change, rash and wound.   Neurological: Negative for dizziness, syncope, weakness and headaches.   Psychiatric/Behavioral: Negative for behavioral problems, confusion and sleep disturbance. The patient is not nervous/anxious.        Objective:      Physical Exam   Constitutional: She is oriented to person, place, and time. She appears well-developed and well-nourished. No distress.   HENT:   Head: Normocephalic.   Mouth/Throat: No oropharyngeal exudate.   Eyes: EOM are normal. Pupils are equal, round, and reactive to light. No scleral icterus.   Neck: Neck supple. No JVD present. No thyromegaly present.   Cardiovascular: Normal rate, regular rhythm and normal heart sounds.  Exam reveals no gallop and no friction rub.    No murmur heard.  Pulmonary/Chest: Effort normal and breath sounds normal. She has no wheezes. She has no rales.    Abdominal: Soft. Bowel sounds are normal. She exhibits no distension and no mass. There is no tenderness. There is no guarding.   Musculoskeletal: Normal range of motion. She exhibits no edema.   Lymphadenopathy:     She has no cervical adenopathy.   Neurological: She is alert and oriented to person, place, and time. She has normal reflexes. She displays normal reflexes. No cranial nerve deficit. She exhibits normal muscle tone.   Skin: Skin is warm. No rash noted. No erythema.   Psychiatric: She has a normal mood and affect. Thought content normal.       Assessment:       1. Essential hypertension    2. Type 2 diabetes mellitus without complication, without long-term current use of insulin    3. Scleroderma    4. Other pulmonary embolism without acute cor pulmonale, unspecified chronicity    5. Internal carotid artery dissection    6. Cerebral infarction due to thrombosis of right carotid artery 2/26/17    7. Cough    8. Pneumonia due to infectious organism, unspecified laterality, unspecified part of lung    9. Acute bronchitis, unspecified organism    10. Abnormal CXR        Plan:       Lyssa was seen today for chills, fever, cough and chest congestion.    Diagnoses and all orders for this visit:    Essential hypertension  - Likely controled under normal situations, follw  Type 2 diabetes mellitus without complication, without long-term current use of insulin  - controled with present treatm,ent  Scleroderma  - Chart reviewed  Other pulmonary embolism without acute cor pulmonale, unspecified chronicity  - On blood thinningh  Internal carotid artery dissection  - Chart reviewed  Cerebral infarction due to thrombosis of right carotid artery 2/26/17  - Control risk  Cough  -     X-Ray Chest PA And Lateral; Future  Monitored with clinic visit assessed by me evaluated with cxr, treat with abx, call if no imp  Pneumonia due to infectious organism, unspecified laterality, unspecified part of lung  -      cefTRIAXone injection 1 g; Inject 1 g into the muscle one time.  -     azithromycin (ZITHROMAX Z-RIKI) 250 MG tablet; Use two first day then one a day  Monitor with serial exam assessed by me evaluated with cxr, treat with abx, call if no imp  Acute bronchitis, unspecified organism    - see above

## 2018-02-25 DIAGNOSIS — R93.89 ABNORMAL FINDING ON CHEST XRAY: Primary | ICD-10-CM

## 2018-02-26 ENCOUNTER — TELEPHONE (OUTPATIENT)
Dept: PULMONOLOGY | Facility: CLINIC | Age: 53
End: 2018-02-26

## 2018-02-26 NOTE — TELEPHONE ENCOUNTER
----- Message from Catalina Mckeon NP sent at 2/25/2018  8:43 AM CST -----  She needs a CT chest prior to visit. Had a CXR which was abnormal and recommendation was to get CT. I have place order, please call patient and schedule and let her know this needs to be obtained before I see her. If we have to reschedule due to approval then do so.    Thanks    Catalina

## 2018-03-05 DIAGNOSIS — E08.00 DIABETES MELLITUS DUE TO UNDERLYING CONDITION WITH HYPEROSMOLARITY WITHOUT COMA, WITHOUT LONG-TERM CURRENT USE OF INSULIN: Primary | ICD-10-CM

## 2018-03-06 ENCOUNTER — PATIENT MESSAGE (OUTPATIENT)
Dept: INTERNAL MEDICINE | Facility: CLINIC | Age: 53
End: 2018-03-06

## 2018-03-06 DIAGNOSIS — E11.9 TYPE 2 DIABETES MELLITUS WITHOUT COMPLICATION: ICD-10-CM

## 2018-03-09 ENCOUNTER — LAB VISIT (OUTPATIENT)
Dept: LAB | Facility: HOSPITAL | Age: 53
End: 2018-03-09
Attending: FAMILY MEDICINE
Payer: COMMERCIAL

## 2018-03-09 DIAGNOSIS — M30.0 POLYARTERITIS NODOSA: Primary | ICD-10-CM

## 2018-03-09 DIAGNOSIS — E08.00 DIABETES MELLITUS DUE TO UNDERLYING CONDITION WITH HYPEROSMOLARITY WITHOUT COMA, WITHOUT LONG-TERM CURRENT USE OF INSULIN: ICD-10-CM

## 2018-03-09 LAB
ESTIMATED AVG GLUCOSE: 206 MG/DL
HBA1C MFR BLD HPLC: 8.8 %

## 2018-03-09 PROCEDURE — 83036 HEMOGLOBIN GLYCOSYLATED A1C: CPT

## 2018-03-09 PROCEDURE — 36415 COLL VENOUS BLD VENIPUNCTURE: CPT

## 2018-03-09 RX ORDER — BUPROPION HYDROCHLORIDE 150 MG/1
150 TABLET ORAL DAILY
Qty: 30 TABLET | Refills: 11 | Status: SHIPPED | OUTPATIENT
Start: 2018-03-09 | End: 2018-06-30

## 2018-03-14 ENCOUNTER — TELEPHONE (OUTPATIENT)
Dept: DERMATOLOGY | Facility: CLINIC | Age: 53
End: 2018-03-14

## 2018-03-14 NOTE — TELEPHONE ENCOUNTER
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 Her callback number is [phone]. Pt is also on the waiting list.       Bright Fragoso   Access Navigator   [phone]

## 2018-03-14 NOTE — TELEPHONE ENCOUNTER
I was able to speak to the patient and schedule her for an appointment on Monday at 1030am per her request. She was very pleased with this and thanked me for the call.

## 2018-03-19 ENCOUNTER — INITIAL CONSULT (OUTPATIENT)
Dept: DERMATOLOGY | Facility: CLINIC | Age: 53
End: 2018-03-19
Payer: COMMERCIAL

## 2018-03-19 DIAGNOSIS — L29.9 ITCHING: ICD-10-CM

## 2018-03-19 DIAGNOSIS — L30.0 NUMMULAR ECZEMA: Primary | ICD-10-CM

## 2018-03-19 DIAGNOSIS — I83.90 VARICOSE VEIN OF LEG: ICD-10-CM

## 2018-03-19 DIAGNOSIS — L81.9 HYPERPIGMENTATION: ICD-10-CM

## 2018-03-19 PROCEDURE — 99999 PR PBB SHADOW E&M-EST. PATIENT-LVL II: CPT | Mod: PBBFAC,,, | Performed by: DERMATOLOGY

## 2018-03-19 PROCEDURE — 99202 OFFICE O/P NEW SF 15 MIN: CPT | Mod: S$GLB,,, | Performed by: DERMATOLOGY

## 2018-03-19 RX ORDER — FLUOCINONIDE 0.5 MG/G
CREAM TOPICAL 2 TIMES DAILY
Qty: 60 G | Refills: 3 | Status: SHIPPED | OUTPATIENT
Start: 2018-03-19 | End: 2018-07-04

## 2018-03-19 NOTE — PROGRESS NOTES
Subjective:       Patient ID:  Lyssa Gandara is a 52 y.o. female who presents for   Chief Complaint   Patient presents with    Rash     lower legs, x 2 wks, itchy @ times, tx eucerin     Rash  - Initial  Affected locations: left lower leg and right lower leg  Duration: 2 weeks  Signs / symptoms: itching (at times)  Relieving factors/Treatments tried: OTC moisturizer     Here for a new rash - uses Bath and Body works lotion and spray on her arms and legs, new red spots popping up, mildly itchy, she has scratched at them.  Also concerned about dark areas on her ankles and veins, worried may be scleroderma.    Review of Systems   Skin: Positive for itching and rash.   Hematologic/Lymphatic: Bruises/bleeds easily.        Objective:    Physical Exam   Constitutional: She appears well-developed and well-nourished. No distress.   Neurological: She is alert and oriented to person, place, and time. She is not disoriented.   Psychiatric: She has a normal mood and affect.   Skin:   Areas Examined (abnormalities noted in diagram):   Head / Face Inspection Performed  RUE Inspected  LUE Inspection Performed  RLE Inspected  LLE Inspection Performed  Nails and Digits Inspection Performed              Diagram Legend     Erythematous scaling macule/papule c/w actinic keratosis       Vascular papule c/w angioma      Pigmented verrucoid papule/plaque c/w seborrheic keratosis      Yellow umbilicated papule c/w sebaceous hyperplasia      Irregularly shaped tan macule c/w lentigo     1-2 mm smooth white papules consistent with Milia      Movable subcutaneous cyst with punctum c/w epidermal inclusion cyst      Subcutaneous movable cyst c/w pilar cyst      Firm pink to brown papule c/w dermatofibroma      Pedunculated fleshy papule(s) c/w skin tag(s)      Evenly pigmented macule c/w junctional nevus     Mildly variegated pigmented, slightly irregular-bordered macule c/w mildly atypical nevus      Flesh colored to evenly  pigmented papule c/w intradermal nevus       Pink pearly papule/plaque c/w basal cell carcinoma      Erythematous hyperkeratotic cursted plaque c/w SCC      Surgical scar with no sign of skin cancer recurrence      Open and closed comedones      Inflammatory papules and pustules      Verrucoid papule consistent consistent with wart     Erythematous eczematous patches and plaques     Dystrophic onycholytic nail with subungual debris c/w onychomycosis     Umbilicated papule    Erythematous-base heme-crusted tan verrucoid plaque consistent with inflamed seborrheic keratosis     Erythematous Silvery Scaling Plaque c/w Psoriasis     See annotation      Assessment / Plan:        Nummular eczema   -     fluocinonide 0.05% (LIDEX) 0.05 % cream; Apply topically 2 (two) times daily. To rash on legs  Dispense: 60 g; Refill: 3  - advised she d/c any fragranced products including bath and body works  I advised changing to a fragrance free bar soap or body wash such as Dove, and fragrance free detergent such as Tide Free & Clear, for sensitive skin.      Itching  I advised changing to a fragrance free bar soap or body wash such as Dove, and fragrance free detergent such as Tide Free & Clear, for sensitive skin.      Varicose vein of leg - due to her history of hypercoagulability she follows with vascular for this.  -continue compression stockings    Hyperpigmentation  -overlying veins - early stasis changes  -no evidence of sclerosis/morphea/scleroderma on exam today.  Patient was told of this diagnosis when she was 8 years old, not on any systemic treatment. Also follows with rheumatology.           Follow-up if symptoms worsen or fail to improve.

## 2018-03-19 NOTE — PATIENT INSTRUCTIONS
XEROSIS (DRY SKIN)        1. Definition    Xerosis is the term for dry skin.  We all have a natural oil coating over our skin produced by the skin oil glands.  If this oil is removed, the skin becomes dry which can lead to cracking, which can lead to inflammation.  Xerosis is usually a long-term problem that recurs often, especially in the winter.    2. Cause     Long hot baths or showers can remove our natural oil and lead to xerosis.  One should never take more than one bath or shower a day and for no longer than ten minutes.   Use of harsh soaps such as Zest, Dial, and Ivory can worsen and cause xerosis.   Cold winter weather worsens xerosis because the amount of moisture contained in cold air is much less than the amount of moisture in warm air.    3. Treatment     Treatment is intended to restore the natural oil to your skin.  Keep the skin lubricated.     Do not take more than one bath or shower a day.  Use lukewarm water, not hot.  Hot water dries out the skin.     Use a gentle moisturizing soap such as Cetaphil soap, Oil of Olay, Dove, Basis, Ivory moisture care, Restoraderm cleanser.     When toweling dry, dont rub.  Blot the skin so there is still some water left on the skin.  You should apply a moisturizing cream to all of the skin such as Cerave cream, Cetaphil cream, Restoraderm or Eucerin Original Formula cream.   Alpha hydroxyacid lotions, i.e., AmLactin, also work very well for preventing dry skin, but may burn when used on inflamed or reddened skin.     If you like to swim during the winter months, you should not use soap when getting out of the pool.  When you have finished swimming, rinse off the chlorine with cool to warm water.  If this will be the only shower of the day, then you may use Cetaphil or another mild soap to cleanse your skin.  After the shower, apply a moisturizing cream to all of the skin as above.        [address] [place] [address]/ [phone]  [phone] FAX/ [email or web address]

## 2018-03-19 NOTE — LETTER
March 19, 2018      Gerard Joyce MD  [address]  [place] [address]           [facility]  [address]  [place] [address]  Phone: [phone]  Fax: [phone]          Patient: Lyssa Gandara   MR Number: [id number]   YOB: 1965   Date of Visit: 3/19/2018       Dear Dr. Gerard Joyce:    Thank you for referring Lyssa Gandara to me for evaluation. Attached you will find relevant portions of my assessment and plan of care.    If you have questions, please do not hesitate to call me. I look forward to following Lyssa Gandara along with you.    Sincerely,    Marilin García MD    Enclosure  CC:  No Recipients    If you would like to receive this communication electronically, please contact [email or web address] or [phone] to request more information on [employer] access.    For providers and/or their staff who would like to refer a patient to [facility], please contact us through our one-stop-shop provider referral line, [facility], at [phone].    If you feel you have received this communication in error or would no longer like to receive these types of communications, please e-mail [email or web address]

## 2018-05-09 ENCOUNTER — TELEPHONE (OUTPATIENT)
Dept: INTERNAL MEDICINE | Facility: CLINIC | Age: 53
End: 2018-05-09

## 2018-05-11 ENCOUNTER — TELEPHONE (OUTPATIENT)
Dept: INTERNAL MEDICINE | Facility: CLINIC | Age: 53
End: 2018-05-11

## 2018-05-11 NOTE — TELEPHONE ENCOUNTER
Left message for patient to call office.  See notes from Dr. Joyce stating patient needs an appt to review diabetes.

## 2018-05-26 NOTE — ASSESSMENT & PLAN NOTE
2/2 infarct  Evident on imaging  
Goal -180  Current A1C 6.8  Overall Goal A1c < 7  
Goal -180  Overall Goal A1c < 7  
Goal -180  Overall Goal A1c < 7  
Goal -200 in the acute setting  Overall Goal A1c < 7  
Goal -200 in the acute setting  Overall Goal A1c < 7  
Most probable etiology of thrombosis in the R ICA, although not confirmed and difficult with only DSA imaging for characterization.  MRI/A w/ T1-FS would be ideal to characterize lesion if patient can tolerate, otherwise at least a carotid US  Continued anticoagulation given underlying PE would also suffice for a dissection if present. Will likely recommend changing Xarelto to alternative NOAC, Dabigatran or Apixaban  
On Xarelto for hx of PE  Need to discuss how she takes her Xarelto and if she takes it with meals  May consider alternative agents such as Apixaban or Dabigatran  
On Xarelto for hx of PE at home  Need to discuss how she takes her Xarelto and if she takes it with meals  May consider alternative agents such as Apixaban or Dabigatran  Holding Xarelto at this time  
On Xarelto for hx of PE at home but still had suspected embolic stroke  Switched to Dabigatran  
On Xarelto for hx of PE at home but still had suspected embolic stroke  Switched to Dabigatran  
PT/OT/SLP evaluation and treatment ongoing  
PT/OT/SLP evaluation and treatment ongoing  Plans for outpatient therapy  
Possible atherosclerotic disease vs underlying vascular dyplasia  Continued anticoagulation given underlying PE would also suffice for a dissection if present.    
Possible atherosclerotic disease vs underlying vascular dyplasia  Continued anticoagulation given underlying PE would also suffice for a dissection if present.  Ordered MRA Neck with fat sat to differentiate carotid bulb atherosclerosis vs dissection  
Possible atherosclerotic disease vs underlying vascular dyplasia  Continued anticoagulation given underlying PE would also suffice for a dissection if present. Will likely recommend changing Xarelto to alternative NOAC, Dabigatran or Apixaban  
Recanalization of extracranial carotid however still distal stenoses still present  Goal BP < 180/100 currently  
Recanalization of extracranial carotid however still distal stenoses still present  Goal BP < 180/100 currently.  
Recanalization of extracranial carotid however still distal stenoses still present  Goal BP < 180/100 currently.  
Stroke risk factor  Continue home lisinopril  Recanalization of extracranial carotid however still distal stenoses still present, therefore goal SBP <180    
Thrombosis found in irregular loop of R ICA extracranial segment during DSA, s/p successful aspiration thrombectomy.   Distal artery to artery thromboembolism found in M3 branch which was too distal for intervention.  Underlying etiology of carotid lesion either dissection given irregularity vs. Atherosclerotic plaque w/ in-situ thrombosis; underlying FMD a possibility  -did not tolerate MRA yesterday. Will need to obtain outside records of angiogram.   · Continue Dabigatran  · BP goal < 180/100, given continued distal branch occlusion.  · TTE did not show any major abnormalities  · Carotid US showed 60-79% stenosis in R ICA  · PT/OT/SLP    
Thrombosis found in irregular loop of R ICA extracranial segment during DSA, s/p successful aspiration thrombectomy.   Distal artery to artery thromboembolism found in M3 branch which was too distal for intervention.  Underlying etiology of carotid lesion either dissection given irregularity vs. Atherosclerotic plaque w/ in-situ thrombosis; underlying FMD a possibility  · Continue Dabigatran  · BP goal < 180/100, given continued distal branch occlusion.  · TTE did not show any major abnormalities  · Carotid US showed 60-79% stenosis in R ICA  · PT/OT/SLP    
Thrombosis found in irregular loop of R ICA extracranial segment during DSA, s/p successful aspiration thrombectomy.   Distal artery to artery thromboembolism found in M3 branch which was too distal for intervention.  Underlying etiology of carotid lesion either dissection given irregularity vs. Atherosclerotic plaque w/ in-situ thrombosis; underlying FMD a possibility  · Continue Xarelto (or change NOAC) after MRI characterization of total infarct and risk of hemorrhage. May need to resort to NCCT given inability for patient to lay flat for long periods  · BP goal < 180/100, given continued distal branch occlusion.  · TTE  · Carotid US to further characterize carotid lesion given limited imaging  · PT/OT/SLP    
Thrombosis found in irregular loop of R ICA extracranial segment during DSA, s/p successful aspiration thrombectomy.   Distal artery to artery thromboembolism found in M3 branch which was too distal for intervention.  Underlying etiology of carotid lesion either dissection given irregularity vs. Atherosclerotic plaque w/ in-situ thrombosis; underlying FMD a possibility  · Continue Xarelto (or change NOAC) after MRI characterization of total infarct and risk of hemorrhage. May need to resort to NCCT given inability for patient to lay flat for long periods  · BP goal < 180/100, given continued distal branch occlusion.  · TTE did not show any major abnormalities  · Carotid US showed 60-79% stenosis in R ICA  · PT/OT/SLP    
Thrombosis found in irregular loop of R ICA extracranial segment during IR angiogram, s/p successful aspiration thrombectomy.   Distal artery to artery thromboembolism found in M3 branch which was too distal for intervention.  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  · TTE with EF of 55% and no LA enlargement  · Carotid US showed 60-79% stenosis in R ICA  · PT/OT/SLP    
normal (ped)...

## 2018-06-30 ENCOUNTER — HOSPITAL ENCOUNTER (INPATIENT)
Facility: HOSPITAL | Age: 53
LOS: 2 days | Discharge: HOME | DRG: 638 | End: 2018-07-02
Attending: EMERGENCY MEDICINE | Admitting: HOSPITALIST
Payer: COMMERCIAL

## 2018-06-30 DIAGNOSIS — E11.9 TYPE 2 DIABETES MELLITUS WITHOUT COMPLICATION, WITH LONG-TERM CURRENT USE OF INSULIN: ICD-10-CM

## 2018-06-30 DIAGNOSIS — E11.10 DIABETIC KETOACIDOSIS WITHOUT COMA ASSOCIATED WITH TYPE 2 DIABETES MELLITUS: Primary | ICD-10-CM

## 2018-06-30 DIAGNOSIS — R73.9 HYPERGLYCEMIA: ICD-10-CM

## 2018-06-30 DIAGNOSIS — Z79.4 TYPE 2 DIABETES MELLITUS WITHOUT COMPLICATION, WITH LONG-TERM CURRENT USE OF INSULIN: ICD-10-CM

## 2018-06-30 LAB
ALBUMIN SERPL BCP-MCNC: 4 G/DL
ALLENS TEST: ABNORMAL
ALP SERPL-CCNC: 128 U/L
ALT SERPL W/O P-5'-P-CCNC: 16 U/L
ANION GAP SERPL CALC-SCNC: 11 MMOL/L
ANION GAP SERPL CALC-SCNC: 11 MMOL/L
ANION GAP SERPL CALC-SCNC: 14 MMOL/L
AST SERPL-CCNC: 20 U/L
B-OH-BUTYR BLD STRIP-SCNC: 1 MMOL/L
BACTERIA #/AREA URNS AUTO: NORMAL /HPF
BASOPHILS # BLD AUTO: 0.03 K/UL
BASOPHILS NFR BLD: 0.5 %
BILIRUB SERPL-MCNC: 0.5 MG/DL
BILIRUB UR QL STRIP: NEGATIVE
BUN SERPL-MCNC: 42 MG/DL
BUN SERPL-MCNC: 42 MG/DL
BUN SERPL-MCNC: 50 MG/DL
BUN SERPL-MCNC: 64 MG/DL (ref 6–30)
CALCIUM SERPL-MCNC: 8.5 MG/DL
CALCIUM SERPL-MCNC: 8.5 MG/DL
CALCIUM SERPL-MCNC: 9.6 MG/DL
CHLORIDE SERPL-SCNC: 112 MMOL/L
CHLORIDE SERPL-SCNC: 112 MMOL/L
CHLORIDE SERPL-SCNC: 97 MMOL/L
CHLORIDE SERPL-SCNC: 97 MMOL/L (ref 95–110)
CLARITY UR REFRACT.AUTO: CLEAR
CO2 SERPL-SCNC: 12 MMOL/L
CO2 SERPL-SCNC: 12 MMOL/L
CO2 SERPL-SCNC: 15 MMOL/L
COLOR UR AUTO: ABNORMAL
CREAT SERPL-MCNC: 1.8 MG/DL
CREAT SERPL-MCNC: 1.8 MG/DL
CREAT SERPL-MCNC: 2.1 MG/DL (ref 0.5–1.4)
CREAT SERPL-MCNC: 2.3 MG/DL
DELSYS: ABNORMAL
DIFFERENTIAL METHOD: ABNORMAL
EOSINOPHIL # BLD AUTO: 0.1 K/UL
EOSINOPHIL NFR BLD: 1.6 %
ERYTHROCYTE [DISTWIDTH] IN BLOOD BY AUTOMATED COUNT: 12.8 %
EST. GFR  (AFRICAN AMERICAN): 27.3 ML/MIN/1.73 M^2
EST. GFR  (AFRICAN AMERICAN): 36.8 ML/MIN/1.73 M^2
EST. GFR  (AFRICAN AMERICAN): 36.8 ML/MIN/1.73 M^2
EST. GFR  (NON AFRICAN AMERICAN): 23.7 ML/MIN/1.73 M^2
EST. GFR  (NON AFRICAN AMERICAN): 31.9 ML/MIN/1.73 M^2
EST. GFR  (NON AFRICAN AMERICAN): 31.9 ML/MIN/1.73 M^2
GLUCOSE SERPL-MCNC: 141 MG/DL
GLUCOSE SERPL-MCNC: 141 MG/DL
GLUCOSE SERPL-MCNC: 569 MG/DL
GLUCOSE SERPL-MCNC: 622 MG/DL (ref 70–110)
GLUCOSE UR QL STRIP: ABNORMAL
HCO3 UR-SCNC: 22.7 MMOL/L (ref 24–28)
HCT VFR BLD AUTO: 43.7 %
HCT VFR BLD CALC: 48 %PCV (ref 36–54)
HGB BLD-MCNC: 14.4 G/DL
HGB UR QL STRIP: NEGATIVE
IMM GRANULOCYTES # BLD AUTO: 0.01 K/UL
IMM GRANULOCYTES NFR BLD AUTO: 0.2 %
KETONES UR QL STRIP: ABNORMAL
LEUKOCYTE ESTERASE UR QL STRIP: ABNORMAL
LYMPHOCYTES # BLD AUTO: 0.9 K/UL
LYMPHOCYTES NFR BLD: 15.7 %
MCH RBC QN AUTO: 29.4 PG
MCHC RBC AUTO-ENTMCNC: 33 G/DL
MCV RBC AUTO: 89 FL
MICROSCOPIC COMMENT: NORMAL
MODE: ABNORMAL
MONOCYTES # BLD AUTO: 0.4 K/UL
MONOCYTES NFR BLD: 7.7 %
NEUTROPHILS # BLD AUTO: 4.3 K/UL
NEUTROPHILS NFR BLD: 74.3 %
NITRITE UR QL STRIP: NEGATIVE
NRBC BLD-RTO: 0 /100 WBC
PCO2 BLDA: 43.6 MMHG (ref 35–45)
PH SMN: 7.33 [PH] (ref 7.35–7.45)
PH UR STRIP: 5 [PH] (ref 5–8)
PLATELET # BLD AUTO: 231 K/UL
PMV BLD AUTO: 11.1 FL
PO2 BLDA: 14 MMHG (ref 40–60)
POC BE: -3 MMOL/L
POC IONIZED CALCIUM: 1.16 MMOL/L (ref 1.06–1.42)
POC SATURATED O2: 16 % (ref 95–100)
POC TCO2 (MEASURED): 22 MMOL/L (ref 23–29)
POC TCO2: 24 MMOL/L (ref 24–29)
POCT GLUCOSE: 237 MG/DL (ref 70–110)
POCT GLUCOSE: 421 MG/DL (ref 70–110)
POCT GLUCOSE: 82 MG/DL (ref 70–110)
POTASSIUM BLD-SCNC: 6.3 MMOL/L (ref 3.5–5.1)
POTASSIUM SERPL-SCNC: 3.7 MMOL/L
POTASSIUM SERPL-SCNC: 3.7 MMOL/L
POTASSIUM SERPL-SCNC: 5.3 MMOL/L
PROT SERPL-MCNC: 8.3 G/DL
PROT UR QL STRIP: NEGATIVE
RBC # BLD AUTO: 4.89 M/UL
RBC #/AREA URNS AUTO: 1 /HPF (ref 0–4)
SAMPLE: ABNORMAL
SAMPLE: ABNORMAL
SITE: ABNORMAL
SODIUM BLD-SCNC: 126 MMOL/L (ref 136–145)
SODIUM SERPL-SCNC: 126 MMOL/L
SODIUM SERPL-SCNC: 135 MMOL/L
SODIUM SERPL-SCNC: 135 MMOL/L
SP GR UR STRIP: 1.01 (ref 1–1.03)
SP02: 95
SQUAMOUS #/AREA URNS AUTO: 0 /HPF
URN SPEC COLLECT METH UR: ABNORMAL
UROBILINOGEN UR STRIP-ACNC: NEGATIVE EU/DL
WBC # BLD AUTO: 5.74 K/UL
WBC #/AREA URNS AUTO: 1 /HPF (ref 0–5)
YEAST UR QL AUTO: NORMAL

## 2018-06-30 PROCEDURE — 96361 HYDRATE IV INFUSION ADD-ON: CPT

## 2018-06-30 PROCEDURE — 99223 1ST HOSP IP/OBS HIGH 75: CPT | Mod: ,,, | Performed by: INTERNAL MEDICINE

## 2018-06-30 PROCEDURE — 82010 KETONE BODYS QUAN: CPT

## 2018-06-30 PROCEDURE — 80048 BASIC METABOLIC PNL TOTAL CA: CPT

## 2018-06-30 PROCEDURE — 81001 URINALYSIS AUTO W/SCOPE: CPT

## 2018-06-30 PROCEDURE — 99285 EMERGENCY DEPT VISIT HI MDM: CPT | Mod: ,,, | Performed by: PHYSICIAN ASSISTANT

## 2018-06-30 PROCEDURE — 96375 TX/PRO/DX INJ NEW DRUG ADDON: CPT

## 2018-06-30 PROCEDURE — 63600175 PHARM REV CODE 636 W HCPCS: Performed by: PHYSICIAN ASSISTANT

## 2018-06-30 PROCEDURE — 96372 THER/PROPH/DIAG INJ SC/IM: CPT

## 2018-06-30 PROCEDURE — 99900035 HC TECH TIME PER 15 MIN (STAT)

## 2018-06-30 PROCEDURE — 96365 THER/PROPH/DIAG IV INF INIT: CPT

## 2018-06-30 PROCEDURE — 80053 COMPREHEN METABOLIC PANEL: CPT

## 2018-06-30 PROCEDURE — 82962 GLUCOSE BLOOD TEST: CPT

## 2018-06-30 PROCEDURE — 82803 BLOOD GASES ANY COMBINATION: CPT

## 2018-06-30 PROCEDURE — 93010 ELECTROCARDIOGRAM REPORT: CPT | Mod: ,,, | Performed by: INTERNAL MEDICINE

## 2018-06-30 PROCEDURE — 12000002 HC ACUTE/MED SURGE SEMI-PRIVATE ROOM

## 2018-06-30 PROCEDURE — 99285 EMERGENCY DEPT VISIT HI MDM: CPT | Mod: 25

## 2018-06-30 PROCEDURE — 25000003 PHARM REV CODE 250: Performed by: PHYSICIAN ASSISTANT

## 2018-06-30 PROCEDURE — 96366 THER/PROPH/DIAG IV INF ADDON: CPT

## 2018-06-30 PROCEDURE — 85025 COMPLETE CBC W/AUTO DIFF WBC: CPT

## 2018-06-30 RX ORDER — SODIUM CHLORIDE AND POTASSIUM CHLORIDE 150; 900 MG/100ML; MG/100ML
INJECTION, SOLUTION INTRAVENOUS CONTINUOUS
Status: DISCONTINUED | OUTPATIENT
Start: 2018-07-01 | End: 2018-06-30

## 2018-06-30 RX ORDER — DABIGATRAN ETEXILATE 150 MG/1
150 CAPSULE ORAL 2 TIMES DAILY
Status: DISCONTINUED | OUTPATIENT
Start: 2018-07-01 | End: 2018-07-02 | Stop reason: HOSPADM

## 2018-06-30 RX ORDER — SODIUM CHLORIDE 0.9 % (FLUSH) 0.9 %
5 SYRINGE (ML) INJECTION
Status: DISCONTINUED | OUTPATIENT
Start: 2018-07-01 | End: 2018-07-02 | Stop reason: HOSPADM

## 2018-06-30 RX ORDER — PROMETHAZINE HYDROCHLORIDE 25 MG/1
25 TABLET ORAL EVERY 6 HOURS PRN
Status: DISCONTINUED | OUTPATIENT
Start: 2018-07-01 | End: 2018-07-02 | Stop reason: HOSPADM

## 2018-06-30 RX ORDER — FLUTICASONE FUROATE AND VILANTEROL 100; 25 UG/1; UG/1
1 POWDER RESPIRATORY (INHALATION) DAILY
Status: DISCONTINUED | OUTPATIENT
Start: 2018-07-01 | End: 2018-07-02 | Stop reason: HOSPADM

## 2018-06-30 RX ORDER — ASPIRIN 81 MG/1
81 TABLET ORAL DAILY
Status: DISCONTINUED | OUTPATIENT
Start: 2018-07-01 | End: 2018-07-02 | Stop reason: HOSPADM

## 2018-06-30 RX ORDER — DICYCLOMINE HYDROCHLORIDE 10 MG/ML
20 INJECTION INTRAMUSCULAR
Status: COMPLETED | OUTPATIENT
Start: 2018-06-30 | End: 2018-06-30

## 2018-06-30 RX ORDER — IBUPROFEN 200 MG
16 TABLET ORAL
Status: DISCONTINUED | OUTPATIENT
Start: 2018-07-01 | End: 2018-07-02 | Stop reason: HOSPADM

## 2018-06-30 RX ORDER — ONDANSETRON 4 MG/1
4 TABLET, ORALLY DISINTEGRATING ORAL
Status: COMPLETED | OUTPATIENT
Start: 2018-06-30 | End: 2018-06-30

## 2018-06-30 RX ORDER — IBUPROFEN 200 MG
24 TABLET ORAL
Status: DISCONTINUED | OUTPATIENT
Start: 2018-07-01 | End: 2018-07-02 | Stop reason: HOSPADM

## 2018-06-30 RX ORDER — SIMVASTATIN 20 MG/1
20 TABLET, FILM COATED ORAL NIGHTLY
Status: DISCONTINUED | OUTPATIENT
Start: 2018-07-01 | End: 2018-07-02 | Stop reason: HOSPADM

## 2018-06-30 RX ORDER — ALBUTEROL SULFATE 0.83 MG/ML
2.5 SOLUTION RESPIRATORY (INHALATION) EVERY 6 HOURS PRN
Status: DISCONTINUED | OUTPATIENT
Start: 2018-07-01 | End: 2018-07-02 | Stop reason: HOSPADM

## 2018-06-30 RX ORDER — GLUCAGON 1 MG
1 KIT INJECTION
Status: DISCONTINUED | OUTPATIENT
Start: 2018-07-01 | End: 2018-07-02 | Stop reason: HOSPADM

## 2018-06-30 RX ORDER — SODIUM CHLORIDE 9 MG/ML
INJECTION, SOLUTION INTRAVENOUS CONTINUOUS
Status: DISCONTINUED | OUTPATIENT
Start: 2018-07-01 | End: 2018-06-30

## 2018-06-30 RX ORDER — DEXTROSE MONOHYDRATE, SODIUM CHLORIDE, AND POTASSIUM CHLORIDE 50; 1.49; 4.5 G/1000ML; G/1000ML; G/1000ML
INJECTION, SOLUTION INTRAVENOUS CONTINUOUS
Status: DISCONTINUED | OUTPATIENT
Start: 2018-06-30 | End: 2018-07-01

## 2018-06-30 RX ORDER — ONDANSETRON 8 MG/1
8 TABLET, ORALLY DISINTEGRATING ORAL EVERY 8 HOURS PRN
Status: DISCONTINUED | OUTPATIENT
Start: 2018-07-01 | End: 2018-07-02 | Stop reason: HOSPADM

## 2018-06-30 RX ADMIN — SODIUM CHLORIDE 8 UNITS/HR: 9 INJECTION, SOLUTION INTRAVENOUS at 09:06

## 2018-06-30 RX ADMIN — SODIUM CHLORIDE 1000 ML: 0.9 INJECTION, SOLUTION INTRAVENOUS at 06:06

## 2018-06-30 RX ADMIN — ONDANSETRON 4 MG: 4 TABLET, ORALLY DISINTEGRATING ORAL at 06:06

## 2018-06-30 RX ADMIN — INSULIN HUMAN 8 UNITS: 100 INJECTION, SOLUTION PARENTERAL at 09:06

## 2018-06-30 RX ADMIN — DICYCLOMINE HYDROCHLORIDE 20 MG: 20 INJECTION, SOLUTION INTRAMUSCULAR at 06:06

## 2018-06-30 RX ADMIN — SODIUM CHLORIDE 1000 ML: 0.9 INJECTION, SOLUTION INTRAVENOUS at 09:06

## 2018-06-30 NOTE — ED NOTES
Reports belly pain since Monday. Pain is around umbilicus.  Does not radiate.  +nausea without vomiting  Diarrhea x 3 days  Not eating last few day      LOC: The patient is awake and alert; oriented x 3 and speaking appropriately.  APPEARANCE: Patient resting comfortably, patient is clean and well groomed  Looks uncomfortable  SKIN: warm and dry, normal skin turgor & moist mucus membranes, skin intact, no breakdown noted.  MUSCULOSKELETAL: Patient moving all extremities well, no obvious swelling or deformities noted  RESPIRATORY: Airway is open and patent, breath sounds clear throughout all lung fields; respirations are spontaneous, normal effort and rate  CARDIAC: Patient has a normal rate, no peripheral edema noted, capillary refill < 3 seconds; No complaints of chest pain   ABDOMEN: Soft and non tender to palpation, no distention noted. Bowel sounds present x 4

## 2018-06-30 NOTE — ED PROVIDER NOTES
"Encounter Date: 6/30/2018       History     Chief Complaint   Patient presents with    Weakness     "too hot to eat", feels dehydrated. Had diarrhea x 3 days , nauseated but no emesis. Still voiding. 8# wt loss in 5 days.     Abdominal Pain     Patient is a 53-year-old female with a history of  hypertension, type 2 diabetes, asthma, PE with IVC filter in place, on pradaxa is presenting to the ER for evaluation of abdominal pain.  Patient states over the last 3 days she has had diffuse abdominal pain and cramping.  She also complains of diarrhea about 3 episodes per day.  No blood in stool or black tarry stool.  Patient states that she has been feeling weaker the last couple days.  She denies any vomiting but has been feeling nauseous.  She has had a decrease in appetite in the last week.  Denies any recorded fevers at home.  She has had some chills at home.  Denies any cough congestion URI symptoms. No shortness of breath or chest pain. Denies prior abdominal surgeries.  No flank pain or UTI symptoms including dysuria hematuria.  No recent travel or changes in diet or medications.  No sick contact.  No history of diverticulitis or colitis.  No recent antibiotic use.  She has been taking Tylenol with some alleviation of her pain.  Patient states that she has been taking her Lantus in the last 3 days but has not been checking her blood sugar in the last 3 days as well.      The history is provided by the patient.     Review of patient's allergies indicates:   Allergen Reactions    Plasma, human, normal      Past Medical History:   Diagnosis Date    Asthma     Dyspnea on exertion     Hx of long term use of blood thinners     Hypertension 3/8/2016    PE (pulmonary embolism)     Hx of multiple DVT/PE    Presence of IVC filter     Scleroderma     Stroke 02/2017    Type 2 diabetes mellitus without complication      Past Surgical History:   Procedure Laterality Date    [employer] FILTER PLACEMENT       Family " History   Problem Relation Age of Onset    Breast cancer Mother     Hypertension Father     No Known Problems Brother     No Known Problems Maternal Grandmother     No Known Problems Maternal Grandfather     No Known Problems Paternal Grandmother     No Known Problems Paternal Grandfather     Diabetes Mellitus Maternal Uncle     No Known Problems Sister     No Known Problems Maternal Aunt     No Known Problems Paternal Aunt     No Known Problems Paternal Uncle     Psoriasis Neg Hx     Rheum arthritis Neg Hx     Thyroid disease Neg Hx     Lupus Neg Hx     Anemia Neg Hx     Arrhythmia Neg Hx     Asthma Neg Hx     Clotting disorder Neg Hx     Fainting Neg Hx     Heart attack Neg Hx     Heart disease Neg Hx     Heart failure Neg Hx     Hyperlipidemia Neg Hx     Stroke Neg Hx     Atrial Septal Defect Neg Hx      Social History   Substance Use Topics    Smoking status: Never Smoker    Smokeless tobacco: Never Used    Alcohol use 1.8 oz/week     3 Cans of beer per week      Comment:  a day     Review of Systems   Constitutional: Positive for appetite change and chills. Negative for fever.   HENT: Negative for congestion.    Respiratory: Negative for cough and shortness of breath.    Cardiovascular: Negative for chest pain and palpitations.   Gastrointestinal: Positive for abdominal pain, diarrhea and nausea. Negative for blood in stool, constipation and vomiting.   Genitourinary: Negative for dysuria, flank pain, hematuria and urgency.   Musculoskeletal: Negative for back pain and myalgias.   Skin: Negative for rash and wound.   Allergic/Immunologic: Negative for immunocompromised state.   Neurological: Positive for weakness and light-headedness. Negative for dizziness, syncope and headaches.   Hematological: Does not bruise/bleed easily.   Psychiatric/Behavioral: Negative for confusion.       Physical Exam     Initial Vitals [06/30/18 1703]   BP Pulse Resp Temp SpO2   117/74 (!) 115 18 98.2  °F (36.8 °C) 95 %      MAP       --         Physical Exam    Constitutional: She appears well-developed and well-nourished. She is not diaphoretic. No distress.   HENT:   Head: Normocephalic and atraumatic.   Mouth/Throat: Oropharynx is clear and moist. Mucous membranes are dry.   Eyes: Conjunctivae and EOM are normal. Pupils are equal, round, and reactive to light.   Neck: Neck supple.   Cardiovascular: Normal rate, regular rhythm, normal heart sounds and intact distal pulses.   Pulmonary/Chest: Breath sounds normal. No respiratory distress.   Abdominal: Soft. Normal appearance. She exhibits no distension. There is generalized tenderness. There is no rigidity, no rebound, no guarding and no CVA tenderness.   Neurological: She is alert and oriented to person, place, and time.   Skin: Skin is warm and dry.         ED Course   Procedures  Labs Reviewed   URINALYSIS, REFLEX TO URINE CULTURE - Abnormal; Notable for the following:        Result Value    Glucose, UA 3+ (*)     Ketones, UA Trace (*)     Leukocytes, UA Trace (*)     All other components within normal limits    Narrative:     Preferred Collection Type->Urine, Clean Catch   CBC W/ AUTO DIFFERENTIAL - Abnormal; Notable for the following:     Lymph # 0.9 (*)     Gran% 74.3 (*)     Lymph% 15.7 (*)     All other components within normal limits   BETA - HYDROXYBUTYRATE, SERUM - Abnormal; Notable for the following:     Beta-Hydroxybutyrate 1.0 (*)     All other components within normal limits   COMPREHENSIVE METABOLIC PANEL - Abnormal; Notable for the following:     Sodium 126 (*)     Potassium 5.3 (*)     CO2 15 (*)     Glucose 569 (*)     BUN, Bld 50 (*)     Creatinine 2.3 (*)     eGFR if  27.3 (*)     eGFR if non  23.7 (*)     All other components within normal limits   BASIC METABOLIC PANEL - Abnormal; Notable for the following:     Sodium 135 (*)     Chloride 112 (*)     CO2 12 (*)     Glucose 141 (*)     BUN, Bld 42 (*)      Creatinine 1.8 (*)     Calcium 8.5 (*)     eGFR if  36.8 (*)     eGFR if non  31.9 (*)     All other components within normal limits   BASIC METABOLIC PANEL - Abnormal; Notable for the following:     Sodium 135 (*)     Chloride 112 (*)     CO2 12 (*)     Glucose 141 (*)     BUN, Bld 42 (*)     Creatinine 1.8 (*)     Calcium 8.5 (*)     eGFR if  36.8 (*)     eGFR if non  31.9 (*)     All other components within normal limits   ISTAT PROCEDURE - Abnormal; Notable for the following:     POC Glucose 622 (*)     POC BUN 64 (*)     POC Creatinine 2.1 (*)     POC Sodium 126 (*)     POC Potassium 6.3 (*)     POC TCO2 (MEASURED) 22 (*)     All other components within normal limits   ISTAT PROCEDURE - Abnormal; Notable for the following:     POC PH 7.325 (*)     POC PO2 14 (*)     POC HCO3 22.7 (*)     POC SATURATED O2 16 (*)     All other components within normal limits   POCT GLUCOSE - Abnormal; Notable for the following:     POCT Glucose 421 (*)     All other components within normal limits   POCT GLUCOSE - Abnormal; Notable for the following:     POCT Glucose 237 (*)     All other components within normal limits   URINALYSIS MICROSCOPIC    Narrative:     Preferred Collection Type->Urine, Clean Catch   POCT GLUCOSE   POCT GLUCOSE MONITORING CONTINUOUS   ISTAT CHEM8   POCT GLUCOSE MONITORING CONTINUOUS          Imaging Results          CT Abdomen Pelvis  Without Contrast (Final result)  Result time 06/30/18 23:22:59    Final result by Teto Ontiveros MD (06/30/18 23:22:59)                 Impression:      1. Cholelithiasis without evidence for cholecystitis.  2. Stable opacities within the right lung base likely representing parenchymal scarring.  3. Stable pulmonary nodule within the lingula.  4. IVC filter in place.  5. Additional findings as detailed above.    Electronically signed by resident: Dillan  Bernard  Date:    06/30/2018  Time:    22:35    Electronically signed by: Teto Ontiveros MD  Date:    06/30/2018  Time:    23:22             Narrative:    EXAMINATION:  CT ABDOMEN PELVIS WITHOUT CONTRAST    CLINICAL HISTORY:  abdominal pain;    TECHNIQUE:  The patient was surveyed from the lung bases through the pelvis without the administration of contrast. The data was reconstructed for coronal, sagittal, and axial images.    COMPARISON:  CT 02/28/2017, CTA 08/05/2016.    FINDINGS:  There is patchy opacification of the right lung base which may represent parenchymal scarring, similar in appearance to prior studies.  Nodule within the lingula measuring 0.7 cm stable when compared to prior study dated 08/05/2016 suggesting benign etiology.  No pleural effusion is seen.    The visualized portions of the heart appear normal. No pericardial effusion.    The liver is normal in size and attenuation.  No focal hepatic abnormality is seen. The gallbladder contains layering hyperdense material likely representing small stones.  No gallbladder wall thickening or pericholecystic fluid identified.  No intrahepatic or extrahepatic biliary ductal dilatation is identified. The spleen is unremarkable.    The stomach, pancreas, and adrenal glands are unremarkable.    The kidneys are normal in size and location. No hydronephrosis is seen.  The ureters appear normal in course and caliber. The urinary bladder is unremarkable. There is a stable calcification within the posterior aspect of the uterus, likely fibroid.    The visualized loops of small and large bowel show no evidence of obstruction or inflammation.  The appendix is unremarkable.    No ascites, free fluid, or intraperitoneal free air is identified.    There is no evidence of lymph node enlargement in the abdomen or pelvis.  The abdominal aorta is normal in course and caliber without significant atherosclerotic calcifications.  An IVC filter is in place.    The osseous  structures demonstrate no significant abnormality.    The extraperitoneal soft tissues are unremarkable.                               X-Ray Chest AP Portable (Final result)  Result time 06/30/18 19:53:38    Final result by Teto Ontiveros MD (06/30/18 19:53:38)                 Impression:      Persistent chronic infiltrate in the right lower lung zone which appears mildly decreased compared to prior study.    No other significant change from prior study.      Electronically signed by: Teto Ontiveros MD  Date:    06/30/2018  Time:    19:53             Narrative:    EXAMINATION:  XR CHEST AP PORTABLE    CLINICAL HISTORY:  hyperglycemia;    TECHNIQUE:  Single frontal view of the chest was performed.    COMPARISON:  February 14, 2018.    FINDINGS:  Persistent chronic infiltrate in the right lower lung zone which appears mildly decreased compared to prior study.    Heart and lungs otherwise appear unchanged when allowing for differences in technique and positioning.                                       APC / Resident Notes:   Patient was seen in the ER promptly upon arrival.  She is afebrile, no acute distress.  Physical examination does reveal diffuse tenderness on palpation over the abdomen.  Abdomen is was soft, nondistended. IV access was established labs ordered fluids given.  POCT glucose is over 500.  EKG unremarkable normal sinus rhythm at a rate of 96 beats per minute.    Laboratory studies show normal white count of 5.7.  Hemoglobin stable. TRUDY noted creatinine 2.3 with BUN of 50     Patient does appear to be in DKA.  PH of 7.32.  Beta hydroxybutyrate was elevated 1.0.  Anion gap of about 14 with bicarb of 15.   The urinalysis does not show evidence of infection or blood.  There are ketones present. Patient was initiated on insulin drip and bolus    CT of the abdomen and pelvis was unremarkable. Chronic findings noted.     Upon re-examination patient's blood glucose has improved.Repeat BMP does reveal an  anion gap of 11.  CO 2 is 12 with glucose of 141    Will admit to Hospital Medicine for further management of this DKA and TRUDY. The care of this patient was overseen by attending physician who agrees with treatment, plan, and disposition.               Attending Attestation:             Attending ED Notes:   DISCUSSED WITH MIDLEVEL PROVIDER.  PATIENT SEEN AND EXAMINED BY ME.  PATIENT DIARRHEA FOR A COUPLE OF DAYS.  NONBLOODY.  NO TRAVEL ANTIBIOTICS OR SICK CONTACTS.  NO TENDERNESS ON ABDOMINAL EXAM.  WE WILL HYDRATE THIS PATIENT. LABS AND REASSESS          ED Course as of Jul 01 0029   Sat Jun 30, 2018   1916 POC Ionized Calcium: 1.16 [AJ]      ED Course User Index  [AJ] Elaine Griffith PA-C     Clinical Impression:   The primary encounter diagnosis was Diabetic ketoacidosis without coma associated with type 2 diabetes mellitus. A diagnosis of Hyperglycemia was also pertinent to this visit.      Disposition:   Disposition: Admitted  Condition: Fair                        Elaine Griffith PA-C  07/01/18 0030

## 2018-07-01 PROBLEM — N18.30 CKD STAGE 3 DUE TO TYPE 2 DIABETES MELLITUS: Status: ACTIVE | Noted: 2018-07-01

## 2018-07-01 PROBLEM — E11.22 CKD STAGE 3 DUE TO TYPE 2 DIABETES MELLITUS: Status: ACTIVE | Noted: 2018-07-01

## 2018-07-01 LAB
ALLENS TEST: ABNORMAL
ANION GAP SERPL CALC-SCNC: 10 MMOL/L
ANION GAP SERPL CALC-SCNC: 10 MMOL/L
BUN SERPL-MCNC: 34 MG/DL
BUN SERPL-MCNC: 38 MG/DL
CALCIUM SERPL-MCNC: 8.9 MG/DL
CALCIUM SERPL-MCNC: 9.2 MG/DL
CHLORIDE SERPL-SCNC: 107 MMOL/L
CHLORIDE SERPL-SCNC: 107 MMOL/L
CO2 SERPL-SCNC: 18 MMOL/L
CO2 SERPL-SCNC: 21 MMOL/L
CREAT SERPL-MCNC: 1.5 MG/DL
CREAT SERPL-MCNC: 1.5 MG/DL
DELSYS: ABNORMAL
EST. GFR  (AFRICAN AMERICAN): 45.8 ML/MIN/1.73 M^2
EST. GFR  (AFRICAN AMERICAN): 45.8 ML/MIN/1.73 M^2
EST. GFR  (NON AFRICAN AMERICAN): 39.8 ML/MIN/1.73 M^2
EST. GFR  (NON AFRICAN AMERICAN): 39.8 ML/MIN/1.73 M^2
ESTIMATED AVG GLUCOSE: 298 MG/DL
GLUCOSE SERPL-MCNC: 115 MG/DL
GLUCOSE SERPL-MCNC: 142 MG/DL
HBA1C MFR BLD HPLC: 12 %
HCO3 UR-SCNC: 22.7 MMOL/L (ref 24–28)
MODE: ABNORMAL
PCO2 BLDA: 43.6 MMHG (ref 35–45)
PH SMN: 7.33 [PH] (ref 7.35–7.45)
PO2 BLDA: 14 MMHG (ref 40–60)
POC BE: -3 MMOL/L
POC SATURATED O2: 16 % (ref 95–100)
POC TCO2: 24 MMOL/L (ref 24–29)
POCT GLUCOSE: 116 MG/DL (ref 70–110)
POCT GLUCOSE: 121 MG/DL (ref 70–110)
POCT GLUCOSE: 137 MG/DL (ref 70–110)
POCT GLUCOSE: 159 MG/DL (ref 70–110)
POCT GLUCOSE: 212 MG/DL (ref 70–110)
POCT GLUCOSE: 79 MG/DL (ref 70–110)
POCT GLUCOSE: 90 MG/DL (ref 70–110)
POTASSIUM SERPL-SCNC: 4.7 MMOL/L
POTASSIUM SERPL-SCNC: 4.7 MMOL/L
SAMPLE: ABNORMAL
SITE: ABNORMAL
SODIUM SERPL-SCNC: 135 MMOL/L
SODIUM SERPL-SCNC: 138 MMOL/L
SP02: 95

## 2018-07-01 PROCEDURE — 63600175 PHARM REV CODE 636 W HCPCS: Performed by: HOSPITALIST

## 2018-07-01 PROCEDURE — 83036 HEMOGLOBIN GLYCOSYLATED A1C: CPT

## 2018-07-01 PROCEDURE — 36415 COLL VENOUS BLD VENIPUNCTURE: CPT

## 2018-07-01 PROCEDURE — 63600175 PHARM REV CODE 636 W HCPCS: Performed by: INTERNAL MEDICINE

## 2018-07-01 PROCEDURE — 80048 BASIC METABOLIC PNL TOTAL CA: CPT

## 2018-07-01 PROCEDURE — 20600001 HC STEP DOWN PRIVATE ROOM

## 2018-07-01 PROCEDURE — 25000003 PHARM REV CODE 250: Performed by: INTERNAL MEDICINE

## 2018-07-01 PROCEDURE — 25000242 PHARM REV CODE 250 ALT 637 W/ HCPCS: Performed by: INTERNAL MEDICINE

## 2018-07-01 PROCEDURE — 99233 SBSQ HOSP IP/OBS HIGH 50: CPT | Mod: ,,, | Performed by: HOSPITALIST

## 2018-07-01 PROCEDURE — 99222 1ST HOSP IP/OBS MODERATE 55: CPT | Mod: ,,, | Performed by: INTERNAL MEDICINE

## 2018-07-01 RX ORDER — INSULIN ASPART 100 [IU]/ML
5 INJECTION, SOLUTION INTRAVENOUS; SUBCUTANEOUS
Status: DISCONTINUED | OUTPATIENT
Start: 2018-07-01 | End: 2018-07-01

## 2018-07-01 RX ORDER — INSULIN ASPART 100 [IU]/ML
1-10 INJECTION, SOLUTION INTRAVENOUS; SUBCUTANEOUS
Status: DISCONTINUED | OUTPATIENT
Start: 2018-07-01 | End: 2018-07-01

## 2018-07-01 RX ORDER — SODIUM CHLORIDE AND POTASSIUM CHLORIDE 150; 450 MG/100ML; MG/100ML
INJECTION, SOLUTION INTRAVENOUS CONTINUOUS
Status: DISCONTINUED | OUTPATIENT
Start: 2018-07-01 | End: 2018-07-01

## 2018-07-01 RX ORDER — INSULIN ASPART 100 [IU]/ML
5 INJECTION, SOLUTION INTRAVENOUS; SUBCUTANEOUS
Status: DISCONTINUED | OUTPATIENT
Start: 2018-07-01 | End: 2018-07-02 | Stop reason: HOSPADM

## 2018-07-01 RX ORDER — INSULIN ASPART 100 [IU]/ML
0-5 INJECTION, SOLUTION INTRAVENOUS; SUBCUTANEOUS
Status: DISCONTINUED | OUTPATIENT
Start: 2018-07-01 | End: 2018-07-02 | Stop reason: HOSPADM

## 2018-07-01 RX ORDER — INSULIN ASPART 100 [IU]/ML
3 INJECTION, SOLUTION INTRAVENOUS; SUBCUTANEOUS
Status: DISCONTINUED | OUTPATIENT
Start: 2018-07-01 | End: 2018-07-01

## 2018-07-01 RX ADMIN — INSULIN DETEMIR 10 UNITS: 100 INJECTION, SOLUTION SUBCUTANEOUS at 11:07

## 2018-07-01 RX ADMIN — DABIGATRAN ETEXILATE MESYLATE 150 MG: 150 CAPSULE ORAL at 10:07

## 2018-07-01 RX ADMIN — INSULIN ASPART 4 UNITS: 100 INJECTION, SOLUTION INTRAVENOUS; SUBCUTANEOUS at 11:07

## 2018-07-01 RX ADMIN — DABIGATRAN ETEXILATE MESYLATE 150 MG: 150 CAPSULE ORAL at 08:07

## 2018-07-01 RX ADMIN — POTASSIUM CHLORIDE AND SODIUM CHLORIDE: 450; 150 INJECTION, SOLUTION INTRAVENOUS at 08:07

## 2018-07-01 RX ADMIN — POTASSIUM CHLORIDE AND SODIUM CHLORIDE: 450; 150 INJECTION, SOLUTION INTRAVENOUS at 02:07

## 2018-07-01 RX ADMIN — SIMVASTATIN 20 MG: 20 TABLET, FILM COATED ORAL at 10:07

## 2018-07-01 RX ADMIN — INSULIN ASPART 5 UNITS: 100 INJECTION, SOLUTION INTRAVENOUS; SUBCUTANEOUS at 04:07

## 2018-07-01 RX ADMIN — FLUTICASONE FUROATE AND VILANTEROL TRIFENATATE 1 PUFF: 100; 25 POWDER RESPIRATORY (INHALATION) at 11:07

## 2018-07-01 RX ADMIN — ASPIRIN 81 MG: 81 TABLET, COATED ORAL at 08:07

## 2018-07-01 NOTE — SUBJECTIVE & OBJECTIVE
PMH, PSH, FH, SH updated and reviewed     Review of Systems   Constitutional: Positive for appetite change and unexpected weight change (down 8 pounds over past week).   Eyes: Negative for visual disturbance.   Respiratory: Negative for shortness of breath.    Cardiovascular: Negative for chest pain.   Gastrointestinal: Positive for abdominal pain and nausea.   Endocrine: Positive for polydipsia and polyuria.   Genitourinary: Negative for urgency.   Musculoskeletal: Negative for arthralgias.   Skin: Negative for wound.   Neurological: Negative for headaches.   Hematological: Does not bruise/bleed easily.   Psychiatric/Behavioral: Negative for sleep disturbance.       PHYSICAL EXAMINATION:  Vitals:    07/01/18 1115   BP: 137/83   Pulse: 84   Resp: 16   Temp: 97.8 °F (36.6 °C)     Body mass index is 31.03 kg/m².    Physical Exam   Constitutional: She is oriented to person, place, and time. She appears well-developed.   HENT:   Right Ear: External ear normal.   Left Ear: External ear normal.   Nose: Nose normal.   Hearing grossly normal  Dentition grossly normal   Neck: No tracheal deviation present. No thyromegaly present.   Cardiovascular: Normal rate.    Pulmonary/Chest: Effort normal. No respiratory distress.   Abdominal: Soft. She exhibits no mass. There is no tenderness.   Musculoskeletal: She exhibits no edema.   No digital clubbing or extremity cyanosis   Neurological: She is alert and oriented to person, place, and time. Coordination normal.   Skin: No rash noted.   No subcutaneous nodules noted.   Psychiatric: She has a normal mood and affect. Her behavior is normal.   Alert and oriented to person, place, and situation.   Nursing note and vitals reviewed.

## 2018-07-01 NOTE — CONSULTS
[facility]  Endocrinology  Diabetes Consult Note    Consult Requested by: Jose R Ramírez MD   Reason for admit: Diabetic ketoacidosis    HISTORY OF PRESENT ILLNESS:  Reason for Consult: Management of type 2 diabetes mellitus, poorly controlled, on long-term insulin therapy    Home Diabetes Medications: ]  Metformin 1000 mg BID  Lantus 10 units nightly - patient only taking when glucose >150, otherwise doesn't take insulin. Since she hasn't been checking her blood glucose, she essentially hasn't been taking any insulin unless she is polyuric.    How often checking glucose at home? 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 female with a diagnosis of type 2 diabetes mellitus, uncontrolled, on long term insulin therapy (prescribed, but not taking) presented with several days of polyuria, polydipsia, nausea, abdominal pain, and generalized weakness. She reports being out in the heat a lot this week, but was not hydrating appropriately. She was found to have significant hyperglycemia with mild DKA on admission. She was on an insulin infusion overnight, and transitioned to SC insulin this AM. She reports feeling much better since getting the fluids. We were consulted to assist with managing diabetes.     PMH, PSH, FH, SH updated and reviewed     Review of Systems   Constitutional: Positive for appetite change and unexpected weight change (down 8 pounds over past week).   Eyes: Negative for visual disturbance.   Respiratory: Negative for shortness of breath.    Cardiovascular: Negative for chest pain.   Gastrointestinal: Positive for abdominal pain and nausea.   Endocrine: Positive for polydipsia and polyuria.   Genitourinary: Negative for urgency.   Musculoskeletal: Negative for arthralgias.   Skin: Negative for wound.   Neurological:  Negative for headaches.   Hematological: Does not bruise/bleed easily.   Psychiatric/Behavioral: Negative for sleep disturbance.       PHYSICAL EXAMINATION:  Vitals:    07/01/18 1115   BP: 137/83   Pulse: 84   Resp: 16   Temp: 97.8 °F (36.6 °C)     Body mass index is 31.03 kg/m².    Physical Exam   Constitutional: She is oriented to person, place, and time. She appears well-developed.   HENT:   Right Ear: External ear normal.   Left Ear: External ear normal.   Nose: Nose normal.   Hearing grossly normal  Dentition grossly normal   Neck: No tracheal deviation present. No thyromegaly present.   Cardiovascular: Normal rate.    Pulmonary/Chest: Effort normal. No respiratory distress.   Abdominal: Soft. She exhibits no mass. There is no tenderness.   Musculoskeletal: She exhibits no edema.   No digital clubbing or extremity cyanosis   Neurological: She is alert and oriented to person, place, and time. Coordination normal.   Skin: No rash noted.   No subcutaneous nodules noted.   Psychiatric: She has a normal mood and affect. Her behavior is normal.   Alert and oriented to person, place, and situation.   Nursing note and vitals reviewed.        Labs Reviewed and Include     Recent Labs  Lab 06/30/18  1909  07/01/18  0759   *  < > 142*   CALCIUM 9.6  < > 9.2   ALBUMIN 4.0  --   --    PROT 8.3  --   --    *  < > 138   K 5.3*  < > 4.7   CO2 15*  < > 21*   CL 97  < > 107   BUN 50*  < > 34*   CREATININE 2.3*  < > 1.5*   ALKPHOS 128  --   --    ALT 16  --   --    AST 20  --   --    BILITOT 0.5  --   --    < > = values in this interval not displayed.  Lab Results   Component Value Date    WBC 5.74 06/30/2018    HGB 14.4 06/30/2018    HCT 48 06/30/2018    MCV 89 06/30/2018     06/30/2018     No results for input(s): TSH, FREET4 in the last 168 hours.  Lab Results   Component Value Date    HGBA1C 12.0 (H) 07/01/2018       Nutritional status:   Body mass index is 31.03 kg/m².  Lab Results   Component Value Date     ALBUMIN 4.0 06/30/2018    ALBUMIN 3.2 (L) 12/30/2017    ALBUMIN 3.9 12/23/2017     No results found for: PREALBUMIN    Estimated Creatinine Clearance: 45.4 mL/min (A) (based on SCr of 1.5 mg/dL (H)).    Accu-Checks  Recent Labs      06/30/18   2117  06/30/18   2213  06/30/18   2323  07/01/18   0207  07/01/18   0434  07/01/18   0658  07/01/18   0818  07/01/18   1137   POCTGLUCOSE  421*  237*  82  90  116*  137*  159*  212*        ASSESSMENT and PLAN    CKD stage 3 due to type 2 diabetes mellitus    Needs to control diabetes to prevent worsening in GFR.  Decrease metformin to 500 mg BID in setting of decreased GFR.           Diabetic ketoacidosis without coma associated with type 2 diabetes mellitus    DKA was very mild, which is typical of type 2 diabetes. Due to non-compliance with therapy, dehydration. Now resolved.    Agree with resuming insulin therapy.  Taking decreased GFR into account, will start with 0.35 units/kg TDD  Increase levemir 15 units daily  Start novolog 5 units with meals  POC AC/HS  Change to Low dose correction    Counseled patient on the need to be compliant with her diabetes therapy. Needs to start monitoring blood glucose several times per day.    Discharge planning:  Resume home insulin (lantus 15 units nightly)  Resume metformin, but decrease to 500 mg BID given CKD  Long term, she would benefit from a GLP-1 agonist. Since compliance is an issue, would opt for weekly dosing (Ozempic, Trulicity, or Bydureon). In the meantime, if additional prandial coverage needed, can use renally dosed januvia.  Refer to outpatient diabetes education  Follow-up with PCP for further diabetes care        Essential hypertension    ACE/ARB would be first option given diabetes and CKD            Plan discussed with patient, family, and RN at bedside.     Rick Weldon MD  Endocrinology  [facility]

## 2018-07-01 NOTE — PROGRESS NOTES
Results for BILLY HZAO (MRN [id number]) as of 7/1/2018 10:17   Ref. Range 7/1/2018 06:58 7/1/2018 08:18   POCT Glucose Latest Ref Range: 70 - 110 mg/dL 137 (H) 159 (H)     0658  BG at shift change 137.  Insulin gtt has been off since last night and D5 1/2NS 20KCl on hold while waiting for pharmacy to send another bag.      0818  Pre-prandial Accucheck 159.  No orders in place for SubQ insulin at this time.  IMX team paged over .  Will continue to monitor.     0858  Still no callback from IMX.  Dr. IVAN Campbell paged. Awaiting callback for further orders for BG management. Fluid bag obtained and infusion restarted per orders.     0959  Still no callback from IMX or Dr. Campbell.  Charge nurse notified.  Pt remains stable at this time.   Will continue to monitor.     1033  Able to get in touch with Dr. Ramírez about orders.  Plan to D/C fluids and start SubQ insulin. Will implement as ordered. Will continue to monitor.

## 2018-07-01 NOTE — ASSESSMENT & PLAN NOTE
Needs to control diabetes to prevent worsening in GFR.  Decrease metformin to 500 mg BID in setting of decreased GFR.

## 2018-07-01 NOTE — ASSESSMENT & PLAN NOTE
DKA was very mild, which is typical of type 2 diabetes. Due to non-compliance with therapy, dehydration. Now resolved.    Agree with resuming insulin therapy.  Taking decreased GFR into account, will start with 0.35 units/kg TDD  Increase levemir 15 units daily  Start novolog 5 units with meals  POC AC/HS  Change to Low dose correction    Counseled patient on the need to be compliant with her diabetes therapy. Needs to start monitoring blood glucose several times per day.    Discharge planning:  Resume home insulin (lantus 15 units nightly)  Resume metformin, but decrease to 500 mg BID given CKD  Long term, she would benefit from a GLP-1 agonist. Since compliance is an issue, would opt for weekly dosing (Ozempic, Trulicity, or Bydureon). In the meantime, if additional prandial coverage needed, can use renally dosed januvia.  Refer to outpatient diabetes education  Follow-up with PCP for further diabetes care

## 2018-07-01 NOTE — PLAN OF CARE
Problem: Patient Care Overview  Goal: Plan of Care Review  Outcome: Ongoing (interventions implemented as appropriate)  Patient remains free from falls and injuries through out shift. Patient AAO and VSS. Patient denies chest pain and SOB. Pt was on Insulin gtt in ED gtt stopped in ED due to BG being 87. Started on 1/2NS with 20mEq of K+@ 200cc/hr. Accu checks every 2hrs per MD request. Plan of care reviewed with patient. Patient verbalizes understanding of plan.  Will continue to monitor.

## 2018-07-01 NOTE — HPI
Reason for Consult: Management of type 2 diabetes mellitus, poorly controlled, on long-term insulin therapy    Home Diabetes Medications: ]  Metformin 1000 mg BID  Lantus 10 units nightly - patient only taking when glucose >150, otherwise doesn't take insulin. Since she hasn't been checking her blood glucose, she essentially hasn't been taking any insulin unless she is polyuric.    How often checking glucose at home? 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 female with a diagnosis of type 2 diabetes mellitus, uncontrolled, on long term insulin therapy (prescribed, but not taking) presented with several days of polyuria, polydipsia, nausea, abdominal pain, and generalized weakness. She reports being out in the heat a lot this week, but was not hydrating appropriately. She was found to have significant hyperglycemia with mild DKA on admission. She was on an insulin infusion overnight, and transitioned to SC insulin this AM. She reports feeling much better since getting the fluids. We were consulted to assist with managing diabetes.

## 2018-07-01 NOTE — PROGRESS NOTES
Hospital Medicine  Progress note    Team: [facility] HOSP MED X Jose R Ramírez MD  Admit Date: 6/30/2018  KRISTEN 07/01/2018  Code status: Full Code    Principal Problem:  DKA   Interval hx:  Patient seen and examined at bedside, admitted overnight for DKA secondary to non compliance with insulin.     ROS   Constitutional: no fever or chills  Respiratory: no coug, mild shortness of breath  Cardiovascular: no chest pain or palpitations  Gastrointestinal: + nausea vomiting+ abdominal pain and change in bowel habits  Genitourinary: no hematuria or dysuria  Integument/Breast: no rash or pruritis  Hematologic/Lymphatic: no easy bruising or lymphadenopathy  Musculoskeletal: no arthralgias or myalgias  Neurological: no seizures or tremors  Behavioral/Psych: no depression or anxiety     PEx  Temp:  [97.6 °F (36.4 °C)-98.2 °F (36.8 °C)]   Pulse:  []   Resp:  [16-26]   BP: (110-153)/(57-83)   SpO2:  [92 %-99 %]     Intake/Output Summary (Last 24 hours) at 07/01/18 1513  Last data filed at 07/01/18 1120   Gross per 24 hour   Intake           953.33 ml   Output              650 ml   Net           303.33 ml     General appearance: no distress, AA woman  Mental status: Alert and oriented x 3  HEENT:  conjunctivae/corneas clear, PERRL  Neck: supple, thyroid not enlarged  Pulm:   normal respiratory effort, CTA B, no c/w/r  Card: RRR, S1, S2 normal, no murmur, click, rub or gallop  Abd: soft, NT, ND, BS present; no masses, no organomegaly  Ext: no c/c/e  Pulses: 2+, symmetric  Skin: color, texture, turgor normal. No rashes or lesions  Neuro: CN II-XII grossly intact, no focal numbness or weakness, normal strength and tone       Recent Labs  Lab 06/30/18  1808 06/30/18  1818   WBC 5.74  --    HGB 14.4  --    HCT 43.7 48     --        Recent Labs  Lab 06/30/18  2249 07/01/18  0433 07/01/18  0759   *  135* 135* 138   K 3.7  3.7 4.7 4.7   *  112* 107 107   CO2 12*  12* 18* 21*   BUN 42*  42* 38* 34*   CREATININE  1.8*  1.8* 1.5* 1.5*   *  141* 115* 142*   CALCIUM 8.5*  8.5* 8.9 9.2       Recent Labs  Lab 06/30/18  1909   ALKPHOS 128   ALT 16   AST 20   ALBUMIN 4.0   PROT 8.3   BILITOT 0.5        Recent Labs  Lab 06/30/18  2323 07/01/18  0207 07/01/18  0434 07/01/18  0658 07/01/18  0818 07/01/18  1137   POCTGLUCOSE 82 90 116* 137* 159* 212*     No results for input(s): CPK, CPKMB, MB, TROPONINI in the last 72 hours.    Scheduled Meds:   aspirin  81 mg Oral Daily    dabigatran etexilate  150 mg Oral BID    fluticasone-vilanterol  1 puff Inhalation Daily    insulin aspart U-100  5 Units Subcutaneous TIDWM    [START ON 7/2/2018] insulin detemir U-100  15 Units Subcutaneous Daily    simvastatin  20 mg Oral QHS     Continuous Infusions:  As Needed:  albuterol sulfate, dextrose 50%, dextrose 50%, glucagon (human recombinant), glucose, glucose, insulin aspart U-100, ondansetron, promethazine, sodium chloride 0.9%    Active Hospital Problems    Diagnosis  POA    CKD stage 3 due to type 2 diabetes mellitus [E11.22, N18.3]  Unknown    Diabetic ketoacidosis without coma associated with type 2 diabetes mellitus [E11.10]  Yes    Asthma [J45.909]  Yes    Pulmonary embolism [I26.99]  Yes    Essential hypertension [I10]  Yes      Resolved Hospital Problems    Diagnosis Date Resolved POA   No resolved problems to display.       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, DM2, HTN, HLD, Asthma presented with DKA, likely 2/2 dehydration and non-compliance with insuln    Assessment and Plan for Problems addressed today:    DKA  -Per above likely 2/2 dehydration and non-compliance with insulin, infx less likely (UA negative, no s/s infection otherwise), no evidence of ACS  -Glucose initially 622, BHB 1.0, AG 14, bicarb 15, pH (venous) 7.37  -S/p 8u IV insulin started in insulin ggt, with 3L NS  - now on Insulin 5 units TID and 15 units daily  -Glucose -->569-->421-->237-->145 currently   - IVF discontinued   -Gap  closed  - endocrinology consulted, recs as above       H/o DVT/PE  -Stable  -Continue ASA, pradaxa     Asthma  -Stable  -PRN albuterol  -Cont advair     TRUDY  -Likely prerenal 2/2 dehydration  -Holding diuretics / ACE  -Continue to monitor  -Hydration per above     DM2  -See DKA     HTN  -Stable  -Holding antihypertensives per above  -Resume as TRUDY resolves (Lisinopril, Triamterene/HCTZ)     DVT PPx: Pradaxa  Dispo: Pending resolution of DKA, likely 1-2 days       Discharge plan and follow up    Provider    Jose R Núñez MD  Staff Hospitalist  Department of Hospital Medicine  [facility]   [phone]

## 2018-07-01 NOTE — H&P
Hospital Medicine  History and Physical Exam    Team: [facility] HOSP MED X Kvng Hood MD  Admit Date: 6/30/2018  KRISTEN 7/2/2018  Principal Problem:  <principal problem not specified>   Patient information was obtained from patient, past medical records and ER records.   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 Pt reports she has not checked her sugars this week at all, and reports taking her long acting insulin once, (10u few days ago), reports abd pain worsened which led her to seek evaluation, currently reports abd pain improved after IV fluids and insulin. Denies recent illness, chest pain, has stable dyspnea with wheezing at times improved with inhaler, denies dysuria, denies hematochezia, denies any recent steroid use. No recent travel, sick contacts. Taking tylenol which has alleviated pain some. Reports some loose stools recently but has improved.        Past Medical History: Patient has a past medical history of Asthma; Dyspnea on exertion; long term use of blood thinners; Hypertension (3/8/2016); PE (pulmonary embolism); Presence of IVC filter; Scleroderma; Stroke (02/2017); and Type 2 diabetes mellitus without complication.    Past Surgical History: Patient has a past surgical history that includes mich filter placement.    Social History: Patient reports that she has never smoked. She has never used smokeless tobacco. She reports that she drinks about 1.8 oz of alcohol per week . She reports that she does not use drugs.    Family History: family history includes Breast cancer in her mother; Diabetes Mellitus in her maternal uncle; Hypertension in her father; No Known Problems in her  "brother, maternal aunt, maternal grandfather, maternal grandmother, paternal aunt, paternal grandfather, paternal grandmother, paternal uncle, and sister.    Medications:   Prior to Admission medications    Medication Sig Start Date End Date Taking? Authorizing Provider   albuterol (PROVENTIL) 2.5 mg /3 mL (0.083 %) nebulizer solution Take 3 mLs (2.5 mg total) by nebulization every 6 (six) hours as needed for Wheezing. 11/10/17 11/10/18 Yes Gerard Joyce MD   aspirin (ECOTRIN) 81 MG EC tablet Take 1 tablet (81 mg total) by mouth once daily. 6/19/17  Yes Gerard Joyce MD   dabigatran etexilate (PRADAXA) 150 mg Cap Take 1 capsule (150 mg total) by mouth 2 (two) times daily. "Do NOT break, chew, or open capsules." 12/1/17  Yes Shellie Maxwell PA-C   insulin glargine (LANTUS SOLOSTAR) 100 unit/mL (3 mL) InPn pen Inject 15 Units into the skin every evening.  Patient taking differently: Inject 10 Units into the skin every evening.  12/31/17 12/31/18 Yes Gregory Catherine MD   lisinopril (PRINIVIL,ZESTRIL) 20 MG tablet Take 1 tablet (20 mg total) by mouth once daily. 4/15/17  Yes Gerard Joyce MD   metFORMIN (GLUCOPHAGE) 1000 MG tablet Take 1 tablet (1,000 mg total) by mouth 2 (two) times daily with meals. 12/15/17 12/15/18 Yes Gerard Joyce MD   simvastatin (ZOCOR) 20 MG tablet Take 1 tablet (20 mg total) by mouth every evening. 5/19/17 6/30/18 Yes Gerard Joyce MD   triamterene-hydrochlorothiazide 37.5-25 mg (MAXZIDE-25) 37.5-25 mg per tablet Take 1 tablet by mouth once daily. 4/12/17  Yes Gerard Joyce MD   buPROPion (WELLBUTRIN XL) 150 MG TB24 tablet Take 1 tablet (150 mg total) by mouth once daily. 3/9/18 6/30/18 Yes Gerard Joyce MD   ferrous sulfate 325 (65 FE) MG EC tablet Take 1 tablet (325 mg total) by mouth once daily. 3/2/17 6/30/18 Yes Meera Mendez PA-C   blood sugar diagnostic Strp 200 strips by Misc.(Non-Drug; Combo Route) route 2 (two) times daily. 12/15/17   Gerard Joyce MD "   citalopram (CELEXA) 10 MG tablet Take 1 tablet (10 mg total) by mouth once daily. 3/28/17 4/27/17  Gerard Joyce MD   cyanocobalamin (VITAMIN B-12) 1000 MCG tablet Take 100 mcg by mouth once daily.    Historical Provider, MD   fluocinonide 0.05% (LIDEX) 0.05 % cream Apply topically 2 (two) times daily. To rash on legs 3/19/18 3/29/18  Marilin García MD   fluticasone-salmeterol 250-50 mcg/dose (ADVAIR) 250-50 mcg/dose diskus inhaler Inhale 1 puff into the lungs 2 (two) times daily. 5/19/17 5/19/18  Gerard Joyce MD   lancets (ONETOUCH ULTRASOFT LANCETS) Misc 1 application by Misc.(Non-Drug; Combo Route) route 2 (two) times daily before meals. 12/15/17   Gerard Joyce MD       Allergies: Patient is allergic to plasma, human, normal.    ROS    Pain Scale: 0 /10   Constitutional: no fever or chills  Respiratory: no coug, mild shortness of breath  Cardiovascular: no chest pain or palpitations  Gastrointestinal: + nausea vomiting+ abdominal pain and change in bowel habits  Genitourinary: no hematuria or dysuria  Integument/Breast: no rash or pruritis  Hematologic/Lymphatic: no easy bruising or lymphadenopathy  Musculoskeletal: no arthralgias or myalgias  Neurological: no seizures or tremors  Behavioral/Psych: no depression or anxiety    PEx  Temp:  [98.2 °F (36.8 °C)]   Pulse:  []   Resp:  [18-26]   BP: (117-153)/(74-79)   SpO2:  [95 %-98 %]   Body mass index is 30.38 kg/m².     General appearance: no distress, AA woman  Mental status: Alert and oriented x 3  HEENT:  conjunctivae/corneas clear, PERRL  Neck: supple, thyroid not enlarged  Pulm:   normal respiratory effort, CTA B, no c/w/r  Card: RRR, S1, S2 normal, no murmur, click, rub or gallop  Abd: soft, NT, ND, BS present; no masses, no organomegaly  Ext: no c/c/e  Pulses: 2+, symmetric  Skin: color, texture, turgor normal. No rashes or lesions  Neuro: CN II-XII grossly intact, no focal numbness or weakness, normal strength and tone       No intake or  output data in the 24 hours ending 06/30/18 2328    Recent Labs  Lab 06/30/18  1808 06/30/18  1818   WBC 5.74  --    HGB 14.4  --    HCT 43.7 48     --        Recent Labs  Lab 06/30/18  1909 06/30/18  2249   * 135*  135*   K 5.3* 3.7  3.7   CL 97 112*  112*   CO2 15* 12*  12*   BUN 50* 42*  42*   CREATININE 2.3* 1.8*  1.8*   * 141*  141*   CALCIUM 9.6 8.5*  8.5*       Recent Labs  Lab 06/30/18  1909   ALKPHOS 128   ALT 16   AST 20   ALBUMIN 4.0   PROT 8.3   BILITOT 0.5        Recent Labs  Lab 06/30/18  2117 06/30/18  2213 06/30/18  2323   POCTGLUCOSE 421* 237* 82     No results for input(s): CPK, CPKMB, MB, TROPONINI in the last 72 hours.    No results for input(s): LACTATE in the last 72 hours.     Active Hospital Problems    Diagnosis  POA    Diabetic ketoacidosis without coma associated with type 2 diabetes mellitus [E11.10]  Yes      Resolved Hospital Problems    Diagnosis Date Resolved POA   No resolved problems to display.       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, DM2, HTN, HLD, Asthma presented with DKA, likely 2/2 dehydration and non-compliance with insuln    Assessment and Plan for Problems addressed today:    DKA  -Per above likely 2/2 dehydration and non-compliance with insulin, infx less likely (UA negative, no s/s infection otherwise), no evidence of ACS  -Glucose initially 622, BHB 1.0, AG 14, bicarb 15, pH (venous) 7.37  -S/p 8u IV insulin started in insulin ggt, with 3L NS  -Glucose -->569-->421-->237-->145, due to pharm / delay in getting D5 fluids glucose dropped to 80s, asymptomatic, insulin ggt stopped, patient allowed to eat, will wait until glucose >175 to initate long acting insulin 10u, cont 1/2 NS  -Starting D5 1/2 NS with 20meq K 200cc/hr  -Gap closed 0000  -Continue insulin ggt protocol overnight  -F/u AM BMP, if gap remains closed start DM diet and can initiate 10u detimir    H/o DVT/PE  -Stable  -Continue ASA, pradaxa    Asthma  -Stable  -PRN  albuterol  -Cont advair    TRUDY  -Likely prerenal 2/2 dehydration  -Holding diuretics / ACE  -Continue to monitor  -Hydration per above    DM2  -See DKA    HTN  -Stable  -Holding antihypertensives per above  -Resume as TRUDY resolves (Lisinopril, Triamterene/HCTZ)    DVT PPx: Pradaxa  Dispo: Pending resolution of DKA, likely 1-2 days    Kvng Hood MD  Department of Hospital Medicine  Pager: [phone]  Spectra: [phone]

## 2018-07-01 NOTE — PLAN OF CARE
Problem: Patient Care Overview  Goal: Plan of Care Review  Outcome: Revised  Pt free of falls/traumas/injuries. Denies c/o SOB, CP, or discomfort. Generalized skin remains CDI; no edema noted.  Blood glucose managed with supplemental SubQ insulin this shift.  Endocrinology following and adjusting meds as needed.  Pt tolerating plan of care.

## 2018-07-02 VITALS
WEIGHT: 180.75 LBS | TEMPERATURE: 97 F | SYSTOLIC BLOOD PRESSURE: 144 MMHG | DIASTOLIC BLOOD PRESSURE: 96 MMHG | OXYGEN SATURATION: 95 % | BODY MASS INDEX: 30.86 KG/M2 | RESPIRATION RATE: 18 BRPM | HEART RATE: 100 BPM | HEIGHT: 64 IN

## 2018-07-02 LAB
ALBUMIN SERPL BCP-MCNC: 3.2 G/DL
ALP SERPL-CCNC: 93 U/L
ALT SERPL W/O P-5'-P-CCNC: 13 U/L
ANION GAP SERPL CALC-SCNC: 8 MMOL/L
AST SERPL-CCNC: 18 U/L
BASOPHILS # BLD AUTO: 0.02 K/UL
BASOPHILS NFR BLD: 0.4 %
BILIRUB SERPL-MCNC: 0.3 MG/DL
BUN SERPL-MCNC: 23 MG/DL
CALCIUM SERPL-MCNC: 9.2 MG/DL
CHLORIDE SERPL-SCNC: 105 MMOL/L
CO2 SERPL-SCNC: 24 MMOL/L
CREAT SERPL-MCNC: 1.2 MG/DL
DIFFERENTIAL METHOD: NORMAL
EOSINOPHIL # BLD AUTO: 0.3 K/UL
EOSINOPHIL NFR BLD: 5.6 %
ERYTHROCYTE [DISTWIDTH] IN BLOOD BY AUTOMATED COUNT: 12.7 %
EST. GFR  (AFRICAN AMERICAN): >60 ML/MIN/1.73 M^2
EST. GFR  (NON AFRICAN AMERICAN): 52.1 ML/MIN/1.73 M^2
GLUCOSE SERPL-MCNC: 124 MG/DL
HCT VFR BLD AUTO: 37.4 %
HGB BLD-MCNC: 12.4 G/DL
IMM GRANULOCYTES # BLD AUTO: 0.01 K/UL
IMM GRANULOCYTES NFR BLD AUTO: 0.2 %
LYMPHOCYTES # BLD AUTO: 1.5 K/UL
LYMPHOCYTES NFR BLD: 28.2 %
MCH RBC QN AUTO: 29.9 PG
MCHC RBC AUTO-ENTMCNC: 33.2 G/DL
MCV RBC AUTO: 90 FL
MONOCYTES # BLD AUTO: 0.6 K/UL
MONOCYTES NFR BLD: 10.9 %
NEUTROPHILS # BLD AUTO: 3 K/UL
NEUTROPHILS NFR BLD: 54.7 %
NRBC BLD-RTO: 0 /100 WBC
PLATELET # BLD AUTO: 179 K/UL
PMV BLD AUTO: 11.4 FL
POCT GLUCOSE: 122 MG/DL (ref 70–110)
POCT GLUCOSE: 156 MG/DL (ref 70–110)
POCT GLUCOSE: 188 MG/DL (ref 70–110)
POCT GLUCOSE: >500 MG/DL (ref 70–110)
POCT GLUCOSE: >500 MG/DL (ref 70–110)
POTASSIUM SERPL-SCNC: 4 MMOL/L
PROT SERPL-MCNC: 6.7 G/DL
RBC # BLD AUTO: 4.15 M/UL
SODIUM SERPL-SCNC: 137 MMOL/L
WBC # BLD AUTO: 5.39 K/UL

## 2018-07-02 PROCEDURE — 94761 N-INVAS EAR/PLS OXIMETRY MLT: CPT

## 2018-07-02 PROCEDURE — 99238 HOSP IP/OBS DSCHRG MGMT 30/<: CPT | Mod: ,,, | Performed by: HOSPITALIST

## 2018-07-02 PROCEDURE — 80053 COMPREHEN METABOLIC PANEL: CPT

## 2018-07-02 PROCEDURE — 36415 COLL VENOUS BLD VENIPUNCTURE: CPT

## 2018-07-02 PROCEDURE — 85025 COMPLETE CBC W/AUTO DIFF WBC: CPT

## 2018-07-02 PROCEDURE — 25000242 PHARM REV CODE 250 ALT 637 W/ HCPCS: Performed by: INTERNAL MEDICINE

## 2018-07-02 PROCEDURE — 25000003 PHARM REV CODE 250: Performed by: INTERNAL MEDICINE

## 2018-07-02 RX ORDER — DABIGATRAN ETEXILATE 150 MG/1
150 CAPSULE ORAL 2 TIMES DAILY
Qty: 60 CAPSULE | Refills: 0 | Status: SHIPPED | OUTPATIENT
Start: 2018-07-02 | End: 2018-07-16 | Stop reason: SDUPTHER

## 2018-07-02 RX ADMIN — DABIGATRAN ETEXILATE MESYLATE 150 MG: 150 CAPSULE ORAL at 08:07

## 2018-07-02 RX ADMIN — INSULIN ASPART 5 UNITS: 100 INJECTION, SOLUTION INTRAVENOUS; SUBCUTANEOUS at 12:07

## 2018-07-02 RX ADMIN — ASPIRIN 81 MG: 81 TABLET, COATED ORAL at 08:07

## 2018-07-02 RX ADMIN — FLUTICASONE FUROATE AND VILANTEROL TRIFENATATE 1 PUFF: 100; 25 POWDER RESPIRATORY (INHALATION) at 08:07

## 2018-07-02 RX ADMIN — INSULIN ASPART 5 UNITS: 100 INJECTION, SOLUTION INTRAVENOUS; SUBCUTANEOUS at 08:07

## 2018-07-02 NOTE — PLAN OF CARE
Gerard Joyce MD  [address] [facility] / [facility] [address]      [facility] - [place], [place] - [address] [facility]  [address] [facility] [address]  Phone: [phone] Fax: [phone]    [facility] - [place], [place] - [address]  [address] [facility] [address]  Phone: [phone] Fax: [phone]      Payor: BLUE CROSS BLUE SHIELD / Plan: BCBS ALL OUT OF STATE / Product Type: PPO /        07/02/18 1000   Discharge Assessment   Assessment Type Discharge Planning Assessment   Confirmed/corrected address and phone number on facesheet? Yes   Assessment information obtained from? Patient   Expected Length of Stay (days) 3   Communicated expected length of stay with patient/caregiver yes   Prior to hospitilization cognitive status: Alert/Oriented   Prior to hospitalization functional status: Independent   Current cognitive status: Alert/Oriented   Current Functional Status: Independent   Lives With other relative(s)   Able to Return to Prior Arrangements yes   Is patient able to care for self after discharge? Yes   Who are your caregiver(s) and their phone number(s)? (Kristine Gandara  [phone])   Patient's perception of discharge disposition home or selfcare   Patient currently receives any other outside agency services? No   Equipment Currently Used at Home glucometer   Do you have any problems affording any of your prescribed medications? TBD   Does the patient have transportation home? Yes   Transportation Available family or friend will provide   Discharge Plan A Home with family   Discharge Plan B Home Health   Patient/Family In Agreement With Plan yes

## 2018-07-02 NOTE — PLAN OF CARE
Problem: Patient Care Overview  Goal: Plan of Care Review  Outcome: Ongoing (interventions implemented as appropriate)  Patient remains free from falls and injuries through out shift. Patient AAO and VSS. Patient denies chest pain and SOB.  Accu checks ACHS, last BG was 122 . Plan of care reviewed with patient. Patient verbalizes understanding of plan. Possible D/C tomorrow.  Will continue to monitor.

## 2018-07-02 NOTE — PROGRESS NOTES
BP elevated prior to discharge, but other VSS.  Pt denying any c/o at this time, but does report that she has not been receiving her Lisinopril and HCTZ this visit.  Dr. Ramírez notified; stated pt can take her daily doses when she gets home. No new orders at this time. Pt discharged per MD orders.        07/02/18 1240 07/02/18 1245   Vital Signs   Temp 96.8 °F (36 °C) --    Pulse 100 --    Resp 18 --    SpO2 95 % --    BP (!) 159/106 (!) 144/96   MAP (mmHg) 127 --    BP Location Right arm Right arm   BP Method Automatic Manual   Patient Position Sitting Sitting

## 2018-07-02 NOTE — PLAN OF CARE
Problem: Patient Care Overview  Goal: Plan of Care Review  Outcome: Outcome(s) achieved Date Met: 07/02/18  Pt free of falls/traumas/injuries. Denies c/o SOB, CP, or discomfort. Generalized skin remains CDI; no edema noted.  Blood glucose managed with supplemental SubQ insulin this shift.  Endocrinology following and pt to f/u outpatient for further diabetes management and education.  Pt to be discharged today per MD orders. Pt tolerating plan of care.

## 2018-07-02 NOTE — PROGRESS NOTES
Pt discharged per MD orders.  Tele and IV discontinued.  Catheter tip intact x2.  Medication list and prescriptions reviewed; prescriptions sent to pt preferred pharmacy.  Pt verbalizes understanding of all written and verbal discharge instructions.  MIS performed and documented in chart. Pt awaiting escort arrival.  Will continue to monitor.

## 2018-07-02 NOTE — DISCHARGE SUMMARY
Discharge Summary  Hospital Medicine    Attending Provider on Discharge: Jose R Ramírez MD  [facility] Medicine Team: [facility]  Date of Admission:  6/30/2018     Date of Discharge:  7/2/2018  Code status: Full Code    Active Hospital Problems    Diagnosis  POA    *Diabetic ketoacidosis without coma associated with type 2 diabetes mellitus [E11.10]  Yes     Priority: 1 - High    CKD stage 3 due to type 2 diabetes mellitus [E11.22, N18.3]  Yes    Asthma [J45.909]  Yes    Pulmonary embolism [I26.99]  Yes    Essential hypertension [I10]  Yes      Resolved Hospital Problems    Diagnosis Date Resolved POA   No resolved problems to display.        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 Pt reports she has not checked her sugars this week at all, and reports taking her long acting insulin once, (10u few days ago), reports abd pain worsened which led her to seek evaluation, currently reports abd pain improved after IV fluids and insulin. Denies recent illness, chest pain, has stable dyspnea with wheezing at times improved with inhaler, denies dysuria, denies hematochezia, denies any recent steroid use. No recent travel, sick contacts. Taking tylenol which has alleviated pain some. Reports some loose stools recently but has improved.      Hospital Course    Patient was admitted for management of DKA Due to non compliance with insulin. She was weaned of insulin infusion and started on subcutaneous insulin. Seen by endocrinologist who recommended increasing home dose of lantus to 15 units at night and metformin with close follow up with endocrinologist as outpatient.  Blood pressure medications resumed at discharge given resolution of TRUDY.       Recent Labs  Lab  "06/30/18  1808 06/30/18  1818 07/02/18  0532   WBC 5.74  --  5.39   HGB 14.4  --  12.4   HCT 43.7 48 37.4     --  179       Recent Labs  Lab 07/01/18  0433 07/01/18  0759 07/02/18  0532   * 138 137   K 4.7 4.7 4.0    107 105   CO2 18* 21* 24   BUN 38* 34* 23*   CREATININE 1.5* 1.5* 1.2   * 142* 124*   CALCIUM 8.9 9.2 9.2       Recent Labs  Lab 06/30/18  1909 07/02/18  0532   ALKPHOS 128 93   ALT 16 13   AST 20 18   ALBUMIN 4.0 3.2*   PROT 8.3 6.7   BILITOT 0.5 0.3        Recent Labs  Lab 07/01/18  1137 07/01/18  1646 07/01/18  2201 07/02/18  0212 07/02/18  0816 07/02/18  1210   POCTGLUCOSE 212* 121* 79 122* 188* 156*     No results for input(s): CPK, CPKMB, MB, TROPONINI in the last 72 hours.    No results for input(s): LACTATE in the last 72 hours.     Procedures: none     Consultants: Endocrinology     Discharge Medication List as of 7/2/2018 12:27 PM      CONTINUE these medications which have CHANGED    Details   dabigatran etexilate (PRADAXA) 150 mg Cap Take 1 capsule (150 mg total) by mouth 2 (two) times daily. "Do NOT break, chew, or open capsules.", Starting Mon 7/2/2018, Normal         CONTINUE these medications which have NOT CHANGED    Details   albuterol (PROVENTIL) 2.5 mg /3 mL (0.083 %) nebulizer solution Take 3 mLs (2.5 mg total) by nebulization every 6 (six) hours as needed for Wheezing., Starting Fri 11/10/2017, Until Sat 11/10/2018, Normal      aspirin (ECOTRIN) 81 MG EC tablet Take 1 tablet (81 mg total) by mouth once daily., Starting Mon 6/19/2017, Normal      insulin glargine (LANTUS SOLOSTAR) 100 unit/mL (3 mL) InPn pen Inject 15 Units into the skin every evening., Starting Sun 12/31/2017, Until Mon 12/31/2018, Normal      lisinopril (PRINIVIL,ZESTRIL) 20 MG tablet Take 1 tablet (20 mg total) by mouth once daily., Starting 4/15/2017, Until Discontinued, Normal      metFORMIN (GLUCOPHAGE) 1000 MG tablet Take 1 tablet (1,000 mg total) by mouth 2 (two) times daily with " meals., Starting Fri 12/15/2017, Until Sat 12/15/2018, Normal      simvastatin (ZOCOR) 20 MG tablet Take 1 tablet (20 mg total) by mouth every evening., Starting Fri 5/19/2017, Until Sat 6/30/2018, Normal      triamterene-hydrochlorothiazide 37.5-25 mg (MAXZIDE-25) 37.5-25 mg per tablet Take 1 tablet by mouth once daily., Starting 4/12/2017, Until Discontinued, Normal      blood sugar diagnostic Strp 200 strips by Misc.(Non-Drug; Combo Route) route 2 (two) times daily., Starting Fri 12/15/2017, Normal      citalopram (CELEXA) 10 MG tablet Take 1 tablet (10 mg total) by mouth once daily., Starting 3/28/2017, Until Thu 4/27/17, Normal      cyanocobalamin (VITAMIN B-12) 1000 MCG tablet Take 100 mcg by mouth once daily., Until Discontinued, Historical Med      fluocinonide 0.05% (LIDEX) 0.05 % cream Apply topically 2 (two) times daily. To rash on legs, Starting Mon 3/19/2018, Until Thu 3/29/2018, Normal      fluticasone-salmeterol 250-50 mcg/dose (ADVAIR) 250-50 mcg/dose diskus inhaler Inhale 1 puff into the lungs 2 (two) times daily., Starting 5/19/2017, Until Sat 5/19/18, Normal      lancets (ONETOUCH ULTRASOFT LANCETS) Misc 1 application by Misc.(Non-Drug; Combo Route) route 2 (two) times daily before meals., Starting Fri 12/15/2017, Normal         STOP taking these medications       buPROPion (WELLBUTRIN XL) 150 MG TB24 tablet Comments:   Reason for Stopping:         ferrous sulfate 325 (65 FE) MG EC tablet Comments:   Reason for Stopping:               Discharge Diet:diabetic diet: 2200 calorie with Normal Fluid intake of 1500 - 2000 mL per day    Activity: activity as tolerated    Discharge Condition: Stable    Disposition: Home or Self Care    Tests pending at the time of discharge: none       Time spent  on the discharge of the patient including review of hospital course with the patient. reviewing discharge medications and arranging follow-up care  35 minutes      Discharge examination Patient was seen and  examined on the date of discharge and determined to be suitable for discharge.    Discharge plan and follow up:  Follow up with PCP And Endocrinology for dm management    Future Appointments  Date Time Provider Department [place]   7/20/2018 8:20 AM Gerard Joyce MD [facility]       Provider  Jose R Núñez MD  Staff Hospitalist  Department of [facility] Medicine  [facility]   [phone]

## 2018-07-04 ENCOUNTER — PATIENT OUTREACH (OUTPATIENT)
Dept: ADMINISTRATIVE | Facility: CLINIC | Age: 53
End: 2018-07-04

## 2018-07-04 NOTE — PATIENT INSTRUCTIONS
"  Discharge Instructions: Checking for Ketones     Urine tests are one way of checking ketones.     The body needs glucose (a kind of sugar from food) for energy. If it doesnt get the glucose it needs, it starts burning fat. When fat is burned it produces ketones. Ketones can build up in the blood and urine. This buildup can cause a dangerous condition called ketoacidosis. Its for this reason that you should check for ketones at times when you are most at risk.   When to check for ketones  Check for ketones, especially if you have type 1 diabetes, when any of the following is true:  · Your blood sugar is above 250 mg/dL (milligrams per deciliter).  · You are ill or under stress.  · You have diarrhea (loose stool) or stomach pain.  · You are very thirsty or need to urinate often.  · You have a dry mouth or if your breath smells "fruity."   · You feel sick or nauseated, or you have vomited and are becoming dehydrated.  · You have run out of your usual diabetes medicines and cannot obtain them promptly (especially if you have been using insulin).   How to check for ketones  Suggestions for checking for ketones include the following:   · Check for ketones by using testing tablets or strips. Different types of test kits are available from your local pharmacy.  · Depending on the type of test you buy, you can check for ketones in your urine or in your blood.  · Record your test results in a notebook so that you can show them to your healthcare provider.  · Contact your healthcare provider if you test positive for ketones. If your sick day guidelines tell you what to do in the presence of high glucose and ketones, follow the instructions.   Urine tests  Tips for using urine tests include the following:   · Follow the package directions carefully.  · Use a clean container (one washed with soap and water) to get a sample of your urine.  · Place the test strip in the urine sample or pass the strip through your urine " stream.  · Gently shake excess urine off the strip.  · Wait for the strip to change color. The directions will tell you how long to wait.  · Compare the strip with the color chart on the bottle or package. This gives you a range for the number of ketones in your urine.  · Record your results.  Blood tests  Tips for testing your blood include the following:   · Use the meter and blood ketone strips as directed by your healthcare provider  · Record your results.  To learn more  The resources below can help you learn more:  · American Diabetes Association [phone] www.diabetes.org  · [facility] 702-614-0827 www.hormone.org  Follow-up  Make a follow-up appointment as directed by your healthcare provider.  When to call your healthcare provider  Call your healthcare provider right away if you have any of the following:  · Fever of 100.4°F (38°C) or higher  · Fatigue  · Dry or flushed skin  · Nausea or vomiting  · Stomach pain  · Shallow breathing  · A sweet, fruity odor on your breath  · Confusion  · If your urine shows moderate to large amounts of ketones    Date Last Reviewed: 7/1/2016  © 4350-5952 [employer]. [address] [place] [address], [place] [address]. All rights reserved. This information is not intended as a substitute for professional medical care. Always follow your healthcare professional's instructions.

## 2018-07-09 ENCOUNTER — OFFICE VISIT (OUTPATIENT)
Dept: INTERNAL MEDICINE | Facility: CLINIC | Age: 53
End: 2018-07-09
Payer: COMMERCIAL

## 2018-07-09 DIAGNOSIS — I27.82 OTHER CHRONIC PULMONARY EMBOLISM WITHOUT ACUTE COR PULMONALE: ICD-10-CM

## 2018-07-09 DIAGNOSIS — E11.22 CKD STAGE 3 DUE TO TYPE 2 DIABETES MELLITUS: ICD-10-CM

## 2018-07-09 DIAGNOSIS — E11.10 DIABETIC KETOACIDOSIS WITHOUT COMA ASSOCIATED WITH TYPE 2 DIABETES MELLITUS: ICD-10-CM

## 2018-07-09 DIAGNOSIS — N18.30 CKD STAGE 3 DUE TO TYPE 2 DIABETES MELLITUS: ICD-10-CM

## 2018-07-09 DIAGNOSIS — I10 ESSENTIAL HYPERTENSION: Primary | ICD-10-CM

## 2018-07-09 DIAGNOSIS — E11.9 TYPE 2 DIABETES MELLITUS WITHOUT COMPLICATION, WITHOUT LONG-TERM CURRENT USE OF INSULIN: ICD-10-CM

## 2018-07-09 PROCEDURE — 99999 PR PBB SHADOW E&M-EST. PATIENT-LVL II: CPT | Mod: PBBFAC,,, | Performed by: FAMILY MEDICINE

## 2018-07-09 PROCEDURE — 99214 OFFICE O/P EST MOD 30 MIN: CPT | Mod: S$GLB,,, | Performed by: FAMILY MEDICINE

## 2018-07-09 NOTE — PROGRESS NOTES
Subjective:       Patient ID: Lyssa Gandara is a 53 y.o. female.    Chief Complaint: No chief complaint on file.  Lyssa Gandara 53 y.o. female is here for office visit to review care and physical exam, reports going to ER not feeling well, hot, dehydrated and found to have high CBg, since reports has been good, wonders about alt tx than present insulin, Lantus 15 U.  ROS unremarkable today but has chronic SOB, has had PEs in past, wonders what breathing status is, neb needed.      HPI  Review of Systems   Constitutional: Negative for activity change, fatigue, fever and unexpected weight change.   HENT: Negative for congestion, hearing loss, postnasal drip and rhinorrhea.    Eyes: Negative for redness and visual disturbance.   Respiratory: Negative for chest tightness, shortness of breath and wheezing.    Cardiovascular: Negative for chest pain, palpitations and leg swelling.   Gastrointestinal: Negative for abdominal distention.   Genitourinary: Negative for decreased urine volume, dysuria, flank pain, hematuria, pelvic pain and urgency.   Musculoskeletal: Negative for back pain, gait problem, joint swelling and neck stiffness.   Skin: Negative for color change, rash and wound.   Neurological: Negative for dizziness, syncope, weakness and headaches.   Psychiatric/Behavioral: Negative for behavioral problems, confusion and sleep disturbance. The patient is not nervous/anxious.        Objective:      Physical Exam   Constitutional: She is oriented to person, place, and time. She appears well-developed and well-nourished. No distress.   HENT:   Head: Normocephalic.   Mouth/Throat: No oropharyngeal exudate.   Eyes: EOM are normal. Pupils are equal, round, and reactive to light. No scleral icterus.   Neck: Neck supple. No JVD present. No thyromegaly present.   Cardiovascular: Normal rate, regular rhythm and normal heart sounds.  Exam reveals no gallop and no friction rub.    No murmur  heard.  Pulmonary/Chest: Effort normal and breath sounds normal. She has no wheezes. She has no rales.   Abdominal: Soft. Bowel sounds are normal. She exhibits no distension and no mass. There is no tenderness. There is no guarding.   Musculoskeletal: Normal range of motion. She exhibits no edema.   Lymphadenopathy:     She has no cervical adenopathy.   Neurological: She is alert and oriented to person, place, and time. She has normal reflexes. She displays normal reflexes. No cranial nerve deficit. She exhibits normal muscle tone.   Skin: Skin is warm. No rash noted. No erythema.   Psychiatric: She has a normal mood and affect. Thought content normal.       Assessment:       1. Essential hypertension    2. Type 2 diabetes mellitus without complication, without long-term current use of insulin    3. Diabetic ketoacidosis without coma associated with type 2 diabetes mellitus    4. CKD stage 3 due to type 2 diabetes mellitus    5. Other chronic pulmonary embolism without acute cor pulmonale        Plan:       Diagnoses and all orders for this visit:    Essential hypertension  - folow, controled  Type 2 diabetes mellitus without complication, without long-term current use of insulin  - rtc with CBGs, trt y Trulicity  Diabetic ketoacidosis without coma associated with type 2 diabetes mellitus  - resolved, diet and hydration advised  CKD stage 3 due to type 2 diabetes mellitus  - control risk  Other chronic pulmonary embolism without acute cor pulmonale  -     Ambulatory Referral to Pulmonology

## 2018-07-16 RX ORDER — DABIGATRAN ETEXILATE 150 MG/1
150 CAPSULE ORAL 2 TIMES DAILY
Qty: 60 CAPSULE | Refills: 0 | Status: SHIPPED | OUTPATIENT
Start: 2018-07-16 | End: 2021-01-01

## 2018-07-16 NOTE — TELEPHONE ENCOUNTER
----- Message from Mirta Mackay sent at 7/16/2018 11:34 AM CDT -----  Contact: Taran [phone] Pt [facility]  Pharmacy is calling to clarify an RX.  RX name: dabigatran etexilate (PRADAXA) 150 mg Cap   What do they need to clarify:  Authorization  Comments: Taran with [facility] pharmacy states it was sent over on 7/06 and waiting for authorization.

## 2018-07-17 ENCOUNTER — TELEPHONE (OUTPATIENT)
Dept: INTERNAL MEDICINE | Facility: CLINIC | Age: 53
End: 2018-07-17

## 2018-07-17 NOTE — TELEPHONE ENCOUNTER
"----- Message from Bria Montero sent at 7/17/2018  9:53 AM CDT -----  Contact: [facility]   Prior Authorization Needed    Medication: dabigatran etexilate (PRADAXA) 150 mg Cap    Pharmacy Info: [facility] [address] [place] [address] 1086 Count includes the [facility] does not cover this medication. Please call plan at [phone] to initiate prior authorization or call/fax pharmacy to change medication. Patient ID#[id number] Group [id number]    Note chart when prior authorization has been submitted.    Please notify pharmacy when prior authorization has been approved.    OR    Prior Authorization Needed    To submit the PA:    1: Go to " [email or web address] " and click "Enter a Key"    2. Enter the patient's last name and date of birth and the key.      KEY: [id number]    3. Complete the forms and click "send to Plan"    Note chart when prior authorization has been submitted.    Please notify pharmacy when prior authorization has been approved.    Thank You      "

## 2018-07-19 DIAGNOSIS — Z12.39 BREAST CANCER SCREENING: ICD-10-CM

## 2018-07-23 ENCOUNTER — TELEPHONE (OUTPATIENT)
Dept: INTERNAL MEDICINE | Facility: CLINIC | Age: 53
End: 2018-07-23

## 2018-07-23 NOTE — TELEPHONE ENCOUNTER
"----- Message from Bria Montero sent at 7/23/2018  3:33 PM CDT -----  2nd Request    Prior Authorization Needed     Medication: dabigatran etexilate (PRADAXA) 150 mg Cap     Pharmacy Info: [facility] [address] [place] [address] [address] [facility] does not cover this medication. Please call plan at [phone] to initiate prior authorization or call/fax pharmacy to change medication. Patient ID#[id number] Group [id number]     Note chart when prior authorization has been submitted.     Please notify pharmacy when prior authorization has been approved.     OR     Prior Authorization Needed     To submit the PA:     1: Go to " [email or web address] " and click "Enter a Key"     2. Enter the patient's last name and date of birth and the key.                            KEY: [id number]     3. Complete the forms and click "send to Plan"     Note chart when prior authorization has been submitted.     Please notify pharmacy when prior authorization has been approved.     Thank You     "

## 2018-07-26 DIAGNOSIS — Z12.11 COLON CANCER SCREENING: ICD-10-CM

## 2018-07-31 ENCOUNTER — PATIENT MESSAGE (OUTPATIENT)
Dept: INTERNAL MEDICINE | Facility: CLINIC | Age: 53
End: 2018-07-31

## 2018-08-01 ENCOUNTER — TELEPHONE (OUTPATIENT)
Dept: INTERNAL MEDICINE | Facility: CLINIC | Age: 53
End: 2018-08-01

## 2018-08-01 NOTE — TELEPHONE ENCOUNTER
----- Message from Bria Montero sent at 8/1/2018  1:08 PM CDT -----  Contact: [facility]  3rd Request    Prior Authorization Needed    Medication: dulaglutide 0.75 mg/0.5 mL SolaIj    Pharmacy Info: [facility] - [place], [place] [facility] [address] [facility] does not cover this medication. Please call plan at [phone] to initiate prior authorization or call/fax pharmacy to change medication. Patient ID#[id number] Group [id number]    Note chart when prior authorization has been submitted.    Please notify pharmacy when prior authorization has been approved.    Thank You

## 2018-08-03 ENCOUNTER — TELEPHONE (OUTPATIENT)
Dept: INTERNAL MEDICINE | Facility: CLINIC | Age: 53
End: 2018-08-03

## 2018-08-03 NOTE — TELEPHONE ENCOUNTER
"----- Message from Vic Kinsey MA sent at 8/3/2018  8:41 AM CDT -----  Contact: [id number] [phone]      ----- Message -----  From: Bria Montero  Sent: 8/3/2018   8:39 AM  To: Maria Del Carmen HOLMAN Staff    Prior Authorization Needed    To submit the PA:    1: Go to " [email or web address] " and click "Enter a Key"    2. Enter the patient's last name and date of birth and the key.      KEY: [id number]    3. Complete the forms and click "send to Plan"    Note chart when prior authorization has been submitted.    Please notify pharmacy when prior authorization has been approved.    Thank You    "

## 2018-09-06 ENCOUNTER — OFFICE VISIT (OUTPATIENT)
Dept: INTERNAL MEDICINE | Facility: CLINIC | Age: 53
End: 2018-09-06
Payer: COMMERCIAL

## 2018-09-06 ENCOUNTER — DOCUMENTATION ONLY (OUTPATIENT)
Dept: INTERNAL MEDICINE | Facility: CLINIC | Age: 53
End: 2018-09-06

## 2018-09-06 VITALS
HEART RATE: 88 BPM | BODY MASS INDEX: 31.24 KG/M2 | SYSTOLIC BLOOD PRESSURE: 124 MMHG | DIASTOLIC BLOOD PRESSURE: 78 MMHG | WEIGHT: 183 LBS | OXYGEN SATURATION: 98 % | HEIGHT: 64 IN

## 2018-09-06 DIAGNOSIS — E11.22 CKD STAGE 3 DUE TO TYPE 2 DIABETES MELLITUS: ICD-10-CM

## 2018-09-06 DIAGNOSIS — Z79.01 LONG TERM CURRENT USE OF ANTICOAGULANT THERAPY: ICD-10-CM

## 2018-09-06 DIAGNOSIS — B35.1 TOENAIL FUNGUS: ICD-10-CM

## 2018-09-06 DIAGNOSIS — E11.9 TYPE 2 DIABETES MELLITUS WITHOUT COMPLICATION, WITHOUT LONG-TERM CURRENT USE OF INSULIN: Primary | ICD-10-CM

## 2018-09-06 DIAGNOSIS — N18.30 CKD STAGE 3 DUE TO TYPE 2 DIABETES MELLITUS: ICD-10-CM

## 2018-09-06 DIAGNOSIS — I27.82 OTHER CHRONIC PULMONARY EMBOLISM WITHOUT ACUTE COR PULMONALE: ICD-10-CM

## 2018-09-06 DIAGNOSIS — I10 ESSENTIAL HYPERTENSION: ICD-10-CM

## 2018-09-06 DIAGNOSIS — J45.52 SEVERE PERSISTENT ASTHMA WITH STATUS ASTHMATICUS: Chronic | ICD-10-CM

## 2018-09-06 DIAGNOSIS — J20.9 ACUTE BRONCHITIS, UNSPECIFIED ORGANISM: ICD-10-CM

## 2018-09-06 PROCEDURE — 3074F SYST BP LT 130 MM HG: CPT | Mod: CPTII,S$GLB,, | Performed by: NURSE PRACTITIONER

## 2018-09-06 PROCEDURE — 99999 PR PBB SHADOW E&M-EST. PATIENT-LVL IV: CPT | Mod: PBBFAC,,, | Performed by: NURSE PRACTITIONER

## 2018-09-06 PROCEDURE — 99214 OFFICE O/P EST MOD 30 MIN: CPT | Mod: S$GLB,,, | Performed by: NURSE PRACTITIONER

## 2018-09-06 PROCEDURE — 3078F DIAST BP <80 MM HG: CPT | Mod: CPTII,S$GLB,, | Performed by: NURSE PRACTITIONER

## 2018-09-06 PROCEDURE — 3008F BODY MASS INDEX DOCD: CPT | Mod: CPTII,S$GLB,, | Performed by: NURSE PRACTITIONER

## 2018-09-06 PROCEDURE — 3046F HEMOGLOBIN A1C LEVEL >9.0%: CPT | Mod: CPTII,S$GLB,, | Performed by: NURSE PRACTITIONER

## 2018-09-06 RX ORDER — CLOTRIMAZOLE 1 %
CREAM (GRAM) TOPICAL 2 TIMES DAILY
Qty: 45 G | Refills: 0 | Status: SHIPPED | OUTPATIENT
Start: 2018-09-06 | End: 2018-12-01 | Stop reason: SDUPTHER

## 2018-09-06 RX ORDER — FLUOCINONIDE 0.5 MG/G
CREAM TOPICAL
COMMUNITY
Start: 2018-08-18 | End: 2019-11-25 | Stop reason: ALTCHOICE

## 2018-09-06 RX ORDER — FLUTICASONE PROPIONATE AND SALMETEROL 250; 50 UG/1; UG/1
1 POWDER RESPIRATORY (INHALATION) 2 TIMES DAILY
Qty: 180 EACH | Refills: 3 | Status: SHIPPED | OUTPATIENT
Start: 2018-09-06 | End: 2019-01-04 | Stop reason: SDUPTHER

## 2018-09-06 NOTE — PROGRESS NOTES
"INTERNAL MEDICINE PROGRESS NOTE    CHIEF COMPLAINT     Chief Complaint   Patient presents with    Follow-up    Medication Refill       HPI     Lyssa Gandara is a 53 y.o. female with depression, asthma, htn, carotid artery disease, scleroderma, h/o DVT and PE on anticoagulants, pedro, dm, CKD and h/o cerebral infarct who presents for a 2 month follow up visit today.    DM- using trulicity 0.75mg nightly and metformin. On ace-I and simvastatin.   Last A1c 12.0 2018  LDL 2017 t goal.   CBG readings- . Feeling much better controlled on Trulicity   Eye- done 2018 - outside facility [facility]   Foot- today     HTN- compliant with lisinopril and triamterene-hctz.     H/o PE and DVT- on pradaxa     Asthma- compliant with advair and proventil. Reports "dampness" and "humidty" causing more frequent attacks. Using inh 1-2 times per day. Would like another rx for nebulizer. Has medication but hand written rx for machine          Past Medical History:  Past Medical History:   Diagnosis Date    Asthma     Dyspnea on exertion     Hx of long term use of blood thinners     Hypertension 3/8/2016    PE (pulmonary embolism)     Hx of multiple DVT/PE    Presence of IVC filter     Scleroderma     Stroke 2017    Type 2 diabetes mellitus without complication        Home Medications:  Prior to Admission medications    Medication Sig Start Date End Date Taking? Authorizing Provider   albuterol (PROVENTIL) 2.5 mg /3 mL (0.083 %) nebulizer solution Take 3 mLs (2.5 mg total) by nebulization every 6 (six) hours as needed for Wheezing. 11/10/17 11/10/18  Gerard Joyce MD   aspirin (ECOTRIN) 81 MG EC tablet Take 1 tablet (81 mg total) by mouth once daily. 17   Gerard Joyce MD   blood sugar diagnostic Strp 200 strips by Misc.(Non-Drug; Combo Route) route 2 (two) times daily. 12/15/17   Gerard Joyce MD   dabigatran etexilate (PRADAXA) 150 mg Cap Take 1 capsule (150 mg total) by mouth 2 " "(two) times daily. "Do NOT break, chew, or open capsules." 7/16/18   Gerard Joyce MD   dulaglutide 0.75 mg/0.5 mL PnIj INJECT 0.75MG INTO THE SKIN EVERY 7 DAYS 7/31/18   Gerard Joyce MD   fluocinonide 0.05% (LIDEX) 0.05 % cream Apply topically 2 (two) times daily. To rash on legs 3/19/18 7/4/18  Marilin García MD   fluticasone-salmeterol 250-50 mcg/dose (ADVAIR) 250-50 mcg/dose diskus inhaler Inhale 1 puff into the lungs 2 (two) times daily. 5/19/17 7/4/18  Gerard Joyce MD   lancets (ONETOUCH ULTRASOFT LANCETS) Misc 1 application by Misc.(Non-Drug; Combo Route) route 2 (two) times daily before meals. 12/15/17   Gerard Joyce MD   lisinopril (PRINIVIL,ZESTRIL) 20 MG tablet Take 1 tablet (20 mg total) by mouth once daily. 4/15/17   Gerard Joyce MD   metFORMIN (GLUCOPHAGE) 1000 MG tablet Take 1 tablet (1,000 mg total) by mouth 2 (two) times daily with meals. 12/15/17 12/15/18  Gerard Joyce MD   simvastatin (ZOCOR) 20 MG tablet Take 1 tablet (20 mg total) by mouth every evening. 5/19/17 7/4/18  Gerard Joyce MD   triamterene-hydrochlorothiazide 37.5-25 mg (MAXZIDE-25) 37.5-25 mg per tablet Take 1 tablet by mouth once daily. 4/12/17   Gerard Joyce MD   insulin glargine (LANTUS SOLOSTAR) 100 unit/mL (3 mL) InPn pen Inject 15 Units into the skin every evening.  Patient taking differently: Inject 10 Units into the skin every evening.  12/31/17 9/6/18  Gregory Catherine MD       Review of Systems:  Review of Systems   Constitutional: Negative for chills, fatigue and fever.   Respiratory: Positive for cough, shortness of breath and wheezing.    Cardiovascular: Negative for chest pain and palpitations.   Gastrointestinal: Negative for abdominal pain, constipation, diarrhea, nausea and vomiting.   Endocrine: Negative for polydipsia, polyphagia and polyuria.   Genitourinary: Negative for dysuria, frequency and urgency.   Skin: Negative for rash.   Neurological: Negative for dizziness, light-headedness and " "headaches.       Health Maintainence:   Immunizations:  Health Maintenance       Date Due Completion Date    Foot Exam 04/30/1976 ---    Eye Exam 04/30/1976 ---    TETANUS VACCINE 04/30/1984 ---    Pneumococcal PPSV23 (Medium Risk) (1) 04/30/1984 ---    Pap Smear with HPV Cotest 04/30/1987 ---    High Dose Statin 04/30/1987 ---    Mammogram 04/30/2006 ---    Colonoscopy 04/30/2016 ---    Lipid Panel 02/26/2018 2/26/2017    Influenza Vaccine 08/01/2018 10/18/2016    Hemoglobin A1c 01/01/2019 7/1/2018           PHYSICAL EXAM     /78 (BP Location: Left arm, Patient Position: Sitting, BP Method: Large (Manual))   Pulse 88   Ht 5' 4" (1.626 m)   Wt 83 kg (182 lb 15.7 oz)   LMP 02/28/2018 (Within Months)   SpO2 98%   BMI 31.41 kg/m²     Physical Exam   Constitutional: She is oriented to person, place, and time. She appears well-developed and well-nourished.   HENT:   Head: Normocephalic.   Right Ear: External ear normal.   Left Ear: External ear normal.   Nose: Nose normal.   Mouth/Throat: Oropharynx is clear and moist. No oropharyngeal exudate.   Eyes: Pupils are equal, round, and reactive to light.   Neck: Neck supple. No JVD present. No tracheal deviation present. No thyromegaly present.   Cardiovascular: Normal rate, regular rhythm, normal heart sounds and intact distal pulses. Exam reveals no gallop and no friction rub.   No murmur heard.  Pulmonary/Chest: Effort normal and breath sounds normal. No respiratory distress. She has no wheezes. She has no rales.   Abdominal: Soft. Bowel sounds are normal. She exhibits no distension. There is no tenderness.   Musculoskeletal: Normal range of motion. She exhibits no edema or tenderness.   Lymphadenopathy:     She has no cervical adenopathy.   Neurological: She is alert and oriented to person, place, and time.   Skin: Skin is warm and dry. No rash noted.   Psychiatric: She has a normal mood and affect. Her behavior is normal.   Vitals reviewed.      LABS     Lab " Results   Component Value Date    HGBA1C 12.0 (H) 07/01/2018     CMP  Sodium   Date Value Ref Range Status   07/02/2018 137 136 - 145 mmol/L Final     Potassium   Date Value Ref Range Status   07/02/2018 4.0 3.5 - 5.1 mmol/L Final     Chloride   Date Value Ref Range Status   07/02/2018 105 95 - 110 mmol/L Final     CO2   Date Value Ref Range Status   07/02/2018 24 23 - 29 mmol/L Final     Glucose   Date Value Ref Range Status   07/02/2018 124 (H) 70 - 110 mg/dL Final     BUN, Bld   Date Value Ref Range Status   07/02/2018 23 (H) 6 - 20 mg/dL Final     Creatinine   Date Value Ref Range Status   07/02/2018 1.2 0.5 - 1.4 mg/dL Final     Calcium   Date Value Ref Range Status   07/02/2018 9.2 8.7 - 10.5 mg/dL Final     Total Protein   Date Value Ref Range Status   07/02/2018 6.7 6.0 - 8.4 g/dL Final     Albumin   Date Value Ref Range Status   07/02/2018 3.2 (L) 3.5 - 5.2 g/dL Final     Total Bilirubin   Date Value Ref Range Status   07/02/2018 0.3 0.1 - 1.0 mg/dL Final     Comment:     For infants and newborns, interpretation of results should be based  on gestational age, weight and in agreement with clinical  observations.  Premature Infant recommended reference ranges:  Up to 24 hours.............<8.0 mg/dL  Up to 48 hours............<12.0 mg/dL  3-5 days..................<15.0 mg/dL  6-29 days.................<15.0 mg/dL       Alkaline Phosphatase   Date Value Ref Range Status   07/02/2018 93 55 - 135 U/L Final     AST   Date Value Ref Range Status   07/02/2018 18 10 - 40 U/L Final     ALT   Date Value Ref Range Status   07/02/2018 13 10 - 44 U/L Final     Anion Gap   Date Value Ref Range Status   07/02/2018 8 8 - 16 mmol/L Final     eGFR if    Date Value Ref Range Status   07/02/2018 >60.0 >60 mL/min/1.73 m^2 Final     eGFR if non    Date Value Ref Range Status   07/02/2018 52.1 (A) >60 mL/min/1.73 m^2 Final     Comment:     Calculation used to obtain the estimated glomerular  filtration  rate (eGFR) is the CKD-EPI equation.        Lab Results   Component Value Date    WBC 5.39 07/02/2018    HGB 12.4 07/02/2018    HCT 37.4 07/02/2018    MCV 90 07/02/2018     07/02/2018     Lab Results   Component Value Date    CHOL 155 02/26/2017    CHOL 154 11/04/2016     Lab Results   Component Value Date    HDL 78 (H) 02/26/2017    HDL 70 11/04/2016     Lab Results   Component Value Date    LDLCALC 60.0 (L) 02/26/2017    LDLCALC 74.2 11/04/2016     Lab Results   Component Value Date    TRIG 85 02/26/2017    TRIG 49 11/04/2016     Lab Results   Component Value Date    CHOLHDL 50.3 (H) 02/26/2017    CHOLHDL 45.5 11/04/2016     Lab Results   Component Value Date    TSH 1.147 02/26/2017       ASSESSMENT/PLAN     Lyssa Gandara is a 53 y.o. female with  Past Medical History:   Diagnosis Date    Asthma     Dyspnea on exertion     Hx of long term use of blood thinners     Hypertension 3/8/2016    PE (pulmonary embolism)     Hx of multiple DVT/PE    Presence of IVC filter     Scleroderma     Stroke 02/2017    Type 2 diabetes mellitus without complication      Type 2 diabetes mellitus without complication, without long-term current use of insulin- CBG readings at goal. Would like to wait until she establishes with new PCP to check labs. Will check A1c at that time. Continue trulicity and metformin. Low sugar diet. Eye and foot UTD   -     dulaglutide 0.75 mg/0.5 mL PnIj; INJECT 0.75MG INTO THE SKIN EVERY 7 DAYS  Dispense: 2 mL; Refill: 6    Severe persistent asthma with status asthmaticus- with acute exacerbation. Will cont advair and albuterol inhaler. nebulaizer for home use.   Comments:  pt reported that she used to take advair but could not refill due to price, provided savings card, stated she will refill today  Orders:  -     fluticasone-salmeterol 250-50 mcg/dose (ADVAIR) 250-50 mcg/dose diskus inhaler; Inhale 1 puff into the lungs 2 (two) times daily.  Dispense: 180 each; Refill:  3  -     NEBULIZER FOR HOME USE    Acute bronchitis, unspecified organism- see above.   -     NEBULIZER FOR HOME USE    Essential hypertension- at goal. Cont current meds. Low Na diet     Toenail fungus- discussed treatment options. Will start cream to the area. If not effective in 6-8 weeks will discuss jublia or other polishes.   -     clotrimazole (LOTRIMIN) 1 % cream; Apply topically 2 (two) times daily.  Dispense: 45 g; Refill: 0    CKD stage 3 due to type 2 diabetes mellitus- stable. Cont BP and CBG control.     Other chronic pulmonary embolism without acute cor pulmonale- stable.     Long term current use of anticoagulant therapy- cont pradaxa.     Follow up as needed and with PCP     Patient education provided from [facility]. Patient was counseled on when and how to seek emergent care.       Aretha SIFUENTES, APRN, FNP-c   Department of Internal Medicine - [facility]  4:42 PM

## 2018-11-19 ENCOUNTER — PATIENT MESSAGE (OUTPATIENT)
Dept: INTERNAL MEDICINE | Facility: CLINIC | Age: 53
End: 2018-11-19

## 2018-11-19 DIAGNOSIS — J45.40 MODERATE PERSISTENT ASTHMA WITHOUT COMPLICATION: ICD-10-CM

## 2018-11-19 RX ORDER — ALBUTEROL SULFATE 0.83 MG/ML
2.5 SOLUTION RESPIRATORY (INHALATION) EVERY 6 HOURS PRN
Qty: 90 EACH | Refills: 0 | Status: SHIPPED | OUTPATIENT
Start: 2018-11-19 | End: 2018-12-26 | Stop reason: SDUPTHER

## 2018-12-01 DIAGNOSIS — B35.1 TOENAIL FUNGUS: ICD-10-CM

## 2018-12-03 RX ORDER — CLOTRIMAZOLE 1 %
CREAM (GRAM) TOPICAL
Qty: 60 G | Refills: 0 | Status: SHIPPED | OUTPATIENT
Start: 2018-12-03 | End: 2019-11-25 | Stop reason: ALTCHOICE

## 2018-12-26 ENCOUNTER — HOSPITAL ENCOUNTER (EMERGENCY)
Facility: HOSPITAL | Age: 53
Discharge: HOME | End: 2018-12-26
Attending: EMERGENCY MEDICINE
Payer: COMMERCIAL

## 2018-12-26 VITALS
HEART RATE: 100 BPM | WEIGHT: 180 LBS | SYSTOLIC BLOOD PRESSURE: 177 MMHG | OXYGEN SATURATION: 100 % | HEIGHT: 64 IN | BODY MASS INDEX: 30.73 KG/M2 | RESPIRATION RATE: 26 BRPM | TEMPERATURE: 99 F | DIASTOLIC BLOOD PRESSURE: 111 MMHG

## 2018-12-26 DIAGNOSIS — J20.9 ACUTE BRONCHITIS, UNSPECIFIED ORGANISM: Primary | ICD-10-CM

## 2018-12-26 DIAGNOSIS — R05.9 COUGH: ICD-10-CM

## 2018-12-26 DIAGNOSIS — J45.40 MODERATE PERSISTENT ASTHMA WITHOUT COMPLICATION: ICD-10-CM

## 2018-12-26 LAB
ALBUMIN SERPL BCP-MCNC: 4 G/DL
ALP SERPL-CCNC: 113 U/L
ALT SERPL W/O P-5'-P-CCNC: 13 U/L
ANION GAP SERPL CALC-SCNC: 12 MMOL/L
AST SERPL-CCNC: 21 U/L
BASOPHILS # BLD AUTO: 0.04 K/UL
BASOPHILS NFR BLD: 0.7 %
BILIRUB SERPL-MCNC: 0.5 MG/DL
BUN SERPL-MCNC: 15 MG/DL
CALCIUM SERPL-MCNC: 9.7 MG/DL
CHLORIDE SERPL-SCNC: 97 MMOL/L
CO2 SERPL-SCNC: 25 MMOL/L
CREAT SERPL-MCNC: 1.4 MG/DL
DIFFERENTIAL METHOD: ABNORMAL
EOSINOPHIL # BLD AUTO: 0.2 K/UL
EOSINOPHIL NFR BLD: 3 %
ERYTHROCYTE [DISTWIDTH] IN BLOOD BY AUTOMATED COUNT: 13.4 %
EST. GFR  (AFRICAN AMERICAN): 49.8 ML/MIN/1.73 M^2
EST. GFR  (NON AFRICAN AMERICAN): 43.2 ML/MIN/1.73 M^2
GLUCOSE SERPL-MCNC: 379 MG/DL
HCT VFR BLD AUTO: 43.3 %
HGB BLD-MCNC: 13.7 G/DL
IMM GRANULOCYTES # BLD AUTO: 0.02 K/UL
IMM GRANULOCYTES NFR BLD AUTO: 0.4 %
LYMPHOCYTES # BLD AUTO: 1 K/UL
LYMPHOCYTES NFR BLD: 18.7 %
MCH RBC QN AUTO: 29.2 PG
MCHC RBC AUTO-ENTMCNC: 31.6 G/DL
MCV RBC AUTO: 92 FL
MONOCYTES # BLD AUTO: 0.5 K/UL
MONOCYTES NFR BLD: 8.8 %
NEUTROPHILS # BLD AUTO: 3.7 K/UL
NEUTROPHILS NFR BLD: 68.4 %
NRBC BLD-RTO: 0 /100 WBC
PLATELET # BLD AUTO: 208 K/UL
PMV BLD AUTO: 11 FL
POCT GLUCOSE: 397 MG/DL (ref 70–110)
POTASSIUM SERPL-SCNC: 4.8 MMOL/L
PROT SERPL-MCNC: 9.3 G/DL
RBC # BLD AUTO: 4.69 M/UL
SODIUM SERPL-SCNC: 134 MMOL/L
WBC # BLD AUTO: 5.34 K/UL

## 2018-12-26 PROCEDURE — 99284 EMERGENCY DEPT VISIT MOD MDM: CPT | Mod: 25

## 2018-12-26 PROCEDURE — 99284 EMERGENCY DEPT VISIT MOD MDM: CPT | Mod: ,,, | Performed by: PHYSICIAN ASSISTANT

## 2018-12-26 PROCEDURE — 80053 COMPREHEN METABOLIC PANEL: CPT

## 2018-12-26 PROCEDURE — 25000003 PHARM REV CODE 250: Performed by: PHYSICIAN ASSISTANT

## 2018-12-26 PROCEDURE — 85025 COMPLETE CBC W/AUTO DIFF WBC: CPT

## 2018-12-26 PROCEDURE — 25000242 PHARM REV CODE 250 ALT 637 W/ HCPCS: Performed by: PHYSICIAN ASSISTANT

## 2018-12-26 PROCEDURE — 94640 AIRWAY INHALATION TREATMENT: CPT

## 2018-12-26 PROCEDURE — 82962 GLUCOSE BLOOD TEST: CPT

## 2018-12-26 RX ORDER — DOXYCYCLINE 100 MG/1
100 CAPSULE ORAL 2 TIMES DAILY
Qty: 14 CAPSULE | Refills: 0 | Status: SHIPPED | OUTPATIENT
Start: 2018-12-26 | End: 2019-01-02

## 2018-12-26 RX ORDER — IPRATROPIUM BROMIDE AND ALBUTEROL SULFATE 2.5; .5 MG/3ML; MG/3ML
3 SOLUTION RESPIRATORY (INHALATION)
Status: COMPLETED | OUTPATIENT
Start: 2018-12-26 | End: 2018-12-26

## 2018-12-26 RX ORDER — METFORMIN HYDROCHLORIDE 500 MG/1
500 TABLET ORAL
Status: COMPLETED | OUTPATIENT
Start: 2018-12-26 | End: 2018-12-26

## 2018-12-26 RX ADMIN — IPRATROPIUM BROMIDE AND ALBUTEROL SULFATE 3 ML: .5; 3 SOLUTION RESPIRATORY (INHALATION) at 02:12

## 2018-12-26 RX ADMIN — METFORMIN HYDROCHLORIDE 500 MG: 500 TABLET ORAL at 02:12

## 2018-12-26 NOTE — DISCHARGE INSTRUCTIONS
Take Doxycycline twice a day for 7 days.  Follow-up with your primary care physician.  Return to the ED for any concerning symptoms.

## 2018-12-26 NOTE — ED NOTES
LOC: The patient is awake and alert; oriented x 3 and speaking appropriately.  APPEARANCE: Patient resting comfortably, patient is clean and well groomed  SKIN: warm and dry, normal skin turgor & moist mucus membranes, skin intact, no breakdown noted.  MUSCULOSKELETAL: Patient moving all extremities well, no obvious swelling or deformities noted  RESPIRATORY: Airway is open and patent,  respirations are spontaneous, normal effort and rate, SOB w/ exertion and productive cough  CARDIAC: Patient has a normal rate, no peripheral edema noted, capillary refill < 3 seconds; No complaints of chest pain   ABDOMEN: Soft and non tender to palpation, no distention noted. Bowel sounds present x 4

## 2018-12-26 NOTE — ED PROVIDER NOTES
Encounter Date: 12/26/2018       History     Chief Complaint   Patient presents with    Cough     Patient is a 53-year-old  female with a PMH of asthma, PE s/p IVF filter, HTN, DM 2, CVA ('17) who presents to the ED for urgent evaluation of cough.  She reports a 2 day history of cough productive of green mucus, subjective fever and chills, low back pain, and shortness of breath. She regularly uses Advair for asthma management, but reports this alone has not been effective and has required increased use of her nebulizer machine.  She has also taken Tylenol for subjective fevers with some relief.  She denies headache, sore throat, ear pain, chest pain, abdominal pain, N/V/D, or dysuria.  She did not take her metformin this morning because she states she was feeling too bad.      The history is provided by the patient.     Review of patient's allergies indicates:   Allergen Reactions    Plasma, human, normal      Past Medical History:   Diagnosis Date    Asthma     Dyspnea on exertion     Hx of long term use of blood thinners     Hypertension 3/8/2016    PE (pulmonary embolism)     Hx of multiple DVT/PE    Presence of IVC filter     Scleroderma     Stroke 02/2017    Type 2 diabetes mellitus without complication      Past Surgical History:   Procedure Laterality Date    DANETTE FILTER PLACEMENT       Family History   Problem Relation Age of Onset    Breast cancer Mother     Hypertension Father     No Known Problems Brother     No Known Problems Maternal Grandmother     No Known Problems Maternal Grandfather     No Known Problems Paternal Grandmother     No Known Problems Paternal Grandfather     Diabetes Mellitus Maternal Uncle     No Known Problems Sister     No Known Problems Maternal Aunt     No Known Problems Paternal Aunt     No Known Problems Paternal Uncle     Psoriasis Neg Hx     Rheum arthritis Neg Hx     Thyroid disease Neg Hx     Lupus Neg Hx     Anemia Neg Hx      Arrhythmia Neg Hx     Asthma Neg Hx     Clotting disorder Neg Hx     Fainting Neg Hx     Heart attack Neg Hx     Heart disease Neg Hx     Heart failure Neg Hx     Hyperlipidemia Neg Hx     Stroke Neg Hx     Atrial Septal Defect Neg Hx      Social History     Tobacco Use    Smoking status: Never Smoker    Smokeless tobacco: Never Used   Substance Use Topics    Alcohol use: Yes     Alcohol/week: 1.8 oz     Types: 3 Cans of beer per week     Comment:  a day    Drug use: No     Review of Systems   Constitutional: Positive for chills and fever.   HENT: Negative for congestion, rhinorrhea, sinus pressure, sinus pain and sore throat.    Eyes: Negative for pain and visual disturbance.   Respiratory: Positive for cough and shortness of breath.    Cardiovascular: Negative for chest pain and leg swelling.   Gastrointestinal: Negative for abdominal pain, constipation, diarrhea, nausea and vomiting.   Genitourinary: Negative for dysuria.   Musculoskeletal: Positive for back pain. Negative for myalgias.   Skin: Negative for wound.   Neurological: Negative for dizziness, weakness and headaches.   Psychiatric/Behavioral: Negative for dysphoric mood.       Physical Exam     Initial Vitals [12/26/18 1246]   BP Pulse Resp Temp SpO2   (!) 223/101 95 16 99 °F (37.2 °C) 96 %      MAP       --         Physical Exam    Nursing note and vitals reviewed.  Constitutional: She appears well-developed and well-nourished. She is not diaphoretic. No distress.   HENT:   Head: Normocephalic and atraumatic.   Nose: Nose normal. No mucosal edema.   Mouth/Throat: Uvula is midline, oropharynx is clear and moist and mucous membranes are normal. No oropharyngeal exudate, posterior oropharyngeal edema, posterior oropharyngeal erythema or tonsillar abscesses.   Eyes: Conjunctivae are normal. Pupils are equal, round, and reactive to light.   Neck: Normal range of motion. Neck supple.   Cardiovascular: Normal rate, regular rhythm, normal  heart sounds and intact distal pulses.   Pulmonary/Chest: She has wheezes.   Mild, diffuse end-expiratory wheezes.   Abdominal: Soft. Bowel sounds are normal. There is no tenderness.   Musculoskeletal: Normal range of motion. She exhibits no edema or tenderness.   No midline spinal tenderness.    Lymphadenopathy:     She has no cervical adenopathy.   Neurological: She is alert and oriented to person, place, and time.   Skin: Skin is warm and dry.   Psychiatric: She has a normal mood and affect. Thought content normal.         ED Course   Procedures  Labs Reviewed   POCT GLUCOSE - Abnormal; Notable for the following components:       Result Value    POCT Glucose 397 (*)     All other components within normal limits   CBC W/ AUTO DIFFERENTIAL   COMPREHENSIVE METABOLIC PANEL   POCT GLUCOSE MONITORING CONTINUOUS          Imaging Results    None          Medical Decision Making:   Initial Assessment:   53AAF with PMHx of asthma, PE s/p IVF filter, HTN, DMII, CVA ('17) who presents to the ED for 2-day hx of productive cough of green mucus, SOB, back pain, and subjective fever/chills. PE reveals a non-toxic, afebrile, well-appearing female in NAD. VSS. She is speaking in clear and full sentences with O2 saturation of 99% on RA.  PERRLA.  EOMI. Post-oropharyngeal cavity clear without evidence of erythema or exudate. No sinus tenderness or cervical lymphadenopathy.  HeartRRR.  Mild diffuse end-expiratory wheezes heard.  Abdomen soft and nontender.  Intact Distal pulses.  Differential Diagnosis:   DDx includes but is not limited to: asthma exacerbation, viral URI, pneumonia.   Clinical Tests:   Lab Tests: Ordered and Reviewed  Radiological Study: Ordered and Reviewed  ED Management:  Will obtain basic labs, CXR. Will give Duonebs. Glu 397 on arrival; will give home dose of Metformin. Will closely monitor and reassess.     Pertinent labs include no leukocytosis, normal H&H, slightly low Na to 134, Glu 379. CXR exhibiting  chronic changes without evidence of acute abnormalities. Given patient's history of asthma and green mucus sputum production, I feel this patient will benefit from antibiotic therapy for treatment of acute bronchitis. She will be discharged home with Doxycycline 100mg BID x 7 days. BP reduced to 177/111 upon discharge. She was instructed to fu with her PCP. ED return precautions given. I have discussed patient case with my supervisory physician, who is in agreement with my assessment and plan.                Attending Attestation:     Physician Attestation Statement for NP/PA:   I discussed this assessment and plan of this patient with the NP/PA, but I did not personally examine the patient. The face to face encounter was performed by the NP/PA.                     Clinical Impression:   The primary encounter diagnosis was Acute bronchitis, unspecified organism. A diagnosis of Cough was also pertinent to this visit.      Disposition:   Disposition: Discharged  Condition: Stable                        Keily Santos PA-C  12/27/18 [id number]       Jodee Fontana MD  12/29/18 [id number]

## 2018-12-27 RX ORDER — ALBUTEROL SULFATE 0.83 MG/ML
SOLUTION RESPIRATORY (INHALATION)
Qty: 75 EACH | Refills: 0 | Status: SHIPPED | OUTPATIENT
Start: 2018-12-27 | End: 2019-01-04 | Stop reason: SDUPTHER

## 2018-12-28 ENCOUNTER — HOSPITAL ENCOUNTER (EMERGENCY)
Facility: HOSPITAL | Age: 53
Discharge: HOME | End: 2018-12-29
Attending: EMERGENCY MEDICINE
Payer: COMMERCIAL

## 2018-12-28 DIAGNOSIS — R11.0 NAUSEA: ICD-10-CM

## 2018-12-28 DIAGNOSIS — R73.9 HYPERGLYCEMIA: Primary | ICD-10-CM

## 2018-12-28 LAB
BUN SERPL-MCNC: 37 MG/DL (ref 6–30)
CHLORIDE SERPL-SCNC: 98 MMOL/L (ref 95–110)
CREAT SERPL-MCNC: 1.4 MG/DL (ref 0.5–1.4)
GLUCOSE SERPL-MCNC: 330 MG/DL (ref 70–110)
HCT VFR BLD CALC: 51 %PCV (ref 36–54)
POC IONIZED CALCIUM: 1.09 MMOL/L (ref 1.06–1.42)
POC TCO2 (MEASURED): 26 MMOL/L (ref 23–29)
POCT GLUCOSE: 362 MG/DL (ref 70–110)
POTASSIUM BLD-SCNC: 5.6 MMOL/L (ref 3.5–5.1)
SAMPLE: ABNORMAL
SODIUM BLD-SCNC: 134 MMOL/L (ref 136–145)

## 2018-12-28 PROCEDURE — 25000003 PHARM REV CODE 250: Performed by: NURSE PRACTITIONER

## 2018-12-28 PROCEDURE — 93010 ELECTROCARDIOGRAM REPORT: CPT | Mod: ,,, | Performed by: INTERNAL MEDICINE

## 2018-12-28 PROCEDURE — 99284 EMERGENCY DEPT VISIT MOD MDM: CPT | Mod: 25

## 2018-12-28 PROCEDURE — 93010 EKG 12-LEAD: ICD-10-PCS | Mod: ,,, | Performed by: INTERNAL MEDICINE

## 2018-12-28 PROCEDURE — 99284 PR EMERGENCY DEPT VISIT,LEVEL IV: ICD-10-PCS | Mod: ,,, | Performed by: EMERGENCY MEDICINE

## 2018-12-28 PROCEDURE — 99284 EMERGENCY DEPT VISIT MOD MDM: CPT | Mod: ,,, | Performed by: EMERGENCY MEDICINE

## 2018-12-28 PROCEDURE — 93005 ELECTROCARDIOGRAM TRACING: CPT

## 2018-12-28 PROCEDURE — 82962 GLUCOSE BLOOD TEST: CPT

## 2018-12-28 RX ADMIN — SODIUM CHLORIDE 1000 ML: 0.9 INJECTION, SOLUTION INTRAVENOUS at 11:12

## 2018-12-29 ENCOUNTER — PATIENT MESSAGE (OUTPATIENT)
Dept: INTERNAL MEDICINE | Facility: CLINIC | Age: 53
End: 2018-12-29

## 2018-12-29 VITALS
OXYGEN SATURATION: 99 % | BODY MASS INDEX: 30.73 KG/M2 | HEART RATE: 100 BPM | DIASTOLIC BLOOD PRESSURE: 88 MMHG | TEMPERATURE: 100 F | SYSTOLIC BLOOD PRESSURE: 126 MMHG | WEIGHT: 180 LBS | RESPIRATION RATE: 18 BRPM | HEIGHT: 64 IN

## 2018-12-29 LAB
ANION GAP SERPL CALC-SCNC: 13 MMOL/L
BUN SERPL-MCNC: 27 MG/DL
CALCIUM SERPL-MCNC: 10 MG/DL
CHLORIDE SERPL-SCNC: 97 MMOL/L
CO2 SERPL-SCNC: 24 MMOL/L
CREAT SERPL-MCNC: 1.6 MG/DL
EST. GFR  (AFRICAN AMERICAN): 42.4 ML/MIN/1.73 M^2
EST. GFR  (NON AFRICAN AMERICAN): 36.8 ML/MIN/1.73 M^2
GLUCOSE SERPL-MCNC: 321 MG/DL
POTASSIUM SERPL-SCNC: 5.1 MMOL/L
SODIUM SERPL-SCNC: 134 MMOL/L

## 2018-12-29 PROCEDURE — 80048 BASIC METABOLIC PNL TOTAL CA: CPT

## 2018-12-29 NOTE — DISCHARGE INSTRUCTIONS
Please follow up with her PCP for further evaluation of your hyperglycemia.  You have been given resources for Internal Medicine at [facility].      Our goal in the emergency department is to always give you outstanding care and exceptional service. You may receive a survey by mail or e-mail in the next week regarding your experience in our ED. We would greatly appreciate your completing and returning the survey. Your feedback provides us with a way to recognize our staff who give very good care and it helps us learn how to improve when your experience was below our aspiration of excellence.

## 2018-12-29 NOTE — ED NOTES
"Pt states "I was here yesterday because I thought I had pneumonia but they diagnosed me with bronchitis but they never treated my sugar". Pt c/o uncontrolled hyperglycemia, reports only takes metformin for DM. Pt took metformin twice today. Pt c/o occasional episodes of diaphoresis, increased thirst, frequent urination, blurry vision, nausea, dry mouth. Pt denies chest pain, SOB, fever, emesis.     Patient identifiers verified and correct for Lyssa Gandara.    LOC: The patient is awake, alert and aware of environment with an appropriate affect, the patient is oriented x 3 and speaking appropriately.  APPEARANCE: Patient resting comfortably and in no acute distress, patient is clean and well groomed, patient's clothing is properly fastened.  SKIN: The skin is warm and dry, color consistent with ethnicity, patient has normal skin turgor and moist mucus membranes, skin intact, no breakdown or bruising noted.  MUSCULOSKELETAL: Patient moving all extremities spontaneously, no obvious swelling or deformities noted.  RESPIRATORY: Airway is open and patent, respirations are spontaneous, patient has a normal effort and rate, no accessory muscle use noted   CARDIAC: Patient has a normal rate and regular rhythm, no periphreal edema noted, capillary refill < 3 seconds.  ABDOMEN: Soft and non tender to palpation, no distention noted, normoactive bowel sounds present in all four quadrants.  NEUROLOGIC: PERRL, 3mm bilaterally, eyes open spontaneously, behavior appropriate to situation, follows commands, facial expression symmetrical, bilateral hand grasp equal and even, purposeful motor response noted, normal sensation in all extremities when touched with a finger.  "

## 2018-12-29 NOTE — ED PROVIDER NOTES
Encounter Date: 12/28/2018       History     Chief Complaint   Patient presents with    Hyperglycemia     last cbg at home 353, +nausea, dry mouth. compliant metformin and trulicity.      Patient is a 53-year-old female with medical history of asthma, hypertension, diabetes presenting to the ED for elevated glucose, dry mouth, blurred vision and nausea since Monday.  Patient states she was seen here on Wednesday and diagnosed with an upper respiratory infection.  Patient states since that time her sugars have remained elevated.  Patient also endorses frequent urination.  Patient denies any fever, chills, vomiting or diarrhea.          Review of patient's allergies indicates:   Allergen Reactions    Plasma, human, normal      Past Medical History:   Diagnosis Date    Asthma     Dyspnea on exertion     Hx of long term use of blood thinners     Hypertension 3/8/2016    PE (pulmonary embolism)     Hx of multiple DVT/PE    Presence of IVC filter     Scleroderma     Stroke 02/2017    Type 2 diabetes mellitus without complication      Past Surgical History:   Procedure Laterality Date    DANETTE FILTER PLACEMENT       Family History   Problem Relation Age of Onset    Breast cancer Mother     Hypertension Father     No Known Problems Brother     No Known Problems Maternal Grandmother     No Known Problems Maternal Grandfather     No Known Problems Paternal Grandmother     No Known Problems Paternal Grandfather     Diabetes Mellitus Maternal Uncle     No Known Problems Sister     No Known Problems Maternal Aunt     No Known Problems Paternal Aunt     No Known Problems Paternal Uncle     Psoriasis Neg Hx     Rheum arthritis Neg Hx     Thyroid disease Neg Hx     Lupus Neg Hx     Anemia Neg Hx     Arrhythmia Neg Hx     Asthma Neg Hx     Clotting disorder Neg Hx     Fainting Neg Hx     Heart attack Neg Hx     Heart disease Neg Hx     Heart failure Neg Hx     Hyperlipidemia Neg Hx      Stroke Neg Hx     Atrial Septal Defect Neg Hx      Social History     Tobacco Use    Smoking status: Never Smoker    Smokeless tobacco: Never Used   Substance Use Topics    Alcohol use: Yes     Alcohol/week: 1.8 oz     Types: 3 Cans of beer per week     Comment:  a day    Drug use: No     Review of Systems   Constitutional: Positive for activity change. Negative for appetite change, chills, fatigue and fever.   HENT: Negative for sore throat.    Eyes: Positive for visual disturbance.   Respiratory: Negative for cough, chest tightness, shortness of breath and wheezing.    Cardiovascular: Negative for chest pain and palpitations.   Gastrointestinal: Positive for nausea. Negative for abdominal pain, constipation, diarrhea and vomiting.   Endocrine: Positive for polyuria.   Genitourinary: Positive for frequency and urgency. Negative for decreased urine volume, difficulty urinating and dysuria.   Musculoskeletal: Negative for arthralgias, back pain and myalgias.   Skin: Negative for color change, pallor, rash and wound.   Neurological: Negative for dizziness, syncope, weakness, numbness and headaches.   All other systems reviewed and are negative.      Physical Exam     Initial Vitals [12/28/18 1917]   BP Pulse Resp Temp SpO2   126/88 100 18 98.3 °F (36.8 °C) 99 %      MAP       --         Physical Exam    Nursing note and vitals reviewed.  Constitutional: Vital signs are normal. She appears well-developed and well-nourished. She is cooperative. She does not have a sickly appearance. She does not appear ill.   HENT:   Head: Normocephalic and atraumatic.   Mouth/Throat: Uvula is midline. Mucous membranes are pale and dry.   Eyes: Conjunctivae, EOM and lids are normal. Pupils are equal, round, and reactive to light.   Pupils equal round reactive 3-2 mm   Neck: Trachea normal, normal range of motion, full passive range of motion without pain and phonation normal. Neck supple. No spinous process tenderness and no  muscular tenderness present. No JVD present.   Cardiovascular: Normal rate and regular rhythm.   Pulses:       Radial pulses are 2+ on the right side, and 2+ on the left side.        Dorsalis pedis pulses are 2+ on the right side, and 2+ on the left side.   Pulmonary/Chest: Effort normal and breath sounds normal.   Abdominal: Soft. Normal appearance and bowel sounds are normal. There is no tenderness. There is no rigidity, no rebound and no guarding.   Musculoskeletal: Normal range of motion.   Neurological: She is alert and oriented to person, place, and time. She has normal strength. No sensory deficit. GCS eye subscore is 4. GCS verbal subscore is 5. GCS motor subscore is 6.   Skin: Skin is warm, dry and intact. Capillary refill takes less than 2 seconds. No abrasion and no rash noted. No cyanosis. Nails show no clubbing.         ED Course   Procedures  Labs Reviewed   BASIC METABOLIC PANEL - Abnormal; Notable for the following components:       Result Value    Sodium 134 (*)     Glucose 321 (*)     BUN, Bld 27 (*)     Creatinine 1.6 (*)     eGFR if  42.4 (*)     eGFR if non  36.8 (*)     All other components within normal limits   POCT GLUCOSE - Abnormal; Notable for the following components:    POCT Glucose 362 (*)     All other components within normal limits   ISTAT PROCEDURE - Abnormal; Notable for the following components:    POC Glucose 330 (*)     POC BUN 37 (*)     POC Sodium 134 (*)     POC Potassium 5.6 (*)     All other components within normal limits          Imaging Results    None                APC / Resident Notes:   Emergent evaluation of a 52 yo female patient presenting to the ER with chief complaint of elevated glucose over the past few days.  Pt states that her PCP is no longer seeing patients and she cannot have her medications adjusted.  Patient states she has had intermittent nausea, dry mouth, blurred vision since Monday.  On exam patient A&O x3.  Abdomen  soft and nontender. Breath sounds clear bilaterally. Pupils equal round reactive 3-2 mm.  No JVD noted. Strength 5/5 in all extremities. I will get a BMP, i-STAT, hydrate and reassess.  Differential diagnoses include but are not limited to hyperglycemia, electrolyte abnormality, viral infection, stress response.  I discussed the care of this patient with my Supervising Physician.      Patient's BMP shows a sodium of 134.  Glucose elevated at 321.  BUN and creatinine elevated at 27 and 1.6.  This is patient's baseline.  Alerted by nursing staff patient is requesting to be discharged.  Patient states she is feeling better.  Patient denies any nausea or continued blurred vision on exam.  Patient given resources for internal medicine to follow up for medication change.  All questions and concerns addressed.    Patient is hemodynamically stable, vital signs are normal. Discharge instructions given. Return to ED precautions discussed. Follow up as directed. Pt verbalized understanding of this plan. Pt is stable for discharge.                          Clinical Impression:   The primary encounter diagnosis was Hyperglycemia. A diagnosis of Nausea was also pertinent to this visit.      Disposition:   Disposition: Discharged  Condition: Stable                        Ashley Roa NP  12/29/18 7543

## 2018-12-29 NOTE — ED NOTES
Bed: 20  Expected date: 12/28/18  Expected time: 10:06 PM  Means of arrival:   Comments:  Juliocesar

## 2019-01-04 ENCOUNTER — IMMUNIZATION (OUTPATIENT)
Dept: PHARMACY | Facility: CLINIC | Age: 54
End: 2019-01-04
Payer: COMMERCIAL

## 2019-01-04 ENCOUNTER — IMMUNIZATION (OUTPATIENT)
Dept: PHARMACY | Facility: CLINIC | Age: 54
End: 2019-01-04

## 2019-01-04 ENCOUNTER — OFFICE VISIT (OUTPATIENT)
Dept: INTERNAL MEDICINE | Facility: CLINIC | Age: 54
End: 2019-01-04
Payer: COMMERCIAL

## 2019-01-04 VITALS
HEIGHT: 64 IN | SYSTOLIC BLOOD PRESSURE: 106 MMHG | HEART RATE: 98 BPM | DIASTOLIC BLOOD PRESSURE: 80 MMHG | OXYGEN SATURATION: 96 % | WEIGHT: 178.56 LBS | BODY MASS INDEX: 30.48 KG/M2

## 2019-01-04 DIAGNOSIS — E66.9 CLASS 1 OBESITY WITH SERIOUS COMORBIDITY AND BODY MASS INDEX (BMI) OF 30.0 TO 30.9 IN ADULT, UNSPECIFIED OBESITY TYPE: ICD-10-CM

## 2019-01-04 DIAGNOSIS — E11.65 TYPE 2 DIABETES MELLITUS WITH HYPERGLYCEMIA, UNSPECIFIED WHETHER LONG TERM INSULIN USE: Primary | ICD-10-CM

## 2019-01-04 DIAGNOSIS — Z23 NEED FOR 23-POLYVALENT PNEUMOCOCCAL POLYSACCHARIDE VACCINE: ICD-10-CM

## 2019-01-04 DIAGNOSIS — J45.40 MODERATE PERSISTENT ASTHMA WITHOUT COMPLICATION: ICD-10-CM

## 2019-01-04 DIAGNOSIS — E11.22 CKD STAGE 3 DUE TO TYPE 2 DIABETES MELLITUS: ICD-10-CM

## 2019-01-04 DIAGNOSIS — Z01.419 PAP TEST, AS PART OF ROUTINE GYNECOLOGICAL EXAMINATION: ICD-10-CM

## 2019-01-04 DIAGNOSIS — Z23 NEED FOR TDAP VACCINATION: ICD-10-CM

## 2019-01-04 DIAGNOSIS — I10 ESSENTIAL HYPERTENSION: ICD-10-CM

## 2019-01-04 DIAGNOSIS — Z23 NEED FOR INFLUENZA VACCINATION: ICD-10-CM

## 2019-01-04 DIAGNOSIS — N18.30 CKD STAGE 3 DUE TO TYPE 2 DIABETES MELLITUS: ICD-10-CM

## 2019-01-04 DIAGNOSIS — J45.52 SEVERE PERSISTENT ASTHMA WITH STATUS ASTHMATICUS: Chronic | ICD-10-CM

## 2019-01-04 DIAGNOSIS — Z12.31 BREAST CANCER SCREENING BY MAMMOGRAM: ICD-10-CM

## 2019-01-04 DIAGNOSIS — Z12.11 COLON CANCER SCREENING: ICD-10-CM

## 2019-01-04 PROCEDURE — 99214 PR OFFICE/OUTPT VISIT, EST, LEVL IV, 30-39 MIN: ICD-10-PCS | Mod: S$GLB,,, | Performed by: NURSE PRACTITIONER

## 2019-01-04 PROCEDURE — 3074F SYST BP LT 130 MM HG: CPT | Mod: CPTII,S$GLB,, | Performed by: NURSE PRACTITIONER

## 2019-01-04 PROCEDURE — 3008F BODY MASS INDEX DOCD: CPT | Mod: CPTII,S$GLB,, | Performed by: NURSE PRACTITIONER

## 2019-01-04 PROCEDURE — 3008F PR BODY MASS INDEX (BMI) DOCUMENTED: ICD-10-PCS | Mod: CPTII,S$GLB,, | Performed by: NURSE PRACTITIONER

## 2019-01-04 PROCEDURE — 3046F HEMOGLOBIN A1C LEVEL >9.0%: CPT | Mod: CPTII,S$GLB,, | Performed by: NURSE PRACTITIONER

## 2019-01-04 PROCEDURE — 3079F PR MOST RECENT DIASTOLIC BLOOD PRESSURE 80-89 MM HG: ICD-10-PCS | Mod: CPTII,S$GLB,, | Performed by: NURSE PRACTITIONER

## 2019-01-04 PROCEDURE — 3074F PR MOST RECENT SYSTOLIC BLOOD PRESSURE < 130 MM HG: ICD-10-PCS | Mod: CPTII,S$GLB,, | Performed by: NURSE PRACTITIONER

## 2019-01-04 PROCEDURE — 99214 OFFICE O/P EST MOD 30 MIN: CPT | Mod: S$GLB,,, | Performed by: NURSE PRACTITIONER

## 2019-01-04 PROCEDURE — 3046F PR MOST RECENT HEMOGLOBIN A1C LEVEL > 9.0%: ICD-10-PCS | Mod: CPTII,S$GLB,, | Performed by: NURSE PRACTITIONER

## 2019-01-04 PROCEDURE — 3079F DIAST BP 80-89 MM HG: CPT | Mod: CPTII,S$GLB,, | Performed by: NURSE PRACTITIONER

## 2019-01-04 PROCEDURE — 99999 PR PBB SHADOW E&M-EST. PATIENT-LVL IV: CPT | Mod: PBBFAC,,, | Performed by: NURSE PRACTITIONER

## 2019-01-04 PROCEDURE — 99999 PR PBB SHADOW E&M-EST. PATIENT-LVL IV: ICD-10-PCS | Mod: PBBFAC,,, | Performed by: NURSE PRACTITIONER

## 2019-01-04 RX ORDER — ALBUTEROL SULFATE 0.83 MG/ML
SOLUTION RESPIRATORY (INHALATION)
Qty: 75 EACH | Refills: 5 | Status: SHIPPED | OUTPATIENT
Start: 2019-01-04 | End: 2020-07-10 | Stop reason: SDUPTHER

## 2019-01-04 RX ORDER — ALBUTEROL SULFATE 90 UG/1
2 AEROSOL, METERED RESPIRATORY (INHALATION) EVERY 6 HOURS PRN
Qty: 18 G | Refills: 5 | Status: SHIPPED | OUTPATIENT
Start: 2019-01-04 | End: 2019-01-08 | Stop reason: CLARIF

## 2019-01-04 RX ORDER — INSULIN GLARGINE 100 [IU]/ML
15 INJECTION, SOLUTION SUBCUTANEOUS NIGHTLY
COMMUNITY
End: 2019-01-04 | Stop reason: SDUPTHER

## 2019-01-04 RX ORDER — FLUTICASONE PROPIONATE AND SALMETEROL 250; 50 UG/1; UG/1
1 POWDER RESPIRATORY (INHALATION) 2 TIMES DAILY
Qty: 180 EACH | Refills: 3 | Status: SHIPPED | OUTPATIENT
Start: 2019-01-04 | End: 2019-11-25 | Stop reason: SDUPTHER

## 2019-01-04 RX ORDER — INSULIN GLARGINE 100 [IU]/ML
15 INJECTION, SOLUTION SUBCUTANEOUS NIGHTLY
Qty: 15 ML | Refills: 5 | Status: SHIPPED | OUTPATIENT
Start: 2019-01-04 | End: 2019-11-25 | Stop reason: SDUPTHER

## 2019-01-04 NOTE — PATIENT INSTRUCTIONS
Check BS twice daily, once in AM fasting (goal is to get sugar ), and in evening at least 2 hrs after meal(goal 2 hrs post meal is no higher than 160).    A1C goal < 7.0, BP goal < 140/80, LDL goal < 100.    Adhere to ADA diet.    Take meds as prescribed, check BS daily. Notify if sugars are out of range discussed during visit.    Hold off rechecking A1C until been on new med regimen with diet, recheck in 3 months--fasting lab appt     Flu, Pneumonia, Tdap vaccines today    Mammogram ordered    GYN referral for pap smear    3 month f/u with Dr. Perez to establish new PCP and DM f/u

## 2019-01-04 NOTE — PROGRESS NOTES
Subjective:       Patient ID: Lyssa Gandara is a 53 y.o. female.    Chief Complaint: Hospital Follow Up and Diabetes    Former pt of Dr. Joyce, here for uncontrolled DM. Pt stated in message on 12-28-18 that she had been to the ER twice in the last three days. Once was for acute bronchitis, which her sugar was 397. The second time she was in the ER, her sugar was still elevated in the high 300's. She doesn't have a primary care doctor because Dr. Joyce has left and needs a new one.     Reviewed ER note from 12-26-18, treated for bronchitis with Doxy for 7 days. Reviewed ER note from 12-28-18, she was compliant with her metformin and Trulicity, on 1000mg Metformin BID and Trulicity 0.75 q7days, but her home sugars were in high 300s. Presented with nausea and dry mouth. Discharged home per pt request with dx of hyperglycemia, told to f/u with us. Noted A1C on 7-1-18 was 12.0, which was up from 8.8 last March.    She states in the ER that she was given IVF and that's it, they did not check her A1C. Told to f/u with primary. She admits not consistently taking her Metformin 1000mg BID. She states that she stopped the Trulicity and went back on Lantus 15units at night this past Monday since she was desperate to try anything to help the BS. BS this morning before breakfast was 127, yesterday at 530 BS was 158, she ate, at 149 it was 149. She would like a refill on the Lantus.     She also needs refills on her asthma meds Advair, Proair, and Albuterol nebulizer tx.    No other complaints.       Review of Systems   Constitutional: Negative for activity change, appetite change, chills and fever.   Eyes: Negative for visual disturbance.   Respiratory: Negative for chest tightness and shortness of breath.    Cardiovascular: Negative for chest pain, palpitations and leg swelling.   Gastrointestinal: Negative for abdominal pain, constipation, diarrhea, nausea and vomiting.   Endocrine: Positive for polydipsia. Negative  "for cold intolerance, heat intolerance and polyphagia.        Resolved, As documented in HPI     Genitourinary: Negative for dysuria.   Musculoskeletal: Negative for arthralgias.   Skin: Negative for color change, pallor and rash.   Allergic/Immunologic: Negative for environmental allergies, food allergies and immunocompromised state.   Neurological: Negative for dizziness, weakness and numbness.   Hematological: Negative for adenopathy. Does not bruise/bleed easily.   Psychiatric/Behavioral: Negative for suicidal ideas.         Review of patient's allergies indicates:   Allergen Reactions    Plasma, human, normal        Current Outpatient Medications:     albuterol (PROVENTIL) 2.5 mg /3 mL (0.083 %) nebulizer solution, USE 3 ML IN NEBULIZER EVERY 6 HOURS AS NEEDED FOR WHEEZING, Disp: 75 each, Rfl: 5    aspirin (ECOTRIN) 81 MG EC tablet, Take 1 tablet (81 mg total) by mouth once daily., Disp: 90 tablet, Rfl: 3    blood sugar diagnostic Strp, 200 strips by Misc.(Non-Drug; Combo Route) route 2 (two) times daily., Disp: 200 strip, Rfl: 12    clotrimazole (LOTRIMIN) 1 % cream, APPLY  CREAM TOPICALLY TWICE DAILY, Disp: 60 g, Rfl: 0    dabigatran etexilate (PRADAXA) 150 mg Cap, Take 1 capsule (150 mg total) by mouth 2 (two) times daily. "Do NOT break, chew, or open capsules.", Disp: 60 capsule, Rfl: 0    fluticasone-salmeterol 250-50 mcg/dose (ADVAIR) 250-50 mcg/dose diskus inhaler, Inhale 1 puff into the lungs 2 (two) times daily., Disp: 180 each, Rfl: 3    insulin (LANTUS SOLOSTAR U-100 INSULIN) glargine 100 units/mL (3mL) SubQ pen, Inject 15 Units into the skin every evening., Disp: 15 mL, Rfl: 5    lisinopril (PRINIVIL,ZESTRIL) 20 MG tablet, Take 1 tablet (20 mg total) by mouth once daily., Disp: 90 tablet, Rfl: 4    metFORMIN (GLUCOPHAGE) 1000 MG tablet, Take 1 tablet (1,000 mg total) by mouth 2 (two) times daily with meals., Disp: 180 tablet, Rfl: 3    simvastatin (ZOCOR) 20 MG tablet, Take 1 tablet (20 " mg total) by mouth every evening., Disp: 90 tablet, Rfl: 3    triamterene-hydrochlorothiazide 37.5-25 mg (MAXZIDE-25) 37.5-25 mg per tablet, Take 1 tablet by mouth once daily., Disp: 90 tablet, Rfl: 4    albuterol (PROAIR HFA) 90 mcg/actuation inhaler, Inhale 2 puffs into the lungs every 6 (six) hours as needed for Wheezing. Rescue, Disp: 18 g, Rfl: 5    fluocinonide 0.05% (LIDEX) 0.05 % cream, , Disp: , Rfl:     lancets (ONETOUCH ULTRASOFT LANCETS) Misc, 1 application by Misc.(Non-Drug; Combo Route) route 2 (two) times daily before meals., Disp: 200 each, Rfl: 11    Patient Active Problem List   Diagnosis    Pulmonary embolism    Essential hypertension    Acute deep vein thrombosis of left lower extremity, unspecified vein    Other pulmonary embolism without acute cor pulmonale, unspecified chronicity    Scleroderma    Long term current use of anticoagulant therapy    Asthma    Type 2 diabetes mellitus without complication    Cough    Internal carotid artery dissection    Cerebral infarction due to thrombosis of right carotid artery 2/26/17    Left arm weakness    Fibromuscular dysplasia of cervicocranial artery    Loss of coordination    Impaired mobility and ADLs    Iron deficiency anemia due to chronic blood loss    Reactive depression    Carotid stenosis    Pneumonia    Acute bronchitis    Asthma exacerbation    Hyperglycemia    Diabetic ketoacidosis without coma associated with type 2 diabetes mellitus    CKD stage 3 due to type 2 diabetes mellitus     Past Medical History:   Diagnosis Date    Asthma     Dyspnea on exertion     Hx of long term use of blood thinners     Hypertension 3/8/2016    PE (pulmonary embolism)     Hx of multiple DVT/PE    Presence of IVC filter     Scleroderma     Stroke 02/2017    Type 2 diabetes mellitus without complication      Past Surgical History:   Procedure Laterality Date    DANETTE FILTER PLACEMENT       Social History  "    Socioeconomic History    Marital status: Single     Spouse name: None    Number of children: None    Years of education: None    Highest education level: None   Social Needs    Financial resource strain: None    Food insecurity - worry: None    Food insecurity - inability: None    Transportation needs - medical: None    Transportation needs - non-medical: None   Occupational History    None   Tobacco Use    Smoking status: Never Smoker    Smokeless tobacco: Never Used   Substance and Sexual Activity    Alcohol use: Yes     Alcohol/week: 1.8 oz     Types: 3 Cans of beer per week     Comment:  a day    Drug use: No    Sexual activity: No   Other Topics Concern    None   Social History Narrative    None     Family History   Problem Relation Age of Onset    Breast cancer Mother     Hypertension Father     No Known Problems Brother     No Known Problems Maternal Grandmother     No Known Problems Maternal Grandfather     No Known Problems Paternal Grandmother     No Known Problems Paternal Grandfather     Diabetes Mellitus Maternal Uncle     No Known Problems Sister     No Known Problems Maternal Aunt     No Known Problems Paternal Aunt     No Known Problems Paternal Uncle     Psoriasis Neg Hx     Rheum arthritis Neg Hx     Thyroid disease Neg Hx     Lupus Neg Hx     Anemia Neg Hx     Arrhythmia Neg Hx     Asthma Neg Hx     Clotting disorder Neg Hx     Fainting Neg Hx     Heart attack Neg Hx     Heart disease Neg Hx     Heart failure Neg Hx     Hyperlipidemia Neg Hx     Stroke Neg Hx     Atrial Septal Defect Neg Hx        Objective:       Vitals:    01/04/19 1319   BP: 106/80   Pulse: 98   SpO2: 96%   Weight: 81 kg (178 lb 9.2 oz)   Height: 5' 4" (1.626 m)   PainSc: 0-No pain        Body mass index is 30.65 kg/m².    Physical Exam   Constitutional: She is oriented to person, place, and time. Vital signs are normal. She appears well-developed and well-nourished.   obese "   HENT:   Head: Normocephalic.   Right Ear: Hearing, tympanic membrane, external ear and ear canal normal.   Left Ear: Hearing, tympanic membrane, external ear and ear canal normal.   Eyes: Conjunctivae, EOM and lids are normal. Pupils are equal, round, and reactive to light. Lids are everted and swept, no foreign bodies found.   Neck: Trachea normal, normal range of motion and full passive range of motion without pain. Neck supple. No JVD present. Carotid bruit is not present.   Cardiovascular: Normal rate, regular rhythm, S1 normal, S2 normal, normal heart sounds, intact distal pulses and normal pulses.   Pulmonary/Chest: Effort normal and breath sounds normal.   Abdominal: Soft. Normal appearance and bowel sounds are normal. There is no hepatosplenomegaly.   obese   Musculoskeletal: Normal range of motion.   Neurological: She is alert and oriented to person, place, and time. She has normal strength and normal reflexes.   Skin: Skin is warm, dry and intact. Capillary refill takes less than 2 seconds.   Psychiatric: She has a normal mood and affect. Her speech is normal and behavior is normal. Judgment and thought content normal. Cognition and memory are normal.   Vitals reviewed.      Assessment:       1. Type 2 diabetes mellitus with hyperglycemia, unspecified whether long term insulin use    2. Essential hypertension    3. CKD stage 3 due to type 2 diabetes mellitus    4. BMI 30.0-30.9,adult    5. Class 1 obesity with serious comorbidity and body mass index (BMI) of 30.0 to 30.9 in adult, unspecified obesity type    6. Need for influenza vaccination    7. Pap test, as part of routine gynecological examination    8. Need for Tdap vaccination    9. Breast cancer screening by mammogram    10. Colon cancer screening    11. Moderate persistent asthma without complication    12. Severe persistent asthma with status asthmaticus    13. Need for 23-polyvalent pneumococcal polysaccharide vaccine        Plan:        Lyssa was seen today for hospital follow up and diabetes.    Diagnoses and all orders for this visit:    Type 2 diabetes mellitus with hyperglycemia, unspecified whether long term insulin use  -     Comprehensive metabolic panel; Future  -     Lipid panel; Future  -     Hemoglobin A1c; Future  -     Microalbumin/creatinine urine ratio; Future  -     insulin (LANTUS SOLOSTAR U-100 INSULIN) glargine 100 units/mL (3mL) SubQ pen; Inject 15 Units into the skin every evening.    Essential hypertension  -     CBC auto differential; Future  -     Comprehensive metabolic panel; Future  -     Lipid panel; Future  -     TSH; Future  -     Urinalysis; Future    CKD stage 3 due to type 2 diabetes mellitus  -     Comprehensive metabolic panel; Future  -     Microalbumin/creatinine urine ratio; Future    BMI 30.0-30.9,adult  BMI reviewed    Class 1 obesity with serious comorbidity and body mass index (BMI) of 30.0 to 30.9 in adult, unspecified obesity type  BMI reviewed.    Diet and exercise to lose weight.    Need for influenza vaccination  Given today    Pap test, as part of routine gynecological examination  -     Ambulatory Referral to Gynecology    Need for Tdap vaccination  Given today    Breast cancer screening by mammogram  -     Mammo Digital Screening Bilat; Future    Colon cancer screening  Pt wants to hold off on this until summer    Moderate persistent asthma without complication  Comments:  pt reported that she used to take advair but could not refill due to price, provided savings card, stated she will refill today  Orders:  -     albuterol (PROVENTIL) 2.5 mg /3 mL (0.083 %) nebulizer solution; USE 3 ML IN NEBULIZER EVERY 6 HOURS AS NEEDED FOR WHEEZING  -     albuterol (PROAIR HFA) 90 mcg/actuation inhaler; Inhale 2 puffs into the lungs every 6 (six) hours as needed for Wheezing. Rescue    Severe persistent asthma with status asthmaticus  Comments:  pt reported that she used to take advair but could not refill  due to price, provided savings card, stated she will refill today  Orders:  -     fluticasone-salmeterol 250-50 mcg/dose (ADVAIR) 250-50 mcg/dose diskus inhaler; Inhale 1 puff into the lungs 2 (two) times daily.  -     albuterol (PROAIR HFA) 90 mcg/actuation inhaler; Inhale 2 puffs into the lungs every 6 (six) hours as needed for Wheezing. Rescue    Need for 23-polyvalent pneumococcal polysaccharide vaccine  Given today    Check BS twice daily, once in AM fasting (goal is to get sugar ), and in evening at least 2 hrs after meal(goal 2 hrs post meal is no higher than 160).    A1C goal < 7.0, BP goal < 140/80, LDL goal < 100.    Adhere to ADA diet.    Take meds as prescribed, check BS daily. Notify if sugars are out of range discussed during visit.    Hold off rechecking A1C until been on new med regimen with diet, recheck in 3 months--fasting lab appt     Flu, Pneumonia, Tdap vaccines today    Mammogram ordered    GYN referral for pap smear    3 month f/u with Dr. Perez to establish new PCP and DM f/u    Follow-up in about 3 months (around 4/4/2019), or with Dr. Surya Perez  to re-establish new MD PCP and DM f/u.

## 2019-01-05 ENCOUNTER — HOSPITAL ENCOUNTER (OUTPATIENT)
Dept: RADIOLOGY | Facility: HOSPITAL | Age: 54
Discharge: HOME | End: 2019-01-05
Attending: NURSE PRACTITIONER
Payer: COMMERCIAL

## 2019-01-05 DIAGNOSIS — Z12.31 BREAST CANCER SCREENING BY MAMMOGRAM: ICD-10-CM

## 2019-01-05 PROCEDURE — 77063 MAMMO DIGITAL SCREENING BILAT WITH TOMOSYNTHESIS_CAD: ICD-10-PCS | Mod: 26,,, | Performed by: RADIOLOGY

## 2019-01-05 PROCEDURE — 77067 SCR MAMMO BI INCL CAD: CPT | Mod: 26,,, | Performed by: RADIOLOGY

## 2019-01-05 PROCEDURE — 77067 SCR MAMMO BI INCL CAD: CPT | Mod: TC

## 2019-01-05 PROCEDURE — 77067 MAMMO DIGITAL SCREENING BILAT WITH TOMOSYNTHESIS_CAD: ICD-10-PCS | Mod: 26,,, | Performed by: RADIOLOGY

## 2019-01-05 PROCEDURE — 77063 BREAST TOMOSYNTHESIS BI: CPT | Mod: 26,,, | Performed by: RADIOLOGY

## 2019-01-07 NOTE — PROGRESS NOTES
There were some asymmetries noted on your screening mammogram. The radiology department will attempt to obtain outside images for comparison, meaning to compare to your previous mammogram. Depending on that they will be contacting you if additional imaging is needed to f/u up on this screening mammogram.

## 2019-01-08 ENCOUNTER — TELEPHONE (OUTPATIENT)
Dept: INTERNAL MEDICINE | Facility: CLINIC | Age: 54
End: 2019-01-08

## 2019-01-08 DIAGNOSIS — J45.909 UNCOMPLICATED ASTHMA, UNSPECIFIED ASTHMA SEVERITY, UNSPECIFIED WHETHER PERSISTENT: Primary | ICD-10-CM

## 2019-01-08 RX ORDER — ALBUTEROL SULFATE 90 UG/1
2 AEROSOL, METERED RESPIRATORY (INHALATION) EVERY 6 HOURS PRN
Qty: 18 G | Refills: 11 | Status: SHIPPED | OUTPATIENT
Start: 2019-01-08 | End: 2020-01-08

## 2019-01-08 NOTE — TELEPHONE ENCOUNTER
----- Message from Bria Montero sent at 1/8/2019 10:15 AM CST -----  Contact: [facility]  Prescription Alternative Needed:     The pharmacy needs alternative on the following RX:    albuterol (PROAIR HFA) 90 mcg/actuation inhaler    Reason: Drug not covered. Preferred alternative Ventolin    Pharmacy: [facility] [address] [place], [place] [address]    Please advise.    Thank You

## 2019-01-22 ENCOUNTER — TELEPHONE (OUTPATIENT)
Dept: RADIOLOGY | Facility: HOSPITAL | Age: 54
End: 2019-01-22

## 2019-01-22 DIAGNOSIS — R92.8 ABNORMALITY OF BOTH BREASTS ON SCREENING MAMMOGRAM: Primary | ICD-10-CM

## 2019-01-22 NOTE — TELEPHONE ENCOUNTER
Spoke with patient and explained mammogram findings.Patient expressed understanding of results. Patient is scheduled for a abnormal mammogram follow up appointment at [facility] on 1/25/2019

## 2019-01-22 NOTE — PROGRESS NOTES
Appears Radiology was able to obtain a previous mammogram you had from 2013 to compare to the screening one we did on 1/7/19. The radiologist is recommending a diagnostic mammogram so I am placing an order for this and they will contact you to have done, if they haven't already.

## 2019-01-31 ENCOUNTER — TELEPHONE (OUTPATIENT)
Dept: RADIOLOGY | Facility: HOSPITAL | Age: 54
End: 2019-01-31

## 2019-01-31 NOTE — TELEPHONE ENCOUNTER
Returned patients call and rescheduled abnormal mammogram  follow up at the breast center to Friday 3/1/2019 per patient request.

## 2019-03-01 ENCOUNTER — HOSPITAL ENCOUNTER (OUTPATIENT)
Dept: RADIOLOGY | Facility: HOSPITAL | Age: 54
Discharge: HOME | End: 2019-03-01
Attending: NURSE PRACTITIONER
Payer: COMMERCIAL

## 2019-03-01 DIAGNOSIS — R92.8 ABNORMALITY OF BOTH BREASTS ON SCREENING MAMMOGRAM: ICD-10-CM

## 2019-03-01 PROCEDURE — 77062 MAMMO DIGITAL DIAGNOSTIC BILAT WITH TOMOSYNTHESIS_CAD: ICD-10-PCS | Mod: 26,,, | Performed by: RADIOLOGY

## 2019-03-01 PROCEDURE — 77066 DX MAMMO INCL CAD BI: CPT | Mod: TC,PO

## 2019-03-01 PROCEDURE — 77066 DX MAMMO INCL CAD BI: CPT | Mod: 26,,, | Performed by: RADIOLOGY

## 2019-03-01 PROCEDURE — 77066 MAMMO DIGITAL DIAGNOSTIC BILAT WITH TOMOSYNTHESIS_CAD: ICD-10-PCS | Mod: 26,,, | Performed by: RADIOLOGY

## 2019-03-01 PROCEDURE — 77062 BREAST TOMOSYNTHESIS BI: CPT | Mod: 26,,, | Performed by: RADIOLOGY

## 2019-03-01 NOTE — PROGRESS NOTES
See above mammogram results, normal, however they are recommending you to the high risk breast clinic as discussed above and state they gave you info on seeing them which I would recommend as well due to your risk factors.

## 2019-04-10 ENCOUNTER — TELEPHONE (OUTPATIENT)
Dept: INTERNAL MEDICINE | Facility: CLINIC | Age: 54
End: 2019-04-10

## 2019-11-18 ENCOUNTER — HOSPITAL ENCOUNTER (EMERGENCY)
Facility: HOSPITAL | Age: 54
Discharge: HOME | End: 2019-11-18
Attending: EMERGENCY MEDICINE
Payer: COMMERCIAL

## 2019-11-18 VITALS
TEMPERATURE: 98 F | OXYGEN SATURATION: 95 % | WEIGHT: 175 LBS | HEIGHT: 64 IN | HEART RATE: 98 BPM | BODY MASS INDEX: 29.88 KG/M2 | RESPIRATION RATE: 18 BRPM | SYSTOLIC BLOOD PRESSURE: 214 MMHG | DIASTOLIC BLOOD PRESSURE: 114 MMHG

## 2019-11-18 DIAGNOSIS — M54.2 NECK PAIN: ICD-10-CM

## 2019-11-18 DIAGNOSIS — E11.65 TYPE 2 DIABETES MELLITUS WITH HYPERGLYCEMIA, WITH LONG-TERM CURRENT USE OF INSULIN: ICD-10-CM

## 2019-11-18 DIAGNOSIS — M54.2 NECK PAIN ON LEFT SIDE: ICD-10-CM

## 2019-11-18 DIAGNOSIS — M25.512 LEFT SHOULDER PAIN: Primary | ICD-10-CM

## 2019-11-18 DIAGNOSIS — Z79.4 TYPE 2 DIABETES MELLITUS WITH HYPERGLYCEMIA, WITH LONG-TERM CURRENT USE OF INSULIN: ICD-10-CM

## 2019-11-18 DIAGNOSIS — Z86.79 HISTORY OF CAROTID STENOSIS: ICD-10-CM

## 2019-11-18 DIAGNOSIS — I65.23 CAROTID STENOSIS, ASYMPTOMATIC, BILATERAL: ICD-10-CM

## 2019-11-18 DIAGNOSIS — R05.9 COUGH: ICD-10-CM

## 2019-11-18 DIAGNOSIS — I10 HYPERTENSION, UNSPECIFIED TYPE: ICD-10-CM

## 2019-11-18 LAB
ALBUMIN SERPL BCP-MCNC: 3.8 G/DL (ref 3.5–5.2)
ALP SERPL-CCNC: 123 U/L (ref 55–135)
ALT SERPL W/O P-5'-P-CCNC: 12 U/L (ref 10–44)
ANION GAP SERPL CALC-SCNC: 11 MMOL/L (ref 8–16)
AST SERPL-CCNC: 16 U/L (ref 10–40)
BASOPHILS # BLD AUTO: 0.02 K/UL (ref 0–0.2)
BASOPHILS NFR BLD: 0.3 % (ref 0–1.9)
BILIRUB SERPL-MCNC: 0.3 MG/DL (ref 0.1–1)
BUN SERPL-MCNC: 18 MG/DL (ref 6–20)
CALCIUM SERPL-MCNC: 9.4 MG/DL (ref 8.7–10.5)
CHLORIDE SERPL-SCNC: 99 MMOL/L (ref 95–110)
CO2 SERPL-SCNC: 24 MMOL/L (ref 23–29)
CREAT SERPL-MCNC: 1.3 MG/DL (ref 0.5–1.4)
CRP SERPL-MCNC: 16.7 MG/L (ref 0–8.2)
DIFFERENTIAL METHOD: ABNORMAL
EOSINOPHIL # BLD AUTO: 0.2 K/UL (ref 0–0.5)
EOSINOPHIL NFR BLD: 2.3 % (ref 0–8)
ERYTHROCYTE [DISTWIDTH] IN BLOOD BY AUTOMATED COUNT: 12.9 % (ref 11.5–14.5)
ERYTHROCYTE [SEDIMENTATION RATE] IN BLOOD BY WESTERGREN METHOD: 32 MM/HR (ref 0–36)
EST. GFR  (AFRICAN AMERICAN): 54.1 ML/MIN/1.73 M^2
EST. GFR  (NON AFRICAN AMERICAN): 47 ML/MIN/1.73 M^2
GLUCOSE SERPL-MCNC: 571 MG/DL (ref 70–110)
HCT VFR BLD AUTO: 41.6 % (ref 37–48.5)
HGB BLD-MCNC: 13.8 G/DL (ref 12–16)
IMM GRANULOCYTES # BLD AUTO: 0.03 K/UL (ref 0–0.04)
IMM GRANULOCYTES NFR BLD AUTO: 0.5 % (ref 0–0.5)
LYMPHOCYTES # BLD AUTO: 0.7 K/UL (ref 1–4.8)
LYMPHOCYTES NFR BLD: 10.4 % (ref 18–48)
MCH RBC QN AUTO: 32.6 PG (ref 27–31)
MCHC RBC AUTO-ENTMCNC: 33.2 G/DL (ref 32–36)
MCV RBC AUTO: 98 FL (ref 82–98)
MONOCYTES # BLD AUTO: 0.1 K/UL (ref 0.3–1)
MONOCYTES NFR BLD: 2.2 % (ref 4–15)
NEUTROPHILS # BLD AUTO: 5.4 K/UL (ref 1.8–7.7)
NEUTROPHILS NFR BLD: 84.3 % (ref 38–73)
NRBC BLD-RTO: 0 /100 WBC
PLATELET # BLD AUTO: 259 K/UL (ref 150–350)
PMV BLD AUTO: 11.8 FL (ref 9.2–12.9)
POTASSIUM SERPL-SCNC: 4.3 MMOL/L (ref 3.5–5.1)
PROT SERPL-MCNC: 8.1 G/DL (ref 6–8.4)
RBC # BLD AUTO: 4.23 M/UL (ref 4–5.4)
SODIUM SERPL-SCNC: 134 MMOL/L (ref 136–145)
TROPONIN I SERPL DL<=0.01 NG/ML-MCNC: <0.006 NG/ML (ref 0–0.03)
WBC # BLD AUTO: 6.43 K/UL (ref 3.9–12.7)

## 2019-11-18 PROCEDURE — 96361 HYDRATE IV INFUSION ADD-ON: CPT

## 2019-11-18 PROCEDURE — 86140 C-REACTIVE PROTEIN: CPT

## 2019-11-18 PROCEDURE — 85025 COMPLETE CBC W/AUTO DIFF WBC: CPT

## 2019-11-18 PROCEDURE — 80053 COMPREHEN METABOLIC PANEL: CPT

## 2019-11-18 PROCEDURE — 96374 THER/PROPH/DIAG INJ IV PUSH: CPT | Mod: 59

## 2019-11-18 PROCEDURE — 84484 ASSAY OF TROPONIN QUANT: CPT

## 2019-11-18 PROCEDURE — 93010 EKG 12-LEAD: ICD-10-PCS | Mod: ,,, | Performed by: INTERNAL MEDICINE

## 2019-11-18 PROCEDURE — 99285 EMERGENCY DEPT VISIT HI MDM: CPT | Mod: 25

## 2019-11-18 PROCEDURE — 63600175 PHARM REV CODE 636 W HCPCS: Performed by: PHYSICIAN ASSISTANT

## 2019-11-18 PROCEDURE — 85652 RBC SED RATE AUTOMATED: CPT

## 2019-11-18 PROCEDURE — 82962 GLUCOSE BLOOD TEST: CPT

## 2019-11-18 PROCEDURE — 93005 ELECTROCARDIOGRAM TRACING: CPT

## 2019-11-18 PROCEDURE — 99285 EMERGENCY DEPT VISIT HI MDM: CPT | Mod: ,,, | Performed by: PHYSICIAN ASSISTANT

## 2019-11-18 PROCEDURE — 99285 PR EMERGENCY DEPT VISIT,LEVEL V: ICD-10-PCS | Mod: ,,, | Performed by: PHYSICIAN ASSISTANT

## 2019-11-18 PROCEDURE — 25000003 PHARM REV CODE 250: Performed by: PHYSICIAN ASSISTANT

## 2019-11-18 PROCEDURE — 25500020 PHARM REV CODE 255: Performed by: EMERGENCY MEDICINE

## 2019-11-18 PROCEDURE — 99283 EMERGENCY DEPT VISIT LOW MDM: CPT | Mod: ,,, | Performed by: SURGERY

## 2019-11-18 PROCEDURE — 93010 ELECTROCARDIOGRAM REPORT: CPT | Mod: ,,, | Performed by: INTERNAL MEDICINE

## 2019-11-18 PROCEDURE — 99283 PR EMERGENCY DEPT VISIT,LEVEL III: ICD-10-PCS | Mod: ,,, | Performed by: SURGERY

## 2019-11-18 PROCEDURE — 25000003 PHARM REV CODE 250: Performed by: EMERGENCY MEDICINE

## 2019-11-18 RX ORDER — SIMVASTATIN 20 MG/1
20 TABLET, FILM COATED ORAL NIGHTLY
Qty: 30 TABLET | Refills: 0 | Status: SHIPPED | OUTPATIENT
Start: 2019-11-18 | End: 2020-07-10 | Stop reason: SDUPTHER

## 2019-11-18 RX ORDER — LISINOPRIL 20 MG/1
20 TABLET ORAL
Status: COMPLETED | OUTPATIENT
Start: 2019-11-18 | End: 2019-11-18

## 2019-11-18 RX ORDER — LIDOCAINE 50 MG/G
1 PATCH TOPICAL
Status: DISCONTINUED | OUTPATIENT
Start: 2019-11-18 | End: 2019-11-18 | Stop reason: HOSPADM

## 2019-11-18 RX ORDER — CYCLOBENZAPRINE HCL 10 MG
10 TABLET ORAL 3 TIMES DAILY PRN
Qty: 15 TABLET | Refills: 0 | OUTPATIENT
Start: 2019-11-18 | End: 2019-11-24

## 2019-11-18 RX ORDER — ACETAMINOPHEN 500 MG
1000 TABLET ORAL
Status: COMPLETED | OUTPATIENT
Start: 2019-11-18 | End: 2019-11-18

## 2019-11-18 RX ORDER — LIDOCAINE 50 MG/G
1 PATCH TOPICAL DAILY
Qty: 10 PATCH | Refills: 0 | Status: SHIPPED | OUTPATIENT
Start: 2019-11-18 | End: 2019-11-24

## 2019-11-18 RX ORDER — TRIAMTERENE AND HYDROCHLOROTHIAZIDE 37.5; 25 MG/1; MG/1
2 CAPSULE ORAL ONCE
Status: COMPLETED | OUTPATIENT
Start: 2019-11-18 | End: 2019-11-18

## 2019-11-18 RX ADMIN — LISINOPRIL 20 MG: 20 TABLET ORAL at 03:11

## 2019-11-18 RX ADMIN — LIDOCAINE 1 PATCH: 50 PATCH TOPICAL at 01:11

## 2019-11-18 RX ADMIN — IOHEXOL 100 ML: 350 INJECTION, SOLUTION INTRAVENOUS at 04:11

## 2019-11-18 RX ADMIN — ACETAMINOPHEN 1000 MG: 500 TABLET ORAL at 10:11

## 2019-11-18 RX ADMIN — TRIAMTERENE AND HYDROCHLOROTHIAZIDE 2 TABLET: 25; 37.5 CAPSULE ORAL at 06:11

## 2019-11-18 RX ADMIN — INSULIN HUMAN 3 UNITS: 100 INJECTION, SOLUTION PARENTERAL at 10:11

## 2019-11-18 RX ADMIN — LISINOPRIL 20 MG: 20 TABLET ORAL at 05:11

## 2019-11-18 RX ADMIN — SODIUM CHLORIDE 1000 ML: 0.9 INJECTION, SOLUTION INTRAVENOUS at 02:11

## 2019-11-18 RX ADMIN — SODIUM CHLORIDE 1000 ML: 0.9 INJECTION, SOLUTION INTRAVENOUS at 10:11

## 2019-11-18 NOTE — ED NOTES
Pt identifiers checked and accurate with Lyssa Gandara    Pt reports neck pain x 2 weeks, radiating to left arm. Pt reports being seen at urgent care and prescribed muscle relaxer and valium without relief. Pt reports tingling in left hand, history of stroke with left hand weakness. Pt report taking methocarbamol, ibuprofen and methylprednisone this AM without relief from symptoms. Pt denies headache, CP, SOB, vision and speech changes.     LOC: The patient is awake, alert and aware of environment with an appropriate affect, the patient is oriented x 3 and speaking appropriately.  APPEARANCE: Patient resting comfortably and in no acute distress, patient is clean and well groomed  SKIN: The skin is warm and dry, color consistent with ethnicity, patient has normal skin turgor and moist mucus membranes, skin intact.  MUSCULOSKELETAL: Patient moving all extremities well, no obvious swelling or deformities noted. Pt ambulates unassisted with steady gait.   RESPIRATORY: Airway is open and patent; respirations are spontaneous, patient has a normal effort and rate, no accessory muscle use noted.   CARDIAC: Patient has no peripheral edema noted. No complaints of chest pain at this time.   NEUROLOGIC: PERRL, eyes open spontaneously, behavior appropriate to situation, follows commands, facial expression symmetrical, bilateral hand grasp equal and even, purposeful motor response noted, normal sensation in all extremities when touched with a finger.

## 2019-11-18 NOTE — ED PROVIDER NOTES
Encounter Date: 11/18/2019    SCRIBE #1 NOTE: I, Cordelia Aquino, am scribing for, and in the presence of,  Dr. Inocencio Roblero. I have scribed the following portions of the note - the EKG reading and the APC attestation.       History     Chief Complaint   Patient presents with    Neck Pain     no bp meds due to too much pain, seen at  yest given shot and valium, started sat pain to L side of neck now going to my shoulder     54-year-old female with PMHx of asthma, PE (s/p IVC filter, on Pradaxa), HTN, scleroderma, stroke, carotid stenosis, internal carotid artery dissection (??) who presents to the ED with c/o neck pain. Per pt, she woke two days ago with left shoulder pain, which has been gradually worsening since. She went to an Urgent Care yesterday and was given a steroid shot and prescribed prednisone, Robaxin, and Valium. She has not have any relief with this treatment. She describes her pain as a constant tightness radiating from her left shoulder to her left sided neck and arm, rated 8/10 currently. She has had some relief with applying heat pads to the area. She has also had a productive cough for the past 2 weeks with green phlegm production. Denies injury, trauma, or heavy lifting. Denies fevers, chills, numbness, weakness, vision changes, chest pain, SOB, abdominal pain, n/v/d, urinary symptoms, or any other medical complaints.     The history is provided by the patient.     Review of patient's allergies indicates:   Allergen Reactions    Plasma, human, normal      Past Medical History:   Diagnosis Date    Asthma     Carotid stenosis     Dyspnea on exertion     Hypertension 3/8/2016    Internal carotid artery dissection 2/26/2017    Long term current use of anticoagulant therapy 3/8/2016    PE (pulmonary embolism)     Hx of multiple DVT/PE    Presence of IVC filter     Scleroderma     Stroke 02/2017    Type 2 diabetes mellitus without complication      Past Surgical History:   Procedure Laterality  Date    DANETTE FILTER PLACEMENT       Family History   Problem Relation Age of Onset    Breast cancer Mother     Hypertension Father     No Known Problems Brother     No Known Problems Maternal Grandmother     No Known Problems Maternal Grandfather     No Known Problems Paternal Grandmother     No Known Problems Paternal Grandfather     Diabetes Mellitus Maternal Uncle     No Known Problems Sister     No Known Problems Maternal Aunt     No Known Problems Paternal Aunt     No Known Problems Paternal Uncle     Colon cancer Neg Hx     Ovarian cancer Neg Hx      Social History     Tobacco Use    Smoking status: Never Smoker    Smokeless tobacco: Never Used   Substance Use Topics    Alcohol use: Yes     Alcohol/week: 3.0 standard drinks     Types: 3 Cans of beer per week     Comment:  a day    Drug use: No     Review of Systems   Constitutional: Negative for chills, fatigue and fever.   HENT: Negative for congestion.    Eyes: Negative for photophobia and visual disturbance.   Respiratory: Positive for cough. Negative for chest tightness and shortness of breath.    Cardiovascular: Negative for chest pain.   Gastrointestinal: Negative for abdominal pain, constipation, diarrhea, nausea and vomiting.   Genitourinary: Negative for dysuria, flank pain, frequency and hematuria.   Musculoskeletal: Positive for neck pain. Negative for back pain.        +left shoulder pain   Skin: Negative for color change and pallor.   Neurological: Negative for dizziness, weakness, light-headedness, numbness and headaches.   Hematological: Does not bruise/bleed easily.   Psychiatric/Behavioral: Negative for confusion.       Physical Exam     Initial Vitals [11/18/19 0824]   BP Pulse Resp Temp SpO2   (!) 202/118 104 16 97.9 °F (36.6 °C) 97 %      MAP       --         Physical Exam    Nursing note and vitals reviewed.  Constitutional: She appears well-developed and well-nourished.   HENT:   Head: Normocephalic and  atraumatic.   Eyes: Conjunctivae and EOM are normal.   Neck: Normal range of motion. Neck supple.   Cardiovascular: Normal rate, regular rhythm, normal heart sounds and intact distal pulses.   Pulses:       Carotid pulses are 2+ on the right side, and 2+ on the left side.       Radial pulses are 2+ on the right side, and 2+ on the left side.        Dorsalis pedis pulses are 2+ on the right side, and 2+ on the left side.   Pulmonary/Chest: Breath sounds normal. She has no wheezes. She has no rhonchi. She has no rales.   Abdominal: Soft. There is no tenderness. There is no rebound and no guarding.   Musculoskeletal: Normal range of motion. She exhibits no edema or tenderness.   No midline C, T, or L spinal tenderness to palpation. Left sided paraspinal cervical pain TTP. Tenderness throughout left shoulder. Decreased ROM of shoulder due to pain. No overlying erythema, warmth, or edema.    Neurological: She is alert and oriented to person, place, and time. She has normal strength.   Strength and sensation intact and symmetric in upper and lower extremities. Normal gait. Speech clear.    Skin: Skin is warm.   Psychiatric: She has a normal mood and affect.         ED Course   Procedures  Labs Reviewed   CBC W/ AUTO DIFFERENTIAL - Abnormal; Notable for the following components:       Result Value    Mean Corpuscular Hemoglobin 32.6 (*)     Lymph # 0.7 (*)     Mono # 0.1 (*)     Gran% 84.3 (*)     Lymph% 10.4 (*)     Mono% 2.2 (*)     All other components within normal limits   COMPREHENSIVE METABOLIC PANEL - Abnormal; Notable for the following components:    Sodium 134 (*)     Glucose 571 (*)     eGFR if  54.1 (*)     eGFR if non  47.0 (*)     All other components within normal limits   C-REACTIVE PROTEIN - Abnormal; Notable for the following components:    CRP 16.7 (*)     All other components within normal limits   TROPONIN I   SEDIMENTATION RATE     EKG Readings: (Independently  Interpreted)   Sinus tachycardia at 110 bpm; left axis deviation; inferior, septal, and anterior t wave inversions; non specific low lateral t wave abnormalities; inferior Q waves; no significant change when compared to previous     ECG Results          EKG 12-lead (Final result)  Result time 11/18/19 14:05:32    Final result by Baldo, Lab In [facility] (11/18/19 14:05:32)                 Narrative:    Test Reason : M54.2,    Vent. Rate : 110 BPM     Atrial Rate : 110 BPM     P-R Int : 164 ms          QRS Dur : 084 ms      QT Int : 332 ms       P-R-T Axes : 038 -30 -55 degrees     QTc Int : 449 ms    Sinus tachycardia  Left atrial enlargement  Left axis deviation  Nonspecific ST-t wave abnormality  Abnormal ECG  When compared with ECG of 28-DEC-2018 23:00,  No significant change was found  Confirmed by AUDELIA ROSSI MD ([id number]) on 11/18/2019 2:05:27 PM    Referred By: [email or web address]   SELF           Confirmed By:AUDELIA ROSSI MD                            Imaging Results          CTA Neck (Final result)  Result time 11/18/19 16:27:38    Final result by Christian Riley MD (11/18/19 16:27:38)                 Impression:      Findings are consistent with fibromuscular disease in the cervical internal carotid arteries above the bulb bilaterally.  Disease appears worse on the right.  Degree of stenosis is difficult to assess due to the beaded appearance, as there could be webs with focal high grade stenosis which are not well appreciated on CT angiogram.      Electronically signed by: Christian Riley MD  Date:    11/18/2019  Time:    16:27             Narrative:    EXAMINATION:  CTA NECK    CLINICAL HISTORY:  Carotid stenosis, known or suspected;    TECHNIQUE:  CT angiogram was performed from the level of the vargas to the top of the orbits following the IV administration of 100mL of Omnipaque 350.   Sagittal and coronal reconstructions and maximum intensity projection reconstructions were performed. Arterial stenosis  percentages are based on NASCET measurement criteria.    COMPARISON:  Carotid ultrasound 11/18/2019    FINDINGS:  CTA of the neck demonstrates normal 3 vessel arch branching pattern.  There is some tortuosity of the common carotid arteries bilaterally.  Vertebral arteries are codominant with tortuosity throughout their course in the transverse foramina.  Basilar artery has good flow without stenosis.    The right carotid bifurcation has no stenosis, but just above the bifurcation the cervical ICA has tortuosity and a beaded appearance over a 2.5 cm length consistent with fibromuscular disease.  Because of this beaded appearance is difficult to  the exact degree of stenosis.  The vertical and horizontal petrous ICA on the right also has mild diffuse narrowing.    Left carotid bifurcation also is patent and there is no stenosis in the bulb.  The distal cervical ICA also has irregular beaded appearance over approximately 1.5 cm distance consistent with fibromuscular disease.    Intracranially, there is satisfactory flow in the cavernous and supraclinoid ICA bilaterally without stenosis.  Moderate size posterior communicating arteries present on the left.                               US Carotid Bilateral (Final result)  Result time 11/18/19 12:30:59    Final result by Angelica Nevarez MD (11/18/19 12:30:59)                 Impression:      In this patient with a known clinical history of fibromuscular dysplasia, there are elevated velocities and systolic and diastolic ratios within the distal internal carotid arteries bilaterally.    On the right, some of the measurements would place the degree of narrowing in the 80-99% range while others would place the degree of narrowing in the 60-79% range.  It is difficult to correlate with the visual degree of narrowing in this region.    On the left, again there are some discrepancies in the estimated degree of narrowing, overall suggestive of 60-79%  narrowing.    Recommend further evaluation with CTa of the neck for more accurate assessment.      Electronically signed by: Angelica Nevarez MD  Date:    11/18/2019  Time:    12:30             Narrative:    EXAMINATION:  US CAROTID BILATERAL    CLINICAL HISTORY:  Cervicalgia    TECHNIQUE:  Grayscale and color Doppler ultrasound examination of the carotid and vertebral artery systems bilaterally.  Stenosis estimates are per the NASCET measurement criteria.    COMPARISON:  None.    Prior ultrasound from February 2017, CT report from March 2017, and angiogram February 2017    FINDINGS:  The examination of the distal internal carotid arteries bilaterally was technically difficult.    Right:    Internal Carotid Artery (ICA):    Peak systolic velocity 234 cm/sec    End diastolic velocity 100 cm/sec    ICA/CCA peak systolic ratio: 5.09    ICA/CCA end diastolic ratio: 7.14    Plaque formation: Homogeneous    Vertebral artery: Antegrade flow and normal waveform.    Left:    Internal Carotid Artery (ICA):    Peak systolic velocity 160 cm/sec    End diastolic velocity 67 cm/sec    ICA/CCA peak systolic ratio: 4.10    ICA/CCA end diastolic ratio: 4.47    Plaque formation: Homogeneous    Vertebral artery: Antegrade flow and normal waveform.                               X-Ray Chest PA And Lateral (Final result)  Result time 11/18/19 10:06:12    Final result by Rita Trevino MD (11/18/19 10:06:12)                 Impression:      No pneumonia or other acute disease identified in this patient with chronic patchy heterogeneous subsegmental opacity in all segments of the right lower lobe as well as in the middle lobe; this abnormality dates back to at least 08/05/2016.    Bronchitis is often radiographically occult.      Electronically signed by: Rita Trevino MD  Date:    11/18/2019  Time:    10:06             Narrative:    EXAMINATION:  XR CHEST PA AND LATERAL    CLINICAL HISTORY:  Cough    TECHNIQUE:  PA and lateral views  of the chest were performed.    COMPARISON:  Chest radiographs: 12/26/2018.  06/30/2018.  02/04/2018.  03/05/2016.    CT of the abdomen and pelvis: 06/30/2018.    CT renal stone: 02/28/2017.    Pulmonary CTA: 08/05/2016.    FINDINGS:  Soft tissues of the patient's arms project over lateral view obscuring some detail of the retrosternal airspace and mediastinal margins.    IVC filter seen on lateral view.    Mediastinal structures are midline.  Descending thoracic aorta is tortuous.  Cardiac silhouette and pulmonary vascular distribution appear unremarkable.    Chronic patchy heterogeneous subsegmental opacity is present in right lower lobe involving the superior and basal segments, as well as in the middle lobe.  Similar findings are present on prior studies dating back to August 2016, cited above.    I detect no new pulmonary disease and no pleural fluid, pneumothorax, pneumomediastinum, pneumoperitoneum or significant osseous abnormality.                               X-Ray Shoulder Trauma Left (Final result)  Result time 11/18/19 09:59:14    Final result by Everett Ferrara MD (11/18/19 09:59:14)                 Impression:      As above      Electronically signed by: Everett Ferrara MD  Date:    11/18/2019  Time:    09:59             Narrative:    EXAMINATION:  XR SHOULDER TRAUMA 3 VIEW LEFT    TECHNIQUE:  Three views of the left shoulder were obtained, with AP, lateral, and axillary projections submitted.    COMPARISON:  No relevant comparison examinations are currently available.  Clinical information of pain, with no recent trauma history.    FINDINGS:  Visualized osseous structures appear unremarkable, with no evidence of recent or healing fracture, lytic destructive process, appreciable glenohumeral arthritic change, or other significant abnormality identified.  No glenohumeral dislocation.  No abnormal soft tissue calcifications.                               X-Ray Cervical Spine AP And Lateral (Final result)   "Result time 11/18/19 10:14:50    Final result by José Johnson III, MD (11/18/19 10:14:50)                 Impression:      As above.  DJD but no acute process seen.    Electronically signed by resident: Mando Sullivan  Date:    11/18/2019  Time:    09:59    Electronically signed by: José Johnson MD  Date:    11/18/2019  Time:    10:14             Narrative:    EXAMINATION:  XR CERVICAL SPINE AP LATERAL    CLINICAL HISTORY:  Cervicalgia    TECHNIQUE:  AP, lateral and open mouth views of the cervical spine were performed.    COMPARISON:  Cervical spine CT 02/26/2017    FINDINGS:  Slight reversal of the normal cervical lordosis.  Mild degenerative joint disease worst at C5-6.  No fracture or subluxation.  Odontoid prevertebral soft tissues and posterior elements are intact.  No fracture, dislocation or bone destruction seen.                                 Medical Decision Making:   History:   Old Medical Records: I decided to obtain old medical records.  Old Records Summarized: records from clinic visits.       <> Summary of Records: Unclear from patient's chart whether she had internal carotid artery dissection, but is a listed diagnosis.      Vascular Neurology note 2/2017 - Pt had "angiography with R ICA occlusive thrombus seen w/ successful aspiration thrombectomy revealing an irregular looped segment, unclear if underlying FMD w/ dissection or underlying atherosclerotic plaque rupture. Subsequently found to have distal M3 branch occlusion that was too distal for intervention."  Initial Assessment:   54-year-old female with PMHx of asthma, PE (s/p IVC filter, on Pradaxa), HTN, scleroderma, stroke, carotid stenosis, internal carotid artery dissection (??) who presents to the ED with c/o neck pain. Per pt, she woke two days ago with left shoulder pain, which has been gradually worsening since. She went to an Urgent Care yesterday and was given a steroid shot and prescribed prednisone, Robaxin, and Valium. Pt " is hypertensive at 202/118 and mildly tachycardic at 104. Afebrile. Lungs CTA bilaterally. Left sided paraspinal cervical pain TTP. Tenderness throughout left shoulder. Decreased ROM of shoulder due to pain.  Neurovascularly intact throughout.   Differential Diagnosis:   DDX includes but is not limited to musculoskeletal strain, shoulder fracture/dislocation, cervical radiculopathy, electrolyte abnormality, anemia, carotid dissection, carotid stenosis, atypical presentation of ACS.   Independently Interpreted Test(s):   I have ordered and independently interpreted X-rays - see summary below.  I have ordered and independently interpreted EKG Reading(s) - see prior notes  Clinical Tests:   Lab Tests: Ordered and Reviewed  Radiological Study: Ordered and Reviewed  Medical Tests: Ordered and Reviewed  ED Management:  Will obtain basic labs, ESR/CRP, troponin, EKG, CXR, and carotid US. Will also check x-rays of left shoulder and cervical spine. Pt given 1 g of acetaminophen.     Cervical spine with DJD but no acute process seen.  Left shoulder x-ray also without acute process.  Chest x-ray with chronic patchy heterogeneous subsegmental opacity in all segments of the right lower lobe, similar to previous in August 2016.  CRP 16.7, ESR 32.  EKG with sinus tachycardia at 110, no STEMI.  Troponin undetectable, no indication to repeat as patient's symptoms have been persistent for greater than 24 hr.  No leukocytosis or anemia on CBC.  Hyperglycemia of 571.  Patient reports average glucose of 250 at home.  This is most likely elevated secondary to recent steroid use.  Will give 3 units IV insulin and 1 L IV fluids.    Pt's glucose improved to 258, which is about her baseline. Pt's BP of 202/109. Will give home dose of lisinopril. Pt reports mildly improved symptoms with lidocaine patch.     Carotid US with elevated velocities and systolic and diastolic ratios within the distal internal carotid arteries bilaterally. Right  narrowing in the 80-99%. Left narrowing with 60-79% narrowing. Radiology recommended obtaining neck CTA- order placed.     Pt's BP remains elevated at 223/104. Will give triamterene-hydrochlorothiazide and lisinopril.     Neck CTA with findings consistent with fibromuscular disease in the cervical internal carotid arteries above the bulb bilaterally.  Disease appears worse on the right.    Discussed results with Vascular Neurology, who will come evaluate the patient in the ED.     I signed out the care of this patient to Elvi Fuentes PA-C. Final dispo pending Vascular Surgery recommendations. Left sided neck/shoulder pain is most likely related to musculoskeletal strain. Recommend discharging with lidocaine patches and Flexeril. She has poorly controlled DM and HTN. Recommend having her f/u with PCP within the next 2-3 days.     I have discussed the treatment and management of this patient with my supervising physician, and we agree on the plan of care.      Dali Munoz PA-C              Scribe Attestation:   Scribe #1: I performed the above scribed service and the documentation accurately describes the services I performed. I attest to the accuracy of the note.    Attending Attestation:     Physician Attestation Statement for NP/PA:   I discussed this assessment and plan of this patient with the NP/PA, but I did not personally examine the patient. The face to face encounter was performed by the NP/PA.          Attending ED Notes:   Continuation:  Vascular surgery evaluated the patient in the emergency department.  They feel that the patient's symptoms are musculoskeletal.  They recommended that the patient start taking Plavix and aspirin.  They would like the patient to follow up in clinic.                        Clinical Impression:       ICD-10-CM ICD-9-CM   1. Left shoulder pain M25.512 719.41   2. Neck pain M54.2 723.1   3. Cough R05 786.2   4. Neck pain on left side M54.2 723.1   5. History of carotid  stenosis Z86.79 V12.59   6. Hypertension, unspecified type I10 401.9   7. Type 2 diabetes mellitus with hyperglycemia, with long-term current use of insulin E11.65 250.00    Z79.4 790.29     V58.67         Disposition:   Disposition: Other  Condition: Stable                     Dali Munoz PA-C  11/18/19 1732       Inocencio Roblero MD  11/19/19 [id number]

## 2019-11-19 NOTE — ASSESSMENT & PLAN NOTE
53yo female with L neck/shoulder pain and known carotid artery stenosis likely secondary to fibromuscular dysplasia    - recommend aspirin, statin and follow up with Dr. Flores whom the patient has seen in the past - will reach out to his clinic staff to arrange follow up  - no acute vascular intervention warranted at this time, pain likely musculoskeletal in nature and unrelated to carotid artery stenosis  - will sign off at this time, please call with questions

## 2019-11-19 NOTE — DISCHARGE INSTRUCTIONS
Stop robaxin, start flexeril. Start taking 81mg Aspirin daily. Start prescribed simvastatin daily.

## 2019-11-19 NOTE — HPI
Ms Gandara is a 55yo female with PMHx of asthma, PE (s/p IVC filter, on Pradaxa), HTN, scleroderma, stroke, carotid stenosis who presents to the ED with right sided neck and shoulder pain. Vascular surgery was consulted for bilateral carotid artery stenosis R>L.    Patient states that she woke two days ago with left shoulder and neck pain, which has been gradually worsening. She went to an Urgent Care yesterday and was given a steroid shot and prescribed prednisone, Robaxin, and Valium. She has not have any relief with this treatment. She describes her pain as a constant tightness radiating from her left shoulder to her left sided neck and arm, rated 8/10 currently. She has had some relief with applying heat pads to the area. She has also had a productive cough for the past 2 weeks with green phlegm production. Denies injury, trauma, or heavy lifting. Denies fevers, chills, numbness, weakness, vision changes, chest pain, SOB, abdominal pain, n/v/d, urinary symptoms, or any other medical complaints. CTA showed fibromuscular disease in the cervical internal carotid arteries above the bulb bilaterally. US showed 80-99% stenosis on the R, 60-79% on the L. Upon vascular surgery interview, patient states that her L neck/shoulder pain has improved with heating pad. States pain is now 4/10 compared to 10/10 when she first arrived. She denies any stroke like symptoms.

## 2019-11-19 NOTE — CONSULTS
[facility]  Vascular Surgery  Consult Note    Inpatient consult to Vascular Surgery  Consult performed by: Neida London MD  Consult ordered by: Dali Munoz PA-C  Reason for consult: carotid artery stenosis        Subjective:     Chief Complaint/Reason for Admission: carotid artery stenosis    History of Present Illness: Ms Gandara is a 53yo female with PMHx of asthma, PE (s/p IVC filter, on Pradaxa), HTN, scleroderma, stroke, carotid stenosis who presents to the ED with right sided neck and shoulder pain. Vascular surgery was consulted for bilateral carotid artery stenosis R>L.    Patient states that she woke two days ago with left shoulder and neck pain, which has been gradually worsening. She went to an Urgent Care yesterday and was given a steroid shot and prescribed prednisone, Robaxin, and Valium. She has not have any relief with this treatment. She describes her pain as a constant tightness radiating from her left shoulder to her left sided neck and arm, rated 8/10 currently. She has had some relief with applying heat pads to the area. She has also had a productive cough for the past 2 weeks with green phlegm production. Denies injury, trauma, or heavy lifting. Denies fevers, chills, numbness, weakness, vision changes, chest pain, SOB, abdominal pain, n/v/d, urinary symptoms, or any other medical complaints.  CTA showed fibromuscular disease in the cervical internal carotid arteries above the bulb bilaterally. US showed 80-99% stenosis on the R, 60-79% on the L. Upon vascular surgery interview, patient states that her L neck/shoulder pain has improved with heating pad. States pain is now 4/10 compared to 10/10 when she first arrived. She denies any stroke like symptoms.      (Not in a hospital admission)    Review of patient's allergies indicates:   Allergen Reactions    Plasma, human, normal        Past Medical History:   Diagnosis Date    Asthma     Carotid stenosis     Dyspnea  on exertion     Hypertension 3/8/2016    Internal carotid artery dissection 2/26/2017    Long term current use of anticoagulant therapy 3/8/2016    PE (pulmonary embolism)     Hx of multiple DVT/PE    Presence of IVC filter     Scleroderma     Stroke 02/2017    Type 2 diabetes mellitus without complication      Past Surgical History:   Procedure Laterality Date    DANETTE FILTER PLACEMENT       Family History     Problem Relation (Age of Onset)    Breast cancer Mother    Diabetes Mellitus Maternal Uncle    Hypertension Father    No Known Problems Brother, Maternal Grandmother, Maternal Grandfather, Paternal Grandmother, Paternal Grandfather, Sister, Maternal Aunt, Paternal Aunt, Paternal Uncle        Tobacco Use    Smoking status: Never Smoker    Smokeless tobacco: Never Used   Substance and Sexual Activity    Alcohol use: Yes     Alcohol/week: 3.0 standard drinks     Types: 3 Cans of beer per week     Comment:  a day    Drug use: No    Sexual activity: Never     Review of Systems   Constitutional: Negative for chills and fever.   Respiratory: Negative for chest tightness and shortness of breath.    Cardiovascular: Negative for chest pain.   Gastrointestinal: Negative for abdominal distention and abdominal pain.   Genitourinary: Negative for difficulty urinating and dysuria.   Musculoskeletal: Positive for arthralgias.   Neurological: Negative for dizziness, syncope, speech difficulty, weakness, light-headedness and headaches.   Hematological: Negative for adenopathy.     Objective:     Vital Signs (Most Recent):  Temp: 97.7 °F (36.5 °C) (11/18/19 1456)  Pulse: 72 (11/18/19 1631)  Resp: 18 (11/18/19 1631)  BP: (!) 223/104 (11/18/19 1631)  SpO2: 98 % (11/18/19 1631) Vital Signs (24h Range):  Temp:  [97.7 °F (36.5 °C)-97.9 °F (36.6 °C)] 97.7 °F (36.5 °C)  Pulse:  [] 72  Resp:  [16-18] 18  SpO2:  [97 %-98 %] 98 %  BP: (202-223)/(104-118) 223/104     Weight: 79.4 kg (175 lb)  Body mass index is  30.04 kg/m².    Physical Exam   Constitutional: She is oriented to person, place, and time. She appears well-developed and well-nourished.   HENT:   Head: Normocephalic and atraumatic.   Cardiovascular: Normal rate.   Pulmonary/Chest: Effort normal.   Abdominal: Soft.   Neurological: She is alert and oriented to person, place, and time.   Skin: Skin is warm and dry. Capillary refill takes less than 2 seconds.   Psychiatric: She has a normal mood and affect.   Nursing note and vitals reviewed.      Significant Labs:  CBC:   Recent Labs   Lab 11/18/19  0921   WBC 6.43   RBC 4.23   HGB 13.8   HCT 41.6      MCV 98   MCH 32.6*   MCHC 33.2     CMP:   Recent Labs   Lab 11/18/19  0921   *   CALCIUM 9.4   ALBUMIN 3.8   PROT 8.1   *   K 4.3   CO2 24   CL 99   BUN 18   CREATININE 1.3   ALKPHOS 123   ALT 12   AST 16   BILITOT 0.3       Significant Diagnostics:  CT:   FINDINGS:  CTA of the neck demonstrates normal 3 vessel arch branching pattern.  There is some tortuosity of the common carotid arteries bilaterally.  Vertebral arteries are codominant with tortuosity throughout their course in the transverse foramina.  Basilar artery has good flow without stenosis.    The right carotid bifurcation has no stenosis, but just above the bifurcation the cervical ICA has tortuosity and a beaded appearance over a 2.5 cm length consistent with fibromuscular disease.  Because of this beaded appearance is difficult to  the exact degree of stenosis.  The vertical and horizontal petrous ICA on the right also has mild diffuse narrowing.    Left carotid bifurcation also is patent and there is no stenosis in the bulb.  The distal cervical ICA also has irregular beaded appearance over approximately 1.5 cm distance consistent with fibromuscular disease.    Intracranially, there is satisfactory flow in the cavernous and supraclinoid ICA bilaterally without stenosis.  Moderate size posterior communicating arteries present on  the left.    U/S: In this patient with a known clinical history of fibromuscular dysplasia, there are elevated velocities and systolic and diastolic ratios within the distal internal carotid arteries bilaterally.    On the right, some of the measurements would place the degree of narrowing in the 80-99% range while others would place the degree of narrowing in the 60-79% range.  It is difficult to correlate with the visual degree of narrowing in this region.    On the left, again there are some discrepancies in the estimated degree of narrowing, overall suggestive of 60-79% narrowing.    Recommend further evaluation with CTa of the neck for more accurate assessment.    Assessment/Plan:     Fibromuscular dysplasia of cervicocranial artery  55yo female with L neck/shoulder pain and known carotid artery stenosis likely secondary to fibromuscular dysplasia    - recommend aspirin, statin and follow up with Dr. Flores whom the patient has seen in the past - will reach out to his clinic staff to arrange follow up  - no acute vascular intervention warranted at this time, pain likely musculoskeletal in nature and unrelated to carotid artery stenosis  - will sign off at this time, please call with questions          Thank you for your consult. I will sign off. Please contact us if you have any additional questions.    Neida London MD  Vascular Surgery  [facility]

## 2019-11-19 NOTE — SUBJECTIVE & OBJECTIVE
(Not in a hospital admission)    Review of patient's allergies indicates:   Allergen Reactions    Plasma, human, normal        Past Medical History:   Diagnosis Date    Asthma     Carotid stenosis     Dyspnea on exertion     Hypertension 3/8/2016    Internal carotid artery dissection 2/26/2017    Long term current use of anticoagulant therapy 3/8/2016    PE (pulmonary embolism)     Hx of multiple DVT/PE    Presence of IVC filter     Scleroderma     Stroke 02/2017    Type 2 diabetes mellitus without complication      Past Surgical History:   Procedure Laterality Date    DANETTE FILTER PLACEMENT       Family History     Problem Relation (Age of Onset)    Breast cancer Mother    Diabetes Mellitus Maternal Uncle    Hypertension Father    No Known Problems Brother, Maternal Grandmother, Maternal Grandfather, Paternal Grandmother, Paternal Grandfather, Sister, Maternal Aunt, Paternal Aunt, Paternal Uncle        Tobacco Use    Smoking status: Never Smoker    Smokeless tobacco: Never Used   Substance and Sexual Activity    Alcohol use: Yes     Alcohol/week: 3.0 standard drinks     Types: 3 Cans of beer per week     Comment:  a day    Drug use: No    Sexual activity: Never     Review of Systems   Constitutional: Negative for chills and fever.   Respiratory: Negative for chest tightness and shortness of breath.    Cardiovascular: Negative for chest pain.   Gastrointestinal: Negative for abdominal distention and abdominal pain.   Genitourinary: Negative for difficulty urinating and dysuria.   Musculoskeletal: Positive for arthralgias.   Neurological: Negative for dizziness, syncope, speech difficulty, weakness, light-headedness and headaches.   Hematological: Negative for adenopathy.     Objective:     Vital Signs (Most Recent):  Temp: 97.7 °F (36.5 °C) (11/18/19 1456)  Pulse: 72 (11/18/19 1631)  Resp: 18 (11/18/19 1631)  BP: (!) 223/104 (11/18/19 1631)  SpO2: 98 % (11/18/19 1631) Vital Signs (24h  Range):  Temp:  [97.7 °F (36.5 °C)-97.9 °F (36.6 °C)] 97.7 °F (36.5 °C)  Pulse:  [] 72  Resp:  [16-18] 18  SpO2:  [97 %-98 %] 98 %  BP: (202-223)/(104-118) 223/104     Weight: 79.4 kg (175 lb)  Body mass index is 30.04 kg/m².    Physical Exam   Constitutional: She is oriented to person, place, and time. She appears well-developed and well-nourished.   HENT:   Head: Normocephalic and atraumatic.   Cardiovascular: Normal rate.   Pulmonary/Chest: Effort normal.   Abdominal: Soft.   Neurological: She is alert and oriented to person, place, and time.   Skin: Skin is warm and dry. Capillary refill takes less than 2 seconds.   Psychiatric: She has a normal mood and affect.   Nursing note and vitals reviewed.      Significant Labs:  CBC:   Recent Labs   Lab 11/18/19  0921   WBC 6.43   RBC 4.23   HGB 13.8   HCT 41.6      MCV 98   MCH 32.6*   MCHC 33.2     CMP:   Recent Labs   Lab 11/18/19  0921   *   CALCIUM 9.4   ALBUMIN 3.8   PROT 8.1   *   K 4.3   CO2 24   CL 99   BUN 18   CREATININE 1.3   ALKPHOS 123   ALT 12   AST 16   BILITOT 0.3       Significant Diagnostics:  CT:   FINDINGS:  CTA of the neck demonstrates normal 3 vessel arch branching pattern.  There is some tortuosity of the common carotid arteries bilaterally.  Vertebral arteries are codominant with tortuosity throughout their course in the transverse foramina.  Basilar artery has good flow without stenosis.    The right carotid bifurcation has no stenosis, but just above the bifurcation the cervical ICA has tortuosity and a beaded appearance over a 2.5 cm length consistent with fibromuscular disease.  Because of this beaded appearance is difficult to  the exact degree of stenosis.  The vertical and horizontal petrous ICA on the right also has mild diffuse narrowing.    Left carotid bifurcation also is patent and there is no stenosis in the bulb.  The distal cervical ICA also has irregular beaded appearance over approximately 1.5 cm  distance consistent with fibromuscular disease.    Intracranially, there is satisfactory flow in the cavernous and supraclinoid ICA bilaterally without stenosis.  Moderate size posterior communicating arteries present on the left.    U/S: In this patient with a known clinical history of fibromuscular dysplasia, there are elevated velocities and systolic and diastolic ratios within the distal internal carotid arteries bilaterally.    On the right, some of the measurements would place the degree of narrowing in the 80-99% range while others would place the degree of narrowing in the 60-79% range.  It is difficult to correlate with the visual degree of narrowing in this region.    On the left, again there are some discrepancies in the estimated degree of narrowing, overall suggestive of 60-79% narrowing.    Recommend further evaluation with CTa of the neck for more accurate assessment.

## 2019-11-19 NOTE — PROVIDER PROGRESS NOTES - EMERGENCY DEPT.
Encounter Date: 11/18/2019    ED Physician Progress Notes        Physician Note:   ED Physician Hand-off Note:    ED Course: I assumed care of patient from off-going ED physician team. Briefly, Patient is a 55 y/o AAF c/o neck pain. Abnormal CT and ultrasound findings.    At the time of signout plan was pending vascular surgery evaluation and recommendations.    Medications given in the ED:    Medications  sodium chloride 0.9% bolus 1,000 mL (0 mLs Intravenous Stopped 11/18/19 1422)  acetaminophen tablet 1,000 mg (1,000 mg Oral Given 11/18/19 1036)  insulin regular injection 3 Units (3 Units Intravenous Given 11/18/19 1043)  sodium chloride 0.9% bolus 1,000 mL (0 mLs Intravenous Stopped 11/18/19 1800)  lisinopril tablet 20 mg (20 mg Oral Given 11/18/19 1510)  iohexol (OMNIPAQUE 350) injection 100 mL (100 mLs Intravenous Given 11/18/19 1610)  triamterene-hydrochlorothiazide 37.5-25 mg per capsule 2 tablet (2 tablets Oral Given 11/18/19 1801)  lisinopril tablet 20 mg (20 mg Oral Given 11/18/19 1732)      Disposition: Discharged. Vascular surgery evaluation in the ED - they feel that pain MSK. Recommend ASA and statin. Follow up in clinic.    Patient counseled regarding exam, results, diagnosis, treatment, and plan. She is already taking ASA 81 daily. Given rx for simvastatin, flexeril, lidoderm patches. Follow up with PCP and vascular surgery, Dr Flores.    Impression: neck pain    Final diagnoses:  (M54.2) Neck pain  (R05) Cough  (M25.512) Left shoulder pain (Primary)  (M54.2) Neck pain on left side  (Z86.79) History of carotid stenosis  (I10) Hypertension, unspecified type  (E11.65, Z79.4) Type 2 diabetes mellitus with hyperglycemia, with long-term current use of insulin

## 2019-11-20 DIAGNOSIS — I65.29 STENOSIS OF CAROTID ARTERY, UNSPECIFIED LATERALITY: Primary | ICD-10-CM

## 2019-11-20 LAB
POCT GLUCOSE: 236 MG/DL (ref 70–110)
POCT GLUCOSE: 252 MG/DL (ref 70–110)
POCT GLUCOSE: 289 MG/DL (ref 70–110)
POCT GLUCOSE: 347 MG/DL (ref 70–110)

## 2019-11-24 ENCOUNTER — HOSPITAL ENCOUNTER (EMERGENCY)
Facility: OTHER | Age: 54
Discharge: HOME | End: 2019-11-24
Attending: EMERGENCY MEDICINE
Payer: COMMERCIAL

## 2019-11-24 VITALS
HEIGHT: 64 IN | SYSTOLIC BLOOD PRESSURE: 122 MMHG | TEMPERATURE: 98 F | RESPIRATION RATE: 16 BRPM | HEART RATE: 107 BPM | BODY MASS INDEX: 29.37 KG/M2 | WEIGHT: 172 LBS | DIASTOLIC BLOOD PRESSURE: 76 MMHG | OXYGEN SATURATION: 97 %

## 2019-11-24 DIAGNOSIS — M25.512 LEFT SHOULDER PAIN, UNSPECIFIED CHRONICITY: ICD-10-CM

## 2019-11-24 DIAGNOSIS — R73.9 HYPERGLYCEMIA: ICD-10-CM

## 2019-11-24 DIAGNOSIS — S16.1XXD NECK STRAIN, SUBSEQUENT ENCOUNTER: Primary | ICD-10-CM

## 2019-11-24 LAB
ANION GAP SERPL CALC-SCNC: 13 MMOL/L (ref 8–16)
BASOPHILS # BLD AUTO: 0.04 K/UL (ref 0–0.2)
BASOPHILS NFR BLD: 0.4 % (ref 0–1.9)
BUN SERPL-MCNC: 20 MG/DL (ref 6–20)
CALCIUM SERPL-MCNC: 10.1 MG/DL (ref 8.7–10.5)
CHLORIDE SERPL-SCNC: 97 MMOL/L (ref 95–110)
CO2 SERPL-SCNC: 24 MMOL/L (ref 23–29)
CREAT SERPL-MCNC: 1.4 MG/DL (ref 0.5–1.4)
DIFFERENTIAL METHOD: ABNORMAL
EOSINOPHIL # BLD AUTO: 0.1 K/UL (ref 0–0.5)
EOSINOPHIL NFR BLD: 1.3 % (ref 0–8)
ERYTHROCYTE [DISTWIDTH] IN BLOOD BY AUTOMATED COUNT: 13.2 % (ref 11.5–14.5)
EST. GFR  (AFRICAN AMERICAN): 49 ML/MIN/1.73 M^2
EST. GFR  (NON AFRICAN AMERICAN): 43 ML/MIN/1.73 M^2
GLUCOSE SERPL-MCNC: 353 MG/DL (ref 70–110)
HCT VFR BLD AUTO: 48.1 % (ref 37–48.5)
HGB BLD-MCNC: 15.7 G/DL (ref 12–16)
IMM GRANULOCYTES # BLD AUTO: 0.03 K/UL (ref 0–0.04)
IMM GRANULOCYTES NFR BLD AUTO: 0.3 % (ref 0–0.5)
LYMPHOCYTES # BLD AUTO: 0.6 K/UL (ref 1–4.8)
LYMPHOCYTES NFR BLD: 5.6 % (ref 18–48)
MCH RBC QN AUTO: 31.7 PG (ref 27–31)
MCHC RBC AUTO-ENTMCNC: 32.6 G/DL (ref 32–36)
MCV RBC AUTO: 97 FL (ref 82–98)
MONOCYTES # BLD AUTO: 0.6 K/UL (ref 0.3–1)
MONOCYTES NFR BLD: 5.3 % (ref 4–15)
NEUTROPHILS # BLD AUTO: 9.3 K/UL (ref 1.8–7.7)
NEUTROPHILS NFR BLD: 87.1 % (ref 38–73)
NRBC BLD-RTO: 0 /100 WBC
PLATELET # BLD AUTO: 259 K/UL (ref 150–350)
PMV BLD AUTO: 10.5 FL (ref 9.2–12.9)
POCT GLUCOSE: 318 MG/DL (ref 70–110)
POCT GLUCOSE: 357 MG/DL (ref 70–110)
POTASSIUM SERPL-SCNC: 5.1 MMOL/L (ref 3.5–5.1)
RBC # BLD AUTO: 4.96 M/UL (ref 4–5.4)
SODIUM SERPL-SCNC: 134 MMOL/L (ref 136–145)
TROPONIN I SERPL DL<=0.01 NG/ML-MCNC: <0.006 NG/ML (ref 0–0.03)
WBC # BLD AUTO: 10.63 K/UL (ref 3.9–12.7)

## 2019-11-24 PROCEDURE — 85025 COMPLETE CBC W/AUTO DIFF WBC: CPT

## 2019-11-24 PROCEDURE — 82962 GLUCOSE BLOOD TEST: CPT

## 2019-11-24 PROCEDURE — 96374 THER/PROPH/DIAG INJ IV PUSH: CPT

## 2019-11-24 PROCEDURE — 96375 TX/PRO/DX INJ NEW DRUG ADDON: CPT

## 2019-11-24 PROCEDURE — 99284 EMERGENCY DEPT VISIT MOD MDM: CPT | Mod: 25

## 2019-11-24 PROCEDURE — 84484 ASSAY OF TROPONIN QUANT: CPT

## 2019-11-24 PROCEDURE — 80048 BASIC METABOLIC PNL TOTAL CA: CPT

## 2019-11-24 PROCEDURE — 63600175 PHARM REV CODE 636 W HCPCS: Performed by: EMERGENCY MEDICINE

## 2019-11-24 PROCEDURE — 96361 HYDRATE IV INFUSION ADD-ON: CPT

## 2019-11-24 RX ORDER — ORPHENADRINE CITRATE 100 MG/1
100 TABLET, EXTENDED RELEASE ORAL 2 TIMES DAILY PRN
Qty: 20 TABLET | Refills: 0 | Status: SHIPPED | OUTPATIENT
Start: 2019-11-24 | End: 2019-11-25 | Stop reason: ALTCHOICE

## 2019-11-24 RX ORDER — ONDANSETRON 2 MG/ML
4 INJECTION INTRAMUSCULAR; INTRAVENOUS
Status: COMPLETED | OUTPATIENT
Start: 2019-11-24 | End: 2019-11-24

## 2019-11-24 RX ORDER — KETOROLAC TROMETHAMINE 30 MG/ML
15 INJECTION, SOLUTION INTRAMUSCULAR; INTRAVENOUS
Status: COMPLETED | OUTPATIENT
Start: 2019-11-24 | End: 2019-11-24

## 2019-11-24 RX ORDER — ORPHENADRINE CITRATE 30 MG/ML
30 INJECTION INTRAMUSCULAR; INTRAVENOUS
Status: COMPLETED | OUTPATIENT
Start: 2019-11-24 | End: 2019-11-24

## 2019-11-24 RX ORDER — ORPHENADRINE CITRATE 30 MG/ML
30 INJECTION INTRAMUSCULAR; INTRAVENOUS
Status: DISCONTINUED | OUTPATIENT
Start: 2019-11-24 | End: 2019-11-24

## 2019-11-24 RX ADMIN — KETOROLAC TROMETHAMINE 15 MG: 30 INJECTION, SOLUTION INTRAMUSCULAR; INTRAVENOUS at 07:11

## 2019-11-24 RX ADMIN — SODIUM CHLORIDE 1000 ML: 0.9 INJECTION, SOLUTION INTRAVENOUS at 08:11

## 2019-11-24 RX ADMIN — ONDANSETRON 4 MG: 2 INJECTION INTRAMUSCULAR; INTRAVENOUS at 07:11

## 2019-11-24 RX ADMIN — INSULIN HUMAN 4 UNITS: 100 INJECTION, SOLUTION PARENTERAL at 10:11

## 2019-11-24 RX ADMIN — ORPHENADRINE CITRATE 30 MG: 30 INJECTION INTRAMUSCULAR; INTRAVENOUS at 07:11

## 2019-11-25 ENCOUNTER — LAB VISIT (OUTPATIENT)
Dept: LAB | Facility: HOSPITAL | Age: 54
End: 2019-11-25
Attending: INTERNAL MEDICINE
Payer: COMMERCIAL

## 2019-11-25 ENCOUNTER — OFFICE VISIT (OUTPATIENT)
Dept: INTERNAL MEDICINE | Facility: CLINIC | Age: 54
End: 2019-11-25
Payer: COMMERCIAL

## 2019-11-25 ENCOUNTER — PATIENT OUTREACH (OUTPATIENT)
Dept: ADMINISTRATIVE | Facility: OTHER | Age: 54
End: 2019-11-25

## 2019-11-25 VITALS
HEART RATE: 106 BPM | HEIGHT: 64 IN | BODY MASS INDEX: 29.7 KG/M2 | WEIGHT: 173.94 LBS | OXYGEN SATURATION: 98 % | DIASTOLIC BLOOD PRESSURE: 80 MMHG | SYSTOLIC BLOOD PRESSURE: 102 MMHG

## 2019-11-25 DIAGNOSIS — E11.22 TYPE 2 DIABETES MELLITUS WITH STAGE 3 CHRONIC KIDNEY DISEASE, WITH LONG-TERM CURRENT USE OF INSULIN: ICD-10-CM

## 2019-11-25 DIAGNOSIS — J45.52 SEVERE PERSISTENT ASTHMA WITH STATUS ASTHMATICUS: Chronic | ICD-10-CM

## 2019-11-25 DIAGNOSIS — I10 ESSENTIAL HYPERTENSION: ICD-10-CM

## 2019-11-25 DIAGNOSIS — Z79.4 TYPE 2 DIABETES MELLITUS WITH STAGE 3 CHRONIC KIDNEY DISEASE, WITH LONG-TERM CURRENT USE OF INSULIN: Primary | ICD-10-CM

## 2019-11-25 DIAGNOSIS — N18.30 TYPE 2 DIABETES MELLITUS WITH STAGE 3 CHRONIC KIDNEY DISEASE, WITH LONG-TERM CURRENT USE OF INSULIN: Primary | ICD-10-CM

## 2019-11-25 DIAGNOSIS — E11.22 TYPE 2 DIABETES MELLITUS WITH STAGE 3 CHRONIC KIDNEY DISEASE, WITH LONG-TERM CURRENT USE OF INSULIN: Primary | ICD-10-CM

## 2019-11-25 DIAGNOSIS — N18.30 TYPE 2 DIABETES MELLITUS WITH STAGE 3 CHRONIC KIDNEY DISEASE, WITH LONG-TERM CURRENT USE OF INSULIN: ICD-10-CM

## 2019-11-25 DIAGNOSIS — Z01.419 WELL WOMAN EXAM: ICD-10-CM

## 2019-11-25 DIAGNOSIS — Z79.4 TYPE 2 DIABETES MELLITUS WITH STAGE 3 CHRONIC KIDNEY DISEASE, WITH LONG-TERM CURRENT USE OF INSULIN: ICD-10-CM

## 2019-11-25 DIAGNOSIS — M34.9 SCLERODERMA: ICD-10-CM

## 2019-11-25 PROBLEM — J18.9 PNEUMONIA: Status: RESOLVED | Noted: 2017-11-29 | Resolved: 2019-11-25

## 2019-11-25 PROBLEM — J20.9 ACUTE BRONCHITIS: Status: RESOLVED | Noted: 2017-12-31 | Resolved: 2019-11-25

## 2019-11-25 PROBLEM — J45.901 ASTHMA EXACERBATION: Status: RESOLVED | Noted: 2017-12-31 | Resolved: 2019-11-25

## 2019-11-25 PROBLEM — E11.10 DIABETIC KETOACIDOSIS WITHOUT COMA ASSOCIATED WITH TYPE 2 DIABETES MELLITUS: Status: RESOLVED | Noted: 2018-06-30 | Resolved: 2019-11-25

## 2019-11-25 PROBLEM — R73.9 HYPERGLYCEMIA: Status: RESOLVED | Noted: 2017-12-31 | Resolved: 2019-11-25

## 2019-11-25 LAB
ALBUMIN SERPL BCP-MCNC: 3.9 G/DL (ref 3.5–5.2)
ALBUMIN/CREAT UR: 10.9 UG/MG (ref 0–30)
ALP SERPL-CCNC: 108 U/L (ref 55–135)
ALT SERPL W/O P-5'-P-CCNC: 22 U/L (ref 10–44)
ANION GAP SERPL CALC-SCNC: 11 MMOL/L (ref 8–16)
AST SERPL-CCNC: 24 U/L (ref 10–40)
BASOPHILS # BLD AUTO: 0.01 K/UL (ref 0–0.2)
BASOPHILS NFR BLD: 0.1 % (ref 0–1.9)
BILIRUB SERPL-MCNC: 0.6 MG/DL (ref 0.1–1)
BUN SERPL-MCNC: 18 MG/DL (ref 6–20)
CALCIUM SERPL-MCNC: 9.4 MG/DL (ref 8.7–10.5)
CHLORIDE SERPL-SCNC: 100 MMOL/L (ref 95–110)
CHOLEST SERPL-MCNC: 141 MG/DL (ref 120–199)
CHOLEST/HDLC SERPL: 1.9 {RATIO} (ref 2–5)
CO2 SERPL-SCNC: 26 MMOL/L (ref 23–29)
CREAT SERPL-MCNC: 1.3 MG/DL (ref 0.5–1.4)
CREAT UR-MCNC: 147 MG/DL (ref 15–325)
DIFFERENTIAL METHOD: ABNORMAL
EOSINOPHIL # BLD AUTO: 0.1 K/UL (ref 0–0.5)
EOSINOPHIL NFR BLD: 0.7 % (ref 0–8)
ERYTHROCYTE [DISTWIDTH] IN BLOOD BY AUTOMATED COUNT: 13.6 % (ref 11.5–14.5)
EST. GFR  (AFRICAN AMERICAN): 54 ML/MIN/1.73 M^2
EST. GFR  (NON AFRICAN AMERICAN): 47 ML/MIN/1.73 M^2
ESTIMATED AVG GLUCOSE: 315 MG/DL (ref 68–131)
GLUCOSE SERPL-MCNC: 277 MG/DL (ref 70–110)
HBA1C MFR BLD HPLC: 12.6 % (ref 4–5.6)
HCT VFR BLD AUTO: 43.7 % (ref 37–48.5)
HDLC SERPL-MCNC: 74 MG/DL (ref 40–75)
HDLC SERPL: 52.5 % (ref 20–50)
HGB BLD-MCNC: 14.3 G/DL (ref 12–16)
LDLC SERPL CALC-MCNC: 51.8 MG/DL (ref 63–159)
LYMPHOCYTES # BLD AUTO: 0.9 K/UL (ref 1–4.8)
LYMPHOCYTES NFR BLD: 13.1 % (ref 18–48)
MCH RBC QN AUTO: 31.6 PG (ref 27–31)
MCHC RBC AUTO-ENTMCNC: 32.7 G/DL (ref 32–36)
MCV RBC AUTO: 97 FL (ref 82–98)
MICROALBUMIN UR DL<=1MG/L-MCNC: 16 UG/ML
MONOCYTES # BLD AUTO: 0.6 K/UL (ref 0.3–1)
MONOCYTES NFR BLD: 8.5 % (ref 4–15)
NEUTROPHILS # BLD AUTO: 5.4 K/UL (ref 1.8–7.7)
NEUTROPHILS NFR BLD: 77.6 % (ref 38–73)
NONHDLC SERPL-MCNC: 67 MG/DL
PLATELET # BLD AUTO: 242 K/UL (ref 150–350)
PMV BLD AUTO: 11 FL (ref 9.2–12.9)
POTASSIUM SERPL-SCNC: 4.2 MMOL/L (ref 3.5–5.1)
PROT SERPL-MCNC: 8.2 G/DL (ref 6–8.4)
RBC # BLD AUTO: 4.53 M/UL (ref 4–5.4)
SODIUM SERPL-SCNC: 137 MMOL/L (ref 136–145)
TRIGL SERPL-MCNC: 76 MG/DL (ref 30–150)
WBC # BLD AUTO: 7.02 K/UL (ref 3.9–12.7)

## 2019-11-25 PROCEDURE — 99215 PR OFFICE/OUTPT VISIT, EST, LEVL V, 40-54 MIN: ICD-10-PCS | Mod: S$GLB,,, | Performed by: INTERNAL MEDICINE

## 2019-11-25 PROCEDURE — 80061 LIPID PANEL: CPT

## 2019-11-25 PROCEDURE — 3074F PR MOST RECENT SYSTOLIC BLOOD PRESSURE < 130 MM HG: ICD-10-PCS | Mod: CPTII,S$GLB,, | Performed by: INTERNAL MEDICINE

## 2019-11-25 PROCEDURE — 99999 PR PBB SHADOW E&M-EST. PATIENT-LVL IV: CPT | Mod: PBBFAC,,, | Performed by: INTERNAL MEDICINE

## 2019-11-25 PROCEDURE — 83036 HEMOGLOBIN GLYCOSYLATED A1C: CPT

## 2019-11-25 PROCEDURE — 80053 COMPREHEN METABOLIC PANEL: CPT

## 2019-11-25 PROCEDURE — 3074F SYST BP LT 130 MM HG: CPT | Mod: CPTII,S$GLB,, | Performed by: INTERNAL MEDICINE

## 2019-11-25 PROCEDURE — 82043 UR ALBUMIN QUANTITATIVE: CPT

## 2019-11-25 PROCEDURE — 99215 OFFICE O/P EST HI 40 MIN: CPT | Mod: S$GLB,,, | Performed by: INTERNAL MEDICINE

## 2019-11-25 PROCEDURE — 3079F PR MOST RECENT DIASTOLIC BLOOD PRESSURE 80-89 MM HG: ICD-10-PCS | Mod: CPTII,S$GLB,, | Performed by: INTERNAL MEDICINE

## 2019-11-25 PROCEDURE — 3079F DIAST BP 80-89 MM HG: CPT | Mod: CPTII,S$GLB,, | Performed by: INTERNAL MEDICINE

## 2019-11-25 PROCEDURE — 36415 COLL VENOUS BLD VENIPUNCTURE: CPT

## 2019-11-25 PROCEDURE — 3008F BODY MASS INDEX DOCD: CPT | Mod: CPTII,S$GLB,, | Performed by: INTERNAL MEDICINE

## 2019-11-25 PROCEDURE — 99999 PR PBB SHADOW E&M-EST. PATIENT-LVL IV: ICD-10-PCS | Mod: PBBFAC,,, | Performed by: INTERNAL MEDICINE

## 2019-11-25 PROCEDURE — 85025 COMPLETE CBC W/AUTO DIFF WBC: CPT

## 2019-11-25 PROCEDURE — 3008F PR BODY MASS INDEX (BMI) DOCUMENTED: ICD-10-PCS | Mod: CPTII,S$GLB,, | Performed by: INTERNAL MEDICINE

## 2019-11-25 RX ORDER — LISINOPRIL 20 MG/1
20 TABLET ORAL DAILY
Qty: 90 TABLET | Refills: 4 | Status: SHIPPED | OUTPATIENT
Start: 2019-11-25 | End: 2020-03-30 | Stop reason: SDUPTHER

## 2019-11-25 RX ORDER — INSULIN GLARGINE 100 [IU]/ML
15 INJECTION, SOLUTION SUBCUTANEOUS NIGHTLY
Qty: 15 ML | Refills: 5 | Status: SHIPPED | OUTPATIENT
Start: 2019-11-25 | End: 2021-01-04

## 2019-11-25 RX ORDER — FLUTICASONE PROPIONATE AND SALMETEROL 250; 50 UG/1; UG/1
1 POWDER RESPIRATORY (INHALATION) 2 TIMES DAILY
Qty: 180 EACH | Refills: 3 | Status: SHIPPED | OUTPATIENT
Start: 2019-11-25 | End: 2020-11-23 | Stop reason: SDUPTHER

## 2019-11-25 RX ORDER — METFORMIN HYDROCHLORIDE 1000 MG/1
1000 TABLET, FILM COATED, EXTENDED RELEASE ORAL 2 TIMES DAILY WITH MEALS
Qty: 180 TABLET | Refills: 3 | Status: SHIPPED | OUTPATIENT
Start: 2019-11-25 | End: 2019-11-26

## 2019-11-25 RX ORDER — LANCETS
1 EACH MISCELLANEOUS
Qty: 200 EACH | Refills: 11 | Status: SHIPPED | OUTPATIENT
Start: 2019-11-25

## 2019-11-25 RX ORDER — LANCETS 26 GAUGE
1 EACH MISCELLANEOUS 2 TIMES DAILY WITH MEALS
Qty: 1 EACH | Refills: 0 | Status: SHIPPED | OUTPATIENT
Start: 2019-11-25 | End: 2020-07-10 | Stop reason: SDUPTHER

## 2019-11-25 RX ORDER — TRIAMTERENE/HYDROCHLOROTHIAZID 37.5-25 MG
1 TABLET ORAL DAILY
Qty: 90 TABLET | Refills: 4 | Status: SHIPPED | OUTPATIENT
Start: 2019-11-25 | End: 2020-03-30 | Stop reason: SDUPTHER

## 2019-11-25 NOTE — PROGRESS NOTES
"    CHIEF COMPLAINT     Chief Complaint   Patient presents with    Diabetes     and medication     Diabetes  HPI     Lyssa Gandara is a 54 y.o. female here today for diabetes    DM  Dx in 2014  Complications include Kidney disease  Previous on lantus 15u and metformin 1000mg BID. Never had a1c below 8. Checks CBGs at home. Checks CBGs BID. No hx of hypoglycemia. Reports she has been out of her medications for the past few weeks. + blurry vision, polyuria.  DM education: no  Foot exam: never  Eye:  2 months  Kidney:    Hx of PE  Was previously on xarelto switched to pradaxa in the setting of CVA. Was considered xarelto failure but today doesn't think she was taking xarelto consistently at the time.      HTN  Lisinopril 20 and maxzide 25/37.5mg. Daily. Doesn't check BP at home.    Low salt diet.     Personally reviewed patient's chart for today's visit.  History notable for poorly-controlled diabetes with last A1c of 12.0 in July 2018.  Patient was seen in urgent care few days ago an inappropriately given steroids for neck/shoulder pain. Patient was then seen in the ER on 11/22 for hyperglycemia and continued pain. Patient last seen by primary care provider in this office in January of 2019.      Personally Reviewed Patient's Medical, surgical, family and social hx. Changes updated in [employer].  Care Team updated in Epic    Review of Systems:  Review of Systems   Eyes: Positive for visual disturbance.   Endocrine: Positive for polyuria.   Musculoskeletal: Positive for neck pain.       Health Maintenance:   Reviewed with patient  Due for the following:      PHYSICAL EXAM     /80 (BP Location: Left arm, Patient Position: Sitting, BP Method: Medium (Manual))   Pulse 106   Ht 5' 4" (1.626 m)   Wt 78.9 kg (173 lb 15.1 oz)   LMP 10/10/2019 Comment: very light period  SpO2 98%   BMI 29.86 kg/m²     Gen: Well Appearing, NAD  HEENT: PERR, EOMI  Neck: FROM, no thyromegaly, no cervical adenopathy  CVD: RRR, no " M/R/G  Pulm: Normal work of breathing, CTAB, no wheezing  Abd:  Soft, NT, ND non TTP, no mass  MSK: no LE edema  Neuro: A&Ox3, gait normal, speech normal  Mood; Mood normal, behavior normal, thought process linear       LABS     Labs reviewed; Notable for  Lab Results   Component Value Date    HGBA1C 12.0 (H) 07/01/2018     Lab Results   Component Value Date    CREATININE 1.4 11/24/2019    BUN 20 11/24/2019     (L) 11/24/2019    K 5.1 11/24/2019    CL 97 11/24/2019    CO2 24 11/24/2019       ASSESSMENT     1. Type 2 diabetes mellitus with stage 3 chronic kidney disease, with long-term current use of insulin  metFORMIN (GLUMETZA) 1000 MG (MOD) 24 hr tablet    insulin (LANTUS SOLOSTAR U-100 INSULIN) glargine 100 units/mL (3mL) SubQ pen    Hemoglobin A1c    Lipid panel    Comprehensive metabolic panel    CBC auto differential    lancing device with lancets Kit    lancets (ONETOUCH ULTRASOFT LANCETS) Misc    Basic metabolic panel    Hemoglobin A1c    Ambulatory consult to Diabetic Education    MICROALBUMIN / CREATININE RATIO URINE    CANCELED: MICROALBUMIN / CREATININE RATIO URINE   2. Essential hypertension  triamterene-hydrochlorothiazide 37.5-25 mg (MAXZIDE-25) 37.5-25 mg per tablet    lisinopril (PRINIVIL,ZESTRIL) 20 MG tablet   3. Scleroderma     4. Severe persistent asthma with status asthmaticus  fluticasone-salmeterol diskus inhaler 250-50 mcg    pt reported that she used to take advair but could not refill due to price, provided savings card, stated she will refill today   5. Well woman exam  Ambulatory Referral to Obstetrics / Gynecology           Plan     Lyssa Gandara is a 54 y.o. female with  1. Type 2 diabetes mellitus with stage 3 chronic kidney disease, with long-term current use of insulin  Patient has been in and out of care for the past year. Will reinitiate DM treatment and send to DM education. Will see her more frequently until we get DM under control. 25/40 minutes counseling pt  regarding her DM and strategies to improve.  Status:uncontrolled  A1c Goal: <7  Meds: will restart metformin 1000mg BID and lantus 15 units daily. Discussed that these medications are to be taken as prescribed   HTN: at goal on lisinopril 20 and maxzide 25/37.5mg daily   ASCVD: continue asa and simvastatin anticipate switching to atorvastatin at next visist  Micro: ordered  Foot: today normal  Eye: eyes due 9/2020  Lifestyle: Recommend at least 7% weight loss, at least 150 mins moderate intensity exercise/week, and 8 hours of sleep nightly    - metFORMIN (GLUMETZA) 1000 MG (MOD) 24 hr tablet; Take 1 tablet (1,000 mg total) by mouth 2 (two) times daily with meals.  Dispense: 180 tablet; Refill: 3  - insulin (LANTUS SOLOSTAR U-100 INSULIN) glargine 100 units/mL (3mL) SubQ pen; Inject 15 Units into the skin every evening.  Dispense: 15 mL; Refill: 5  - Hemoglobin A1c; Future  - Lipid panel; Future  - MICROALBUMIN / CREATININE RATIO URINE; Future  - Comprehensive metabolic panel; Future  - CBC auto differential; Future    2. Essential hypertension  Continue current regimen, would like to uptitrate ACE-I as needed.    3. Scleroderma  From reviewing chart doesn't appear to actually have scleroderma. She does have some skin tightening of LLE but no other features.   Had rheum and derm evaluation in 2016 that echoed these sentiments. Will review chart again and remove from list.    4. Severe persistent asthma with status asthmaticus  refill  - fluticasone-salmeterol diskus inhaler 250-50 mcg; Inhale 1 puff into the lungs 2 (two) times daily.  Dispense: 180 each; Refill: 3    5. Well woman exam  - Ambulatory Referral to Obstetrics / Gynecology; Future    RTC 3 months    Efrain Fields MD

## 2019-11-25 NOTE — ED TRIAGE NOTES
Pt arrived with c/o L neck pain radiating to her L shoulder x 1 week.  Denies any heavy lifting or falls recently.  Took Ibuprofen with no relief.  FROM noted to neck and L arm.  Denies any numbness or tingling.  No obvious deformity noted to L shoulder or arm.

## 2019-11-25 NOTE — ED PROVIDER NOTES
Encounter Date: 11/24/2019    SCRIBE #1 NOTE: I, Renny Woods, am scribing for, and in the presence of, Dr. Renee.       History     Chief Complaint   Patient presents with    Nausea     since 1600    Shoulder Pain     L shoulder and neck pain. Seen at [facility] last week for it, but the pain hasn't stopped     Time seen by provider: 7:03 PM    This is a 54 y.o. female with a history of PE, HTN, and scleroderma who presents with a complaint of left shoulder pain that began approximately over one week ago and persistent since. She is also experiencing nausea that began this afternoon. The patient reports that the pain initially began in her neck and started to radiates to her left shoulder. She had a cough and congestion, which she attributes to the worsening of her pain. Her cough and congestion has resolved. The patient was seen at [facility] for worsened left neck and shoulder pain, not relieved by a steroid shot that she received at . She had an extensive workup with no clear etiology of her symptoms. She has been taking Valium, Tylenol PM, and muscle relaxants with no relief of her symptoms. She denies fever, sore throat, congestion, shortness of breath, cough, abdominal pain, dysuria.     The history is provided by the patient.     Review of patient's allergies indicates:   Allergen Reactions    Plasma, human, normal      Past Medical History:   Diagnosis Date    Asthma     Carotid stenosis     Dyspnea on exertion     Hypertension 3/8/2016    Internal carotid artery dissection 2/26/2017    Long term current use of anticoagulant therapy 3/8/2016    PE (pulmonary embolism)     Hx of multiple DVT/PE    Presence of IVC filter     Scleroderma     Stroke 02/2017    Type 2 diabetes mellitus without complication      Past Surgical History:   Procedure Laterality Date    DANETTE FILTER PLACEMENT       Family History   Problem Relation Age of Onset    Breast cancer Mother 60    Hypertension  Father     No Known Problems Brother     No Known Problems Maternal Grandmother     No Known Problems Maternal Grandfather     No Known Problems Paternal Grandmother     No Known Problems Paternal Grandfather     Diabetes Mellitus Maternal Uncle     No Known Problems Maternal Aunt     No Known Problems Paternal Aunt     No Known Problems Paternal Uncle     Colon cancer Neg Hx     Ovarian cancer Neg Hx      Social History     Tobacco Use    Smoking status: Never Smoker    Smokeless tobacco: Never Used   Substance Use Topics    Alcohol use: Yes     Alcohol/week: 0.0 standard drinks     Frequency: 4 or more times a week     Drinks per session: 3 or 4     Comment: 2 beers a day    Drug use: No     Review of Systems   Constitutional: Negative for fever.   HENT: Negative for congestion.    Eyes: Negative for redness.   Respiratory: Negative for shortness of breath.    Cardiovascular: Negative for chest pain.   Gastrointestinal: Negative for abdominal pain.   Genitourinary: Negative for dysuria.   Musculoskeletal: Positive for neck pain.   Skin: Negative for rash.   Neurological: Negative for headaches.   Psychiatric/Behavioral: Negative for confusion.       Physical Exam     Initial Vitals [11/24/19 1802]   BP Pulse Resp Temp SpO2   123/81 (!) 113 16 97.5 °F (36.4 °C) 99 %      MAP       --         Physical Exam    Nursing note and vitals reviewed.  Constitutional: She appears well-developed and well-nourished. She is not diaphoretic. No distress.   HENT:   Head: Normocephalic and atraumatic.   Mouth/Throat: Oropharynx is clear and moist.   Eyes: Conjunctivae and EOM are normal. Pupils are equal, round, and reactive to light.   Neck: Normal range of motion. Neck supple.   Cardiovascular: Normal rate, regular rhythm, normal heart sounds and normal pulses. Exam reveals no gallop and no friction rub.    No murmur heard.  Pulmonary/Chest: Breath sounds normal. No respiratory distress. She has no wheezes. She  has no rhonchi. She has no rales.   Abdominal: Soft. There is no tenderness. There is no rebound and no guarding.   Musculoskeletal: Normal range of motion. She exhibits tenderness. She exhibits no edema.   Tenderness to palpation and muscle spasm to left cervical trapezius and deltoid muscle. Left shoulder painful ROM with abduction above 90 degrees    Neurological: She is alert and oriented to person, place, and time. She has normal strength. No cranial nerve deficit or sensory deficit.   Skin: Skin is warm and dry. No rash and no abscess noted. No erythema. No pallor.   Psychiatric: She has a normal mood and affect. Her behavior is normal. Thought content normal.         ED Course   Procedures  Labs Reviewed   CBC W/ AUTO DIFFERENTIAL - Abnormal; Notable for the following components:       Result Value    Mean Corpuscular Hemoglobin 31.7 (*)     Gran # (ANC) 9.3 (*)     Lymph # 0.6 (*)     Gran% 87.1 (*)     Lymph% 5.6 (*)     All other components within normal limits   BASIC METABOLIC PANEL - Abnormal; Notable for the following components:    Sodium 134 (*)     Glucose 353 (*)     eGFR if  49 (*)     eGFR if non  43 (*)     All other components within normal limits   POCT GLUCOSE - Abnormal; Notable for the following components:    POCT Glucose 357 (*)     All other components within normal limits   POCT GLUCOSE - Abnormal; Notable for the following components:    POCT Glucose 318 (*)     All other components within normal limits   TROPONIN I          Imaging Results    None          Medical Decision Making:   Initial Assessment:       54-year-old female with extensive medical history including HTN, scleroderma, distant PE still on anticoagulation, presents with persistent left neck and shoulder pain for over 1 week.  No clear trauma or etiology at onset, patient was initially seen at urgent care and was given steroid shot and Rx for steroids and muscle relaxants with no  improvement.  She was seen for same symptoms 5 days ago at [facility].  She has reported history of carotid artery dissection so CT and ultrasound done that showed some carotid stenosis.  She was evaluated by vascular surgery there who believe that this was not responsible for her pain, it was more likely musculoskeletal etiology.  She was discharged with Flexeril and has been taking this along with Valium, Tylenol with no improvement.  Pain is worse with certain movements, starts in her left neck and radiates to her left shoulder.  No left arm pain, weakness, or numbness.  On exam patient with tenderness to left paraspinal cervical muscle and left trapezius muscle, with palpable spasm.  H&P most consistent with musculoskeletal etiology.  No chest pain, SOB, or tachycardia/hypoxia to suggest PE, and she is compliant with anticoagulation.  Unlikely to be cervical radiculopathy given description of pain and lack of radiation or paresthesias to left arm.  Given extensive imaging done on last ED visit, and no sign of fracture or new exam findings, no indication for repeat emergent imaging, no sign fracture. Patient is chronically poor diabetes control, so she could be to chronically dehydrated which could exacerbate any muscle strain and spasm.       Basic labs checked in patient with glucose 353 with no DKA or TRUDY.  Due to cardiovascular risk factors, a troponin checked and also negative with no EKG findings of acute ischemia.  Patient was treated with IVF, Norflex, Toradol with improvement.  She is unable take NSAIDs since she is on Pradaxa, and cannot get steroids due to poor diabetes control, so Tylenol is only anti-inflammatory she can take.  Since she had better relief with Norflex instead of Valium or Flexeril, will Rx this.  Patient advised to follow up with PCP or rheumatology that she follows with for scleroderma, for further evaluation and outpatient management.  She may benefit from alternative  treatments such as acupuncture of physical therapy.  She is also advised to closely comply with diabetic diet to improve chronic hyperglycemia and dehydration. I had extensive discussion about workup results and patient is comfortable with discharge plan. Patient advised to closely follow-up with PCP and return for the ED for any worsening symptoms or other concerns.      Clinical Tests:   Lab Tests: Ordered and Reviewed            Scribe Attestation:   Scribe #1: I performed the above scribed service and the documentation accurately describes the services I performed. I attest to the accuracy of the note.    Attending Attestation:           Physician Attestation for Scribe:  Physician Attestation Statement for Scribe #1: I, Dr. Mcdonnell, reviewed documentation, as scribed by Renny Woods  in my presence, and it is both accurate and complete.                               Clinical Impression:     1. Neck strain, subsequent encounter    2. Left shoulder pain, unspecified chronicity    3. Hyperglycemia                              Lux Renee MD  11/25/19 2123

## 2019-11-26 ENCOUNTER — PATIENT MESSAGE (OUTPATIENT)
Dept: INTERNAL MEDICINE | Facility: CLINIC | Age: 54
End: 2019-11-26

## 2019-11-26 ENCOUNTER — TELEPHONE (OUTPATIENT)
Dept: OBSTETRICS AND GYNECOLOGY | Facility: CLINIC | Age: 54
End: 2019-11-26

## 2019-11-26 ENCOUNTER — OFFICE VISIT (OUTPATIENT)
Dept: OBSTETRICS AND GYNECOLOGY | Facility: CLINIC | Age: 54
End: 2019-11-26
Payer: COMMERCIAL

## 2019-11-26 VITALS
HEIGHT: 64 IN | BODY MASS INDEX: 29.26 KG/M2 | SYSTOLIC BLOOD PRESSURE: 110 MMHG | HEART RATE: 106 BPM | WEIGHT: 171.38 LBS | DIASTOLIC BLOOD PRESSURE: 83 MMHG

## 2019-11-26 DIAGNOSIS — E11.21 TYPE 2 DIABETES MELLITUS WITH DIABETIC NEPHROPATHY, WITH LONG-TERM CURRENT USE OF INSULIN: Primary | ICD-10-CM

## 2019-11-26 DIAGNOSIS — Z78.0 POSTMENOPAUSE: ICD-10-CM

## 2019-11-26 DIAGNOSIS — Z86.018 HISTORY OF UTERINE FIBROID: ICD-10-CM

## 2019-11-26 DIAGNOSIS — Z01.419 WELL WOMAN EXAM WITH ROUTINE GYNECOLOGICAL EXAM: Primary | ICD-10-CM

## 2019-11-26 DIAGNOSIS — Z79.4 TYPE 2 DIABETES MELLITUS WITH DIABETIC NEPHROPATHY, WITH LONG-TERM CURRENT USE OF INSULIN: Primary | ICD-10-CM

## 2019-11-26 PROCEDURE — 99999 PR PBB SHADOW E&M-EST. PATIENT-LVL IV: ICD-10-PCS | Mod: PBBFAC,,, | Performed by: NURSE PRACTITIONER

## 2019-11-26 PROCEDURE — 3079F DIAST BP 80-89 MM HG: CPT | Mod: CPTII,S$GLB,, | Performed by: NURSE PRACTITIONER

## 2019-11-26 PROCEDURE — 3074F SYST BP LT 130 MM HG: CPT | Mod: CPTII,S$GLB,, | Performed by: NURSE PRACTITIONER

## 2019-11-26 PROCEDURE — 88175 CYTOPATH C/V AUTO FLUID REDO: CPT

## 2019-11-26 PROCEDURE — 99999 PR PBB SHADOW E&M-EST. PATIENT-LVL IV: CPT | Mod: PBBFAC,,, | Performed by: NURSE PRACTITIONER

## 2019-11-26 PROCEDURE — 99386 PR PREVENTIVE VISIT,NEW,40-64: ICD-10-PCS | Mod: S$GLB,,, | Performed by: NURSE PRACTITIONER

## 2019-11-26 PROCEDURE — 3074F PR MOST RECENT SYSTOLIC BLOOD PRESSURE < 130 MM HG: ICD-10-PCS | Mod: CPTII,S$GLB,, | Performed by: NURSE PRACTITIONER

## 2019-11-26 PROCEDURE — 3079F PR MOST RECENT DIASTOLIC BLOOD PRESSURE 80-89 MM HG: ICD-10-PCS | Mod: CPTII,S$GLB,, | Performed by: NURSE PRACTITIONER

## 2019-11-26 PROCEDURE — 87624 HPV HI-RISK TYP POOLED RSLT: CPT

## 2019-11-26 PROCEDURE — 99386 PREV VISIT NEW AGE 40-64: CPT | Mod: S$GLB,,, | Performed by: NURSE PRACTITIONER

## 2019-11-26 RX ORDER — METFORMIN HYDROCHLORIDE 500 MG/1
1000 TABLET, EXTENDED RELEASE ORAL 2 TIMES DAILY WITH MEALS
Qty: 360 TABLET | Refills: 3 | Status: SHIPPED | OUTPATIENT
Start: 2019-11-26 | End: 2020-03-30 | Stop reason: SDUPTHER

## 2019-11-26 NOTE — PROGRESS NOTES
"HISTORY OF PRESENT ILLNESS:    Lyssa Gandara is a 54 y.o. female ., presents for a routine exam and has no gyn complaints.    -Here to get established for care.    Past Medical History:   Diagnosis Date    Asthma     Carotid stenosis     Dyspnea on exertion     Hypertension 3/8/2016    Internal carotid artery dissection 2017    Long term current use of anticoagulant therapy 3/8/2016    PE (pulmonary embolism)     Hx of multiple DVT/PE    Presence of IVC filter     Scleroderma     Stroke 2017    Type 2 diabetes mellitus without complication        Past Surgical History:   Procedure Laterality Date    DANETTE FILTER PLACEMENT          MEDICATIONS AND ALLERGIES:      Current Outpatient Medications:     albuterol (PROVENTIL) 2.5 mg /3 mL (0.083 %) nebulizer solution, USE 3 ML IN NEBULIZER EVERY 6 HOURS AS NEEDED FOR WHEEZING, Disp: 75 each, Rfl: 5    albuterol (VENTOLIN HFA) 90 mcg/actuation inhaler, Inhale 2 puffs into the lungs every 6 (six) hours as needed for Wheezing. Rescue, Disp: 18 g, Rfl: 11    aspirin (ECOTRIN) 81 MG EC tablet, Take 1 tablet (81 mg total) by mouth once daily., Disp: 90 tablet, Rfl: 3    blood sugar diagnostic Strp, 200 strips by Misc.(Non-Drug; Combo Route) route 2 (two) times daily., Disp: 200 strip, Rfl: 12    dabigatran etexilate (PRADAXA) 150 mg Cap, Take 1 capsule (150 mg total) by mouth 2 (two) times daily. "Do NOT break, chew, or open capsules.", Disp: 60 capsule, Rfl: 0    fluticasone-salmeterol diskus inhaler 250-50 mcg, Inhale 1 puff into the lungs 2 (two) times daily., Disp: 180 each, Rfl: 3    insulin (LANTUS SOLOSTAR U-100 INSULIN) glargine 100 units/mL (3mL) SubQ pen, Inject 15 Units into the skin every evening., Disp: 15 mL, Rfl: 5    lancets (ONETOUCH ULTRASOFT LANCETS) Misc, 1 application by Misc.(Non-Drug; Combo Route) route 2 (two) times daily before meals., Disp: 200 each, Rfl: 11    lancing device with lancets Kit, 1 Device by " Misc.(Non-Drug; Combo Route) route 2 (two) times daily with meals., Disp: 1 each, Rfl: 0    lisinopril (PRINIVIL,ZESTRIL) 20 MG tablet, Take 1 tablet (20 mg total) by mouth once daily., Disp: 90 tablet, Rfl: 4    metFORMIN (GLUMETZA) 1000 MG (MOD) 24 hr tablet, Take 1 tablet (1,000 mg total) by mouth 2 (two) times daily with meals., Disp: 180 tablet, Rfl: 3    simvastatin (ZOCOR) 20 MG tablet, Take 1 tablet (20 mg total) by mouth every evening., Disp: 30 tablet, Rfl: 0    triamterene-hydrochlorothiazide 37.5-25 mg (MAXZIDE-25) 37.5-25 mg per tablet, Take 1 tablet by mouth once daily., Disp: 90 tablet, Rfl: 4    pneumococcal 23-mohan ps vaccine (PNEUMOVAX 23) 25 mcg/0.5 mL, Inject into the muscle. (Patient not taking: Reported on 11/26/2019), Disp: 0.5 mL, Rfl: 0    Review of patient's allergies indicates:   Allergen Reactions    Plasma, human, normal        Family History   Problem Relation Age of Onset    Breast cancer Mother 60    Hypertension Father     No Known Problems Brother     No Known Problems Maternal Grandmother     No Known Problems Maternal Grandfather     No Known Problems Paternal Grandmother     No Known Problems Paternal Grandfather     Diabetes Mellitus Maternal Uncle     No Known Problems Maternal Aunt     No Known Problems Paternal Aunt     No Known Problems Paternal Uncle     Colon cancer Neg Hx     Ovarian cancer Neg Hx        Social History     Socioeconomic History    Marital status: Single     Spouse name: Not on file    Number of children: Not on file    Years of education: Not on file    Highest education level: Not on file   Occupational History    Occupation: Special     Occupation: [employer]   Social Needs    Financial resource strain: Not on file    Food insecurity:     Worry: Not on file     Inability: Not on file    Transportation needs:     Medical: Not on file     Non-medical: Not on file   Tobacco Use    Smoking status: Never Smoker  "   Smokeless tobacco: Never Used   Substance and Sexual Activity    Alcohol use: Yes     Alcohol/week: 0.0 standard drinks     Frequency: 4 or more times a week     Drinks per session: 3 or 4     Comment: 2 beers a day    Drug use: No    Sexual activity: Not Currently     Partners: Male     Birth control/protection: Post-menopausal, None   Lifestyle    Physical activity:     Days per week: Not on file     Minutes per session: Not on file    Stress: Not on file   Relationships    Social connections:     Talks on phone: Not on file     Gets together: Not on file     Attends Mandaen service: Not on file     Active member of club or organization: Not on file     Attends meetings of clubs or organizations: Not on file     Relationship status: Not on file   Other Topics Concern    Not on file   Social History Narrative    Not on file       OB HISTORY: Number of vaginal deliveries: stillborn at 20 weeks 1997.    COMPREHENSIVE GYN HISTORY:  PAP History:  Denies abnormal Paps. UNKNOWN DATE OF LAST PAP "NEG".  Infection History: Denies STDs. Denies PID.  Benign History: Reports uterine fibroids. Denies ovarian cysts. Denies endometriosis.  Denies other conditions.  Cancer History: Denies cervical cancer. Denies uterine cancer or hyperplasia. Denies ovarian cancer. Denies vulvar cancer or pre-cancer. Denies vaginal cancer or pre-cancer. Denies breast cancer. Denies colon cancer.  Sexual Activity History: Reports currently being sexually.  Menstrual History: Denies menses. Pt is  not on HRT.     ROS:  GENERAL: No weight changes. No swelling. No fatigue. No fever.  CARDIOVASCULAR: No chest pain. + SOB with activity. No leg cramps.   NEUROLOGICAL: No headaches. No vision changes. + WEAKNESS LEFT SIDE.  BREASTS: No pain. No lumps. No discharge.  ABDOMEN: No pain. No nausea. No vomiting. No diarrhea. No constipation.  REPRODUCTIVE: No abnormal bleeding.   VULVA: No pain. No lesions. No itching.  VAGINA: No relaxation. " "No itching. No odor. No discharge. No lesions.  URINARY: No incontinence. No nocturia. No frequency. No dysuria.    /83 (BP Location: Right arm, Patient Position: Sitting, BP Method: Small (Automatic))   Pulse 106   Ht 5' 4" (1.626 m)   Wt 77.7 kg (171 lb 6.4 oz)   LMP 10/10/2019 (Exact Date)   BMI 29.42 kg/m²     PE:  APPEARANCE: Well nourished, well developed, in no acute distress.  AFFECT: WNL, alert and oriented x 3.  SKIN: No hirsutism or acne.  NECK: Neck symmetric without masses or thyromegaly.  NODES: No inguinal, cervical, axillary or femoral lymph node enlargement.  CHEST: Good respiratory effort.   ABDOMEN: Soft. No tenderness or masses.   BREASTS: Symmetrical, no skin changes or visible lesions. No palpable masses, nipple discharge bilaterally.  PELVIC: ATROPHIC EXTERNAL FEMALE GENITALIA without lesions. Normal hair distribution. Adequate perineal body, normal urethral meatus. VAGINA DRY/ATROPHIC without lesions or discharge. CERVIX STENOTIC without lesions, discharge or tenderness. No significant cystocele or rectocele. Bimanual exam shows uterus to be 10 WEEK size, regular, mobile and nontender. Adnexa without masses or tenderness.  RECTAL: Rectovaginal exam confirms above with normal sphincter tone, no masses.  EXTREMITIES: No edema.    DIAGNOSIS:  1. Well woman exam with routine gynecological exam    2. Postmenopause    3. History of uterine fibroid        PLAN:    Orders Placed This Encounter    HPV High Risk Genotypes, PCR    Liquid-Based Pap Smear, Screening   Up to date on mammogram, needs colon screening (states last one in [place] at time of Estrella)    COUNSELING:  The patient was counseled today on:  -osteoporosis prevention and regular weight bearing exercise;  -A.C.S. Pap and pelvic exam guidelines (pap every 3 years, no pap after age 65) and recommendations for yearly mammogram;  -to see her PCP for other health maintenance.    FOLLOW-UP with Dr Roberts annually.     "

## 2019-11-26 NOTE — TELEPHONE ENCOUNTER
Labs reviewed with pt . Notable a1c elevation. Since we are restarting medications. Will follow up with CBG logs.     Also sent alternative Metformin XR since previous script was not covered by insurance.     Please let me know if you have issues with the new script at the pharmacy.     Efrain Fields

## 2019-11-26 NOTE — LETTER
November 26, 2019      Devin Fields MD  [address]  [facility] [address]           [facility] OB[facility]N 5th Floor  [address]  [facility] [address]  Phone: [phone]          Patient: Lyssa Gandara   MR Number: [id number]   YOB: 1965   Date of Visit: 11/26/2019       Dear Dr. Devin Fields:    Thank you for referring Lyssa Gandara to me for evaluation. Attached you will find relevant portions of my assessment and plan of care.    If you have questions, please do not hesitate to call me. I look forward to following Lyssa Gandara along with you.    Sincerely,    DEANNA Lozada, [place]    Enclosure  CC:  No Recipients    If you would like to receive this communication electronically, please contact [email or web address] or [phone] to request more information on [facility] Link access.    For providers and/or their staff who would like to refer a patient to [facility], please contact us through our one-stop-shop provider referral line, [facility], at [phone].    If you feel you have received this communication in error or would no longer like to receive these types of communications, please e-mail [email or web address]

## 2019-12-03 LAB
HPV HR 12 DNA SPEC QL NAA+PROBE: NEGATIVE
HPV16 AG SPEC QL: NEGATIVE
HPV18 DNA SPEC QL NAA+PROBE: NEGATIVE

## 2019-12-09 LAB
FINAL PATHOLOGIC DIAGNOSIS: NORMAL
Lab: NORMAL

## 2020-01-10 DIAGNOSIS — I65.29 STENOSIS OF CAROTID ARTERY, UNSPECIFIED LATERALITY: Primary | ICD-10-CM

## 2020-01-20 ENCOUNTER — HOSPITAL ENCOUNTER (OUTPATIENT)
Dept: VASCULAR SURGERY | Facility: CLINIC | Age: 55
Discharge: HOME | End: 2020-01-20
Attending: SURGERY
Payer: COMMERCIAL

## 2020-01-20 DIAGNOSIS — I65.23 BILATERAL CAROTID ARTERY STENOSIS: ICD-10-CM

## 2020-01-20 DIAGNOSIS — I65.29 STENOSIS OF CAROTID ARTERY, UNSPECIFIED LATERALITY: ICD-10-CM

## 2020-01-20 PROCEDURE — 93880 EXTRACRANIAL BILAT STUDY: CPT | Mod: S$GLB,,, | Performed by: SURGERY

## 2020-01-20 PROCEDURE — 93880 PR DUPLEX SCAN EXTRACRANIAL,BILAT: ICD-10-PCS | Mod: S$GLB,,, | Performed by: SURGERY

## 2020-03-30 ENCOUNTER — PATIENT MESSAGE (OUTPATIENT)
Dept: INTERNAL MEDICINE | Facility: CLINIC | Age: 55
End: 2020-03-30

## 2020-03-30 DIAGNOSIS — E11.21 TYPE 2 DIABETES MELLITUS WITH DIABETIC NEPHROPATHY, WITH LONG-TERM CURRENT USE OF INSULIN: ICD-10-CM

## 2020-03-30 DIAGNOSIS — Z79.4 TYPE 2 DIABETES MELLITUS WITH DIABETIC NEPHROPATHY, WITH LONG-TERM CURRENT USE OF INSULIN: ICD-10-CM

## 2020-03-30 DIAGNOSIS — I10 ESSENTIAL HYPERTENSION: ICD-10-CM

## 2020-03-30 RX ORDER — CLOPIDOGREL BISULFATE 75 MG/1
75 TABLET ORAL DAILY
Status: ON HOLD | COMMUNITY
End: 2021-01-01 | Stop reason: SDUPTHER

## 2020-04-02 RX ORDER — TRIAMTERENE/HYDROCHLOROTHIAZID 37.5-25 MG
1 TABLET ORAL DAILY
Qty: 90 TABLET | Refills: 0 | Status: SHIPPED | OUTPATIENT
Start: 2020-04-02 | End: 2020-11-23 | Stop reason: SDUPTHER

## 2020-04-02 RX ORDER — METFORMIN HYDROCHLORIDE 500 MG/1
1000 TABLET, EXTENDED RELEASE ORAL 2 TIMES DAILY WITH MEALS
Qty: 360 TABLET | Refills: 0 | Status: SHIPPED | OUTPATIENT
Start: 2020-04-02 | End: 2021-01-01 | Stop reason: SDUPTHER

## 2020-04-02 RX ORDER — CLOPIDOGREL BISULFATE 75 MG/1
75 TABLET ORAL DAILY
Qty: 90 TABLET | Refills: 2 | Status: CANCELLED | OUTPATIENT
Start: 2020-04-02

## 2020-04-02 RX ORDER — LISINOPRIL 20 MG/1
20 TABLET ORAL DAILY
Qty: 90 TABLET | Refills: 2 | Status: SHIPPED | OUTPATIENT
Start: 2020-04-02 | End: 2020-11-23 | Stop reason: SDUPTHER

## 2020-04-02 NOTE — TELEPHONE ENCOUNTER
Was she taken off pradaxa or just stopped taking it?  Has she been taking plavix?  I cannot figure out what anticoagulation she is supposed to be on from chart review.

## 2020-04-02 NOTE — TELEPHONE ENCOUNTER
Refill Authorization Note     is requesting a refill authorization.    Brief assessment and rationale for refill: REVIEW: not previously prescrbed by provider(plavix)/NA non-interntional/DDzi/Recent ED visit/abnormal labs     Medication-related problems identified:   Non-adherence (knowledge deficit) non-intentional  Drug-disease interaction    Medication Therapy Plan: Per EPIC data 0% adherance(maxzide/lisinipril/metformin); A1C-elevated(12.0 to 12.6); Not previously prescribed by provider(plavix); Recent ED visits(11/18/19-L shoulder pain and 11/24/19-Neck strain); DDzi(metformin and maxzide(type 2 diabetes with diabetic nephropathy; Per [facility] lisinipril and maxzide last filled 3/20 for 90 days,  review    Medication reconciliation completed: Yes   Pharmacist Review Requested: Yes                     Comments:   Refill Center Care Gap Closure protocols temporarily suspended.   Requested Prescriptions   Pending Prescriptions Disp Refills    metFORMIN (GLUCOPHAGE-XR) 500 MG XR 24hr tablet 360 tablet 0     Sig: Take 2 tablets (1,000 mg total) by mouth 2 (two) times daily with meals.       Endocrinology:  Diabetes - Biguanides Failed - 4/2/2020  8:18 AM        Failed - HBA1C is 7.9 or below and within 180 days     Hemoglobin A1C   Date Value Ref Range Status   11/25/2019 12.6 (H) 4.0 - 5.6 % Final     Comment:     ADA Screening Guidelines:  5.7-6.4%  Consistent with prediabetes  >or=6.5%  Consistent with diabetes  High levels of fetal hemoglobin interfere with the HbA1C  assay. Heterozygous hemoglobin variants (HbS, HgC, etc)do  not significantly interfere with this assay.   However, presence of multiple variants may affect accuracy.     07/01/2018 12.0 (H) 4.0 - 5.6 % Final     Comment:     ADA Screening Guidelines:  5.7-6.4%  Consistent with prediabetes  >or=6.5%  Consistent with diabetes  High levels of fetal hemoglobin interfere with the HbA1C  assay. Heterozygous hemoglobin variants (HbS, HgC,  etc)do  not significantly interfere with this assay.   However, presence of multiple variants may affect accuracy.     03/09/2018 8.8 (H) 4.0 - 5.6 % Final     Comment:     According to ADA guidelines, hemoglobin A1c <7.0% represents  optimal control in non-pregnant diabetic patients. Different  metrics may apply to specific patient populations.   Standards of Medical Care in Diabetes-2016.  For the purpose of screening for the presence of diabetes:  <5.7%     Consistent with the absence of diabetes  5.7-6.4%  Consistent with increasing risk for diabetes   (prediabetes)  >or=6.5%  Consistent with diabetes  Currently, no consensus exists for use of hemoglobin A1c  for diagnosis of diabetes for children.  This Hemoglobin A1c assay has significant interference with fetal   hemoglobin   (HbF). The results are invalid for patients with abnormal amounts of   HbF,   including those with known Hereditary Persistence   of Fetal Hemoglobin. Heterozygous hemoglobin variants (HbAS, HbAC,   HbAD, HbAE, HbA2) do not significantly interfere with this assay;   however, presence of multiple variants in a sample may impact the %   interference.                Passed - Patient is at least 18 years old        Passed - Office visit in past 12 months or future 90 days.     Recent Outpatient Visits            4 months ago Well woman exam with routine gynecological exam    [facility] - OB/GYN 5th Floor DEANNA Lozada NP    4 months ago Type 2 diabetes mellitus with stage 3 chronic kidney disease, with long-term current use of insulin    [facility] Internal Medicine Devin Fields MD    1 year ago Type 2 diabetes mellitus with hyperglycemia, unspecified whether long term insulin use    [facility] Kim Ingram DNP    1 year ago Type 2 diabetes mellitus without complication, without long-term current use of insulin    [facility] Aretha Moreland NP    1 year ago Essential hypertension    [facility] - Internal Medicine Gerard Joyce MD                    Passed - Cr is 1.3 or below and within 360 days     Creatinine   Date Value Ref Range Status   11/25/2019 1.3 0.5 - 1.4 mg/dL Final   11/24/2019 1.4 0.5 - 1.4 mg/dL Final   11/18/2019 1.3 0.5 - 1.4 mg/dL Final     POC Creatinine   Date Value Ref Range Status   12/28/2018 1.4 0.5 - 1.4 mg/dL Final   06/30/2018 2.1 (H) 0.5 - 1.4 mg/dL Final   03/28/2017 1.0 0.5 - 1.4 mg/dL Final              Passed - eGFR is 30 or above and within 360 days     eGFR if non    Date Value Ref Range Status   11/25/2019 47 (A) >60 mL/min/1.73 m^2 Final     Comment:     Calculation used to obtain the estimated glomerular filtration  rate (eGFR) is the CKD-EPI equation.      11/24/2019 43 (A) >60 mL/min/1.73 m^2 Final     Comment:     Calculation used to obtain the estimated glomerular filtration  rate (eGFR) is the CKD-EPI equation.      11/18/2019 47.0 (A) >60 mL/min/1.73 m^2 Final     Comment:     Calculation used to obtain the estimated glomerular filtration  rate (eGFR) is the CKD-EPI equation.        eGFR if    Date Value Ref Range Status   11/25/2019 54 (A) >60 mL/min/1.73 m^2 Final   11/24/2019 49 (A) >60 mL/min/1.73 m^2 Final   11/18/2019 54.1 (A) >60 mL/min/1.73 m^2 Final             lisinopriL (PRINIVIL,ZESTRIL) 20 MG tablet 90 tablet 0     Sig: Take 1 tablet (20 mg total) by mouth once daily.       Cardiovascular:  ACE Inhibitors Passed - 4/2/2020  8:18 AM        Passed - Patient is at least 18 years old        Passed - Last BP in normal range within 360 days.     BP Readings from Last 3 Encounters:   11/26/19 110/83   11/25/19 102/80   11/24/19 122/76              Passed - Office visit in past 12 months or future 90 days.     Recent Outpatient Visits            4 months ago Well woman exam with routine gynecological exam    [facility] - [facility]N 5th Floor DEANNA Lozada NP    4 months ago Type 2 diabetes mellitus with stage 3 chronic  kidney disease, with long-term current use of insulin    [facility] Devin Fields MD    1 year ago Type 2 diabetes mellitus with hyperglycemia, unspecified whether long term insulin use    [facility] Kim Ingram DNP    1 year ago Type 2 diabetes mellitus without complication, without long-term current use of insulin    [facility] Aretha Moreland, NP    1 year ago Essential hypertension    [facility] Gerard Joyce MD                    Passed - Cr is 1.3 or below and within 360 days     Creatinine   Date Value Ref Range Status   11/25/2019 1.3 0.5 - 1.4 mg/dL Final   11/24/2019 1.4 0.5 - 1.4 mg/dL Final   11/18/2019 1.3 0.5 - 1.4 mg/dL Final     POC Creatinine   Date Value Ref Range Status   12/28/2018 1.4 0.5 - 1.4 mg/dL Final   06/30/2018 2.1 (H) 0.5 - 1.4 mg/dL Final   03/28/2017 1.0 0.5 - 1.4 mg/dL Final              Passed - K in normal range and within 360 days     POC Potassium   Date Value Ref Range Status   12/28/2018 5.6 (H) 3.5 - 5.1 mmol/L Final   06/30/2018 6.3 (HH) 3.5 - 5.1 mmol/L Final     Potassium   Date Value Ref Range Status   11/25/2019 4.2 3.5 - 5.1 mmol/L Final   11/24/2019 5.1 3.5 - 5.1 mmol/L Final     Comment:     Specimen slightly hemolyzed   11/18/2019 4.3 3.5 - 5.1 mmol/L Final              Passed - eGFR within 360 days     eGFR if non    Date Value Ref Range Status   11/25/2019 47 (A) >60 mL/min/1.73 m^2 Final     Comment:     Calculation used to obtain the estimated glomerular filtration  rate (eGFR) is the CKD-EPI equation.      11/24/2019 43 (A) >60 mL/min/1.73 m^2 Final     Comment:     Calculation used to obtain the estimated glomerular filtration  rate (eGFR) is the CKD-EPI equation.      11/18/2019 47.0 (A) >60 mL/min/1.73 m^2 Final     Comment:     Calculation used to obtain the estimated glomerular filtration  rate (eGFR) is the CKD-EPI equation.        eGFR if   American   Date Value Ref Range Status   11/25/2019 54 (A) >60 mL/min/1.73 m^2 Final   11/24/2019 49 (A) >60 mL/min/1.73 m^2 Final   11/18/2019 54.1 (A) >60 mL/min/1.73 m^2 Final             triamterene-hydrochlorothiazide 37.5-25 mg (MAXZIDE-25) 37.5-25 mg per tablet 90 tablet 0     Sig: Take 1 tablet by mouth once daily.       Cardiovascular: Diuretic Combos Passed - 4/2/2020  8:18 AM        Passed - Patient is at least 18 years old        Passed - Last BP in normal range within 360 days.     BP Readings from Last 3 Encounters:   11/26/19 110/83   11/25/19 102/80   11/24/19 122/76              Passed - Office visit in past 12 months or future 90 days.     Recent Outpatient Visits            4 months ago Well woman exam with routine gynecological exam    [facility] - OB/GYN 5th Floor DEANNA Lozada NP    4 months ago Type 2 diabetes mellitus with stage 3 chronic kidney disease, with long-term current use of insulin    [facility] Internal Medicine Devin Fields MD    1 year ago Type 2 diabetes mellitus with hyperglycemia, unspecified whether long term insulin use    [facility] Internal Medicine Kim Ingram DNP    1 year ago Type 2 diabetes mellitus without complication, without long-term current use of insulin    [facility] Internal Medicine Aretha Moreland NP    1 year ago Essential hypertension    [facility] Internal Medicine Gerard Joyce MD                    Passed - K in normal range and within 180 days     POC Potassium   Date Value Ref Range Status   12/28/2018 5.6 (H) 3.5 - 5.1 mmol/L Final   06/30/2018 6.3 (HH) 3.5 - 5.1 mmol/L Final     Potassium   Date Value Ref Range Status   11/25/2019 4.2 3.5 - 5.1 mmol/L Final   11/24/2019 5.1 3.5 - 5.1 mmol/L Final     Comment:     Specimen slightly hemolyzed   11/18/2019 4.3 3.5 - 5.1 mmol/L Final              Passed - Na is between 130 and 148 and within 180 days     POC Sodium   Date Value Ref Range Status   12/28/2018 134 (L) 136 - 145 mmol/L  Final   06/30/2018 126 (L) 136 - 145 mmol/L Final     Sodium   Date Value Ref Range Status   11/25/2019 137 136 - 145 mmol/L Final   11/24/2019 134 (L) 136 - 145 mmol/L Final   11/18/2019 134 (L) 136 - 145 mmol/L Final              Passed - Cr is 1.3 or below and within 180 days     Creatinine   Date Value Ref Range Status   11/25/2019 1.3 0.5 - 1.4 mg/dL Final   11/24/2019 1.4 0.5 - 1.4 mg/dL Final   11/18/2019 1.3 0.5 - 1.4 mg/dL Final     POC Creatinine   Date Value Ref Range Status   12/28/2018 1.4 0.5 - 1.4 mg/dL Final   06/30/2018 2.1 (H) 0.5 - 1.4 mg/dL Final   03/28/2017 1.0 0.5 - 1.4 mg/dL Final              Passed - Ca in normal range and within 360 days     Calcium   Date Value Ref Range Status   11/25/2019 9.4 8.7 - 10.5 mg/dL Final   11/24/2019 10.1 8.7 - 10.5 mg/dL Final   11/18/2019 9.4 8.7 - 10.5 mg/dL Final              Passed - eGFR within 180 days     eGFR if non    Date Value Ref Range Status   11/25/2019 47 (A) >60 mL/min/1.73 m^2 Final     Comment:     Calculation used to obtain the estimated glomerular filtration  rate (eGFR) is the CKD-EPI equation.      11/24/2019 43 (A) >60 mL/min/1.73 m^2 Final     Comment:     Calculation used to obtain the estimated glomerular filtration  rate (eGFR) is the CKD-EPI equation.      11/18/2019 47.0 (A) >60 mL/min/1.73 m^2 Final     Comment:     Calculation used to obtain the estimated glomerular filtration  rate (eGFR) is the CKD-EPI equation.        eGFR if    Date Value Ref Range Status   11/25/2019 54 (A) >60 mL/min/1.73 m^2 Final   11/24/2019 49 (A) >60 mL/min/1.73 m^2 Final   11/18/2019 54.1 (A) >60 mL/min/1.73 m^2 Final             clopidogreL (PLAVIX) 75 mg tablet       Sig: Take 1 tablet (75 mg total) by mouth once daily.       Hematology: Antiplatelets - clopidogrel Passed - 4/1/2020  9:55 PM        Passed - Patient is at least 18 years old        Passed - No contraindicated proton pump inhibitors on active  medication list        Passed - Office visit in past 12 months or future 90 days.     Recent Outpatient Visits            4 months ago Well woman exam with routine gynecological exam    [facility] - OB/GYN 5th Floor DEANNA Lozada, [place]    4 months ago Type 2 diabetes mellitus with stage 3 chronic kidney disease, with long-term current use of insulin    [facility] Internal Medicine Devin Fields MD    1 year ago Type 2 diabetes mellitus with hyperglycemia, unspecified whether long term insulin use    [facility] Internal Medicine Kim Ingram DNP    1 year ago Type 2 diabetes mellitus without complication, without long-term current use of insulin    [facility] Aretha Moreland NP    1 year ago Essential hypertension    [facility] Internal Medicine Gerard Joyce MD                    Passed - HCT in normal range and within 360 days     POC Hematocrit   Date Value Ref Range Status   12/28/2018 51 36 - 54 %PCV Final   06/30/2018 48 36 - 54 %PCV Final     Hematocrit   Date Value Ref Range Status   11/25/2019 43.7 37.0 - 48.5 % Final   11/24/2019 48.1 37.0 - 48.5 % Final   11/18/2019 41.6 37.0 - 48.5 % Final              Passed - HGB in normal range and within 360 days     Hemoglobin   Date Value Ref Range Status   11/25/2019 14.3 12.0 - 16.0 g/dL Final   11/24/2019 15.7 12.0 - 16.0 g/dL Final   11/18/2019 13.8 12.0 - 16.0 g/dL Final              Passed - PLT in normal range and within 360 days     Platelets   Date Value Ref Range Status   11/25/2019 242 150 - 350 K/uL Final   11/24/2019 259 150 - 350 K/uL Final   11/18/2019 259 150 - 350 K/uL Final              Powered by [employer] - 4/1/2020  9:56 PM        The requested medication is on the active medication list without a start date.      Signed Prescriptions Disp Refills    clopidogreL (PLAVIX) 75 mg tablet       Sig: Take 75 mg by mouth once daily.       There is no refill protocol information for this order         Appointments   past 12m or future 3m with PCP    Date Provider   Last Visit   11/25/2019 Devin Fields MD   Next Visit   Visit date not found Devin Fields MD   .  ED visits in past 90 days: [unfilled]       Note composed:9:35 AM 04/02/2020

## 2020-04-02 NOTE — TELEPHONE ENCOUNTER
Please call patient - has someone else been filling her plavix?  Please call pharmacy to see when last fill was complete and by whom.  Really just trying to confirm that she is supposed to be on both plavix and pradaxa.

## 2020-04-02 NOTE — TELEPHONE ENCOUNTER
Pt stated, she does not take the Pradaxa for the last 6     months ,medication was ordered for pt by her     pulmonologist. [facility] pharmacist states, no     prescriptions noted for pt( plavix or pradaxa).

## 2020-04-02 NOTE — TELEPHONE ENCOUNTER
Please see the following assessment. This refill request still requires some action on your part.      Clopidogrel has not previously been prescribed by current PCP (previously prescribed by Gerard Joyce MD). Outside Refill Center protocol to renew. Pended for your review. Defer refill request to you.      Thank you!

## 2020-04-16 ENCOUNTER — PATIENT MESSAGE (OUTPATIENT)
Dept: ADMINISTRATIVE | Facility: HOSPITAL | Age: 55
End: 2020-04-16

## 2020-07-10 ENCOUNTER — OFFICE VISIT (OUTPATIENT)
Dept: INTERNAL MEDICINE | Facility: CLINIC | Age: 55
End: 2020-07-10
Payer: COMMERCIAL

## 2020-07-10 ENCOUNTER — LAB VISIT (OUTPATIENT)
Dept: LAB | Facility: HOSPITAL | Age: 55
End: 2020-07-10
Attending: INTERNAL MEDICINE
Payer: COMMERCIAL

## 2020-07-10 ENCOUNTER — TELEPHONE (OUTPATIENT)
Dept: INTERNAL MEDICINE | Facility: CLINIC | Age: 55
End: 2020-07-10

## 2020-07-10 VITALS
DIASTOLIC BLOOD PRESSURE: 82 MMHG | WEIGHT: 185 LBS | HEIGHT: 64 IN | BODY MASS INDEX: 31.58 KG/M2 | SYSTOLIC BLOOD PRESSURE: 124 MMHG

## 2020-07-10 DIAGNOSIS — E11.22 TYPE 2 DIABETES MELLITUS WITH STAGE 3 CHRONIC KIDNEY DISEASE, WITH LONG-TERM CURRENT USE OF INSULIN: ICD-10-CM

## 2020-07-10 DIAGNOSIS — J45.40 MODERATE PERSISTENT ASTHMA WITHOUT COMPLICATION: ICD-10-CM

## 2020-07-10 DIAGNOSIS — Z79.4 TYPE 2 DIABETES MELLITUS WITH STAGE 3 CHRONIC KIDNEY DISEASE, WITH LONG-TERM CURRENT USE OF INSULIN: Primary | ICD-10-CM

## 2020-07-10 DIAGNOSIS — B35.3 TINEA PEDIS OF BOTH FEET: ICD-10-CM

## 2020-07-10 DIAGNOSIS — Z79.4 TYPE 2 DIABETES MELLITUS WITH STAGE 3 CHRONIC KIDNEY DISEASE, WITH LONG-TERM CURRENT USE OF INSULIN: ICD-10-CM

## 2020-07-10 DIAGNOSIS — N18.30 TYPE 2 DIABETES MELLITUS WITH STAGE 3 CHRONIC KIDNEY DISEASE, WITH LONG-TERM CURRENT USE OF INSULIN: Primary | ICD-10-CM

## 2020-07-10 DIAGNOSIS — E11.22 TYPE 2 DIABETES MELLITUS WITH STAGE 3 CHRONIC KIDNEY DISEASE, WITH LONG-TERM CURRENT USE OF INSULIN: Primary | ICD-10-CM

## 2020-07-10 DIAGNOSIS — I65.21 CAROTID ARTERY STENOSIS, UNILATERAL, RIGHT: ICD-10-CM

## 2020-07-10 DIAGNOSIS — N18.30 TYPE 2 DIABETES MELLITUS WITH STAGE 3 CHRONIC KIDNEY DISEASE, WITH LONG-TERM CURRENT USE OF INSULIN: ICD-10-CM

## 2020-07-10 LAB
ANION GAP SERPL CALC-SCNC: 10 MMOL/L (ref 8–16)
BUN SERPL-MCNC: 28 MG/DL (ref 6–20)
CALCIUM SERPL-MCNC: 9.7 MG/DL (ref 8.7–10.5)
CHLORIDE SERPL-SCNC: 103 MMOL/L (ref 95–110)
CO2 SERPL-SCNC: 27 MMOL/L (ref 23–29)
CREAT SERPL-MCNC: 1.2 MG/DL (ref 0.5–1.4)
EST. GFR  (AFRICAN AMERICAN): 59.2 ML/MIN/1.73 M^2
EST. GFR  (NON AFRICAN AMERICAN): 51.4 ML/MIN/1.73 M^2
ESTIMATED AVG GLUCOSE: 192 MG/DL (ref 68–131)
GLUCOSE SERPL-MCNC: 51 MG/DL (ref 70–110)
HBA1C MFR BLD HPLC: 8.3 % (ref 4–5.6)
POTASSIUM SERPL-SCNC: 4.3 MMOL/L (ref 3.5–5.1)
SODIUM SERPL-SCNC: 140 MMOL/L (ref 136–145)

## 2020-07-10 PROCEDURE — 83036 HEMOGLOBIN GLYCOSYLATED A1C: CPT

## 2020-07-10 PROCEDURE — 99214 OFFICE O/P EST MOD 30 MIN: CPT | Mod: S$GLB,,, | Performed by: INTERNAL MEDICINE

## 2020-07-10 PROCEDURE — 80048 BASIC METABOLIC PNL TOTAL CA: CPT

## 2020-07-10 PROCEDURE — 3046F PR MOST RECENT HEMOGLOBIN A1C LEVEL > 9.0%: ICD-10-PCS | Mod: CPTII,S$GLB,, | Performed by: INTERNAL MEDICINE

## 2020-07-10 PROCEDURE — 99999 PR PBB SHADOW E&M-EST. PATIENT-LVL IV: ICD-10-PCS | Mod: PBBFAC,,, | Performed by: INTERNAL MEDICINE

## 2020-07-10 PROCEDURE — 3079F PR MOST RECENT DIASTOLIC BLOOD PRESSURE 80-89 MM HG: ICD-10-PCS | Mod: CPTII,S$GLB,, | Performed by: INTERNAL MEDICINE

## 2020-07-10 PROCEDURE — 99214 PR OFFICE/OUTPT VISIT, EST, LEVL IV, 30-39 MIN: ICD-10-PCS | Mod: S$GLB,,, | Performed by: INTERNAL MEDICINE

## 2020-07-10 PROCEDURE — 3079F DIAST BP 80-89 MM HG: CPT | Mod: CPTII,S$GLB,, | Performed by: INTERNAL MEDICINE

## 2020-07-10 PROCEDURE — 3074F SYST BP LT 130 MM HG: CPT | Mod: CPTII,S$GLB,, | Performed by: INTERNAL MEDICINE

## 2020-07-10 PROCEDURE — 3074F PR MOST RECENT SYSTOLIC BLOOD PRESSURE < 130 MM HG: ICD-10-PCS | Mod: CPTII,S$GLB,, | Performed by: INTERNAL MEDICINE

## 2020-07-10 PROCEDURE — 3008F BODY MASS INDEX DOCD: CPT | Mod: CPTII,S$GLB,, | Performed by: INTERNAL MEDICINE

## 2020-07-10 PROCEDURE — 36415 COLL VENOUS BLD VENIPUNCTURE: CPT

## 2020-07-10 PROCEDURE — 3008F PR BODY MASS INDEX (BMI) DOCUMENTED: ICD-10-PCS | Mod: CPTII,S$GLB,, | Performed by: INTERNAL MEDICINE

## 2020-07-10 PROCEDURE — 99999 PR PBB SHADOW E&M-EST. PATIENT-LVL IV: CPT | Mod: PBBFAC,,, | Performed by: INTERNAL MEDICINE

## 2020-07-10 PROCEDURE — 3046F HEMOGLOBIN A1C LEVEL >9.0%: CPT | Mod: CPTII,S$GLB,, | Performed by: INTERNAL MEDICINE

## 2020-07-10 RX ORDER — LANCETS 26 GAUGE
1 EACH MISCELLANEOUS 2 TIMES DAILY WITH MEALS
Qty: 1 EACH | Refills: 0 | Status: SHIPPED | OUTPATIENT
Start: 2020-07-10 | End: 2021-01-01

## 2020-07-10 RX ORDER — ASPIRIN 81 MG/1
81 TABLET ORAL DAILY
Qty: 90 TABLET | Refills: 3 | Status: SHIPPED | OUTPATIENT
Start: 2020-07-10 | End: 2021-01-01

## 2020-07-10 RX ORDER — SIMVASTATIN 20 MG/1
20 TABLET, FILM COATED ORAL NIGHTLY
Qty: 30 TABLET | Refills: 0 | Status: ON HOLD | OUTPATIENT
Start: 2020-07-10 | End: 2021-01-01

## 2020-07-10 RX ORDER — TERBINAFINE HYDROCHLORIDE 250 MG/1
250 TABLET ORAL DAILY
Qty: 14 TABLET | Refills: 0 | Status: SHIPPED | OUTPATIENT
Start: 2020-07-10 | End: 2020-08-09

## 2020-07-10 RX ORDER — ALBUTEROL SULFATE 0.83 MG/ML
SOLUTION RESPIRATORY (INHALATION)
Qty: 75 EACH | Refills: 5 | Status: SHIPPED | OUTPATIENT
Start: 2020-07-10 | End: 2021-01-01

## 2020-07-10 RX ORDER — INSULIN PUMP SYRINGE, 3 ML
EACH MISCELLANEOUS
Qty: 1 EACH | Refills: 0 | Status: SHIPPED | OUTPATIENT
Start: 2020-07-10 | End: 2021-01-01

## 2020-07-10 NOTE — TELEPHONE ENCOUNTER
Returned patients call,  Patient referred by Dr. Fields for Health coaching (Diabetes, weight loss, lifestyle changes).  Called patient and gave an explanation about Health Coaching program and invited participation.   Patient states she would like to participate.  Set appointment for 7.17.20 at 0700.   Gave direct contact number to call if she has any questions [phone].   Radha Guidry RN  Health

## 2020-07-10 NOTE — PROGRESS NOTES
CHIEF COMPLAINT     Chief Complaint   Patient presents with    Diabetes     is currently on Metformin.     Rash     on bilateral foot, reports of rash (burning, itching and redness). patient uses Lotrimin cream to help.     Patient arrived 10 minutes late and was ready for provider >20 minutes after scheduled appointment  HPI     Lyssafern Gandara is a 55 y.o. female with uncontrolled type 2 diabetes and history of stroke.  Patient was last seen by me approximately 6 months ago but was lost to follow-up secondary to COVID.      Answers for HPI/ROS submitted by the patient on 7/8/2020   Rash  Chronicity: new  Onset: 1 to 4 weeks ago  Progression since onset: waxing and waning  Affected locations: left foot, left toes, right foot, right toes  Characteristics: blistering, burning, pain, redness, swelling, itchiness  Exposed to: nothing  anorexia: No  nail changes: Yes  Treatments tried: anti-itch cream, moisturizer  Improvement on treatment: mild  asthma: Yes  eczema: Yes    DM2 last a1c 12.6  Patient prescrribed lantus 15units HS and metformin XR 1000mg bid. She hasn't been checking her cbg since losing her glucometer.  Complications include none.  Of note patient has gained approximately 20 lb during COVID.  She attributes weight gain to decreased activity increased alcohol intake.    Rash  Patient reports itchy scaly red skin between her toes and on bottom of feet.  Reports rash has been there approximately 1 month.  Reports she has been using over-the-counter Lotrimin x 3 weeks with some improvement.  Reports never having athlete's foot this bad in the past.      Personally Reviewed Patient's Medical, surgical, family and social hx. Changes updated in Epic.  Patient reports increased alcohol use  Care Team updated in Epic    Review of Systems:  Review of Systems   Constitutional: Negative for fatigue and fever.   HENT: Negative for congestion, rhinorrhea and sore throat.    Eyes: Negative for pain.  "  Gastrointestinal: Negative for diarrhea and vomiting.   Skin: Positive for rash.       Health Maintenance:   Reviewed with patient  Due for the following:      PHYSICAL EXAM     /82 (BP Location: Left arm, Patient Position: Sitting, BP Method: Medium (Manual))   Ht 5' 4" (1.626 m)   Wt 83.9 kg (185 lb)   BMI 31.76 kg/m²     Gen: Well Appearing, NAD  HEENT: PERR, EOMI  Neck: FROM, no thyromegaly, no cervical adenopathy  CVD: RRR, no M/R/G  Pulm: Normal work of breathing, CTAB, no wheezing  Abd:  Soft, NT, ND non TTP, no mass  MSK: no LE edema  Neuro: A&Ox3, gait normal, speech normal  Mood; Mood normal, behavior normal, thought process linear       LABS     Labs reviewed; Notable for    Lab Results   Component Value Date    HGBA1C 12.6 (H) 11/25/2019     Lab Results   Component Value Date    CREATININE 1.3 11/25/2019    BUN 18 11/25/2019     11/25/2019    K 4.2 11/25/2019     11/25/2019    CO2 26 11/25/2019           ASSESSMENT     1. Type 2 diabetes mellitus with stage 3 chronic kidney disease, with long-term current use of insulin  Hemoglobin A1C    Basic metabolic panel    lancing device with lancets Kit    blood-glucose meter kit   2. Tinea pedis of both feet  terbinafine HCL (LAMISIL) 250 mg tablet   3. Moderate persistent asthma without complication  albuterol (PROVENTIL) 2.5 mg /3 mL (0.083 %) nebulizer solution    pt reported that she used to take advair but could not refill due to price, provided savings card, stated she will refill today   4. Carotid artery stenosis, unilateral, right  simvastatin (ZOCOR) 20 MG tablet    aspirin (ECOTRIN) 81 MG EC tablet           Plan     Lyssa Gandara is a 55 y.o. female with uncontrolled type 2 diabetes and history of stroke.    1. Type 2 diabetes mellitus with stage 3 chronic kidney disease, with long-term current use of insulin  Uncontrolled  Will recheck A1c today I anticipate starting Trulicity  Continue Lantus 15 units nightly  Will " reduce metformin to 500 mg twice a day since she is having she adverse GI symptoms with a 1000 mg b.i.d..  I asked her to track sugars  Will have her follow-up with me in 1 month.  - Hemoglobin A1C; Future  - Basic metabolic panel; Future  - lancing device with lancets Kit; 1 Device by Misc.(Non-Drug; Combo Route) route 2 (two) times daily with meals.  Dispense: 1 each; Refill: 0  - blood-glucose meter kit; To check BG TID times daily, to use with insurance preferred meter  Dispense: 1 each; Refill: 0    2. Tinea pedis of both feet  Improvement with 3 weeks of topical therapy but still significant symptom.  Will treat her with oral to benefit in daily x2 weeks.  - terbinafine HCL (LAMISIL) 250 mg tablet; Take 1 tablet (250 mg total) by mouth once daily.  Dispense: 14 tablet; Refill: 0    3. Moderate persistent asthma without complication  Refill  - albuterol (PROVENTIL) 2.5 mg /3 mL (0.083 %) nebulizer solution; USE 3 ML IN NEBULIZER EVERY 6 HOURS AS NEEDED FOR WHEEZING  Dispense: 75 each; Refill: 5    4. Carotid artery stenosis, unilateral, right  Refill-   simvastatin (ZOCOR) 20 MG tablet; Take 1 tablet (20 mg total) by mouth every evening.  Dispense: 30 tablet; Refill: 0  - aspirin (ECOTRIN) 81 MG EC tablet; Take 1 tablet (81 mg total) by mouth once daily.  Dispense: 90 tablet; Refill: 3  patient is s/p tx with lotrmin x3 weeks.   Will treat with systemic therapy.    Devin Fields MD

## 2020-07-11 ENCOUNTER — PATIENT MESSAGE (OUTPATIENT)
Dept: INTERNAL MEDICINE | Facility: CLINIC | Age: 55
End: 2020-07-11

## 2020-07-11 NOTE — TELEPHONE ENCOUNTER
Ms. Gandara.    A1c is down to 8.3 so not as bad as we were anticipating (this is actually better than our last value I have for you.)      I would still like to add trulicity like we talked about and see we can get a1c below 7. Please let me know if you are okay with this and I will send to your pharmacy.    Devin Fields

## 2020-07-17 ENCOUNTER — PATIENT OUTREACH (OUTPATIENT)
Dept: ADMINISTRATIVE | Facility: HOSPITAL | Age: 55
End: 2020-07-17

## 2020-07-17 ENCOUNTER — TELEPHONE (OUTPATIENT)
Dept: INTERNAL MEDICINE | Facility: CLINIC | Age: 55
End: 2020-07-17

## 2020-07-17 ENCOUNTER — CLINICAL SUPPORT (OUTPATIENT)
Dept: INTERNAL MEDICINE | Facility: CLINIC | Age: 55
End: 2020-07-17
Payer: COMMERCIAL

## 2020-07-17 VITALS — BODY MASS INDEX: 32.46 KG/M2 | WEIGHT: 183.19 LBS | HEIGHT: 63 IN

## 2020-07-17 DIAGNOSIS — E11.22 TYPE 2 DIABETES MELLITUS WITH STAGE 3 CHRONIC KIDNEY DISEASE, WITH LONG-TERM CURRENT USE OF INSULIN: Primary | ICD-10-CM

## 2020-07-17 DIAGNOSIS — Z79.4 TYPE 2 DIABETES MELLITUS WITH STAGE 3 CHRONIC KIDNEY DISEASE, WITH LONG-TERM CURRENT USE OF INSULIN: Primary | ICD-10-CM

## 2020-07-17 DIAGNOSIS — Z12.31 ENCOUNTER FOR SCREENING MAMMOGRAM FOR BREAST CANCER: ICD-10-CM

## 2020-07-17 DIAGNOSIS — N18.30 TYPE 2 DIABETES MELLITUS WITH STAGE 3 CHRONIC KIDNEY DISEASE, WITH LONG-TERM CURRENT USE OF INSULIN: Primary | ICD-10-CM

## 2020-07-17 PROCEDURE — 99999 PR PBB SHADOW E&M-EST. PATIENT-LVL II: ICD-10-PCS | Mod: PBBFAC,,,

## 2020-07-17 PROCEDURE — 99999 PR PBB SHADOW E&M-EST. PATIENT-LVL II: CPT | Mod: PBBFAC,,,

## 2020-07-17 RX ORDER — DULAGLUTIDE 0.75 MG/.5ML
0.75 INJECTION, SOLUTION SUBCUTANEOUS
Qty: 4 PEN | Refills: 3 | Status: SHIPPED | OUTPATIENT
Start: 2020-07-17 | End: 2020-07-17 | Stop reason: SDUPTHER

## 2020-07-17 RX ORDER — DULAGLUTIDE 0.75 MG/.5ML
0.75 INJECTION, SOLUTION SUBCUTANEOUS
Qty: 4 PEN | Refills: 3 | Status: SHIPPED | OUTPATIENT
Start: 2020-07-17 | End: 2020-11-03

## 2020-07-17 NOTE — PROGRESS NOTES
Date:  7.17.20                    Time: 0700  Initial Health  Contact - [x]Clinic visit  []  Phone Visit  ASSEMENT: Information obtained through combination of chart review prior to seeing patient, patient self-report.   Primary Referral diagnosis/reason for referral:         Weight loss, Diabetes  (See physician progress note for complete list of diagnoses.)  PCP:      Dr. Fields  Referent :  [x] PCP       [] Transition Navigator    []  EHUSEYIN    []  CHINTAN  []  Other:     Supports:       Aunts and parents    Preferred Learning Style  (may be a combination)  [x]  Visual (pictures/ video)     [] Auditory/ Listening   []  Reading    [x]  Hands-on/ Demonstration   []  1:1    []  Group        [x]  Alone       []  No preference   []  Combination  []  Other:         Presenting issues from patients point of view:  [x]  Improve nutrition/increase healthy choices.                    []  Improve /maintain current functioning.  []  Obtain and maintain healthy blood pressure.                  []  Stop smoking.  [x]  Improve weight management.                                       [x]  Lower cholesterol.  [x]  Improve blood glucose management.                            [x]  Improve breathing.  [x]  Increase energy level.                                                      []  Increase/maintain independence.   [x]  Improve sleep.                                                                []  Decrease stress.  []  Improve pain management.                                             []  Improve mood.  []  Other:                  Pt. 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  [x]  Tries to modify meals so more healthy.  []  Exercises by        []  Takes BP        times a        (insert frequency)  [x]  Weighs self daily        (how often)  []  Takes all medications as prescribed.  []  Rarely misses health care appointments.  []  Sleeps 8 hours without waking more than twice.  [x]  Consistently wears/uses functioning aids as recommended  (ie:  Contacts [] , glasses [x] , hearing  Aid [] , prosthesis [] ,  Walker [] ,  Wheelchair []  etc.)  []  Regularly engages in stress management activities I.e.:  []  Quit smoking   []  Purposefully educates self about health issues.  []  Pt did bring glucose log with him/her.  []  Other  (explain)           []  Comments:       []  Reported Blood Sugar Readings:          Health screenings   Last PCP visit: 7.10.20     GYN/Pap:     11.26.19                        MMG:  3.1.19 requested new order from MD Trino SUÁREZ: [place] after [place]                        Colon: [facility]    Lipids: 11.25.19   CMP: 7.10.20   HgA1C: 7.10.20   Urine Microalbumin-Creatinine:     11.25.19        Eye Exam:   [facility] Feb 2020 request for records sent   Foot Exam:  11.25.19        Strengths:  [x]  Confident                       [x]  Determined/persistent           [x]  Enthusiastic         [x]  Good problem solving           []  Good self-control                   [x]  Hard working        [x]  Hopeful/optimistic                  []  Intelligent                                []  Self aware  []  Creative                                 []  Flexible          []  Sense of humor                     []  Open/willing          [x]  Spiritual                                  [] Values health    []  Successful overcoming difficult challenges in the past.     []  Pos support network  [x]   Health literate (The degree to which individuals have the capacity to obtain, process, and    understand basic health information and services needed to make appropriate health decisions.- Dept. of Health and Human services.)  []    Other Comments:             Change Markers  Scale 0-10 with 10 representing most Ready 0 least ready, 10 most important 0 least important 10 most confident 0 least confident.     [] NA at this time.   Readiness:     10;  Importance:     10;  Confidence:    10    INTERVENTIONS:  [x] Given an explanation about health coaching program and invited participation.  [x] Listened reflectively; provided support, encouragement, and validation; respected  and maintained patient self-direction; explored pros/cons of change; personalized health risks of maintaining the status quo; and facilitated recognition of discrepancy between current behaviors and patients goals and values.  [x] Assessed stage of change and employed appropriate motivational interviewing strategies to facilitate movement toward the next stage of change and healthy behavior modification.  [x] Normalized occurrence of relapse, helped patient recognize outcome of new self-learning as a result of the relapse such as triggers, and helped focus on factors to reduce chances of returning to previous behaviors .  [x] Used readiness scale ratings to guide assessment of importance and confidence of successfully reaching goals.  [x] Assisted patient to develop goal, action plan, and address potential barriers to goal     achievement.  [] Patient was given a new (insert glucose meter or other supplies), taught to use, and      successfully demonstrated ability to carry out essential steps of process.    [] Rx for ( monitor/supplies, pen needles, etc.) was obtained.  [x] Provided educational review, written materials/handouts (Meal Planning Counting Carbs, Healthy Plate, 10 Rules for Eating Safely for Life, 10 Tips to Cutting Your Cravings).   [x] Topics discussed/provided:    GI of foods and how it affects your metabolism and helps you burn fat, carb counting    [x] Facilitated completion of needed exams and screenings by reviewing Diabetic/Health Maintenance Report  Card with patient.  [x] Provided pt with my business card.  [x] Informed of/reviewed how to access health  and after hours assistance.  [x] Discussed, planned, and scheduled future contacts.  [x]Challenges/Barriers identified discussed as identified by patient.  [x] Needs identified by this Health :    Education and support     [] Other:            For additional care management support patient was referred to:        []  Community resources for                        []  Medication assistance programs               []  Dietician              []  Diabetes  [employer] [place]                                        []  Mental health services                           []                  []  Diabetes [place]                                              []  Home health services                                []  Complex case management              []  PCP/covering provider due to                 []  Emergency Dept.                                  []  GYN              []  Optometrist/ Ophthalmologist                          []  Podiatrist                                                      []  N/A        [x]  Other: Mammogram due           RESPONSE/IMPRESSION  Behavior is consistent with the following stage of change:  []  Pre-contemplation  []  Contemplation  [x]  Preparation  []  Action  []  Maintenance  []  Relapse    Level of participation:  []  Refused to participate  []  Guarded / resistant  []  Passive participant  [x]  Active participant/interactive  [x]  Interested/receptive  [x]  Self-motivated  []  Other          Goal developed by patient today:  10/10  Eat more healthier snack  Portion size  Get mammogram  Start Trulicity per Dr. Fields (message sent to MD)  Decrease insulin per Dr. Fields (message sent to MD)    Plan (needed actions to accomplish goal):          [x] Pt will work on action plan as stated above.        [x] Pt will follow up with Health  on      8.12.20  [x] Health   will remain available.  [x] Health  will maintain regular contacts with patient to educate, support, encourage; help problem-solve; assist with development of self-advocacy/increasing independent health management; and provide resources as needed.  [] Pt has declined Health  Program at this time. Provided pt with Health  Program information should they decide to participate at a later time.        [] Pt to facilitate contact with Health        [] Health  to facilitate contact with patient.        [] Other:          Notes:   Met with patient she is interested in losing weight and getting A1c in normal range.   Her best reasons are her health, improve her diabetes (out of whack) and look good.   She would like to start back on the Trulicity, request sent to Dr. Fields.   Due for her mammogram, request order from Dr. Fields.  Goals set by patient and HC will continue to work with patient to assist to reach her goals.    Best Method of Contact:  [] email:   [x] Phone:   [] Mail  [] Office     Radha Guidry RN HC

## 2020-07-17 NOTE — LETTER
AUTHORIZATION FOR RELEASE OF   CONFIDENTIAL INFORMATION    Dear [facility],    We are seeing Lyssa Gandara, date of birth 1965, in the clinic at [facility] INTER[facility]. Devin Fields MD is the patient's PCP. Lyssa Gandara has an outstanding lab/procedure at the time we reviewed her chart. In order to help keep her health information updated, she has authorized us to request the following medical record(s):        (  )  MAMMOGRAM                                      (  )  COLONOSCOPY      (  )  PAP SMEAR                                          (  )  OUTSIDE LAB RESULTS     (  )  DEXA SCAN                                          ( X)  EYE EXAM            (  )  FOOT EXAM                                          (  )  ENTIRE RECORD     (  )  OUTSIDE IMMUNIZATIONS                 (  )  _______________         Please fax records to [facility], Samuel T Plost, MD, [phone].     If you have any questions, please contact WILLIAM Burnett at [phone].           Patient Name: Lyssa Gandara  : 1965  Patient Phone #: [phone]

## 2020-07-17 NOTE — TELEPHONE ENCOUNTER
Spoke to patient.  Since last visit  Patient is on a very strict diet at this point time is had her sugars running low on 15 units of Lantus.. Restart Trulicity and decreased Lantus to 5 units.    We have upcoming virtual visit a couple weeks.

## 2020-07-20 ENCOUNTER — PATIENT OUTREACH (OUTPATIENT)
Dept: ADMINISTRATIVE | Facility: HOSPITAL | Age: 55
End: 2020-07-20

## 2020-07-22 ENCOUNTER — PATIENT OUTREACH (OUTPATIENT)
Dept: ADMINISTRATIVE | Facility: HOSPITAL | Age: 55
End: 2020-07-22

## 2020-07-22 NOTE — LETTER
AUTHORIZATION FOR RELEASE OF   CONFIDENTIAL INFORMATION    Dear [facility] Records ,    We are seeing Lyssa Gandara, date of birth 1965, in the clinic at [facility]. Devin Fields MD is the patient's PCP. Lyssa Gandara has an outstanding lab/procedure at the time we reviewed her chart. In order to help keep her health information updated, she has authorized us to request the following medical record(s):        (  )  MAMMOGRAM                                      ( X)  COLONOSCOPY      (  )  PAP SMEAR                                          (  )  OUTSIDE LAB RESULTS     (  )  DEXA SCAN                                          (  )  EYE EXAM            (  )  FOOT EXAM                                          (  )  ENTIRE RECORD     (  )  OUTSIDE IMMUNIZATIONS                 (  )  _______________         Please fax records to [facility], Samuel T Plost, MD, [phone].     If you have any questions, please contact WILLIAM Burnett at [phone].           Patient Name: Lyssa Gandara  : 1965  Patient Phone #: [phone]

## 2020-07-22 NOTE — PROGRESS NOTES
Chart review completed. Colonoscopy records requested from [facility].

## 2020-07-23 ENCOUNTER — PATIENT MESSAGE (OUTPATIENT)
Dept: INTERNAL MEDICINE | Facility: CLINIC | Age: 55
End: 2020-07-23

## 2020-07-23 ENCOUNTER — PATIENT OUTREACH (OUTPATIENT)
Dept: ADMINISTRATIVE | Facility: HOSPITAL | Age: 55
End: 2020-07-23

## 2020-07-23 DIAGNOSIS — B35.3 TINEA PEDIS, UNSPECIFIED LATERALITY: Primary | ICD-10-CM

## 2020-07-30 ENCOUNTER — HOSPITAL ENCOUNTER (OUTPATIENT)
Dept: RADIOLOGY | Facility: HOSPITAL | Age: 55
Discharge: HOME | End: 2020-07-30
Attending: INTERNAL MEDICINE
Payer: COMMERCIAL

## 2020-07-30 ENCOUNTER — TELEPHONE (OUTPATIENT)
Dept: PRIMARY CARE CLINIC | Facility: CLINIC | Age: 55
End: 2020-07-30

## 2020-07-30 DIAGNOSIS — Z12.31 ENCOUNTER FOR SCREENING MAMMOGRAM FOR BREAST CANCER: ICD-10-CM

## 2020-07-30 DIAGNOSIS — B35.3 TINEA PEDIS, UNSPECIFIED LATERALITY: Primary | ICD-10-CM

## 2020-07-30 PROCEDURE — 77063 BREAST TOMOSYNTHESIS BI: CPT | Mod: 26,,, | Performed by: RADIOLOGY

## 2020-07-30 PROCEDURE — 77063 MAMMO DIGITAL SCREENING BILAT WITH TOMOSYNTHESIS_CAD: ICD-10-PCS | Mod: 26,,, | Performed by: RADIOLOGY

## 2020-07-30 PROCEDURE — 77067 MAMMO DIGITAL SCREENING BILAT WITH TOMOSYNTHESIS_CAD: ICD-10-PCS | Mod: 26,,, | Performed by: RADIOLOGY

## 2020-07-30 PROCEDURE — 77067 SCR MAMMO BI INCL CAD: CPT | Mod: TC

## 2020-07-30 PROCEDURE — 77067 SCR MAMMO BI INCL CAD: CPT | Mod: 26,,, | Performed by: RADIOLOGY

## 2020-07-30 RX ORDER — CLOTRIMAZOLE AND BETAMETHASONE DIPROPIONATE 10; .64 MG/G; MG/G
CREAM TOPICAL 2 TIMES DAILY
Qty: 45 G | Refills: 0 | Status: SHIPPED | OUTPATIENT
Start: 2020-07-30 | End: 2021-01-01

## 2020-07-30 RX ORDER — NAFTIFINE HYDROCHLORIDE 20 MG/G
1 CREAM TOPICAL DAILY
Qty: 60 G | Refills: 0 | Status: SHIPPED | OUTPATIENT
Start: 2020-07-30 | End: 2020-08-13

## 2020-07-30 NOTE — TELEPHONE ENCOUNTER
----- Message from Marlene Jeffrey sent at 7/30/2020  9:11 AM CDT -----  Contact: [address]  Requesting an RX refill or new RX.  Is this a refill or new RX:    RX name and strength: naftifine 2 % Crea  Directions (copy/paste from chart):    Is this a 30 day or 90 day RX:    Local pharmacy or mail order pharmacy:  local  Pharmacy name and phone # (copy/paste from chart):   [facility] [address] [place] [phone] [facility]  Comments:  not covered with insurance. What is covered: ketoconzole, clotrimazole, cicloprix, econazole.

## 2020-07-30 NOTE — TELEPHONE ENCOUNTER
Called and spoke to patient. Patient called stating that he spoke with his pharmacy minutes ago & verbalized that the naftin cream, which I think is a topical anti fungal medication used to treat types of skin infections. Pharmacy stated that the cream is not covered under his insurance. The creams that are covered are    Ketoconazole  Ciclopirox  Econazole and Clotrimazole  Please advise.

## 2020-07-30 NOTE — TELEPHONE ENCOUNTER
Last question about the nausea. Did it get better after some time on the trulicity or did you have to stop medication because of the nausea?    Devin Fields

## 2020-08-07 ENCOUNTER — OFFICE VISIT (OUTPATIENT)
Dept: INTERNAL MEDICINE | Facility: CLINIC | Age: 55
End: 2020-08-07
Payer: COMMERCIAL

## 2020-08-07 DIAGNOSIS — E11.9 TYPE 2 DIABETES MELLITUS WITHOUT COMPLICATION, WITHOUT LONG-TERM CURRENT USE OF INSULIN: Primary | ICD-10-CM

## 2020-08-07 DIAGNOSIS — B35.3 TINEA PEDIS, UNSPECIFIED LATERALITY: ICD-10-CM

## 2020-08-07 PROCEDURE — 99212 OFFICE O/P EST SF 10 MIN: CPT | Mod: 95,,, | Performed by: INTERNAL MEDICINE

## 2020-08-07 PROCEDURE — 99212 PR OFFICE/OUTPT VISIT, EST, LEVL II, 10-19 MIN: ICD-10-PCS | Mod: 95,,, | Performed by: INTERNAL MEDICINE

## 2020-08-07 NOTE — PROGRESS NOTES
The patient location is: [place]  The chief complaint leading to consultation is:  diabetesVisit type: Virtual visit with synchronous audio and video  Total time spent with patient: 11 minutes  Each patient to whom he or she provides medical services by telemedicine is:  (1) informed of the relationship between the physician and patient and the respective role of any other health care provider with respect to management of the patient; and (2) notified that he or she may decline to receive medical services by telemedicine and may withdraw from such care at any time.      CHIEF COMPLAINT     No chief complaint on file.  TELEMEDICINE VISIT  CC:  diabetes    HPI     Lyssa Gandara is 55 y.o. female here today for      diabetes   patient is taking metformin a 1000 b.i.d., Lantus 15 units and started Trulicity 0.75 mg approximately 3 weeks ago.  Reports she has also been very diligent regarding her diet.  Reports a couple episodes of hypoglycemia with blood sugar around 80.  Patient did not have symptoms.  Reports having 1 day greater than 200 after eating some [place] toast at [employer]    Personally Reviewed Patient's Medical, surgical, family and social hx. Changes updated in [facility].  Care Team updated in Epic    Review of Systems:  Review of Systems   Constitutional: Negative for diaphoresis and malaise/fatigue.   Cardiovascular: Negative for palpitations.   Neurological: Negative for tremors.       Health Maintenance:   Reviewed with patient  Due for the following:      PHYSICAL EXAM      audio    LABS     Labs reviewed; Notable for    ASSESSMENT     1. Type 2 diabetes mellitus without complication, without long-term current use of insulin     2. Tinea pedis, unspecified laterality               Plan     Lyssa Gandara is a 55 y.o. female  1. Type 2 diabetes mellitus without complication, without long-term current use of insulin   continue metformin and Trulicity   decrease Lantus to 10 units  nightly   will recheck A1c at 3 months   continue working on diabetic diet    2. Tinea pedis, unspecified laterality   status post treatment with Lamisil with improvement but not complete resolution  .  Will try 2 weeks of Lotrisone.  If do not improve will send to dermatologist to make sure were treating the correct diagnosis.    Devin Fields MD

## 2020-10-23 ENCOUNTER — PATIENT OUTREACH (OUTPATIENT)
Dept: ADMINISTRATIVE | Facility: HOSPITAL | Age: 55
End: 2020-10-23

## 2020-10-23 DIAGNOSIS — E11.9 TYPE 2 DIABETES MELLITUS WITHOUT COMPLICATION: ICD-10-CM

## 2020-12-21 ENCOUNTER — PATIENT MESSAGE (OUTPATIENT)
Dept: INTERNAL MEDICINE | Facility: CLINIC | Age: 55
End: 2020-12-21

## 2020-12-29 ENCOUNTER — TELEPHONE (OUTPATIENT)
Dept: INTERNAL MEDICINE | Facility: CLINIC | Age: 55
End: 2020-12-29

## 2020-12-29 NOTE — TELEPHONE ENCOUNTER
----- Message from Katia William sent at 12/29/2020  4:18 PM CST -----  Contact: self [phone]  Caller is requesting an earlier appt than we can schedule.  Caller declined first available appointment listed below. Caller will not accept being placed on the wait list and is requesting a message be sent to the provider.  When is the next available appointment:  3/15/2021  Reason for the appointment:  diabetes check  Patient preference of timeframe to be scheduled:  soon   Comments:

## 2021-01-01 ENCOUNTER — HOSPITAL ENCOUNTER (OUTPATIENT)
Dept: RADIOLOGY | Facility: HOSPITAL | Age: 56
Discharge: HOME | End: 2021-08-10
Attending: INTERNAL MEDICINE
Payer: COMMERCIAL

## 2021-01-01 ENCOUNTER — PATIENT MESSAGE (OUTPATIENT)
Dept: GYNECOLOGIC ONCOLOGY | Facility: CLINIC | Age: 56
End: 2021-01-01
Payer: COMMERCIAL

## 2021-01-01 ENCOUNTER — INFUSION (OUTPATIENT)
Dept: INFUSION THERAPY | Facility: OTHER | Age: 56
End: 2021-01-01
Attending: OBSTETRICS & GYNECOLOGY
Payer: COMMERCIAL

## 2021-01-01 ENCOUNTER — PATIENT MESSAGE (OUTPATIENT)
Dept: ENDOCRINOLOGY | Facility: CLINIC | Age: 56
End: 2021-01-01

## 2021-01-01 ENCOUNTER — TELEPHONE (OUTPATIENT)
Dept: INTERNAL MEDICINE | Facility: CLINIC | Age: 56
End: 2021-01-01
Payer: COMMERCIAL

## 2021-01-01 ENCOUNTER — PATIENT MESSAGE (OUTPATIENT)
Dept: ADMINISTRATIVE | Facility: HOSPITAL | Age: 56
End: 2021-01-01

## 2021-01-01 ENCOUNTER — HOSPITAL ENCOUNTER (OUTPATIENT)
Dept: RADIOLOGY | Facility: HOSPITAL | Age: 56
Discharge: HOME | End: 2021-09-10
Attending: INTERNAL MEDICINE
Payer: COMMERCIAL

## 2021-01-01 ENCOUNTER — TELEPHONE (OUTPATIENT)
Dept: ENDOCRINOLOGY | Facility: CLINIC | Age: 56
End: 2021-01-01

## 2021-01-01 ENCOUNTER — TELEPHONE (OUTPATIENT)
Dept: GYNECOLOGIC ONCOLOGY | Facility: CLINIC | Age: 56
End: 2021-01-01
Payer: COMMERCIAL

## 2021-01-01 ENCOUNTER — LAB VISIT (OUTPATIENT)
Dept: LAB | Facility: HOSPITAL | Age: 56
End: 2021-01-01
Payer: COMMERCIAL

## 2021-01-01 ENCOUNTER — PATIENT OUTREACH (OUTPATIENT)
Dept: ADMINISTRATIVE | Facility: HOSPITAL | Age: 56
End: 2021-01-01

## 2021-01-01 ENCOUNTER — LAB VISIT (OUTPATIENT)
Dept: LAB | Facility: HOSPITAL | Age: 56
End: 2021-01-01
Attending: INTERNAL MEDICINE
Payer: COMMERCIAL

## 2021-01-01 ENCOUNTER — PATIENT MESSAGE (OUTPATIENT)
Dept: INTERNAL MEDICINE | Facility: CLINIC | Age: 56
End: 2021-01-01
Payer: COMMERCIAL

## 2021-01-01 ENCOUNTER — OFFICE VISIT (OUTPATIENT)
Dept: INTERNAL MEDICINE | Facility: CLINIC | Age: 56
End: 2021-01-01
Payer: COMMERCIAL

## 2021-01-01 ENCOUNTER — ANTI-COAG VISIT (OUTPATIENT)
Dept: CARDIOLOGY | Facility: CLINIC | Age: 56
End: 2021-01-01
Payer: COMMERCIAL

## 2021-01-01 ENCOUNTER — TELEPHONE (OUTPATIENT)
Dept: SURGERY | Facility: CLINIC | Age: 56
End: 2021-01-01

## 2021-01-01 ENCOUNTER — TELEPHONE (OUTPATIENT)
Dept: INTERNAL MEDICINE | Facility: CLINIC | Age: 56
End: 2021-01-01

## 2021-01-01 ENCOUNTER — TELEPHONE (OUTPATIENT)
Dept: RADIOLOGY | Facility: HOSPITAL | Age: 56
End: 2021-01-01

## 2021-01-01 ENCOUNTER — OFFICE VISIT (OUTPATIENT)
Dept: GYNECOLOGIC ONCOLOGY | Facility: CLINIC | Age: 56
End: 2021-01-01
Payer: COMMERCIAL

## 2021-01-01 ENCOUNTER — TELEPHONE (OUTPATIENT)
Dept: DIABETES | Facility: CLINIC | Age: 56
End: 2021-01-01

## 2021-01-01 ENCOUNTER — PATIENT MESSAGE (OUTPATIENT)
Dept: CARDIOLOGY | Facility: CLINIC | Age: 56
End: 2021-01-01

## 2021-01-01 ENCOUNTER — CLINICAL SUPPORT (OUTPATIENT)
Dept: DIABETES | Facility: CLINIC | Age: 56
End: 2021-01-01
Payer: COMMERCIAL

## 2021-01-01 ENCOUNTER — OFFICE VISIT (OUTPATIENT)
Dept: INTERNAL MEDICINE | Facility: CLINIC | Age: 56
End: 2021-01-01
Attending: FAMILY MEDICINE
Payer: COMMERCIAL

## 2021-01-01 ENCOUNTER — LAB VISIT (OUTPATIENT)
Dept: LAB | Facility: OTHER | Age: 56
End: 2021-01-01
Attending: INTERNAL MEDICINE
Payer: COMMERCIAL

## 2021-01-01 ENCOUNTER — HOSPITAL ENCOUNTER (INPATIENT)
Facility: HOSPITAL | Age: 56
LOS: 12 days | Discharge: HOME | DRG: 175 | End: 2021-12-08
Attending: EMERGENCY MEDICINE | Admitting: STUDENT IN AN ORGANIZED HEALTH CARE EDUCATION/TRAINING PROGRAM
Payer: COMMERCIAL

## 2021-01-01 ENCOUNTER — PATIENT MESSAGE (OUTPATIENT)
Dept: RADIOLOGY | Facility: HOSPITAL | Age: 56
End: 2021-01-01

## 2021-01-01 ENCOUNTER — HOSPITAL ENCOUNTER (OUTPATIENT)
Dept: RADIOLOGY | Facility: HOSPITAL | Age: 56
Discharge: HOME | End: 2021-08-01
Attending: INTERNAL MEDICINE
Payer: COMMERCIAL

## 2021-01-01 ENCOUNTER — PATIENT OUTREACH (OUTPATIENT)
Dept: ADMINISTRATIVE | Facility: OTHER | Age: 56
End: 2021-01-01

## 2021-01-01 ENCOUNTER — OFFICE VISIT (OUTPATIENT)
Dept: ENDOCRINOLOGY | Facility: CLINIC | Age: 56
End: 2021-01-01
Payer: COMMERCIAL

## 2021-01-01 VITALS
BODY MASS INDEX: 28.52 KG/M2 | HEART RATE: 64 BPM | DIASTOLIC BLOOD PRESSURE: 78 MMHG | HEIGHT: 63 IN | WEIGHT: 160.94 LBS | SYSTOLIC BLOOD PRESSURE: 130 MMHG

## 2021-01-01 VITALS
BODY MASS INDEX: 30.79 KG/M2 | HEIGHT: 63 IN | DIASTOLIC BLOOD PRESSURE: 76 MMHG | OXYGEN SATURATION: 96 % | HEART RATE: 87 BPM | WEIGHT: 173.75 LBS | SYSTOLIC BLOOD PRESSURE: 110 MMHG

## 2021-01-01 VITALS
WEIGHT: 158.75 LBS | HEIGHT: 63 IN | DIASTOLIC BLOOD PRESSURE: 86 MMHG | BODY MASS INDEX: 28.13 KG/M2 | SYSTOLIC BLOOD PRESSURE: 132 MMHG | HEART RATE: 72 BPM

## 2021-01-01 VITALS
BODY MASS INDEX: 31.7 KG/M2 | HEART RATE: 109 BPM | SYSTOLIC BLOOD PRESSURE: 132 MMHG | DIASTOLIC BLOOD PRESSURE: 86 MMHG | OXYGEN SATURATION: 93 % | WEIGHT: 178.88 LBS | HEIGHT: 63 IN

## 2021-01-01 VITALS
WEIGHT: 153.69 LBS | DIASTOLIC BLOOD PRESSURE: 78 MMHG | SYSTOLIC BLOOD PRESSURE: 129 MMHG | HEART RATE: 120 BPM | BODY MASS INDEX: 27.22 KG/M2

## 2021-01-01 VITALS
HEART RATE: 102 BPM | DIASTOLIC BLOOD PRESSURE: 83 MMHG | SYSTOLIC BLOOD PRESSURE: 123 MMHG | BODY MASS INDEX: 28.64 KG/M2 | WEIGHT: 161.63 LBS | OXYGEN SATURATION: 97 % | RESPIRATION RATE: 18 BRPM | HEIGHT: 63 IN | TEMPERATURE: 98 F

## 2021-01-01 VITALS
HEART RATE: 101 BPM | TEMPERATURE: 98 F | SYSTOLIC BLOOD PRESSURE: 156 MMHG | RESPIRATION RATE: 18 BRPM | WEIGHT: 152.56 LBS | DIASTOLIC BLOOD PRESSURE: 92 MMHG | BODY MASS INDEX: 27.03 KG/M2 | OXYGEN SATURATION: 100 % | HEIGHT: 63 IN

## 2021-01-01 VITALS — WEIGHT: 153 LBS | BODY MASS INDEX: 27.1 KG/M2

## 2021-01-01 DIAGNOSIS — I10 ESSENTIAL HYPERTENSION: ICD-10-CM

## 2021-01-01 DIAGNOSIS — N18.31 TYPE 2 DIABETES MELLITUS WITH STAGE 3A CHRONIC KIDNEY DISEASE, WITH LONG-TERM CURRENT USE OF INSULIN: Primary | ICD-10-CM

## 2021-01-01 DIAGNOSIS — I26.99 OTHER ACUTE PULMONARY EMBOLISM WITHOUT ACUTE COR PULMONALE: Primary | ICD-10-CM

## 2021-01-01 DIAGNOSIS — Z79.4 TYPE 2 DIABETES MELLITUS WITH STAGE 3A CHRONIC KIDNEY DISEASE, WITH LONG-TERM CURRENT USE OF INSULIN: ICD-10-CM

## 2021-01-01 DIAGNOSIS — C55 LEIOMYOSARCOMA OF UTERUS: Primary | ICD-10-CM

## 2021-01-01 DIAGNOSIS — R11.0 NAUSEA: ICD-10-CM

## 2021-01-01 DIAGNOSIS — R06.02 SHORTNESS OF BREATH: ICD-10-CM

## 2021-01-01 DIAGNOSIS — Z79.01 LONG TERM (CURRENT) USE OF ANTICOAGULANTS: ICD-10-CM

## 2021-01-01 DIAGNOSIS — E11.22 TYPE 2 DIABETES MELLITUS WITH STAGE 3A CHRONIC KIDNEY DISEASE, WITH LONG-TERM CURRENT USE OF INSULIN: ICD-10-CM

## 2021-01-01 DIAGNOSIS — Z79.01 LONG TERM (CURRENT) USE OF ANTICOAGULANTS: Primary | ICD-10-CM

## 2021-01-01 DIAGNOSIS — Z12.31 VISIT FOR SCREENING MAMMOGRAM: Primary | ICD-10-CM

## 2021-01-01 DIAGNOSIS — R92.8 ABNORMALITY OF LEFT BREAST ON SCREENING MAMMOGRAM: Primary | ICD-10-CM

## 2021-01-01 DIAGNOSIS — Z79.01 LONG TERM CURRENT USE OF ANTICOAGULANT THERAPY: ICD-10-CM

## 2021-01-01 DIAGNOSIS — M34.9 SCLERODERMA: ICD-10-CM

## 2021-01-01 DIAGNOSIS — C54.1 MALIGNANT NEOPLASM OF ENDOMETRIUM: ICD-10-CM

## 2021-01-01 DIAGNOSIS — N85.8 UTERINE MASS: ICD-10-CM

## 2021-01-01 DIAGNOSIS — I26.99 OTHER ACUTE PULMONARY EMBOLISM WITHOUT ACUTE COR PULMONALE: ICD-10-CM

## 2021-01-01 DIAGNOSIS — N18.31 TYPE 2 DIABETES MELLITUS WITH STAGE 3A CHRONIC KIDNEY DISEASE, WITH LONG-TERM CURRENT USE OF INSULIN: ICD-10-CM

## 2021-01-01 DIAGNOSIS — R07.9 CHEST PAIN: ICD-10-CM

## 2021-01-01 DIAGNOSIS — E11.9 TYPE 2 DIABETES MELLITUS WITHOUT COMPLICATION: ICD-10-CM

## 2021-01-01 DIAGNOSIS — J45.40 MODERATE PERSISTENT ASTHMA WITHOUT COMPLICATION: ICD-10-CM

## 2021-01-01 DIAGNOSIS — C78.01 MALIGNANT NEOPLASM METASTATIC TO BOTH LUNGS: ICD-10-CM

## 2021-01-01 DIAGNOSIS — E11.9 TYPE 2 DIABETES MELLITUS WITHOUT COMPLICATION, UNSPECIFIED WHETHER LONG TERM INSULIN USE: ICD-10-CM

## 2021-01-01 DIAGNOSIS — R19.00 ABDOMINAL MASS, UNSPECIFIED ABDOMINAL LOCATION: ICD-10-CM

## 2021-01-01 DIAGNOSIS — C78.02 MALIGNANT NEOPLASM METASTATIC TO BOTH LUNGS: ICD-10-CM

## 2021-01-01 DIAGNOSIS — I63.031 CEREBRAL INFARCTION DUE TO THROMBOSIS OF RIGHT CAROTID ARTERY: ICD-10-CM

## 2021-01-01 DIAGNOSIS — R92.8 ABNORMAL MAMMOGRAM: ICD-10-CM

## 2021-01-01 DIAGNOSIS — J96.01 ACUTE HYPOXEMIC RESPIRATORY FAILURE: ICD-10-CM

## 2021-01-01 DIAGNOSIS — R35.0 URINARY FREQUENCY: Primary | ICD-10-CM

## 2021-01-01 DIAGNOSIS — E55.9 VITAMIN D DEFICIENCY: Primary | ICD-10-CM

## 2021-01-01 DIAGNOSIS — I65.21 CAROTID ARTERY STENOSIS, UNILATERAL, RIGHT: ICD-10-CM

## 2021-01-01 DIAGNOSIS — Z12.31 VISIT FOR SCREENING MAMMOGRAM: ICD-10-CM

## 2021-01-01 DIAGNOSIS — I26.99 PULMONARY EMBOLISM, BILATERAL: ICD-10-CM

## 2021-01-01 DIAGNOSIS — J45.909 UNCOMPLICATED ASTHMA, UNSPECIFIED ASTHMA SEVERITY, UNSPECIFIED WHETHER PERSISTENT: ICD-10-CM

## 2021-01-01 DIAGNOSIS — C55 LEIOMYOSARCOMA OF UTERUS: ICD-10-CM

## 2021-01-01 DIAGNOSIS — Z79.4 TYPE 2 DIABETES MELLITUS WITH STAGE 3A CHRONIC KIDNEY DISEASE, WITH LONG-TERM CURRENT USE OF INSULIN: Primary | ICD-10-CM

## 2021-01-01 DIAGNOSIS — J45.909 ASTHMA, UNSPECIFIED ASTHMA SEVERITY, UNSPECIFIED WHETHER COMPLICATED, UNSPECIFIED WHETHER PERSISTENT: ICD-10-CM

## 2021-01-01 DIAGNOSIS — I26.99 PULMONARY EMBOLISM, BILATERAL: Primary | ICD-10-CM

## 2021-01-01 DIAGNOSIS — N30.01 ACUTE CYSTITIS WITH HEMATURIA: Primary | ICD-10-CM

## 2021-01-01 DIAGNOSIS — R92.8 ABNORMALITY OF LEFT BREAST ON SCREENING MAMMOGRAM: ICD-10-CM

## 2021-01-01 DIAGNOSIS — E11.22 TYPE 2 DIABETES MELLITUS WITH STAGE 3A CHRONIC KIDNEY DISEASE, WITH LONG-TERM CURRENT USE OF INSULIN: Primary | ICD-10-CM

## 2021-01-01 DIAGNOSIS — I77.71 INTERNAL CAROTID ARTERY DISSECTION: ICD-10-CM

## 2021-01-01 DIAGNOSIS — I65.23 CAROTID STENOSIS, ASYMPTOMATIC, BILATERAL: ICD-10-CM

## 2021-01-01 DIAGNOSIS — R30.0 DYSURIA: ICD-10-CM

## 2021-01-01 DIAGNOSIS — R00.0 TACHYCARDIA: ICD-10-CM

## 2021-01-01 DIAGNOSIS — C54.1 MALIGNANT NEOPLASM OF ENDOMETRIUM: Primary | ICD-10-CM

## 2021-01-01 DIAGNOSIS — R91.8 LUNG MASS: ICD-10-CM

## 2021-01-01 DIAGNOSIS — R92.8 ABNORMAL MAMMOGRAM: Primary | ICD-10-CM

## 2021-01-01 DIAGNOSIS — E78.5 HYPERLIPIDEMIA, UNSPECIFIED HYPERLIPIDEMIA TYPE: ICD-10-CM

## 2021-01-01 DIAGNOSIS — R30.0 DYSURIA: Primary | ICD-10-CM

## 2021-01-01 DIAGNOSIS — I10 HYPERTENSION, ESSENTIAL: ICD-10-CM

## 2021-01-01 LAB
25(OH)D3+25(OH)D2 SERPL-MCNC: 15 NG/ML (ref 30–96)
25(OH)D3+25(OH)D2 SERPL-MCNC: 7 NG/ML (ref 30–96)
ABO + RH BLD: NORMAL
ALBUMIN SERPL BCP-MCNC: 3 G/DL (ref 3.5–5.2)
ALBUMIN SERPL BCP-MCNC: 3.1 G/DL (ref 3.5–5.2)
ALBUMIN SERPL BCP-MCNC: 3.3 G/DL (ref 3.5–5.2)
ALBUMIN SERPL BCP-MCNC: 3.4 G/DL (ref 3.5–5.2)
ALBUMIN SERPL BCP-MCNC: 3.7 G/DL (ref 3.5–5.2)
ALBUMIN SERPL BCP-MCNC: 4 G/DL (ref 3.5–5.2)
ALBUMIN SERPL BCP-MCNC: 4.3 G/DL (ref 3.5–5.2)
ALBUMIN/CREAT UR: 34.8 UG/MG (ref 0–30)
ALP SERPL-CCNC: 103 U/L (ref 55–135)
ALP SERPL-CCNC: 109 U/L (ref 55–135)
ALP SERPL-CCNC: 113 U/L (ref 55–135)
ALP SERPL-CCNC: 123 U/L (ref 55–135)
ALP SERPL-CCNC: 124 U/L (ref 55–135)
ALP SERPL-CCNC: 132 U/L (ref 55–135)
ALP SERPL-CCNC: 72 U/L (ref 55–135)
ALP SERPL-CCNC: 73 U/L (ref 55–135)
ALP SERPL-CCNC: 78 U/L (ref 55–135)
ALP SERPL-CCNC: 95 U/L (ref 55–135)
ALP SERPL-CCNC: 96 U/L (ref 55–135)
ALT SERPL W/O P-5'-P-CCNC: 10 U/L (ref 10–44)
ALT SERPL W/O P-5'-P-CCNC: 12 U/L (ref 10–44)
ALT SERPL W/O P-5'-P-CCNC: 14 U/L (ref 10–44)
ALT SERPL W/O P-5'-P-CCNC: 35 U/L (ref 10–44)
ALT SERPL W/O P-5'-P-CCNC: 45 U/L (ref 10–44)
ALT SERPL W/O P-5'-P-CCNC: 6 U/L (ref 10–44)
ALT SERPL W/O P-5'-P-CCNC: 61 U/L (ref 10–44)
ALT SERPL W/O P-5'-P-CCNC: 67 U/L (ref 10–44)
ALT SERPL W/O P-5'-P-CCNC: 8 U/L (ref 10–44)
ALT SERPL W/O P-5'-P-CCNC: 8 U/L (ref 10–44)
ALT SERPL W/O P-5'-P-CCNC: 9 U/L (ref 10–44)
ANION GAP SERPL CALC-SCNC: 10 MMOL/L (ref 8–16)
ANION GAP SERPL CALC-SCNC: 11 MMOL/L (ref 8–16)
ANION GAP SERPL CALC-SCNC: 12 MMOL/L (ref 8–16)
ANION GAP SERPL CALC-SCNC: 13 MMOL/L (ref 8–16)
ANION GAP SERPL CALC-SCNC: 13 MMOL/L (ref 8–16)
ANION GAP SERPL CALC-SCNC: 14 MMOL/L (ref 8–16)
ANION GAP SERPL CALC-SCNC: 14 MMOL/L (ref 8–16)
ANION GAP SERPL CALC-SCNC: 16 MMOL/L (ref 8–16)
ANION GAP SERPL CALC-SCNC: 18 MMOL/L (ref 8–16)
ANISOCYTOSIS BLD QL SMEAR: SLIGHT
APTT BLDCRRT: 47 SEC (ref 21–32)
APTT BLDCRRT: 51.3 SEC (ref 21–32)
APTT BLDCRRT: 54.1 SEC (ref 21–32)
APTT BLDCRRT: 54.5 SEC (ref 21–32)
APTT BLDCRRT: 56.2 SEC (ref 21–32)
APTT BLDCRRT: 56.8 SEC (ref 21–32)
APTT BLDCRRT: 60.4 SEC (ref 21–32)
APTT BLDCRRT: 63.1 SEC (ref 21–32)
APTT BLDCRRT: 64.1 SEC (ref 21–32)
APTT BLDCRRT: 67.4 SEC (ref 21–32)
APTT BLDCRRT: <21 SEC (ref 21–32)
ASCENDING AORTA: 2.89 CM
AST SERPL-CCNC: 15 U/L (ref 10–40)
AST SERPL-CCNC: 16 U/L (ref 10–40)
AST SERPL-CCNC: 16 U/L (ref 10–40)
AST SERPL-CCNC: 18 U/L (ref 10–40)
AST SERPL-CCNC: 22 U/L (ref 10–40)
AST SERPL-CCNC: 23 U/L (ref 10–40)
AST SERPL-CCNC: 23 U/L (ref 10–40)
AST SERPL-CCNC: 25 U/L (ref 10–40)
AST SERPL-CCNC: 30 U/L (ref 10–40)
AST SERPL-CCNC: 49 U/L (ref 10–40)
AST SERPL-CCNC: 54 U/L (ref 10–40)
AV INDEX (PROSTH): 0.8
AV MEAN GRADIENT: 6 MMHG
AV PEAK GRADIENT: 10 MMHG
AV VALVE AREA: 2.49 CM2
AV VELOCITY RATIO: 0.6
B-HCG UR QL: NEGATIVE
BACTERIA #/AREA URNS AUTO: ABNORMAL /HPF
BACTERIA UR CULT: NORMAL
BACTERIA UR CULT: NORMAL
BASOPHILS # BLD AUTO: 0.01 K/UL (ref 0–0.2)
BASOPHILS # BLD AUTO: 0.02 K/UL (ref 0–0.2)
BASOPHILS # BLD AUTO: 0.03 K/UL (ref 0–0.2)
BASOPHILS NFR BLD: 0.2 % (ref 0–1.9)
BASOPHILS NFR BLD: 0.2 % (ref 0–1.9)
BASOPHILS NFR BLD: 0.3 % (ref 0–1.9)
BASOPHILS NFR BLD: 0.5 % (ref 0–1.9)
BASOPHILS NFR BLD: 0.6 % (ref 0–1.9)
BASOPHILS NFR BLD: 0.7 % (ref 0–1.9)
BILIRUB SERPL-MCNC: 0.4 MG/DL (ref 0.1–1)
BILIRUB SERPL-MCNC: 0.4 MG/DL (ref 0.1–1)
BILIRUB SERPL-MCNC: 0.5 MG/DL (ref 0.1–1)
BILIRUB SERPL-MCNC: 0.6 MG/DL (ref 0.1–1)
BILIRUB SERPL-MCNC: 0.6 MG/DL (ref 0.1–1)
BILIRUB SERPL-MCNC: 0.7 MG/DL (ref 0.1–1)
BILIRUB SERPL-MCNC: 0.8 MG/DL (ref 0.1–1)
BILIRUB SERPL-MCNC: 0.9 MG/DL (ref 0.1–1)
BILIRUB SERPL-MCNC: 1.1 MG/DL (ref 0.1–1)
BILIRUB UR QL STRIP: NEGATIVE
BLD GP AB SCN CELLS X3 SERPL QL: NORMAL
BNP SERPL-MCNC: 21 PG/ML (ref 0–99)
BSA FOR ECHO PROCEDURE: 1.78 M2
BUN SERPL-MCNC: 10 MG/DL (ref 6–20)
BUN SERPL-MCNC: 11 MG/DL (ref 6–20)
BUN SERPL-MCNC: 11 MG/DL (ref 6–20)
BUN SERPL-MCNC: 14 MG/DL (ref 6–20)
BUN SERPL-MCNC: 16 MG/DL (ref 6–20)
BUN SERPL-MCNC: 20 MG/DL (ref 6–20)
BUN SERPL-MCNC: 22 MG/DL (ref 6–20)
BUN SERPL-MCNC: 24 MG/DL (ref 6–20)
BUN SERPL-MCNC: 27 MG/DL (ref 6–20)
BUN SERPL-MCNC: 6 MG/DL (ref 6–20)
BUN SERPL-MCNC: 8 MG/DL (ref 6–30)
BUN SERPL-MCNC: 9 MG/DL (ref 6–20)
BUN SERPL-MCNC: 9 MG/DL (ref 6–20)
CALCIUM SERPL-MCNC: 10.1 MG/DL (ref 8.7–10.5)
CALCIUM SERPL-MCNC: 10.4 MG/DL (ref 8.7–10.5)
CALCIUM SERPL-MCNC: 8.5 MG/DL (ref 8.7–10.5)
CALCIUM SERPL-MCNC: 9.1 MG/DL (ref 8.7–10.5)
CALCIUM SERPL-MCNC: 9.3 MG/DL (ref 8.7–10.5)
CALCIUM SERPL-MCNC: 9.3 MG/DL (ref 8.7–10.5)
CALCIUM SERPL-MCNC: 9.4 MG/DL (ref 8.7–10.5)
CALCIUM SERPL-MCNC: 9.5 MG/DL (ref 8.7–10.5)
CALCIUM SERPL-MCNC: 9.5 MG/DL (ref 8.7–10.5)
CALCIUM SERPL-MCNC: 9.7 MG/DL (ref 8.7–10.5)
CALCIUM SERPL-MCNC: 9.8 MG/DL (ref 8.7–10.5)
CALCIUM SERPL-MCNC: 9.8 MG/DL (ref 8.7–10.5)
CANCER AG125 SERPL-ACNC: 35 U/ML (ref 0–30)
CHLORIDE SERPL-SCNC: 101 MMOL/L (ref 95–110)
CHLORIDE SERPL-SCNC: 102 MMOL/L (ref 95–110)
CHLORIDE SERPL-SCNC: 102 MMOL/L (ref 95–110)
CHLORIDE SERPL-SCNC: 103 MMOL/L (ref 95–110)
CHLORIDE SERPL-SCNC: 103 MMOL/L (ref 95–110)
CHLORIDE SERPL-SCNC: 107 MMOL/L (ref 95–110)
CHLORIDE SERPL-SCNC: 98 MMOL/L (ref 95–110)
CHLORIDE SERPL-SCNC: 98 MMOL/L (ref 95–110)
CHLORIDE SERPL-SCNC: 99 MMOL/L (ref 95–110)
CHOLEST SERPL-MCNC: 172 MG/DL (ref 120–199)
CHOLEST/HDLC SERPL: 2.6 {RATIO} (ref 2–5)
CLARITY UR REFRACT.AUTO: ABNORMAL
CLARITY UR REFRACT.AUTO: CLEAR
CLARITY UR REFRACT.AUTO: CLEAR
CO2 SERPL-SCNC: 18 MMOL/L (ref 23–29)
CO2 SERPL-SCNC: 24 MMOL/L (ref 23–29)
CO2 SERPL-SCNC: 26 MMOL/L (ref 23–29)
CO2 SERPL-SCNC: 27 MMOL/L (ref 23–29)
CO2 SERPL-SCNC: 28 MMOL/L (ref 23–29)
CO2 SERPL-SCNC: 28 MMOL/L (ref 23–29)
CO2 SERPL-SCNC: 29 MMOL/L (ref 23–29)
COLOR UR AUTO: ABNORMAL
COLOR UR AUTO: ABNORMAL
COLOR UR AUTO: YELLOW
CREAT SERPL-MCNC: 0.8 MG/DL (ref 0.5–1.4)
CREAT SERPL-MCNC: 0.9 MG/DL (ref 0.5–1.4)
CREAT SERPL-MCNC: 1 MG/DL (ref 0.5–1.4)
CREAT SERPL-MCNC: 1.2 MG/DL (ref 0.5–1.4)
CREAT SERPL-MCNC: 1.2 MG/DL (ref 0.5–1.4)
CREAT SERPL-MCNC: 1.3 MG/DL (ref 0.5–1.4)
CREAT SERPL-MCNC: 1.4 MG/DL (ref 0.5–1.4)
CREAT SERPL-MCNC: 1.6 MG/DL (ref 0.5–1.4)
CREAT UR-MCNC: 23 MG/DL (ref 15–325)
CTP QC/QA: YES
CV ECHO LV RWT: 0.4 CM
DACRYOCYTES BLD QL SMEAR: ABNORMAL
DACRYOCYTES BLD QL SMEAR: ABNORMAL
DIFFERENTIAL METHOD: ABNORMAL
DOP CALC AO PEAK VEL: 1.6 M/S
DOP CALC AO VTI: 24.6 CM
DOP CALC LVOT AREA: 3.1 CM2
DOP CALC LVOT DIAMETER: 1.99 CM
DOP CALC LVOT PEAK VEL: 0.96 M/S
DOP CALC LVOT STROKE VOLUME: 61.21 CM3
DOP CALCLVOT PEAK VEL VTI: 19.69 CM
E WAVE DECELERATION TIME: 283.88 MSEC
E/A RATIO: 0.91
E/E' RATIO: 14.91 M/S
ECHO LV POSTERIOR WALL: 0.94 CM (ref 0.6–1.1)
EJECTION FRACTION: 63 %
EOSINOPHIL # BLD AUTO: 0.1 K/UL (ref 0–0.5)
EOSINOPHIL # BLD AUTO: 0.2 K/UL (ref 0–0.5)
EOSINOPHIL # BLD AUTO: 0.3 K/UL (ref 0–0.5)
EOSINOPHIL NFR BLD: 2.8 % (ref 0–8)
EOSINOPHIL NFR BLD: 3.9 % (ref 0–8)
EOSINOPHIL NFR BLD: 3.9 % (ref 0–8)
EOSINOPHIL NFR BLD: 4.2 % (ref 0–8)
EOSINOPHIL NFR BLD: 4.4 % (ref 0–8)
EOSINOPHIL NFR BLD: 4.4 % (ref 0–8)
EOSINOPHIL NFR BLD: 4.7 % (ref 0–8)
EOSINOPHIL NFR BLD: 5 % (ref 0–8)
EOSINOPHIL NFR BLD: 5.1 % (ref 0–8)
EOSINOPHIL NFR BLD: 5.2 % (ref 0–8)
EOSINOPHIL NFR BLD: 5.3 % (ref 0–8)
EOSINOPHIL NFR BLD: 5.3 % (ref 0–8)
EOSINOPHIL NFR BLD: 5.6 % (ref 0–8)
EOSINOPHIL NFR BLD: 7.1 % (ref 0–8)
EOSINOPHIL NFR BLD: 7.2 % (ref 0–8)
EOSINOPHIL NFR BLD: 8.5 % (ref 0–8)
ERYTHROCYTE [DISTWIDTH] IN BLOOD BY AUTOMATED COUNT: 12.8 % (ref 11.5–14.5)
ERYTHROCYTE [DISTWIDTH] IN BLOOD BY AUTOMATED COUNT: 12.9 % (ref 11.5–14.5)
ERYTHROCYTE [DISTWIDTH] IN BLOOD BY AUTOMATED COUNT: 13.1 % (ref 11.5–14.5)
ERYTHROCYTE [DISTWIDTH] IN BLOOD BY AUTOMATED COUNT: 13.1 % (ref 11.5–14.5)
ERYTHROCYTE [DISTWIDTH] IN BLOOD BY AUTOMATED COUNT: 13.2 % (ref 11.5–14.5)
ERYTHROCYTE [DISTWIDTH] IN BLOOD BY AUTOMATED COUNT: 13.3 % (ref 11.5–14.5)
ERYTHROCYTE [DISTWIDTH] IN BLOOD BY AUTOMATED COUNT: 13.3 % (ref 11.5–14.5)
ERYTHROCYTE [DISTWIDTH] IN BLOOD BY AUTOMATED COUNT: 13.4 % (ref 11.5–14.5)
ERYTHROCYTE [DISTWIDTH] IN BLOOD BY AUTOMATED COUNT: 13.5 % (ref 11.5–14.5)
ERYTHROCYTE [DISTWIDTH] IN BLOOD BY AUTOMATED COUNT: 13.5 % (ref 11.5–14.5)
ERYTHROCYTE [DISTWIDTH] IN BLOOD BY AUTOMATED COUNT: 13.6 % (ref 11.5–14.5)
ERYTHROCYTE [DISTWIDTH] IN BLOOD BY AUTOMATED COUNT: 13.6 % (ref 11.5–14.5)
ERYTHROCYTE [DISTWIDTH] IN BLOOD BY AUTOMATED COUNT: 16.6 % (ref 11.5–14.5)
EST. GFR  (AFRICAN AMERICAN): 41.5 ML/MIN/1.73 M^2
EST. GFR  (AFRICAN AMERICAN): 48.8 ML/MIN/1.73 M^2
EST. GFR  (AFRICAN AMERICAN): 53.4 ML/MIN/1.73 M^2
EST. GFR  (AFRICAN AMERICAN): 58.8 ML/MIN/1.73 M^2
EST. GFR  (AFRICAN AMERICAN): 58.8 ML/MIN/1.73 M^2
EST. GFR  (AFRICAN AMERICAN): >60 ML/MIN/1.73 M^2
EST. GFR  (NON AFRICAN AMERICAN): 36 ML/MIN/1.73 M^2
EST. GFR  (NON AFRICAN AMERICAN): 42.3 ML/MIN/1.73 M^2
EST. GFR  (NON AFRICAN AMERICAN): 46.3 ML/MIN/1.73 M^2
EST. GFR  (NON AFRICAN AMERICAN): 51 ML/MIN/1.73 M^2
EST. GFR  (NON AFRICAN AMERICAN): 51 ML/MIN/1.73 M^2
EST. GFR  (NON AFRICAN AMERICAN): >60 ML/MIN/1.73 M^2
ESTIMATED AVG GLUCOSE: 203 MG/DL (ref 68–131)
ESTIMATED AVG GLUCOSE: 249 MG/DL (ref 68–131)
ESTIMATED AVG GLUCOSE: 258 MG/DL (ref 68–131)
FERRITIN SERPL-MCNC: 277 NG/ML (ref 20–300)
FINAL PATHOLOGIC DIAGNOSIS: NORMAL
FOLATE SERPL-MCNC: 8.3 NG/ML (ref 4–24)
FRACTIONAL SHORTENING: 28 % (ref 28–44)
GLUCOSE SERPL-MCNC: 119 MG/DL (ref 70–110)
GLUCOSE SERPL-MCNC: 151 MG/DL (ref 70–110)
GLUCOSE SERPL-MCNC: 156 MG/DL (ref 70–110)
GLUCOSE SERPL-MCNC: 158 MG/DL (ref 70–110)
GLUCOSE SERPL-MCNC: 161 MG/DL (ref 70–110)
GLUCOSE SERPL-MCNC: 169 MG/DL (ref 70–110)
GLUCOSE SERPL-MCNC: 186 MG/DL (ref 70–110)
GLUCOSE SERPL-MCNC: 187 MG/DL (ref 70–110)
GLUCOSE SERPL-MCNC: 207 MG/DL (ref 70–110)
GLUCOSE SERPL-MCNC: 55 MG/DL (ref 70–110)
GLUCOSE SERPL-MCNC: 67 MG/DL (ref 70–110)
GLUCOSE SERPL-MCNC: 75 MG/DL (ref 70–110)
GLUCOSE SERPL-MCNC: 77 MG/DL (ref 70–110)
GLUCOSE UR QL STRIP: NEGATIVE
GROSS: NORMAL
HBA1C MFR BLD: 10.3 % (ref 4–5.6)
HBA1C MFR BLD: 10.6 % (ref 4–5.6)
HBA1C MFR BLD: 8.7 % (ref 4–5.6)
HCT VFR BLD AUTO: 28 % (ref 37–48.5)
HCT VFR BLD AUTO: 28.1 % (ref 37–48.5)
HCT VFR BLD AUTO: 29 % (ref 37–48.5)
HCT VFR BLD AUTO: 29.1 % (ref 37–48.5)
HCT VFR BLD AUTO: 29.4 % (ref 37–48.5)
HCT VFR BLD AUTO: 29.5 % (ref 37–48.5)
HCT VFR BLD AUTO: 29.7 % (ref 37–48.5)
HCT VFR BLD AUTO: 30 % (ref 37–48.5)
HCT VFR BLD AUTO: 30.8 % (ref 37–48.5)
HCT VFR BLD AUTO: 30.8 % (ref 37–48.5)
HCT VFR BLD AUTO: 32.8 % (ref 37–48.5)
HCT VFR BLD AUTO: 34.8 % (ref 37–48.5)
HCT VFR BLD AUTO: 37.7 % (ref 37–48.5)
HCT VFR BLD CALC: 44 %PCV (ref 36–54)
HDLC SERPL-MCNC: 65 MG/DL (ref 40–75)
HDLC SERPL: 37.8 % (ref 20–50)
HGB BLD-MCNC: 10 G/DL (ref 12–16)
HGB BLD-MCNC: 10.1 G/DL (ref 12–16)
HGB BLD-MCNC: 10.1 G/DL (ref 12–16)
HGB BLD-MCNC: 10.6 G/DL (ref 12–16)
HGB BLD-MCNC: 10.6 G/DL (ref 12–16)
HGB BLD-MCNC: 11 G/DL (ref 12–16)
HGB BLD-MCNC: 11 G/DL (ref 12–16)
HGB BLD-MCNC: 12.9 G/DL (ref 12–16)
HGB BLD-MCNC: 9.2 G/DL (ref 12–16)
HGB BLD-MCNC: 9.4 G/DL (ref 12–16)
HGB BLD-MCNC: 9.7 G/DL (ref 12–16)
HGB BLD-MCNC: 9.8 G/DL (ref 12–16)
HGB BLD-MCNC: 9.9 G/DL (ref 12–16)
HGB BLD-MCNC: 9.9 G/DL (ref 12–16)
HGB UR QL STRIP: ABNORMAL
HYALINE CASTS UR QL AUTO: 1 /LPF
HYALINE CASTS UR QL AUTO: 7 /LPF
HYPOCHROMIA BLD QL SMEAR: ABNORMAL
HYPOCHROMIA BLD QL SMEAR: ABNORMAL
IMM GRANULOCYTES # BLD AUTO: 0.01 K/UL (ref 0–0.04)
IMM GRANULOCYTES # BLD AUTO: 0.02 K/UL (ref 0–0.04)
IMM GRANULOCYTES # BLD AUTO: 0.03 K/UL (ref 0–0.04)
IMM GRANULOCYTES NFR BLD AUTO: 0.2 % (ref 0–0.5)
IMM GRANULOCYTES NFR BLD AUTO: 0.3 % (ref 0–0.5)
IMM GRANULOCYTES NFR BLD AUTO: 0.5 % (ref 0–0.5)
IMM GRANULOCYTES NFR BLD AUTO: 0.5 % (ref 0–0.5)
IMM GRANULOCYTES NFR BLD AUTO: 0.6 % (ref 0–0.5)
IMM GRANULOCYTES NFR BLD AUTO: 1 % (ref 0–0.5)
INR PPP: 1 (ref 0.8–1.2)
INR PPP: 1 (ref 0.8–1.2)
INR PPP: 1.1 (ref 0.8–1.2)
INR PPP: 1.4 (ref 0.8–1.2)
INR PPP: 1.9 (ref 0.8–1.2)
INR PPP: 2.2 (ref 0.8–1.2)
INR PPP: 2.3 (ref 0.8–1.2)
INR PPP: 2.7 (ref 0.8–1.2)
INR PPP: 3.6 (ref 0.8–1.2)
INR PPP: 4.2 (ref 0.8–1.2)
INR PPP: 5.06
INR PPP: 5.1 (ref 0.8–1.2)
INTERVENTRICULAR SEPTUM: 0.81 CM (ref 0.6–1.1)
IRON SERPL-MCNC: 26 UG/DL (ref 30–160)
IVRT: 117.03 MSEC
KETONES UR QL STRIP: ABNORMAL
KETONES UR QL STRIP: NEGATIVE
KETONES UR QL STRIP: NEGATIVE
LA MAJOR: 5.62 CM
LA MINOR: 5.62 CM
LA WIDTH: 3.08 CM
LDLC SERPL CALC-MCNC: 87 MG/DL (ref 63–159)
LEFT ATRIUM SIZE: 3.36 CM
LEFT ATRIUM VOLUME INDEX MOD: 28.4 ML/M2
LEFT ATRIUM VOLUME INDEX: 28.4 ML/M2
LEFT ATRIUM VOLUME MOD: 49.35 CM3
LEFT ATRIUM VOLUME: 49.44 CM3
LEFT INTERNAL DIMENSION IN SYSTOLE: 3.39 CM (ref 2.1–4)
LEFT VENTRICLE DIASTOLIC VOLUME INDEX: 59.06 ML/M2
LEFT VENTRICLE DIASTOLIC VOLUME: 102.76 ML
LEFT VENTRICLE MASS INDEX: 79 G/M2
LEFT VENTRICLE SYSTOLIC VOLUME INDEX: 27.1 ML/M2
LEFT VENTRICLE SYSTOLIC VOLUME: 47.11 ML
LEFT VENTRICULAR INTERNAL DIMENSION IN DIASTOLE: 4.71 CM (ref 3.5–6)
LEFT VENTRICULAR MASS: 137.96 G
LEUKOCYTE ESTERASE UR QL STRIP: ABNORMAL
LEUKOCYTE ESTERASE UR QL STRIP: ABNORMAL
LEUKOCYTE ESTERASE UR QL STRIP: NEGATIVE
LV LATERAL E/E' RATIO: 16.4 M/S
LV SEPTAL E/E' RATIO: 13.67 M/S
LYMPHOCYTES # BLD AUTO: 1 K/UL (ref 1–4.8)
LYMPHOCYTES # BLD AUTO: 1.2 K/UL (ref 1–4.8)
LYMPHOCYTES # BLD AUTO: 1.3 K/UL (ref 1–4.8)
LYMPHOCYTES # BLD AUTO: 1.5 K/UL (ref 1–4.8)
LYMPHOCYTES # BLD AUTO: 1.6 K/UL (ref 1–4.8)
LYMPHOCYTES # BLD AUTO: 1.7 K/UL (ref 1–4.8)
LYMPHOCYTES # BLD AUTO: 1.7 K/UL (ref 1–4.8)
LYMPHOCYTES # BLD AUTO: 2.1 K/UL (ref 1–4.8)
LYMPHOCYTES # BLD AUTO: 2.4 K/UL (ref 1–4.8)
LYMPHOCYTES NFR BLD: 28.8 % (ref 18–48)
LYMPHOCYTES NFR BLD: 32.9 % (ref 18–48)
LYMPHOCYTES NFR BLD: 36.4 % (ref 18–48)
LYMPHOCYTES NFR BLD: 38.9 % (ref 18–48)
LYMPHOCYTES NFR BLD: 39 % (ref 18–48)
LYMPHOCYTES NFR BLD: 39 % (ref 18–48)
LYMPHOCYTES NFR BLD: 39.9 % (ref 18–48)
LYMPHOCYTES NFR BLD: 41.3 % (ref 18–48)
LYMPHOCYTES NFR BLD: 41.7 % (ref 18–48)
LYMPHOCYTES NFR BLD: 42.4 % (ref 18–48)
LYMPHOCYTES NFR BLD: 47.9 % (ref 18–48)
LYMPHOCYTES NFR BLD: 49.2 % (ref 18–48)
LYMPHOCYTES NFR BLD: 51.1 % (ref 18–48)
LYMPHOCYTES NFR BLD: 51.1 % (ref 18–48)
LYMPHOCYTES NFR BLD: 59 % (ref 18–48)
LYMPHOCYTES NFR BLD: 64.5 % (ref 18–48)
Lab: NORMAL
MAGNESIUM SERPL-MCNC: 1.5 MG/DL (ref 1.6–2.6)
MAGNESIUM SERPL-MCNC: 1.5 MG/DL (ref 1.6–2.6)
MAGNESIUM SERPL-MCNC: 1.6 MG/DL (ref 1.6–2.6)
MCH RBC QN AUTO: 35.5 PG (ref 27–31)
MCH RBC QN AUTO: 38.9 PG (ref 27–31)
MCH RBC QN AUTO: 39 PG (ref 27–31)
MCH RBC QN AUTO: 39.2 PG (ref 27–31)
MCH RBC QN AUTO: 39.2 PG (ref 27–31)
MCH RBC QN AUTO: 39.6 PG (ref 27–31)
MCH RBC QN AUTO: 39.7 PG (ref 27–31)
MCH RBC QN AUTO: 39.8 PG (ref 27–31)
MCH RBC QN AUTO: 40.1 PG (ref 27–31)
MCH RBC QN AUTO: 40.2 PG (ref 27–31)
MCH RBC QN AUTO: 40.2 PG (ref 27–31)
MCH RBC QN AUTO: 40.3 PG (ref 27–31)
MCH RBC QN AUTO: 40.4 PG (ref 27–31)
MCHC RBC AUTO-ENTMCNC: 31.6 G/DL (ref 32–36)
MCHC RBC AUTO-ENTMCNC: 32.9 G/DL (ref 32–36)
MCHC RBC AUTO-ENTMCNC: 33 G/DL (ref 32–36)
MCHC RBC AUTO-ENTMCNC: 33 G/DL (ref 32–36)
MCHC RBC AUTO-ENTMCNC: 33.5 G/DL (ref 32–36)
MCHC RBC AUTO-ENTMCNC: 33.5 G/DL (ref 32–36)
MCHC RBC AUTO-ENTMCNC: 33.7 G/DL (ref 32–36)
MCHC RBC AUTO-ENTMCNC: 33.9 G/DL (ref 32–36)
MCHC RBC AUTO-ENTMCNC: 33.9 G/DL (ref 32–36)
MCHC RBC AUTO-ENTMCNC: 34.2 G/DL (ref 32–36)
MCHC RBC AUTO-ENTMCNC: 34.2 G/DL (ref 32–36)
MCHC RBC AUTO-ENTMCNC: 34.4 G/DL (ref 32–36)
MCHC RBC AUTO-ENTMCNC: 34.5 G/DL (ref 32–36)
MCV RBC AUTO: 112 FL (ref 82–98)
MCV RBC AUTO: 116 FL (ref 82–98)
MCV RBC AUTO: 117 FL (ref 82–98)
MCV RBC AUTO: 118 FL (ref 82–98)
MCV RBC AUTO: 119 FL (ref 82–98)
MCV RBC AUTO: 119 FL (ref 82–98)
MCV RBC AUTO: 120 FL (ref 82–98)
MCV RBC AUTO: 121 FL (ref 82–98)
MICROALBUMIN UR DL<=1MG/L-MCNC: 8 UG/ML
MICROSCOPIC COMMENT: ABNORMAL
MONOCYTES # BLD AUTO: 0.1 K/UL (ref 0.3–1)
MONOCYTES # BLD AUTO: 0.2 K/UL (ref 0.3–1)
MONOCYTES # BLD AUTO: 0.3 K/UL (ref 0.3–1)
MONOCYTES # BLD AUTO: 0.3 K/UL (ref 0.3–1)
MONOCYTES # BLD AUTO: 0.4 K/UL (ref 0.3–1)
MONOCYTES # BLD AUTO: 0.5 K/UL (ref 0.3–1)
MONOCYTES NFR BLD: 11.1 % (ref 4–15)
MONOCYTES NFR BLD: 11.3 % (ref 4–15)
MONOCYTES NFR BLD: 11.6 % (ref 4–15)
MONOCYTES NFR BLD: 12 % (ref 4–15)
MONOCYTES NFR BLD: 3.3 % (ref 4–15)
MONOCYTES NFR BLD: 3.4 % (ref 4–15)
MONOCYTES NFR BLD: 3.5 % (ref 4–15)
MONOCYTES NFR BLD: 3.9 % (ref 4–15)
MONOCYTES NFR BLD: 4.1 % (ref 4–15)
MONOCYTES NFR BLD: 5.1 % (ref 4–15)
MONOCYTES NFR BLD: 5.2 % (ref 4–15)
MONOCYTES NFR BLD: 5.5 % (ref 4–15)
MONOCYTES NFR BLD: 5.8 % (ref 4–15)
MONOCYTES NFR BLD: 5.8 % (ref 4–15)
MONOCYTES NFR BLD: 6.7 % (ref 4–15)
MONOCYTES NFR BLD: 8.7 % (ref 4–15)
MV PEAK A VEL: 0.9 M/S
MV PEAK E VEL: 0.82 M/S
MV STENOSIS PRESSURE HALF TIME: 82.33 MS
MV VALVE AREA P 1/2 METHOD: 2.67 CM2
NEUTROPHILS # BLD AUTO: 0.9 K/UL (ref 1.8–7.7)
NEUTROPHILS # BLD AUTO: 1.1 K/UL (ref 1.8–7.7)
NEUTROPHILS # BLD AUTO: 1.2 K/UL (ref 1.8–7.7)
NEUTROPHILS # BLD AUTO: 1.5 K/UL (ref 1.8–7.7)
NEUTROPHILS # BLD AUTO: 1.6 K/UL (ref 1.8–7.7)
NEUTROPHILS # BLD AUTO: 1.6 K/UL (ref 1.8–7.7)
NEUTROPHILS # BLD AUTO: 1.7 K/UL (ref 1.8–7.7)
NEUTROPHILS # BLD AUTO: 1.8 K/UL (ref 1.8–7.7)
NEUTROPHILS # BLD AUTO: 1.9 K/UL (ref 1.8–7.7)
NEUTROPHILS # BLD AUTO: 1.9 K/UL (ref 1.8–7.7)
NEUTROPHILS # BLD AUTO: 2 K/UL (ref 1.8–7.7)
NEUTROPHILS # BLD AUTO: 2.2 K/UL (ref 1.8–7.7)
NEUTROPHILS # BLD AUTO: 2.5 K/UL (ref 1.8–7.7)
NEUTROPHILS NFR BLD: 24 % (ref 38–73)
NEUTROPHILS NFR BLD: 29.9 % (ref 38–73)
NEUTROPHILS NFR BLD: 36.8 % (ref 38–73)
NEUTROPHILS NFR BLD: 36.8 % (ref 38–73)
NEUTROPHILS NFR BLD: 37.3 % (ref 38–73)
NEUTROPHILS NFR BLD: 42.7 % (ref 38–73)
NEUTROPHILS NFR BLD: 42.8 % (ref 38–73)
NEUTROPHILS NFR BLD: 44.5 % (ref 38–73)
NEUTROPHILS NFR BLD: 44.9 % (ref 38–73)
NEUTROPHILS NFR BLD: 47.5 % (ref 38–73)
NEUTROPHILS NFR BLD: 47.9 % (ref 38–73)
NEUTROPHILS NFR BLD: 49.5 % (ref 38–73)
NEUTROPHILS NFR BLD: 50 % (ref 38–73)
NEUTROPHILS NFR BLD: 50.8 % (ref 38–73)
NEUTROPHILS NFR BLD: 52.3 % (ref 38–73)
NEUTROPHILS NFR BLD: 55.8 % (ref 38–73)
NITRITE UR QL STRIP: NEGATIVE
NITRITE UR QL STRIP: NEGATIVE
NITRITE UR QL STRIP: POSITIVE
NONHDLC SERPL-MCNC: 107 MG/DL
NRBC BLD-RTO: 0 /100 WBC
NRBC BLD-RTO: 1 /100 WBC
OVALOCYTES BLD QL SMEAR: ABNORMAL
PH UR STRIP: 5 [PH] (ref 5–8)
PHOSPHATE SERPL-MCNC: 3.5 MG/DL (ref 2.7–4.5)
PHOSPHATE SERPL-MCNC: 4 MG/DL (ref 2.7–4.5)
PHOSPHATE SERPL-MCNC: 4 MG/DL (ref 2.7–4.5)
PLATELET # BLD AUTO: 100 K/UL (ref 150–450)
PLATELET # BLD AUTO: 107 K/UL (ref 150–450)
PLATELET # BLD AUTO: 115 K/UL (ref 150–450)
PLATELET # BLD AUTO: 131 K/UL (ref 150–450)
PLATELET # BLD AUTO: 145 K/UL (ref 150–450)
PLATELET # BLD AUTO: 154 K/UL (ref 150–450)
PLATELET # BLD AUTO: 158 K/UL (ref 150–450)
PLATELET # BLD AUTO: 159 K/UL (ref 150–450)
PLATELET # BLD AUTO: 159 K/UL (ref 150–450)
PLATELET # BLD AUTO: 168 K/UL (ref 150–450)
PLATELET # BLD AUTO: 170 K/UL (ref 150–450)
PLATELET # BLD AUTO: 179 K/UL (ref 150–450)
PLATELET # BLD AUTO: 179 K/UL (ref 150–450)
PLATELET # BLD AUTO: 181 K/UL (ref 150–450)
PLATELET # BLD AUTO: 184 K/UL (ref 150–450)
PLATELET # BLD AUTO: 236 K/UL (ref 150–450)
PLATELET # BLD AUTO: 353 K/UL (ref 150–450)
PLATELET BLD QL SMEAR: ABNORMAL
PMV BLD AUTO: 11.2 FL (ref 9.2–12.9)
PMV BLD AUTO: 11.6 FL (ref 9.2–12.9)
PMV BLD AUTO: 11.9 FL (ref 9.2–12.9)
PMV BLD AUTO: 12 FL (ref 9.2–12.9)
PMV BLD AUTO: 12 FL (ref 9.2–12.9)
PMV BLD AUTO: 12.1 FL (ref 9.2–12.9)
PMV BLD AUTO: 12.2 FL (ref 9.2–12.9)
PMV BLD AUTO: 12.2 FL (ref 9.2–12.9)
PMV BLD AUTO: 12.3 FL (ref 9.2–12.9)
PMV BLD AUTO: 12.5 FL (ref 9.2–12.9)
PMV BLD AUTO: 12.6 FL (ref 9.2–12.9)
PMV BLD AUTO: 12.6 FL (ref 9.2–12.9)
PMV BLD AUTO: 12.7 FL (ref 9.2–12.9)
PMV BLD AUTO: 12.7 FL (ref 9.2–12.9)
PMV BLD AUTO: 13.6 FL (ref 9.2–12.9)
POC IONIZED CALCIUM: 1.15 MMOL/L (ref 1.06–1.42)
POC TCO2 (MEASURED): 30 MMOL/L (ref 23–29)
POCT GLUCOSE: 102 MG/DL (ref 70–110)
POCT GLUCOSE: 119 MG/DL (ref 70–110)
POCT GLUCOSE: 128 MG/DL (ref 70–110)
POCT GLUCOSE: 130 MG/DL (ref 70–110)
POCT GLUCOSE: 137 MG/DL (ref 70–110)
POCT GLUCOSE: 139 MG/DL (ref 70–110)
POCT GLUCOSE: 143 MG/DL (ref 70–110)
POCT GLUCOSE: 143 MG/DL (ref 70–110)
POCT GLUCOSE: 156 MG/DL (ref 70–110)
POCT GLUCOSE: 162 MG/DL (ref 70–110)
POCT GLUCOSE: 163 MG/DL (ref 70–110)
POCT GLUCOSE: 168 MG/DL (ref 70–110)
POCT GLUCOSE: 171 MG/DL (ref 70–110)
POCT GLUCOSE: 173 MG/DL (ref 70–110)
POCT GLUCOSE: 173 MG/DL (ref 70–110)
POCT GLUCOSE: 174 MG/DL (ref 70–110)
POCT GLUCOSE: 188 MG/DL (ref 70–110)
POCT GLUCOSE: 190 MG/DL (ref 70–110)
POCT GLUCOSE: 204 MG/DL (ref 70–110)
POCT GLUCOSE: 213 MG/DL (ref 70–110)
POCT GLUCOSE: 218 MG/DL (ref 70–110)
POCT GLUCOSE: 219 MG/DL (ref 70–110)
POCT GLUCOSE: 220 MG/DL (ref 70–110)
POCT GLUCOSE: 244 MG/DL (ref 70–110)
POCT GLUCOSE: 244 MG/DL (ref 70–110)
POCT GLUCOSE: 254 MG/DL (ref 70–110)
POCT GLUCOSE: 266 MG/DL (ref 70–110)
POCT GLUCOSE: 275 MG/DL (ref 70–110)
POCT GLUCOSE: 279 MG/DL (ref 70–110)
POCT GLUCOSE: 282 MG/DL (ref 70–110)
POCT GLUCOSE: 284 MG/DL (ref 70–110)
POCT GLUCOSE: 285 MG/DL (ref 70–110)
POCT GLUCOSE: 286 MG/DL (ref 70–110)
POCT GLUCOSE: 302 MG/DL (ref 70–110)
POCT GLUCOSE: 303 MG/DL (ref 70–110)
POCT GLUCOSE: 304 MG/DL (ref 70–110)
POCT GLUCOSE: 309 MG/DL (ref 70–110)
POCT GLUCOSE: 311 MG/DL (ref 70–110)
POCT GLUCOSE: 354 MG/DL (ref 70–110)
POCT GLUCOSE: 381 MG/DL (ref 70–110)
POCT GLUCOSE: 383 MG/DL (ref 70–110)
POCT GLUCOSE: 388 MG/DL (ref 70–110)
POCT GLUCOSE: 415 MG/DL (ref 70–110)
POCT GLUCOSE: 64 MG/DL (ref 70–110)
POCT GLUCOSE: 72 MG/DL (ref 70–110)
POCT GLUCOSE: 85 MG/DL (ref 70–110)
POCT GLUCOSE: 88 MG/DL (ref 70–110)
POCT GLUCOSE: 92 MG/DL (ref 70–110)
POIKILOCYTOSIS BLD QL SMEAR: ABNORMAL
POIKILOCYTOSIS BLD QL SMEAR: SLIGHT
POLYCHROMASIA BLD QL SMEAR: ABNORMAL
POTASSIUM BLD-SCNC: 3.8 MMOL/L (ref 3.5–5.1)
POTASSIUM SERPL-SCNC: 3.5 MMOL/L (ref 3.5–5.1)
POTASSIUM SERPL-SCNC: 3.7 MMOL/L (ref 3.5–5.1)
POTASSIUM SERPL-SCNC: 4.1 MMOL/L (ref 3.5–5.1)
POTASSIUM SERPL-SCNC: 4.1 MMOL/L (ref 3.5–5.1)
POTASSIUM SERPL-SCNC: 4.2 MMOL/L (ref 3.5–5.1)
POTASSIUM SERPL-SCNC: 4.4 MMOL/L (ref 3.5–5.1)
POTASSIUM SERPL-SCNC: 4.5 MMOL/L (ref 3.5–5.1)
POTASSIUM SERPL-SCNC: 4.8 MMOL/L (ref 3.5–5.1)
POTASSIUM SERPL-SCNC: 4.9 MMOL/L (ref 3.5–5.1)
POTASSIUM SERPL-SCNC: 5.2 MMOL/L (ref 3.5–5.1)
PROT SERPL-MCNC: 6.1 G/DL (ref 6–8.4)
PROT SERPL-MCNC: 6.3 G/DL (ref 6–8.4)
PROT SERPL-MCNC: 6.6 G/DL (ref 6–8.4)
PROT SERPL-MCNC: 6.9 G/DL (ref 6–8.4)
PROT SERPL-MCNC: 7.1 G/DL (ref 6–8.4)
PROT SERPL-MCNC: 7.3 G/DL (ref 6–8.4)
PROT SERPL-MCNC: 7.5 G/DL (ref 6–8.4)
PROT SERPL-MCNC: 7.5 G/DL (ref 6–8.4)
PROT SERPL-MCNC: 8 G/DL (ref 6–8.4)
PROT SERPL-MCNC: 8.2 G/DL (ref 6–8.4)
PROT SERPL-MCNC: 9.2 G/DL (ref 6–8.4)
PROT UR QL STRIP: ABNORMAL
PROT UR QL STRIP: ABNORMAL
PROT UR QL STRIP: NEGATIVE
PROTHROMBIN TIME: 10.9 SEC (ref 9–12.5)
PROTHROMBIN TIME: 10.9 SEC (ref 9–12.5)
PROTHROMBIN TIME: 11.9 SEC (ref 9–12.5)
PROTHROMBIN TIME: 14.6 SEC (ref 9–12.5)
PROTHROMBIN TIME: 20.3 SEC (ref 9–12.5)
PROTHROMBIN TIME: 22.7 SEC (ref 9–12.5)
PROTHROMBIN TIME: 24 SEC (ref 9–12.5)
PROTHROMBIN TIME: 27.5 SEC (ref 9–12.5)
PROTHROMBIN TIME: 36 SEC (ref 9–12.5)
PROTHROMBIN TIME: 41.8 SEC (ref 9–12.5)
PROTHROMBIN TIME: 50.1 SEC (ref 9–12.5)
RA MAJOR: 4.65 CM
RA PRESSURE: 3 MMHG
RA WIDTH: 2.89 CM
RBC # BLD AUTO: 2.32 M/UL (ref 4–5.4)
RBC # BLD AUTO: 2.36 M/UL (ref 4–5.4)
RBC # BLD AUTO: 2.47 M/UL (ref 4–5.4)
RBC # BLD AUTO: 2.47 M/UL (ref 4–5.4)
RBC # BLD AUTO: 2.49 M/UL (ref 4–5.4)
RBC # BLD AUTO: 2.49 M/UL (ref 4–5.4)
RBC # BLD AUTO: 2.5 M/UL (ref 4–5.4)
RBC # BLD AUTO: 2.5 M/UL (ref 4–5.4)
RBC # BLD AUTO: 2.52 M/UL (ref 4–5.4)
RBC # BLD AUTO: 2.52 M/UL (ref 4–5.4)
RBC # BLD AUTO: 2.55 M/UL (ref 4–5.4)
RBC # BLD AUTO: 2.55 M/UL (ref 4–5.4)
RBC # BLD AUTO: 2.63 M/UL (ref 4–5.4)
RBC # BLD AUTO: 2.64 M/UL (ref 4–5.4)
RBC # BLD AUTO: 2.83 M/UL (ref 4–5.4)
RBC # BLD AUTO: 3.1 M/UL (ref 4–5.4)
RBC # BLD AUTO: 3.22 M/UL (ref 4–5.4)
RBC #/AREA URNS AUTO: 3 /HPF (ref 0–4)
RBC #/AREA URNS AUTO: 5 /HPF (ref 0–4)
RBC #/AREA URNS AUTO: >100 /HPF (ref 0–4)
RIGHT VENTRICULAR END-DIASTOLIC DIMENSION: 2.76 CM
RV TISSUE DOPPLER FREE WALL SYSTOLIC VELOCITY 1 (APICAL 4 CHAMBER VIEW): 12.48 CM/S
SAMPLE: ABNORMAL
SARS-COV-2 RDRP RESP QL NAA+PROBE: NEGATIVE
SATURATED IRON: 9 % (ref 20–50)
SINUS: 2.95 CM
SODIUM BLD-SCNC: 142 MMOL/L (ref 136–145)
SODIUM SERPL-SCNC: 136 MMOL/L (ref 136–145)
SODIUM SERPL-SCNC: 137 MMOL/L (ref 136–145)
SODIUM SERPL-SCNC: 137 MMOL/L (ref 136–145)
SODIUM SERPL-SCNC: 138 MMOL/L (ref 136–145)
SODIUM SERPL-SCNC: 139 MMOL/L (ref 136–145)
SODIUM SERPL-SCNC: 140 MMOL/L (ref 136–145)
SODIUM SERPL-SCNC: 140 MMOL/L (ref 136–145)
SODIUM SERPL-SCNC: 141 MMOL/L (ref 136–145)
SODIUM SERPL-SCNC: 143 MMOL/L (ref 136–145)
SODIUM SERPL-SCNC: 143 MMOL/L (ref 136–145)
SP GR UR STRIP: 1.02 (ref 1–1.03)
SQUAMOUS #/AREA URNS AUTO: 1 /HPF
SQUAMOUS #/AREA URNS AUTO: 1 /HPF
SQUAMOUS #/AREA URNS AUTO: 2 /HPF
STJ: 2.57 CM
TDI LATERAL: 0.05 M/S
TDI SEPTAL: 0.06 M/S
TDI: 0.06 M/S
TOTAL IRON BINDING CAPACITY: 293 UG/DL (ref 250–450)
TRANSFERRIN SERPL-MCNC: 198 MG/DL (ref 200–375)
TRICUSPID ANNULAR PLANE SYSTOLIC EXCURSION: 2.34 CM
TRIGL SERPL-MCNC: 100 MG/DL (ref 30–150)
TROPONIN I SERPL DL<=0.01 NG/ML-MCNC: 0.01 NG/ML (ref 0–0.03)
TSH SERPL DL<=0.005 MIU/L-ACNC: 0.44 UIU/ML (ref 0.4–4)
URN SPEC COLLECT METH UR: ABNORMAL
VIT B12 SERPL-MCNC: <148 PG/ML (ref 210–950)
WBC # BLD AUTO: 3.1 K/UL (ref 3.9–12.7)
WBC # BLD AUTO: 3.19 K/UL (ref 3.9–12.7)
WBC # BLD AUTO: 3.3 K/UL (ref 3.9–12.7)
WBC # BLD AUTO: 3.53 K/UL (ref 3.9–12.7)
WBC # BLD AUTO: 3.57 K/UL (ref 3.9–12.7)
WBC # BLD AUTO: 3.61 K/UL (ref 3.9–12.7)
WBC # BLD AUTO: 3.68 K/UL (ref 3.9–12.7)
WBC # BLD AUTO: 3.72 K/UL (ref 3.9–12.7)
WBC # BLD AUTO: 3.74 K/UL (ref 3.9–12.7)
WBC # BLD AUTO: 3.8 K/UL (ref 3.9–12.7)
WBC # BLD AUTO: 3.86 K/UL (ref 3.9–12.7)
WBC # BLD AUTO: 4.08 K/UL (ref 3.9–12.7)
WBC # BLD AUTO: 4.15 K/UL (ref 3.9–12.7)
WBC # BLD AUTO: 4.15 K/UL (ref 3.9–12.7)
WBC # BLD AUTO: 4.34 K/UL (ref 3.9–12.7)
WBC # BLD AUTO: 4.36 K/UL (ref 3.9–12.7)
WBC # BLD AUTO: 4.41 K/UL (ref 3.9–12.7)
WBC #/AREA URNS AUTO: 1 /HPF (ref 0–5)
WBC #/AREA URNS AUTO: 33 /HPF (ref 0–5)
WBC #/AREA URNS AUTO: 6 /HPF (ref 0–5)

## 2021-01-01 PROCEDURE — 99232 SBSQ HOSP IP/OBS MODERATE 35: CPT | Mod: 95,,, | Performed by: INTERNAL MEDICINE

## 2021-01-01 PROCEDURE — 93793 PR ANTICOAGULANT MGMT FOR PT TAKING WARFARIN: ICD-10-PCS | Mod: S$GLB,,, | Performed by: PHARMACIST

## 2021-01-01 PROCEDURE — 85730 THROMBOPLASTIN TIME PARTIAL: CPT | Performed by: PHYSICIAN ASSISTANT

## 2021-01-01 PROCEDURE — 99233 SBSQ HOSP IP/OBS HIGH 50: CPT | Mod: ,,, | Performed by: STUDENT IN AN ORGANIZED HEALTH CARE EDUCATION/TRAINING PROGRAM

## 2021-01-01 PROCEDURE — 99999 PR PBB SHADOW E&M-EST. PATIENT-LVL II: ICD-10-PCS | Mod: PBBFAC,,,

## 2021-01-01 PROCEDURE — 99215 PR OFFICE/OUTPT VISIT, EST, LEVL V, 40-54 MIN: ICD-10-PCS | Mod: S$GLB,,, | Performed by: OBSTETRICS & GYNECOLOGY

## 2021-01-01 PROCEDURE — 84100 ASSAY OF PHOSPHORUS: CPT | Performed by: STUDENT IN AN ORGANIZED HEALTH CARE EDUCATION/TRAINING PROGRAM

## 2021-01-01 PROCEDURE — 88342 CHG IMMUNOCYTOCHEMISTRY: ICD-10-PCS | Mod: 26,,, | Performed by: PATHOLOGY

## 2021-01-01 PROCEDURE — 19081 BX BREAST 1ST LESION STRTCTC: CPT | Mod: XS,LT,, | Performed by: RADIOLOGY

## 2021-01-01 PROCEDURE — 99233 PR SUBSEQUENT HOSPITAL CARE,LEVL III: ICD-10-PCS | Mod: ,,, | Performed by: STUDENT IN AN ORGANIZED HEALTH CARE EDUCATION/TRAINING PROGRAM

## 2021-01-01 PROCEDURE — 25000003 PHARM REV CODE 250: Performed by: STUDENT IN AN ORGANIZED HEALTH CARE EDUCATION/TRAINING PROGRAM

## 2021-01-01 PROCEDURE — G0108 DIAB MANAGE TRN  PER INDIV: HCPCS | Mod: S$GLB,,, | Performed by: INTERNAL MEDICINE

## 2021-01-01 PROCEDURE — 27000221 HC OXYGEN, UP TO 24 HOURS

## 2021-01-01 PROCEDURE — 4010F ACE/ARB THERAPY RXD/TAKEN: CPT | Mod: CPTII,S$GLB,, | Performed by: INTERNAL MEDICINE

## 2021-01-01 PROCEDURE — 36415 COLL VENOUS BLD VENIPUNCTURE: CPT | Performed by: STUDENT IN AN ORGANIZED HEALTH CARE EDUCATION/TRAINING PROGRAM

## 2021-01-01 PROCEDURE — 19081 BX BREAST 1ST LESION STRTCTC: CPT | Mod: 77,LT,, | Performed by: RADIOLOGY

## 2021-01-01 PROCEDURE — 96409 CHEMO IV PUSH SNGL DRUG: CPT

## 2021-01-01 PROCEDURE — 36415 COLL VENOUS BLD VENIPUNCTURE: CPT | Performed by: INTERNAL MEDICINE

## 2021-01-01 PROCEDURE — 85025 COMPLETE CBC W/AUTO DIFF WBC: CPT | Performed by: STUDENT IN AN ORGANIZED HEALTH CARE EDUCATION/TRAINING PROGRAM

## 2021-01-01 PROCEDURE — 63600175 PHARM REV CODE 636 W HCPCS: Performed by: STUDENT IN AN ORGANIZED HEALTH CARE EDUCATION/TRAINING PROGRAM

## 2021-01-01 PROCEDURE — 3066F NEPHROPATHY DOC TX: CPT | Mod: CPTII,S$GLB,, | Performed by: FAMILY MEDICINE

## 2021-01-01 PROCEDURE — 93010 ELECTROCARDIOGRAM REPORT: CPT | Mod: ,,, | Performed by: INTERNAL MEDICINE

## 2021-01-01 PROCEDURE — C9399 UNCLASSIFIED DRUGS OR BIOLOG: HCPCS | Performed by: STUDENT IN AN ORGANIZED HEALTH CARE EDUCATION/TRAINING PROGRAM

## 2021-01-01 PROCEDURE — 88342 IMHCHEM/IMCYTCHM 1ST ANTB: CPT | Mod: 26,,, | Performed by: PATHOLOGY

## 2021-01-01 PROCEDURE — 86304 IMMUNOASSAY TUMOR CA 125: CPT | Performed by: OBSTETRICS & GYNECOLOGY

## 2021-01-01 PROCEDURE — 96365 THER/PROPH/DIAG IV INF INIT: CPT

## 2021-01-01 PROCEDURE — 80053 COMPREHEN METABOLIC PANEL: CPT | Performed by: INTERNAL MEDICINE

## 2021-01-01 PROCEDURE — 63600175 PHARM REV CODE 636 W HCPCS: Performed by: PHYSICIAN ASSISTANT

## 2021-01-01 PROCEDURE — 81001 URINALYSIS AUTO W/SCOPE: CPT | Performed by: INTERNAL MEDICINE

## 2021-01-01 PROCEDURE — 99232 PR SUBSEQUENT HOSPITAL CARE,LEVL II: ICD-10-PCS | Mod: ,,, | Performed by: STUDENT IN AN ORGANIZED HEALTH CARE EDUCATION/TRAINING PROGRAM

## 2021-01-01 PROCEDURE — 77065 MAMMO DIGITAL DIAGNOSTIC LEFT WITH TOMO: ICD-10-PCS | Mod: 26,LT,, | Performed by: RADIOLOGY

## 2021-01-01 PROCEDURE — 85027 COMPLETE CBC AUTOMATED: CPT | Performed by: OBSTETRICS & GYNECOLOGY

## 2021-01-01 PROCEDURE — 88305 TISSUE EXAM BY PATHOLOGIST: CPT | Mod: 26,,, | Performed by: PATHOLOGY

## 2021-01-01 PROCEDURE — 99999 PR PBB SHADOW E&M-EST. PATIENT-LVL V: ICD-10-PCS | Mod: PBBFAC,,, | Performed by: INTERNAL MEDICINE

## 2021-01-01 PROCEDURE — 3078F PR MOST RECENT DIASTOLIC BLOOD PRESSURE < 80 MM HG: ICD-10-PCS | Mod: CPTII,S$GLB,, | Performed by: INTERNAL MEDICINE

## 2021-01-01 PROCEDURE — 99999 PR PBB SHADOW E&M-EST. PATIENT-LVL III: ICD-10-PCS | Mod: PBBFAC,,, | Performed by: OBSTETRICS & GYNECOLOGY

## 2021-01-01 PROCEDURE — 19081 BX BREAST 1ST LESION STRTCTC: CPT | Mod: LT

## 2021-01-01 PROCEDURE — 82728 ASSAY OF FERRITIN: CPT | Performed by: INTERNAL MEDICINE

## 2021-01-01 PROCEDURE — 99900035 HC TECH TIME PER 15 MIN (STAT)

## 2021-01-01 PROCEDURE — 99233 PR SUBSEQUENT HOSPITAL CARE,LEVL III: ICD-10-PCS | Mod: 95,,, | Performed by: INTERNAL MEDICINE

## 2021-01-01 PROCEDURE — 94761 N-INVAS EAR/PLS OXIMETRY MLT: CPT

## 2021-01-01 PROCEDURE — 99999 PR PBB SHADOW E&M-EST. PATIENT-LVL III: CPT | Mod: PBBFAC,,, | Performed by: INTERNAL MEDICINE

## 2021-01-01 PROCEDURE — 96375 TX/PRO/DX INJ NEW DRUG ADDON: CPT

## 2021-01-01 PROCEDURE — 99204 PR OFFICE/OUTPT VISIT, NEW, LEVL IV, 45-59 MIN: ICD-10-PCS | Mod: S$GLB,,, | Performed by: INTERNAL MEDICINE

## 2021-01-01 PROCEDURE — 82306 VITAMIN D 25 HYDROXY: CPT | Performed by: INTERNAL MEDICINE

## 2021-01-01 PROCEDURE — 3052F HG A1C>EQUAL 8.0%<EQUAL 9.0%: CPT | Mod: CPTII,S$GLB,, | Performed by: INTERNAL MEDICINE

## 2021-01-01 PROCEDURE — 99214 OFFICE O/P EST MOD 30 MIN: CPT | Mod: S$GLB,,, | Performed by: INTERNAL MEDICINE

## 2021-01-01 PROCEDURE — 25000242 PHARM REV CODE 250 ALT 637 W/ HCPCS: Performed by: PHYSICIAN ASSISTANT

## 2021-01-01 PROCEDURE — 99214 PR OFFICE/OUTPT VISIT, EST, LEVL IV, 30-39 MIN: ICD-10-PCS | Mod: S$GLB,,, | Performed by: INTERNAL MEDICINE

## 2021-01-01 PROCEDURE — 4010F ACE/ARB THERAPY RXD/TAKEN: CPT | Mod: CPTII,S$GLB,, | Performed by: FAMILY MEDICINE

## 2021-01-01 PROCEDURE — 88341 IMHCHEM/IMCYTCHM EA ADD ANTB: CPT | Mod: 26,,, | Performed by: PATHOLOGY

## 2021-01-01 PROCEDURE — 88333 PATH CONSLTJ SURG CYTO XM 1: CPT | Mod: 26,,, | Performed by: PATHOLOGY

## 2021-01-01 PROCEDURE — 25000003 PHARM REV CODE 250: Performed by: PHYSICIAN ASSISTANT

## 2021-01-01 PROCEDURE — 99232 SBSQ HOSP IP/OBS MODERATE 35: CPT | Mod: ,,, | Performed by: STUDENT IN AN ORGANIZED HEALTH CARE EDUCATION/TRAINING PROGRAM

## 2021-01-01 PROCEDURE — 3066F NEPHROPATHY DOC TX: CPT | Mod: CPTII,S$GLB,, | Performed by: INTERNAL MEDICINE

## 2021-01-01 PROCEDURE — 93010 EKG 12-LEAD: ICD-10-PCS | Mod: ,,, | Performed by: INTERNAL MEDICINE

## 2021-01-01 PROCEDURE — 77061 MAMMO DIGITAL DIAGNOSTIC LEFT WITH TOMO: ICD-10-PCS | Mod: 26,LT,, | Performed by: RADIOLOGY

## 2021-01-01 PROCEDURE — 77067 MAMMO DIGITAL SCREENING BILAT WITH TOMO: ICD-10-PCS | Mod: 26,,, | Performed by: RADIOLOGY

## 2021-01-01 PROCEDURE — 99214 PR OFFICE/OUTPT VISIT, EST, LEVL IV, 30-39 MIN: ICD-10-PCS | Mod: S$GLB,,, | Performed by: FAMILY MEDICINE

## 2021-01-01 PROCEDURE — 3066F PR DOCUMENTATION OF TREATMENT FOR NEPHROPATHY: ICD-10-PCS | Mod: CPTII,S$GLB,, | Performed by: INTERNAL MEDICINE

## 2021-01-01 PROCEDURE — 86900 BLOOD TYPING SEROLOGIC ABO: CPT | Performed by: STUDENT IN AN ORGANIZED HEALTH CARE EDUCATION/TRAINING PROGRAM

## 2021-01-01 PROCEDURE — 1126F AMNT PAIN NOTED NONE PRSNT: CPT | Mod: S$GLB,,, | Performed by: INTERNAL MEDICINE

## 2021-01-01 PROCEDURE — 80053 COMPREHEN METABOLIC PANEL: CPT | Performed by: STUDENT IN AN ORGANIZED HEALTH CARE EDUCATION/TRAINING PROGRAM

## 2021-01-01 PROCEDURE — 80053 COMPREHEN METABOLIC PANEL: CPT | Performed by: PHYSICIAN ASSISTANT

## 2021-01-01 PROCEDURE — 3008F PR BODY MASS INDEX (BMI) DOCUMENTED: ICD-10-PCS | Mod: CPTII,S$GLB,, | Performed by: INTERNAL MEDICINE

## 2021-01-01 PROCEDURE — 4010F PR ACE/ARB THEARPY RXD/TAKEN: ICD-10-PCS | Mod: CPTII,S$GLB,, | Performed by: FAMILY MEDICINE

## 2021-01-01 PROCEDURE — 93793 ANTICOAG MGMT PT WARFARIN: CPT | Mod: S$GLB,,,

## 2021-01-01 PROCEDURE — 99999 PR PBB SHADOW E&M-EST. PATIENT-LVL III: ICD-10-PCS | Mod: PBBFAC,,, | Performed by: FAMILY MEDICINE

## 2021-01-01 PROCEDURE — 81025 URINE PREGNANCY TEST: CPT | Performed by: INTERNAL MEDICINE

## 2021-01-01 PROCEDURE — 3060F POS MICROALBUMINURIA REV: CPT | Mod: CPTII,S$GLB,, | Performed by: INTERNAL MEDICINE

## 2021-01-01 PROCEDURE — 36415 COLL VENOUS BLD VENIPUNCTURE: CPT | Performed by: PHYSICIAN ASSISTANT

## 2021-01-01 PROCEDURE — 1126F PR PAIN SEVERITY QUANTIFIED, NO PAIN PRESENT: ICD-10-PCS | Mod: S$GLB,,, | Performed by: INTERNAL MEDICINE

## 2021-01-01 PROCEDURE — 3060F PR POS MICROALBUMINURIA RESULT DOCUMENTED/REVIEW: ICD-10-PCS | Mod: CPTII,S$GLB,, | Performed by: FAMILY MEDICINE

## 2021-01-01 PROCEDURE — 88305 TISSUE EXAM BY PATHOLOGIST: ICD-10-PCS | Mod: 26,,, | Performed by: PATHOLOGY

## 2021-01-01 PROCEDURE — 99999 PR PBB SHADOW E&M-EST. PATIENT-LVL IV: CPT | Mod: PBBFAC,,, | Performed by: INTERNAL MEDICINE

## 2021-01-01 PROCEDURE — 76642 ULTRASOUND BREAST LIMITED: CPT | Mod: TC,LT

## 2021-01-01 PROCEDURE — 20600001 HC STEP DOWN PRIVATE ROOM

## 2021-01-01 PROCEDURE — 87086 URINE CULTURE/COLONY COUNT: CPT | Performed by: INTERNAL MEDICINE

## 2021-01-01 PROCEDURE — 93793 PR ANTICOAGULANT MGMT FOR PT TAKING WARFARIN: ICD-10-PCS | Mod: S$GLB,,,

## 2021-01-01 PROCEDURE — 3008F BODY MASS INDEX DOCD: CPT | Mod: CPTII,S$GLB,, | Performed by: INTERNAL MEDICINE

## 2021-01-01 PROCEDURE — 1159F PR MEDICATION LIST DOCUMENTED IN MEDICAL RECORD: ICD-10-PCS | Mod: CPTII,S$GLB,, | Performed by: INTERNAL MEDICINE

## 2021-01-01 PROCEDURE — 3046F PR MOST RECENT HEMOGLOBIN A1C LEVEL > 9.0%: ICD-10-PCS | Mod: CPTII,S$GLB,, | Performed by: INTERNAL MEDICINE

## 2021-01-01 PROCEDURE — 88333 PR  INTRAOPERATIVE CYTO PATH CONSULT, INITIAL SITE: ICD-10-PCS | Mod: 26,,, | Performed by: PATHOLOGY

## 2021-01-01 PROCEDURE — 77067 SCR MAMMO BI INCL CAD: CPT | Mod: TC

## 2021-01-01 PROCEDURE — 99233 SBSQ HOSP IP/OBS HIGH 50: CPT | Mod: 95,,, | Performed by: INTERNAL MEDICINE

## 2021-01-01 PROCEDURE — 83036 HEMOGLOBIN GLYCOSYLATED A1C: CPT | Performed by: INTERNAL MEDICINE

## 2021-01-01 PROCEDURE — 99999 PR PBB SHADOW E&M-EST. PATIENT-LVL IV: ICD-10-PCS | Mod: PBBFAC,,, | Performed by: INTERNAL MEDICINE

## 2021-01-01 PROCEDURE — 3060F PR POS MICROALBUMINURIA RESULT DOCUMENTED/REVIEW: ICD-10-PCS | Mod: CPTII,S$GLB,, | Performed by: INTERNAL MEDICINE

## 2021-01-01 PROCEDURE — 25500020 PHARM REV CODE 255: Performed by: EMERGENCY MEDICINE

## 2021-01-01 PROCEDURE — 77063 MAMMO DIGITAL SCREENING BILAT WITH TOMO: ICD-10-PCS | Mod: 26,,, | Performed by: RADIOLOGY

## 2021-01-01 PROCEDURE — 3075F PR MOST RECENT SYSTOLIC BLOOD PRESS GE 130-139MM HG: ICD-10-PCS | Mod: CPTII,S$GLB,, | Performed by: INTERNAL MEDICINE

## 2021-01-01 PROCEDURE — 63600175 PHARM REV CODE 636 W HCPCS: Performed by: OBSTETRICS & GYNECOLOGY

## 2021-01-01 PROCEDURE — 3060F PR POS MICROALBUMINURIA RESULT DOCUMENTED/REVIEW: ICD-10-PCS | Mod: CPTII,S$GLB,, | Performed by: OBSTETRICS & GYNECOLOGY

## 2021-01-01 PROCEDURE — 96367 TX/PROPH/DG ADDL SEQ IV INF: CPT

## 2021-01-01 PROCEDURE — 99214 OFFICE O/P EST MOD 30 MIN: CPT | Mod: S$GLB,,, | Performed by: FAMILY MEDICINE

## 2021-01-01 PROCEDURE — 99215 OFFICE O/P EST HI 40 MIN: CPT | Mod: S$GLB,,, | Performed by: OBSTETRICS & GYNECOLOGY

## 2021-01-01 PROCEDURE — 88342 IMHCHEM/IMCYTCHM 1ST ANTB: CPT | Performed by: PATHOLOGY

## 2021-01-01 PROCEDURE — 3079F DIAST BP 80-89 MM HG: CPT | Mod: CPTII,S$GLB,, | Performed by: INTERNAL MEDICINE

## 2021-01-01 PROCEDURE — 99999 PR PBB SHADOW E&M-EST. PATIENT-LVL III: ICD-10-PCS | Mod: PBBFAC,,, | Performed by: INTERNAL MEDICINE

## 2021-01-01 PROCEDURE — 88342 IMHCHEM/IMCYTCHM 1ST ANTB: CPT | Mod: 59 | Performed by: PATHOLOGY

## 2021-01-01 PROCEDURE — 99223 PR INITIAL HOSPITAL CARE,LEVL III: ICD-10-PCS | Mod: ,,, | Performed by: PHYSICIAN ASSISTANT

## 2021-01-01 PROCEDURE — 99233 SBSQ HOSP IP/OBS HIGH 50: CPT | Mod: ,,, | Performed by: OBSTETRICS & GYNECOLOGY

## 2021-01-01 PROCEDURE — 85610 PROTHROMBIN TIME: CPT | Performed by: PHYSICIAN ASSISTANT

## 2021-01-01 PROCEDURE — 1160F PR REVIEW ALL MEDS BY PRESCRIBER/CLIN PHARMACIST DOCUMENTED: ICD-10-PCS | Mod: CPTII,S$GLB,, | Performed by: INTERNAL MEDICINE

## 2021-01-01 PROCEDURE — 1159F MED LIST DOCD IN RCRD: CPT | Mod: CPTII,S$GLB,, | Performed by: INTERNAL MEDICINE

## 2021-01-01 PROCEDURE — 94640 AIRWAY INHALATION TREATMENT: CPT

## 2021-01-01 PROCEDURE — 99291 CRITICAL CARE FIRST HOUR: CPT | Mod: CS,,, | Performed by: PHYSICIAN ASSISTANT

## 2021-01-01 PROCEDURE — 25000003 PHARM REV CODE 250: Performed by: INTERNAL MEDICINE

## 2021-01-01 PROCEDURE — 85025 COMPLETE CBC W/AUTO DIFF WBC: CPT | Performed by: PHYSICIAN ASSISTANT

## 2021-01-01 PROCEDURE — 4010F PR ACE/ARB THEARPY RXD/TAKEN: ICD-10-PCS | Mod: CPTII,S$GLB,, | Performed by: INTERNAL MEDICINE

## 2021-01-01 PROCEDURE — 99223 1ST HOSP IP/OBS HIGH 75: CPT | Mod: ,,, | Performed by: PHYSICIAN ASSISTANT

## 2021-01-01 PROCEDURE — 3060F POS MICROALBUMINURIA REV: CPT | Mod: CPTII,S$GLB,, | Performed by: FAMILY MEDICINE

## 2021-01-01 PROCEDURE — 83880 ASSAY OF NATRIURETIC PEPTIDE: CPT | Performed by: PHYSICIAN ASSISTANT

## 2021-01-01 PROCEDURE — 99285 EMERGENCY DEPT VISIT HI MDM: CPT | Mod: 25

## 2021-01-01 PROCEDURE — 77061 BREAST TOMOSYNTHESIS UNI: CPT | Mod: TC,LT

## 2021-01-01 PROCEDURE — 80061 LIPID PANEL: CPT | Performed by: INTERNAL MEDICINE

## 2021-01-01 PROCEDURE — 93793 ANTICOAG MGMT PT WARFARIN: CPT | Mod: S$GLB,,, | Performed by: PHARMACIST

## 2021-01-01 PROCEDURE — 82607 VITAMIN B-12: CPT | Performed by: INTERNAL MEDICINE

## 2021-01-01 PROCEDURE — 88341 IMHCHEM/IMCYTCHM EA ADD ANTB: CPT | Performed by: PATHOLOGY

## 2021-01-01 PROCEDURE — 3078F DIAST BP <80 MM HG: CPT | Mod: CPTII,S$GLB,, | Performed by: INTERNAL MEDICINE

## 2021-01-01 PROCEDURE — 80048 BASIC METABOLIC PNL TOTAL CA: CPT | Performed by: INTERNAL MEDICINE

## 2021-01-01 PROCEDURE — 85610 PROTHROMBIN TIME: CPT | Performed by: INTERNAL MEDICINE

## 2021-01-01 PROCEDURE — 4010F PR ACE/ARB THEARPY RXD/TAKEN: ICD-10-PCS | Mod: CPTII,S$GLB,, | Performed by: OBSTETRICS & GYNECOLOGY

## 2021-01-01 PROCEDURE — 99291 PR CRITICAL CARE, E/M 30-74 MINUTES: ICD-10-PCS | Mod: CS,,, | Performed by: PHYSICIAN ASSISTANT

## 2021-01-01 PROCEDURE — 3052F PR MOST RECENT HEMOGLOBIN A1C LEVEL 8.0 - < 9.0%: ICD-10-PCS | Mod: CPTII,S$GLB,, | Performed by: INTERNAL MEDICINE

## 2021-01-01 PROCEDURE — 80047 BASIC METABLC PNL IONIZED CA: CPT

## 2021-01-01 PROCEDURE — 85610 PROTHROMBIN TIME: CPT | Performed by: STUDENT IN AN ORGANIZED HEALTH CARE EDUCATION/TRAINING PROGRAM

## 2021-01-01 PROCEDURE — 99232 PR SUBSEQUENT HOSPITAL CARE,LEVL II: ICD-10-PCS | Mod: 95,,, | Performed by: INTERNAL MEDICINE

## 2021-01-01 PROCEDURE — 1160F RVW MEDS BY RX/DR IN RCRD: CPT | Mod: CPTII,S$GLB,, | Performed by: INTERNAL MEDICINE

## 2021-01-01 PROCEDURE — 99204 OFFICE O/P NEW MOD 45 MIN: CPT | Mod: S$GLB,,, | Performed by: INTERNAL MEDICINE

## 2021-01-01 PROCEDURE — 77065 DX MAMMO INCL CAD UNI: CPT | Mod: 26,LT,, | Performed by: RADIOLOGY

## 2021-01-01 PROCEDURE — 77061 BREAST TOMOSYNTHESIS UNI: CPT | Mod: 26,LT,, | Performed by: RADIOLOGY

## 2021-01-01 PROCEDURE — 88271 CYTOGENETICS DNA PROBE: CPT | Mod: 59 | Performed by: PATHOLOGY

## 2021-01-01 PROCEDURE — 85730 THROMBOPLASTIN TIME PARTIAL: CPT | Mod: 91 | Performed by: STUDENT IN AN ORGANIZED HEALTH CARE EDUCATION/TRAINING PROGRAM

## 2021-01-01 PROCEDURE — 82746 ASSAY OF FOLIC ACID SERUM: CPT | Performed by: INTERNAL MEDICINE

## 2021-01-01 PROCEDURE — 88342 IMHCHEM/IMCYTCHM 1ST ANTB: CPT | Mod: 91 | Performed by: PATHOLOGY

## 2021-01-01 PROCEDURE — 84443 ASSAY THYROID STIM HORMONE: CPT | Performed by: INTERNAL MEDICINE

## 2021-01-01 PROCEDURE — 88341 IMHCHEM/IMCYTCHM EA ADD ANTB: CPT | Mod: 59 | Performed by: PATHOLOGY

## 2021-01-01 PROCEDURE — 3075F SYST BP GE 130 - 139MM HG: CPT | Mod: CPTII,S$GLB,, | Performed by: INTERNAL MEDICINE

## 2021-01-01 PROCEDURE — 88333 PATH CONSLTJ SURG CYTO XM 1: CPT | Performed by: PATHOLOGY

## 2021-01-01 PROCEDURE — U0002 COVID-19 LAB TEST NON-CDC: HCPCS | Performed by: PHYSICIAN ASSISTANT

## 2021-01-01 PROCEDURE — 93005 ELECTROCARDIOGRAM TRACING: CPT

## 2021-01-01 PROCEDURE — 3066F PR DOCUMENTATION OF TREATMENT FOR NEPHROPATHY: ICD-10-PCS | Mod: CPTII,S$GLB,, | Performed by: OBSTETRICS & GYNECOLOGY

## 2021-01-01 PROCEDURE — 25000003 PHARM REV CODE 250: Performed by: OBSTETRICS & GYNECOLOGY

## 2021-01-01 PROCEDURE — 12000002 HC ACUTE/MED SURGE SEMI-PRIVATE ROOM

## 2021-01-01 PROCEDURE — 88305 TISSUE EXAM BY PATHOLOGIST: CPT | Performed by: PATHOLOGY

## 2021-01-01 PROCEDURE — 82570 ASSAY OF URINE CREATININE: CPT | Performed by: INTERNAL MEDICINE

## 2021-01-01 PROCEDURE — 99233 PR SUBSEQUENT HOSPITAL CARE,LEVL III: ICD-10-PCS | Mod: ,,, | Performed by: OBSTETRICS & GYNECOLOGY

## 2021-01-01 PROCEDURE — 19081 MAMMO BREAST STEREOTACTIC BREAST BIOPSY LEFT: ICD-10-PCS | Mod: XS,LT,, | Performed by: RADIOLOGY

## 2021-01-01 PROCEDURE — 3066F PR DOCUMENTATION OF TREATMENT FOR NEPHROPATHY: ICD-10-PCS | Mod: CPTII,S$GLB,, | Performed by: FAMILY MEDICINE

## 2021-01-01 PROCEDURE — 76642 ULTRASOUND BREAST LIMITED: CPT | Mod: 26,LT,, | Performed by: RADIOLOGY

## 2021-01-01 PROCEDURE — 3046F HEMOGLOBIN A1C LEVEL >9.0%: CPT | Mod: CPTII,S$GLB,, | Performed by: INTERNAL MEDICINE

## 2021-01-01 PROCEDURE — 3066F NEPHROPATHY DOC TX: CPT | Mod: CPTII,S$GLB,, | Performed by: OBSTETRICS & GYNECOLOGY

## 2021-01-01 PROCEDURE — 81001 URINALYSIS AUTO W/SCOPE: CPT | Performed by: PHYSICIAN ASSISTANT

## 2021-01-01 PROCEDURE — 3060F POS MICROALBUMINURIA REV: CPT | Mod: CPTII,S$GLB,, | Performed by: OBSTETRICS & GYNECOLOGY

## 2021-01-01 PROCEDURE — 80053 COMPREHEN METABOLIC PANEL: CPT | Performed by: OBSTETRICS & GYNECOLOGY

## 2021-01-01 PROCEDURE — 85025 COMPLETE CBC W/AUTO DIFF WBC: CPT | Mod: 91 | Performed by: STUDENT IN AN ORGANIZED HEALTH CARE EDUCATION/TRAINING PROGRAM

## 2021-01-01 PROCEDURE — 99239 PR HOSPITAL DISCHARGE DAY,>30 MIN: ICD-10-PCS | Mod: GT,,, | Performed by: INTERNAL MEDICINE

## 2021-01-01 PROCEDURE — 63600175 PHARM REV CODE 636 W HCPCS: Performed by: INTERNAL MEDICINE

## 2021-01-01 PROCEDURE — 83735 ASSAY OF MAGNESIUM: CPT | Performed by: STUDENT IN AN ORGANIZED HEALTH CARE EDUCATION/TRAINING PROGRAM

## 2021-01-01 PROCEDURE — 76642 US BREAST LEFT LIMITED: ICD-10-PCS | Mod: 26,LT,, | Performed by: RADIOLOGY

## 2021-01-01 PROCEDURE — 84484 ASSAY OF TROPONIN QUANT: CPT | Performed by: PHYSICIAN ASSISTANT

## 2021-01-01 PROCEDURE — 99999 PR PBB SHADOW E&M-EST. PATIENT-LVL III: CPT | Mod: PBBFAC,,, | Performed by: OBSTETRICS & GYNECOLOGY

## 2021-01-01 PROCEDURE — 99999 PR PBB SHADOW E&M-EST. PATIENT-LVL V: CPT | Mod: PBBFAC,,, | Performed by: INTERNAL MEDICINE

## 2021-01-01 PROCEDURE — 88341 PR IHC OR ICC EACH ADD'L SINGLE ANTIBODY  STAINPR: ICD-10-PCS | Mod: 26,,, | Performed by: PATHOLOGY

## 2021-01-01 PROCEDURE — 4010F ACE/ARB THERAPY RXD/TAKEN: CPT | Mod: CPTII,S$GLB,, | Performed by: OBSTETRICS & GYNECOLOGY

## 2021-01-01 PROCEDURE — G0108 PR DIAB MANAGE TRN  PER INDIV: ICD-10-PCS | Mod: S$GLB,,, | Performed by: INTERNAL MEDICINE

## 2021-01-01 PROCEDURE — 3079F PR MOST RECENT DIASTOLIC BLOOD PRESSURE 80-89 MM HG: ICD-10-PCS | Mod: CPTII,S$GLB,, | Performed by: INTERNAL MEDICINE

## 2021-01-01 PROCEDURE — 84466 ASSAY OF TRANSFERRIN: CPT | Performed by: INTERNAL MEDICINE

## 2021-01-01 PROCEDURE — 99999 PR PBB SHADOW E&M-EST. PATIENT-LVL III: CPT | Mod: PBBFAC,,, | Performed by: FAMILY MEDICINE

## 2021-01-01 PROCEDURE — 99999 PR PBB SHADOW E&M-EST. PATIENT-LVL II: CPT | Mod: PBBFAC,,,

## 2021-01-01 PROCEDURE — 77063 BREAST TOMOSYNTHESIS BI: CPT | Mod: 26,,, | Performed by: RADIOLOGY

## 2021-01-01 PROCEDURE — 77067 SCR MAMMO BI INCL CAD: CPT | Mod: 26,,, | Performed by: RADIOLOGY

## 2021-01-01 PROCEDURE — 99239 HOSP IP/OBS DSCHRG MGMT >30: CPT | Mod: GT,,, | Performed by: INTERNAL MEDICINE

## 2021-01-01 PROCEDURE — 88275 CYTOGENETICS 100-300: CPT | Performed by: PATHOLOGY

## 2021-01-01 RX ORDER — LISINOPRIL 20 MG/1
20 TABLET ORAL DAILY
Qty: 90 TABLET | Refills: 2
Start: 2021-01-01 | End: 2021-01-01 | Stop reason: ALTCHOICE

## 2021-01-01 RX ORDER — ONDANSETRON 4 MG/1
8 TABLET, ORALLY DISINTEGRATING ORAL EVERY 8 HOURS PRN
Status: DISCONTINUED | OUTPATIENT
Start: 2021-01-01 | End: 2021-01-01

## 2021-01-01 RX ORDER — INSULIN GLARGINE 100 [IU]/ML
5 INJECTION, SOLUTION SUBCUTANEOUS NIGHTLY
Qty: 15 ML | Refills: 5 | Status: SHIPPED | OUTPATIENT
Start: 2021-01-01 | End: 2021-01-01

## 2021-01-01 RX ORDER — WARFARIN SODIUM 5 MG/1
5 TABLET ORAL DAILY
Qty: 30 TABLET | Refills: 11 | Status: SHIPPED | OUTPATIENT
Start: 2021-01-01 | End: 2021-01-01 | Stop reason: SDUPTHER

## 2021-01-01 RX ORDER — LIDOCAINE HYDROCHLORIDE AND EPINEPHRINE 20; 10 MG/ML; UG/ML
20 INJECTION, SOLUTION INFILTRATION; PERINEURAL ONCE
Status: COMPLETED | OUTPATIENT
Start: 2021-01-01 | End: 2021-01-01

## 2021-01-01 RX ORDER — WARFARIN SODIUM 5 MG/1
5 TABLET ORAL DAILY
Status: DISCONTINUED | OUTPATIENT
Start: 2021-01-01 | End: 2021-01-01

## 2021-01-01 RX ORDER — ATORVASTATIN CALCIUM 10 MG/1
10 TABLET, FILM COATED ORAL DAILY
Status: DISCONTINUED | OUTPATIENT
Start: 2021-01-01 | End: 2021-01-01 | Stop reason: HOSPADM

## 2021-01-01 RX ORDER — INSULIN ASPART 100 [IU]/ML
3 INJECTION, SOLUTION INTRAVENOUS; SUBCUTANEOUS
Status: DISCONTINUED | OUTPATIENT
Start: 2021-01-01 | End: 2021-01-01 | Stop reason: HOSPADM

## 2021-01-01 RX ORDER — BISACODYL 10 MG
10 SUPPOSITORY, RECTAL RECTAL DAILY PRN
Status: DISCONTINUED | OUTPATIENT
Start: 2021-01-01 | End: 2021-01-01

## 2021-01-01 RX ORDER — SODIUM CHLORIDE 0.9 % (FLUSH) 0.9 %
10 SYRINGE (ML) INJECTION
Status: CANCELLED | OUTPATIENT
Start: 2021-01-01

## 2021-01-01 RX ORDER — LANOLIN ALCOHOL/MO/W.PET/CERES
1000 CREAM (GRAM) TOPICAL DAILY
Qty: 30 TABLET | Refills: 5 | Status: SHIPPED | OUTPATIENT
Start: 2021-01-01

## 2021-01-01 RX ORDER — AMOXICILLIN 250 MG
1 CAPSULE ORAL DAILY
Status: DISCONTINUED | OUTPATIENT
Start: 2021-01-01 | End: 2021-01-01 | Stop reason: HOSPADM

## 2021-01-01 RX ORDER — OXYCODONE HYDROCHLORIDE 5 MG/1
10 CAPSULE ORAL EVERY 4 HOURS PRN
Qty: 30 CAPSULE | Refills: 0 | Status: SHIPPED | OUTPATIENT
Start: 2021-01-01

## 2021-01-01 RX ORDER — INSULIN ASPART 100 [IU]/ML
5 INJECTION, SOLUTION INTRAVENOUS; SUBCUTANEOUS
Status: DISCONTINUED | OUTPATIENT
Start: 2021-01-01 | End: 2021-01-01

## 2021-01-01 RX ORDER — LANOLIN ALCOHOL/MO/W.PET/CERES
800 CREAM (GRAM) TOPICAL
Status: DISCONTINUED | OUTPATIENT
Start: 2021-01-01 | End: 2021-01-01 | Stop reason: HOSPADM

## 2021-01-01 RX ORDER — ONDANSETRON 2 MG/ML
4 INJECTION INTRAMUSCULAR; INTRAVENOUS EVERY 6 HOURS PRN
Status: DISCONTINUED | OUTPATIENT
Start: 2021-01-01 | End: 2021-01-01 | Stop reason: HOSPADM

## 2021-01-01 RX ORDER — GENTAMICIN SULFATE 3 MG/ML
SOLUTION/ DROPS OPHTHALMIC
COMMUNITY
Start: 2021-01-01 | End: 2021-01-01

## 2021-01-01 RX ORDER — AMOXICILLIN 250 MG
1 CAPSULE ORAL DAILY
COMMUNITY
Start: 2021-01-01

## 2021-01-01 RX ORDER — ONDANSETRON 2 MG/ML
INJECTION INTRAMUSCULAR; INTRAVENOUS
Status: DISPENSED
Start: 2021-01-01 | End: 2021-01-01

## 2021-01-01 RX ORDER — GLUCAGON 1 MG
1 KIT INJECTION
Status: DISCONTINUED | OUTPATIENT
Start: 2021-01-01 | End: 2021-01-01 | Stop reason: HOSPADM

## 2021-01-01 RX ORDER — IBUPROFEN 200 MG
16 TABLET ORAL
Status: DISCONTINUED | OUTPATIENT
Start: 2021-01-01 | End: 2021-01-01 | Stop reason: HOSPADM

## 2021-01-01 RX ORDER — CLOPIDOGREL BISULFATE 75 MG/1
75 TABLET ORAL DAILY
Status: DISCONTINUED | OUTPATIENT
Start: 2021-01-01 | End: 2021-01-01 | Stop reason: HOSPADM

## 2021-01-01 RX ORDER — ALBUTEROL SULFATE 0.83 MG/ML
SOLUTION RESPIRATORY (INHALATION)
Qty: 75 ML | Refills: 3 | Status: SHIPPED | OUTPATIENT
Start: 2021-01-01 | End: 2021-01-01

## 2021-01-01 RX ORDER — CLOPIDOGREL BISULFATE 75 MG/1
75 TABLET ORAL DAILY
Qty: 30 TABLET | Refills: 5 | Status: SHIPPED | OUTPATIENT
Start: 2021-01-01

## 2021-01-01 RX ORDER — TRIAMTERENE/HYDROCHLOROTHIAZID 37.5-25 MG
TABLET ORAL
Qty: 90 TABLET | Refills: 3 | Status: ON HOLD | OUTPATIENT
Start: 2021-01-01 | End: 2021-01-01 | Stop reason: SDUPTHER

## 2021-01-01 RX ORDER — METOCLOPRAMIDE 5 MG/1
5 TABLET ORAL
Status: DISCONTINUED | OUTPATIENT
Start: 2021-01-01 | End: 2021-01-01 | Stop reason: HOSPADM

## 2021-01-01 RX ORDER — POLYETHYLENE GLYCOL 3350 17 G/17G
17 POWDER, FOR SOLUTION ORAL DAILY
Status: DISCONTINUED | OUTPATIENT
Start: 2021-01-01 | End: 2021-01-01 | Stop reason: HOSPADM

## 2021-01-01 RX ORDER — POLYETHYLENE GLYCOL 3350 17 G/17G
17 POWDER, FOR SOLUTION ORAL 2 TIMES DAILY PRN
Refills: 0 | COMMUNITY
Start: 2021-01-01

## 2021-01-01 RX ORDER — TALC
6 POWDER (GRAM) TOPICAL NIGHTLY PRN
Status: CANCELLED | OUTPATIENT
Start: 2021-01-01

## 2021-01-01 RX ORDER — SODIUM CHLORIDE 9 MG/ML
INJECTION, SOLUTION INTRAVENOUS CONTINUOUS
Status: ACTIVE | OUTPATIENT
Start: 2021-01-01 | End: 2021-01-01

## 2021-01-01 RX ORDER — TRIAMTERENE/HYDROCHLOROTHIAZID 37.5-25 MG
1 TABLET ORAL DAILY
Qty: 90 TABLET | Refills: 3
Start: 2021-01-01 | End: 2021-01-01 | Stop reason: ALTCHOICE

## 2021-01-01 RX ORDER — PREDNISOLONE ACETATE 10 MG/ML
SUSPENSION/ DROPS OPHTHALMIC
COMMUNITY
Start: 2021-01-01 | End: 2021-01-01

## 2021-01-01 RX ORDER — HEPARIN 100 UNIT/ML
500 SYRINGE INTRAVENOUS
Status: DISCONTINUED | OUTPATIENT
Start: 2021-01-01 | End: 2021-01-01 | Stop reason: HOSPADM

## 2021-01-01 RX ORDER — DOXORUBICIN HYDROCHLORIDE 2 MG/ML
75 INJECTION, SOLUTION INTRAVENOUS
Status: CANCELLED | OUTPATIENT
Start: 2021-01-01

## 2021-01-01 RX ORDER — ASPIRIN 81 MG/1
TABLET ORAL
Qty: 90 TABLET | Refills: 3 | Status: ON HOLD | OUTPATIENT
Start: 2021-01-01 | End: 2021-01-01 | Stop reason: HOSPADM

## 2021-01-01 RX ORDER — HEPARIN SODIUM,PORCINE/D5W 25000/250
0-40 INTRAVENOUS SOLUTION INTRAVENOUS CONTINUOUS
Status: DISCONTINUED | OUTPATIENT
Start: 2021-01-01 | End: 2021-01-01

## 2021-01-01 RX ORDER — METFORMIN HYDROCHLORIDE 500 MG/1
500 TABLET, EXTENDED RELEASE ORAL 2 TIMES DAILY WITH MEALS
Qty: 360 TABLET | Refills: 0 | Status: ON HOLD | OUTPATIENT
Start: 2021-01-01 | End: 2022-01-01 | Stop reason: HOSPADM

## 2021-01-01 RX ORDER — WARFARIN SODIUM 5 MG/1
5 TABLET ORAL DAILY
Qty: 30 TABLET | Refills: 11 | Status: SHIPPED | OUTPATIENT
Start: 2021-01-01 | End: 2022-12-10

## 2021-01-01 RX ORDER — HYDRALAZINE HYDROCHLORIDE 25 MG/1
25 TABLET, FILM COATED ORAL EVERY 8 HOURS PRN
Status: DISCONTINUED | OUTPATIENT
Start: 2021-01-01 | End: 2021-01-01 | Stop reason: HOSPADM

## 2021-01-01 RX ORDER — CHOLECALCIFEROL (VITAMIN D3) 25 MCG
1000 TABLET ORAL DAILY
Status: DISCONTINUED | OUTPATIENT
Start: 2021-01-01 | End: 2021-01-01 | Stop reason: HOSPADM

## 2021-01-01 RX ORDER — LISINOPRIL 20 MG/1
20 TABLET ORAL
Status: COMPLETED | OUTPATIENT
Start: 2021-01-01 | End: 2021-01-01

## 2021-01-01 RX ORDER — ONDANSETRON 2 MG/ML
8 INJECTION INTRAMUSCULAR; INTRAVENOUS
Status: COMPLETED | OUTPATIENT
Start: 2021-01-01 | End: 2021-01-01

## 2021-01-01 RX ORDER — CHOLECALCIFEROL (VITAMIN D3) 50 MCG
1 TABLET ORAL DAILY
Qty: 30 TABLET | Refills: 5 | Status: SHIPPED | OUTPATIENT
Start: 2021-01-01

## 2021-01-01 RX ORDER — TRIAMTERENE AND HYDROCHLOROTHIAZIDE 37.5; 25 MG/1; MG/1
1 CAPSULE ORAL DAILY
Refills: 3 | Status: DISCONTINUED | OUTPATIENT
Start: 2021-01-01 | End: 2021-01-01

## 2021-01-01 RX ORDER — LISINOPRIL 20 MG/1
20 TABLET ORAL DAILY
Status: DISCONTINUED | OUTPATIENT
Start: 2021-01-01 | End: 2021-01-01

## 2021-01-01 RX ORDER — ONDANSETRON 4 MG/1
4 TABLET, ORALLY DISINTEGRATING ORAL EVERY 8 HOURS PRN
Qty: 30 TABLET | Refills: 0 | Status: SHIPPED | OUTPATIENT
Start: 2021-01-01

## 2021-01-01 RX ORDER — TRIAMTERENE AND HYDROCHLOROTHIAZIDE 37.5; 25 MG/1; MG/1
1 CAPSULE ORAL
Status: DISCONTINUED | OUTPATIENT
Start: 2021-01-01 | End: 2021-01-01

## 2021-01-01 RX ORDER — HEPARIN 100 UNIT/ML
500 SYRINGE INTRAVENOUS
Status: CANCELLED | OUTPATIENT
Start: 2021-01-01

## 2021-01-01 RX ORDER — WARFARIN SODIUM 5 MG/1
5 TABLET ORAL DAILY
Status: DISCONTINUED | OUTPATIENT
Start: 2021-01-01 | End: 2021-01-01 | Stop reason: HOSPADM

## 2021-01-01 RX ORDER — NITROFURANTOIN 25; 75 MG/1; MG/1
100 CAPSULE ORAL 2 TIMES DAILY
Qty: 10 CAPSULE | Refills: 0 | Status: ON HOLD | OUTPATIENT
Start: 2021-01-01 | End: 2021-01-01 | Stop reason: HOSPADM

## 2021-01-01 RX ORDER — TRIAMTERENE AND HYDROCHLOROTHIAZIDE 37.5; 25 MG/1; MG/1
1 CAPSULE ORAL DAILY
Status: DISCONTINUED | OUTPATIENT
Start: 2021-01-01 | End: 2021-01-01

## 2021-01-01 RX ORDER — DULAGLUTIDE 0.75 MG/.5ML
0.75 INJECTION, SOLUTION SUBCUTANEOUS
Qty: 4 PEN | Refills: 0 | Status: SHIPPED | OUTPATIENT
Start: 2021-01-01 | End: 2021-01-01

## 2021-01-01 RX ORDER — ENOXAPARIN SODIUM 100 MG/ML
1 INJECTION SUBCUTANEOUS
Status: DISCONTINUED | OUTPATIENT
Start: 2021-01-01 | End: 2021-01-01

## 2021-01-01 RX ORDER — INSULIN GLARGINE 100 [IU]/ML
20 INJECTION, SOLUTION SUBCUTANEOUS DAILY
Qty: 6 ML | Refills: 11 | Status: SHIPPED | OUTPATIENT
Start: 2021-01-01 | End: 2022-12-08

## 2021-01-01 RX ORDER — SODIUM CHLORIDE 0.9 % (FLUSH) 0.9 %
10 SYRINGE (ML) INJECTION
Status: DISCONTINUED | OUTPATIENT
Start: 2021-01-01 | End: 2021-01-01 | Stop reason: HOSPADM

## 2021-01-01 RX ORDER — FLUTICASONE FUROATE AND VILANTEROL 100; 25 UG/1; UG/1
1 POWDER RESPIRATORY (INHALATION) DAILY
Refills: 3 | Status: DISCONTINUED | OUTPATIENT
Start: 2021-01-01 | End: 2021-01-01 | Stop reason: HOSPADM

## 2021-01-01 RX ORDER — SIMETHICONE 80 MG
1 TABLET,CHEWABLE ORAL 3 TIMES DAILY PRN
Status: DISCONTINUED | OUTPATIENT
Start: 2021-01-01 | End: 2021-01-01 | Stop reason: HOSPADM

## 2021-01-01 RX ORDER — OXYCODONE AND ACETAMINOPHEN 10; 325 MG/1; MG/1
1 TABLET ORAL EVERY 4 HOURS PRN
Qty: 31 TABLET | Refills: 0 | Status: SHIPPED | OUTPATIENT
Start: 2021-01-01 | End: 2021-01-01

## 2021-01-01 RX ORDER — SIMVASTATIN 20 MG/1
20 TABLET, FILM COATED ORAL NIGHTLY
Qty: 30 TABLET | Refills: 5 | Status: SHIPPED | OUTPATIENT
Start: 2021-01-01 | End: 2022-12-08

## 2021-01-01 RX ORDER — FENTANYL CITRATE 50 UG/ML
INJECTION, SOLUTION INTRAMUSCULAR; INTRAVENOUS CODE/TRAUMA/SEDATION MEDICATION
Status: COMPLETED | OUTPATIENT
Start: 2021-01-01 | End: 2021-01-01

## 2021-01-01 RX ORDER — OXYCODONE AND ACETAMINOPHEN 5; 325 MG/1; MG/1
1 TABLET ORAL EVERY 4 HOURS PRN
Status: DISCONTINUED | OUTPATIENT
Start: 2021-01-01 | End: 2021-01-01

## 2021-01-01 RX ORDER — IPRATROPIUM BROMIDE AND ALBUTEROL SULFATE 2.5; .5 MG/3ML; MG/3ML
3 SOLUTION RESPIRATORY (INHALATION) EVERY 4 HOURS PRN
Status: DISCONTINUED | OUTPATIENT
Start: 2021-01-01 | End: 2021-01-01

## 2021-01-01 RX ORDER — OXYCODONE HYDROCHLORIDE 5 MG/1
5 TABLET ORAL EVERY 6 HOURS PRN
Status: DISCONTINUED | OUTPATIENT
Start: 2021-01-01 | End: 2021-01-01

## 2021-01-01 RX ORDER — LORAZEPAM 0.5 MG/1
0.5 TABLET ORAL EVERY 4 HOURS PRN
Status: DISCONTINUED | OUTPATIENT
Start: 2021-01-01 | End: 2021-01-01 | Stop reason: HOSPADM

## 2021-01-01 RX ORDER — PROCHLORPERAZINE MALEATE 5 MG
5 TABLET ORAL EVERY 6 HOURS PRN
Qty: 30 TABLET | Refills: 1 | Status: SHIPPED | OUTPATIENT
Start: 2021-01-01 | End: 2022-12-13

## 2021-01-01 RX ORDER — POLYETHYLENE GLYCOL 3350 17 G/17G
17 POWDER, FOR SOLUTION ORAL DAILY PRN
Status: DISCONTINUED | OUTPATIENT
Start: 2021-01-01 | End: 2021-01-01 | Stop reason: HOSPADM

## 2021-01-01 RX ORDER — DOXORUBICIN HYDROCHLORIDE 2 MG/ML
75 INJECTION, SOLUTION INTRAVENOUS
Status: COMPLETED | OUTPATIENT
Start: 2021-01-01 | End: 2021-01-01

## 2021-01-01 RX ORDER — INSULIN ASPART 100 [IU]/ML
2-8 INJECTION, SOLUTION INTRAVENOUS; SUBCUTANEOUS
Qty: 15 ML | Refills: 8 | Status: SHIPPED | OUTPATIENT
Start: 2021-01-01 | End: 2022-12-08

## 2021-01-01 RX ORDER — LIDOCAINE HYDROCHLORIDE 10 MG/ML
3 INJECTION, SOLUTION EPIDURAL; INFILTRATION; INTRACAUDAL; PERINEURAL ONCE
Status: COMPLETED | OUTPATIENT
Start: 2021-01-01 | End: 2021-01-01

## 2021-01-01 RX ORDER — MIDAZOLAM HYDROCHLORIDE 1 MG/ML
INJECTION INTRAMUSCULAR; INTRAVENOUS CODE/TRAUMA/SEDATION MEDICATION
Status: COMPLETED | OUTPATIENT
Start: 2021-01-01 | End: 2021-01-01

## 2021-01-01 RX ORDER — SODIUM CHLORIDE 0.9 % (FLUSH) 0.9 %
10 SYRINGE (ML) INJECTION EVERY 8 HOURS PRN
Status: DISCONTINUED | OUTPATIENT
Start: 2021-01-01 | End: 2021-01-01 | Stop reason: HOSPADM

## 2021-01-01 RX ORDER — METOCLOPRAMIDE 5 MG/1
5 TABLET ORAL EVERY 6 HOURS PRN
Qty: 30 TABLET | Refills: 1 | Status: SHIPPED | OUTPATIENT
Start: 2021-01-01

## 2021-01-01 RX ORDER — OXYCODONE AND ACETAMINOPHEN 10; 325 MG/1; MG/1
1 TABLET ORAL EVERY 4 HOURS PRN
Status: DISCONTINUED | OUTPATIENT
Start: 2021-01-01 | End: 2021-01-01 | Stop reason: HOSPADM

## 2021-01-01 RX ORDER — HYDROXYZINE HYDROCHLORIDE 25 MG/1
25 TABLET, FILM COATED ORAL 4 TIMES DAILY PRN
Qty: 30 TABLET | Refills: 3 | Status: SHIPPED | OUTPATIENT
Start: 2021-01-01

## 2021-01-01 RX ORDER — TALC
6 POWDER (GRAM) TOPICAL NIGHTLY PRN
Status: DISCONTINUED | OUTPATIENT
Start: 2021-01-01 | End: 2021-01-01 | Stop reason: HOSPADM

## 2021-01-01 RX ORDER — ERGOCALCIFEROL 1.25 MG/1
50000 CAPSULE ORAL
Qty: 12 CAPSULE | Refills: 1 | Status: ON HOLD | OUTPATIENT
Start: 2021-01-01 | End: 2021-01-01 | Stop reason: HOSPADM

## 2021-01-01 RX ORDER — ERGOCALCIFEROL 1.25 MG/1
50000 CAPSULE ORAL
Status: CANCELLED | OUTPATIENT
Start: 2021-01-01

## 2021-01-01 RX ORDER — BISACODYL 10 MG
10 SUPPOSITORY, RECTAL RECTAL DAILY PRN
Status: DISCONTINUED | OUTPATIENT
Start: 2021-01-01 | End: 2021-01-01 | Stop reason: HOSPADM

## 2021-01-01 RX ORDER — DULAGLUTIDE 1.5 MG/.5ML
1.5 INJECTION, SOLUTION SUBCUTANEOUS
Qty: 6 ML | Refills: 3 | Status: SHIPPED | OUTPATIENT
Start: 2021-01-01 | End: 2021-01-01

## 2021-01-01 RX ORDER — SYRINGE,SAFETY WITH NEEDLE,1ML 25GX1"
SYRINGE (EA) MISCELLANEOUS
Qty: 120 EACH | Refills: 2 | Status: SHIPPED | OUTPATIENT
Start: 2021-01-01

## 2021-01-01 RX ORDER — INSULIN GLARGINE 100 [IU]/ML
INJECTION, SOLUTION SUBCUTANEOUS
Qty: 1 BOX | Refills: 3 | Status: SHIPPED | OUTPATIENT
Start: 2021-01-01 | End: 2021-01-01

## 2021-01-01 RX ORDER — PROMETHAZINE HYDROCHLORIDE 12.5 MG/1
25 TABLET ORAL EVERY 6 HOURS PRN
Status: DISCONTINUED | OUTPATIENT
Start: 2021-01-01 | End: 2021-01-01

## 2021-01-01 RX ORDER — FLUTICASONE PROPIONATE AND SALMETEROL 250; 50 UG/1; UG/1
1 POWDER RESPIRATORY (INHALATION) 2 TIMES DAILY
Qty: 60 EACH | Refills: 3 | Status: SHIPPED | OUTPATIENT
Start: 2021-01-01 | End: 2022-04-08

## 2021-01-01 RX ORDER — INSULIN ASPART 100 [IU]/ML
0-5 INJECTION, SOLUTION INTRAVENOUS; SUBCUTANEOUS
Status: DISCONTINUED | OUTPATIENT
Start: 2021-01-01 | End: 2021-01-01 | Stop reason: HOSPADM

## 2021-01-01 RX ORDER — ALBUTEROL SULFATE 0.83 MG/ML
2.5 SOLUTION RESPIRATORY (INHALATION) EVERY 6 HOURS PRN
Qty: 1 EACH | Refills: 1 | Status: SHIPPED | OUTPATIENT
Start: 2021-01-01

## 2021-01-01 RX ORDER — ACETAMINOPHEN 325 MG/1
650 TABLET ORAL EVERY 4 HOURS PRN
Status: DISCONTINUED | OUTPATIENT
Start: 2021-01-01 | End: 2021-01-01 | Stop reason: HOSPADM

## 2021-01-01 RX ORDER — ASPIRIN 81 MG/1
81 TABLET ORAL DAILY
Status: DISCONTINUED | OUTPATIENT
Start: 2021-01-01 | End: 2021-01-01

## 2021-01-01 RX ORDER — IBUPROFEN 200 MG
24 TABLET ORAL
Status: DISCONTINUED | OUTPATIENT
Start: 2021-01-01 | End: 2021-01-01 | Stop reason: HOSPADM

## 2021-01-01 RX ORDER — HYDRALAZINE HYDROCHLORIDE 20 MG/ML
10 INJECTION INTRAMUSCULAR; INTRAVENOUS EVERY 8 HOURS PRN
Status: DISCONTINUED | OUTPATIENT
Start: 2021-01-01 | End: 2021-01-01

## 2021-01-01 RX ORDER — LANOLIN ALCOHOL/MO/W.PET/CERES
1000 CREAM (GRAM) TOPICAL DAILY
Status: DISCONTINUED | OUTPATIENT
Start: 2021-01-01 | End: 2021-01-01 | Stop reason: HOSPADM

## 2021-01-01 RX ORDER — NALOXONE HCL 0.4 MG/ML
0.02 VIAL (ML) INJECTION
Status: DISCONTINUED | OUTPATIENT
Start: 2021-01-01 | End: 2021-01-01 | Stop reason: HOSPADM

## 2021-01-01 RX ADMIN — FLUTICASONE FUROATE AND VILANTEROL TRIFENATATE 1 PUFF: 100; 25 POWDER RESPIRATORY (INHALATION) at 07:12

## 2021-01-01 RX ADMIN — SENNOSIDES AND DOCUSATE SODIUM 1 TABLET: 50; 8.6 TABLET ORAL at 08:11

## 2021-01-01 RX ADMIN — HEPARIN SODIUM AND DEXTROSE 18 UNITS/KG/HR: 10000; 5 INJECTION INTRAVENOUS at 04:11

## 2021-01-01 RX ADMIN — METOCLOPRAMIDE 5 MG: 5 TABLET ORAL at 05:12

## 2021-01-01 RX ADMIN — OXYCODONE 5 MG: 5 TABLET ORAL at 09:12

## 2021-01-01 RX ADMIN — Medication 6 MG: at 09:12

## 2021-01-01 RX ADMIN — ATORVASTATIN CALCIUM 10 MG: 10 TABLET, FILM COATED ORAL at 09:12

## 2021-01-01 RX ADMIN — CLOPIDOGREL 75 MG: 75 TABLET, FILM COATED ORAL at 08:12

## 2021-01-01 RX ADMIN — SIMETHICONE 80 MG: 80 TABLET, CHEWABLE ORAL at 03:11

## 2021-01-01 RX ADMIN — CLOPIDOGREL 75 MG: 75 TABLET, FILM COATED ORAL at 03:12

## 2021-01-01 RX ADMIN — METOCLOPRAMIDE 5 MG: 5 TABLET ORAL at 06:12

## 2021-01-01 RX ADMIN — ENOXAPARIN SODIUM 70 MG: 100 INJECTION SUBCUTANEOUS at 12:12

## 2021-01-01 RX ADMIN — OXYCODONE HYDROCHLORIDE AND ACETAMINOPHEN 1 TABLET: 5; 325 TABLET ORAL at 08:11

## 2021-01-01 RX ADMIN — FLUTICASONE FUROATE AND VILANTEROL TRIFENATATE 1 PUFF: 100; 25 POWDER RESPIRATORY (INHALATION) at 09:12

## 2021-01-01 RX ADMIN — Medication 1000 UNITS: at 05:12

## 2021-01-01 RX ADMIN — SENNOSIDES AND DOCUSATE SODIUM 1 TABLET: 50; 8.6 TABLET ORAL at 08:12

## 2021-01-01 RX ADMIN — ATORVASTATIN CALCIUM 10 MG: 10 TABLET, FILM COATED ORAL at 08:11

## 2021-01-01 RX ADMIN — LISINOPRIL 20 MG: 20 TABLET ORAL at 08:12

## 2021-01-01 RX ADMIN — ASPIRIN 81 MG: 81 TABLET, COATED ORAL at 03:12

## 2021-01-01 RX ADMIN — OXYCODONE HYDROCHLORIDE AND ACETAMINOPHEN 1 TABLET: 5; 325 TABLET ORAL at 08:12

## 2021-01-01 RX ADMIN — ASPIRIN 81 MG: 81 TABLET, COATED ORAL at 09:11

## 2021-01-01 RX ADMIN — ASPIRIN 81 MG: 81 TABLET, COATED ORAL at 10:12

## 2021-01-01 RX ADMIN — INSULIN ASPART 4 UNITS: 100 INJECTION, SOLUTION INTRAVENOUS; SUBCUTANEOUS at 04:12

## 2021-01-01 RX ADMIN — INSULIN ASPART 3 UNITS: 100 INJECTION, SOLUTION INTRAVENOUS; SUBCUTANEOUS at 05:12

## 2021-01-01 RX ADMIN — LIDOCAINE HYDROCHLORIDE 3 ML: 10 INJECTION, SOLUTION EPIDURAL; INFILTRATION; INTRACAUDAL; PERINEURAL at 10:09

## 2021-01-01 RX ADMIN — CYANOCOBALAMIN TAB 1000 MCG 1000 MCG: 1000 TAB at 09:12

## 2021-01-01 RX ADMIN — SODIUM CHLORIDE: 0.9 INJECTION, SOLUTION INTRAVENOUS at 11:12

## 2021-01-01 RX ADMIN — INSULIN ASPART 5 UNITS: 100 INJECTION, SOLUTION INTRAVENOUS; SUBCUTANEOUS at 04:12

## 2021-01-01 RX ADMIN — CLOPIDOGREL 75 MG: 75 TABLET, FILM COATED ORAL at 10:12

## 2021-01-01 RX ADMIN — MIDAZOLAM HYDROCHLORIDE 1 MG: 1 INJECTION, SOLUTION INTRAMUSCULAR; INTRAVENOUS at 02:12

## 2021-01-01 RX ADMIN — INSULIN ASPART 2 UNITS: 100 INJECTION, SOLUTION INTRAVENOUS; SUBCUTANEOUS at 08:11

## 2021-01-01 RX ADMIN — FLUTICASONE FUROATE AND VILANTEROL TRIFENATATE 1 PUFF: 100; 25 POWDER RESPIRATORY (INHALATION) at 09:11

## 2021-01-01 RX ADMIN — TRIAMTERENE AND HYDROCHLOROTHIAZIDE 1 CAPSULE: 37.5; 25 CAPSULE ORAL at 09:11

## 2021-01-01 RX ADMIN — TRIAMTERENE AND HYDROCHLOROTHIAZIDE 1 CAPSULE: 37.5; 25 CAPSULE ORAL at 08:11

## 2021-01-01 RX ADMIN — TRIAMTERENE AND HYDROCHLOROTHIAZIDE 1 CAPSULE: 37.5; 25 CAPSULE ORAL at 08:12

## 2021-01-01 RX ADMIN — ATORVASTATIN CALCIUM 10 MG: 10 TABLET, FILM COATED ORAL at 08:12

## 2021-01-01 RX ADMIN — POLYETHYLENE GLYCOL 3350 17 G: 17 POWDER, FOR SOLUTION ORAL at 08:12

## 2021-01-01 RX ADMIN — FLUTICASONE FUROATE AND VILANTEROL TRIFENATATE 1 PUFF: 100; 25 POWDER RESPIRATORY (INHALATION) at 08:12

## 2021-01-01 RX ADMIN — Medication 6 MG: at 08:11

## 2021-01-01 RX ADMIN — ENOXAPARIN SODIUM 70 MG: 100 INJECTION SUBCUTANEOUS at 01:12

## 2021-01-01 RX ADMIN — OXYCODONE HYDROCHLORIDE AND ACETAMINOPHEN 1 TABLET: 5; 325 TABLET ORAL at 06:12

## 2021-01-01 RX ADMIN — INSULIN ASPART 3 UNITS: 100 INJECTION, SOLUTION INTRAVENOUS; SUBCUTANEOUS at 03:12

## 2021-01-01 RX ADMIN — INSULIN ASPART 2 UNITS: 100 INJECTION, SOLUTION INTRAVENOUS; SUBCUTANEOUS at 07:12

## 2021-01-01 RX ADMIN — LORAZEPAM 0.5 MG: 0.5 TABLET ORAL at 10:12

## 2021-01-01 RX ADMIN — INSULIN ASPART 3 UNITS: 100 INJECTION, SOLUTION INTRAVENOUS; SUBCUTANEOUS at 12:12

## 2021-01-01 RX ADMIN — LORAZEPAM 0.5 MG: 0.5 TABLET ORAL at 12:12

## 2021-01-01 RX ADMIN — INSULIN ASPART 3 UNITS: 100 INJECTION, SOLUTION INTRAVENOUS; SUBCUTANEOUS at 07:12

## 2021-01-01 RX ADMIN — HEPARIN SODIUM AND DEXTROSE 18 UNITS/KG/HR: 10000; 5 INJECTION INTRAVENOUS at 03:11

## 2021-01-01 RX ADMIN — METOCLOPRAMIDE 5 MG: 5 TABLET ORAL at 03:12

## 2021-01-01 RX ADMIN — WARFARIN SODIUM 5 MG: 5 TABLET ORAL at 04:12

## 2021-01-01 RX ADMIN — LISINOPRIL 20 MG: 20 TABLET ORAL at 09:11

## 2021-01-01 RX ADMIN — INSULIN ASPART 3 UNITS: 100 INJECTION, SOLUTION INTRAVENOUS; SUBCUTANEOUS at 12:11

## 2021-01-01 RX ADMIN — Medication 6 MG: at 08:12

## 2021-01-01 RX ADMIN — INSULIN ASPART 3 UNITS: 100 INJECTION, SOLUTION INTRAVENOUS; SUBCUTANEOUS at 04:12

## 2021-01-01 RX ADMIN — LISINOPRIL 20 MG: 20 TABLET ORAL at 08:11

## 2021-01-01 RX ADMIN — HEPARIN SODIUM AND DEXTROSE 18.03 UNITS/KG/HR: 10000; 5 INJECTION INTRAVENOUS at 10:12

## 2021-01-01 RX ADMIN — INSULIN DETEMIR 4 UNITS: 100 INJECTION, SOLUTION SUBCUTANEOUS at 04:11

## 2021-01-01 RX ADMIN — INSULIN ASPART 4 UNITS: 100 INJECTION, SOLUTION INTRAVENOUS; SUBCUTANEOUS at 08:11

## 2021-01-01 RX ADMIN — OXYCODONE HYDROCHLORIDE AND ACETAMINOPHEN 1 TABLET: 5; 325 TABLET ORAL at 05:12

## 2021-01-01 RX ADMIN — WARFARIN SODIUM 5 MG: 5 TABLET ORAL at 05:12

## 2021-01-01 RX ADMIN — LIDOCAINE HYDROCHLORIDE,EPINEPHRINE BITARTRATE 20 ML: 20; .01 INJECTION, SOLUTION INFILTRATION; PERINEURAL at 10:09

## 2021-01-01 RX ADMIN — POLYETHYLENE GLYCOL 3350 17 G: 17 POWDER, FOR SOLUTION ORAL at 04:11

## 2021-01-01 RX ADMIN — INSULIN ASPART 4 UNITS: 100 INJECTION, SOLUTION INTRAVENOUS; SUBCUTANEOUS at 11:11

## 2021-01-01 RX ADMIN — METOCLOPRAMIDE 5 MG: 5 TABLET ORAL at 11:12

## 2021-01-01 RX ADMIN — INSULIN ASPART 3 UNITS: 100 INJECTION, SOLUTION INTRAVENOUS; SUBCUTANEOUS at 08:12

## 2021-01-01 RX ADMIN — METOCLOPRAMIDE 5 MG: 5 TABLET ORAL at 12:12

## 2021-01-01 RX ADMIN — METOCLOPRAMIDE 5 MG: 5 TABLET ORAL at 04:12

## 2021-01-01 RX ADMIN — CLOPIDOGREL 75 MG: 75 TABLET, FILM COATED ORAL at 09:12

## 2021-01-01 RX ADMIN — OXYCODONE HYDROCHLORIDE AND ACETAMINOPHEN 1 TABLET: 5; 325 TABLET ORAL at 09:11

## 2021-01-01 RX ADMIN — INSULIN ASPART 2 UNITS: 100 INJECTION, SOLUTION INTRAVENOUS; SUBCUTANEOUS at 12:12

## 2021-01-01 RX ADMIN — ACETAMINOPHEN 650 MG: 325 TABLET ORAL at 01:11

## 2021-01-01 RX ADMIN — Medication 16 G: at 05:12

## 2021-01-01 RX ADMIN — OXYCODONE HYDROCHLORIDE AND ACETAMINOPHEN 1 TABLET: 5; 325 TABLET ORAL at 03:12

## 2021-01-01 RX ADMIN — PROMETHAZINE HYDROCHLORIDE 25 MG: 12.5 TABLET ORAL at 08:12

## 2021-01-01 RX ADMIN — FLUTICASONE FUROATE AND VILANTEROL TRIFENATATE 1 PUFF: 100; 25 POWDER RESPIRATORY (INHALATION) at 08:11

## 2021-01-01 RX ADMIN — ATORVASTATIN CALCIUM 10 MG: 10 TABLET, FILM COATED ORAL at 09:11

## 2021-01-01 RX ADMIN — INSULIN ASPART 5 UNITS: 100 INJECTION, SOLUTION INTRAVENOUS; SUBCUTANEOUS at 11:12

## 2021-01-01 RX ADMIN — CYANOCOBALAMIN TAB 1000 MCG 1000 MCG: 1000 TAB at 05:12

## 2021-01-01 RX ADMIN — Medication 1000 UNITS: at 09:12

## 2021-01-01 RX ADMIN — INSULIN ASPART 5 UNITS: 100 INJECTION, SOLUTION INTRAVENOUS; SUBCUTANEOUS at 04:11

## 2021-01-01 RX ADMIN — HEPARIN SODIUM AND DEXTROSE 18 UNITS/KG/HR: 10000; 5 INJECTION INTRAVENOUS at 11:11

## 2021-01-01 RX ADMIN — PALONOSETRON HYDROCHLORIDE 0.25 MG: 0.25 INJECTION, SOLUTION INTRAVENOUS at 09:12

## 2021-01-01 RX ADMIN — WARFARIN SODIUM 7.5 MG: 2.5 TABLET ORAL at 04:12

## 2021-01-01 RX ADMIN — INSULIN ASPART 1 UNITS: 100 INJECTION, SOLUTION INTRAVENOUS; SUBCUTANEOUS at 08:11

## 2021-01-01 RX ADMIN — HEPARIN SODIUM AND DEXTROSE 18.03 UNITS/KG/HR: 10000; 5 INJECTION INTRAVENOUS at 04:11

## 2021-01-01 RX ADMIN — ENOXAPARIN SODIUM 70 MG: 100 INJECTION SUBCUTANEOUS at 08:12

## 2021-01-01 RX ADMIN — OXYCODONE HYDROCHLORIDE AND ACETAMINOPHEN 1 TABLET: 5; 325 TABLET ORAL at 03:11

## 2021-01-01 RX ADMIN — INSULIN ASPART 3 UNITS: 100 INJECTION, SOLUTION INTRAVENOUS; SUBCUTANEOUS at 10:12

## 2021-01-01 RX ADMIN — INSULIN ASPART 3 UNITS: 100 INJECTION, SOLUTION INTRAVENOUS; SUBCUTANEOUS at 05:11

## 2021-01-01 RX ADMIN — ATORVASTATIN CALCIUM 10 MG: 10 TABLET, FILM COATED ORAL at 10:12

## 2021-01-01 RX ADMIN — OXYCODONE AND ACETAMINOPHEN 1 TABLET: 10; 325 TABLET ORAL at 10:12

## 2021-01-01 RX ADMIN — INSULIN ASPART 3 UNITS: 100 INJECTION, SOLUTION INTRAVENOUS; SUBCUTANEOUS at 11:12

## 2021-01-01 RX ADMIN — TRIAMTERENE AND HYDROCHLOROTHIAZIDE 1 CAPSULE: 37.5; 25 CAPSULE ORAL at 09:12

## 2021-01-01 RX ADMIN — INSULIN DETEMIR 6 UNITS: 100 INJECTION, SOLUTION SUBCUTANEOUS at 08:11

## 2021-01-01 RX ADMIN — ONDANSETRON 8 MG: 2 INJECTION INTRAMUSCULAR; INTRAVENOUS at 08:11

## 2021-01-01 RX ADMIN — LIDOCAINE HYDROCHLORIDE 30 MG: 10 INJECTION, SOLUTION EPIDURAL; INFILTRATION; INTRACAUDAL; PERINEURAL at 11:09

## 2021-01-01 RX ADMIN — FLUTICASONE FUROATE AND VILANTEROL TRIFENATATE 1 PUFF: 100; 25 POWDER RESPIRATORY (INHALATION) at 10:12

## 2021-01-01 RX ADMIN — ENOXAPARIN SODIUM 70 MG: 100 INJECTION SUBCUTANEOUS at 02:12

## 2021-01-01 RX ADMIN — INSULIN ASPART 3 UNITS: 100 INJECTION, SOLUTION INTRAVENOUS; SUBCUTANEOUS at 04:11

## 2021-01-01 RX ADMIN — DOXORUBICIN HYDROCHLORIDE 136 MG: 2 INJECTION, SOLUTION INTRAVENOUS at 09:12

## 2021-01-01 RX ADMIN — OXYCODONE 5 MG: 5 TABLET ORAL at 04:12

## 2021-01-01 RX ADMIN — SENNOSIDES AND DOCUSATE SODIUM 1 TABLET: 50; 8.6 TABLET ORAL at 09:12

## 2021-01-01 RX ADMIN — INSULIN ASPART 2 UNITS: 100 INJECTION, SOLUTION INTRAVENOUS; SUBCUTANEOUS at 08:12

## 2021-01-01 RX ADMIN — TRIAMTERENE AND HYDROCHLOROTHIAZIDE 1 CAPSULE: 37.5; 25 CAPSULE ORAL at 11:12

## 2021-01-01 RX ADMIN — ONDANSETRON 8 MG: 4 TABLET, ORALLY DISINTEGRATING ORAL at 11:12

## 2021-01-01 RX ADMIN — INSULIN ASPART 1 UNITS: 100 INJECTION, SOLUTION INTRAVENOUS; SUBCUTANEOUS at 08:12

## 2021-01-01 RX ADMIN — LISINOPRIL 20 MG: 20 TABLET ORAL at 09:12

## 2021-01-01 RX ADMIN — HYDRALAZINE HYDROCHLORIDE 25 MG: 25 TABLET, FILM COATED ORAL at 04:11

## 2021-01-01 RX ADMIN — LIDOCAINE HYDROCHLORIDE,EPINEPHRINE BITARTRATE 20 ML: 20; .01 INJECTION, SOLUTION INFILTRATION; PERINEURAL at 11:09

## 2021-01-01 RX ADMIN — IOHEXOL 100 ML: 350 INJECTION, SOLUTION INTRAVENOUS at 07:11

## 2021-01-01 RX ADMIN — Medication 6 MG: at 06:12

## 2021-01-01 RX ADMIN — ONDANSETRON 8 MG: 4 TABLET, ORALLY DISINTEGRATING ORAL at 03:12

## 2021-01-01 RX ADMIN — FENTANYL CITRATE 50 MCG: 50 INJECTION, SOLUTION INTRAMUSCULAR; INTRAVENOUS at 02:12

## 2021-01-01 RX ADMIN — ENOXAPARIN SODIUM 70 MG: 100 INJECTION SUBCUTANEOUS at 03:12

## 2021-01-01 RX ADMIN — PROMETHAZINE HYDROCHLORIDE 25 MG: 12.5 TABLET ORAL at 07:12

## 2021-01-01 RX ADMIN — OXYCODONE HYDROCHLORIDE AND ACETAMINOPHEN 1 TABLET: 5; 325 TABLET ORAL at 07:12

## 2021-01-04 ENCOUNTER — PATIENT MESSAGE (OUTPATIENT)
Dept: ADMINISTRATIVE | Facility: HOSPITAL | Age: 56
End: 2021-01-04

## 2021-07-06 NOTE — PROGRESS NOTES
Nicola with [facility] left message for call back regarding Lidocaine appeal. [facility]  Vascular Neurology  Comprehensive Stroke Center  Progress Note      Neurologic Chief Complaint: R MCA stroke s/p R ICA thrombectomy and distal M3 occlusion on right.     Subjective:     Interval History: Passed swallow study, recommended dental soft diet. Echo finished this morning, results pending. MRI showing scattered foci of infarct within right MCA distribution. Left sided weakness improving. Carotid ultrasound shows 60-79% R ICA stenosis    HPI, Past Medical, Family, and Social History remains the same as documented in the initial encounter.     Review of Systems   Constitutional: Negative for activity change.   HENT: Negative for hearing loss.    Eyes: Negative for photophobia.   Respiratory: Negative for apnea.    Cardiovascular: Negative for chest pain.   Gastrointestinal: Negative for abdominal distention.   Endocrine: Negative for cold intolerance.   Genitourinary: Negative for difficulty urinating.   Musculoskeletal: Negative for arthralgias.   Skin: Negative for color change.   Neurological: Negative for dizziness.   Psychiatric/Behavioral: Negative for agitation.     Scheduled Meds:   cyanocobalamin  1,000 mcg Intramuscular Q7 Days    dabigatran etexilate  150 mg Oral BID    ferrous sulfate  325 mg Oral Daily    insulin aspart  1-10 Units Subcutaneous TIDWM    lisinopril  40 mg Oral Daily    polyethylene glycol  17 g Oral Daily    senna-docusate 8.6-50 mg  1 tablet Oral Daily    sodium chloride 0.9%  3 mL Intravenous Q8H    thiamine  100 mg Oral Daily     Continuous Infusions:     PRN Meds:sodium chloride, acetaminophen, albuterol-ipratropium 2.5mg-0.5mg/3mL, dextrose 50%, dextrose 50%, glucagon (human recombinant), glucose, glucose, labetalol, pneumoc 13-mohan conj-dip cr(PF)    Objective:     Vital Signs (Most Recent):  Temp: 97.8 °F (36.6 °C) (03/01/17 1500)  Pulse: 101 (03/01/17 1705)  Resp: (!) 39 (03/01/17 1705)  BP: (!) 160/89 (03/01/17 1705)  SpO2: 95 %  (17 1705)       Vital Signs Range (Last 24H):  Temp:  [97.8 °F (36.6 °C)-99.9 °F (37.7 °C)]   Pulse:  []   Resp:  [0-50]   BP: (140-190)/(65-95)   SpO2:  [95 %-100 %]        Physical Exam   Constitutional: No distress.   HENT:   Head trauma to left face w/ swelling and edema involving orbit and upper lip   Eyes: Pupils are equal, round, and reactive to light.   Neck: No thyromegaly present.   Cardiovascular: Regular rhythm.    Pulmonary/Chest: No respiratory distress. She has no wheezes.   Abdominal: She exhibits no distension. There is no tenderness.   Musculoskeletal: She exhibits no edema, tenderness or deformity.   Lymphadenopathy:     She has no cervical adenopathy.   Skin: Skin is warm and dry.     Neurological Exam:   LOC: alert and follows requests  Language: No aphasia  Speech: Dysarthria  Orientation: Person, Place, Time  Visual Fields (CN II): Full  EOM (CN III, IV, VI): fluctuating R gaze preference; able to overcome midline on most attempts  Pupils (CN III, IV, VI): PERRL  Facial Movement (CN VII): left lower facial droop, present despite confounder of left facial trauma  Motor*: Arm Left: Paretic: 4/5, Leg Left: Paretic: 4/5, Arm Right: Normal (5/5), Leg Right: Normal (5/5)  Cerebellar*: Normal limb  Sensation: decreased sensation on left hemibody ~ 50%       NIH Stroke Scale:    Level of Consciousness: 0 - alert  LOC Questions: 0 - answers both correctly  LOC Commands: 0 - performs both correctly  Best Gaze: 1 - partial gaze palsy  Visual: 0 - no visual loss  Facial Palsy: 1 - minor  Motor Left Arm: 1 - drift  Motor Right Arm: 0 - no drift  Motor Left Le - drift  Motor Right Le - no drift  Limb Ataxia: 0 - absent  Sensory: 1 - mild to moderate loss  Best Language: 0 - no aphasia  Dysarthria: 1 - mild to moderate dysarthria  Extinction and Inattention: 1 - partial neglect  NIH Stroke Scale Total: 7      Laboratory:  CMP:     Recent Labs  Lab 17  0346   CALCIUM 8.6*   ALBUMIN  2.8*   PROT 6.6      K 3.8   CO2 23      BUN 6   CREATININE 1.1   ALKPHOS 45*   ALT 11   AST 17   BILITOT 0.5     BMP:     Recent Labs  Lab 03/01/17  0346      K 3.8      CO2 23   BUN 6   CREATININE 1.1   CALCIUM 8.6*     CBC:     Recent Labs  Lab 03/01/17  0935   WBC 5.98   RBC 3.73*   HGB 8.8*   HCT 28.9*   *   MCV 78*   MCH 23.6*   MCHC 30.4*     Lipid Panel:     Recent Labs  Lab 02/26/17  0740   CHOL 155   LDLCALC 60.0*   HDL 78*   TRIG 85     Coagulation:     Recent Labs  Lab 03/01/17  0346   INR 1.0     Platelet Aggregation Study: No results for input(s): PLTAGG, PLTAGINTERP, PLTAGREGLACO, ADPPLTAGGREG in the last 168 hours.  Hgb A1C:     Recent Labs  Lab 02/26/17  1756   HGBA1C 6.8*     TSH:     Recent Labs  Lab 02/26/17  0740   TSH 1.147       Diagnostic Results:  Brain Imaging: CT Head. Date: 2/26/17  Acute Pathology: Infarct  MRI 2/27/17  acute infarct of the right MCA distribution involving the majority of the right parietal lobe with additional scattered foci of infarcts involving the right caudate head, right occipital lobe, right temporal lobe, right subinsular cortex, and right posterior frontal lobe.       Cerebrovascular Imaging: Angiogram Head. Date: 2/26/17  R ICA occlusion w/ irregularity of looped proximal segment; unclear if plaque w/ underlying stenosis vs. Dissection w/ underlying FMD; successful thrombectomy; intracranial runs do show at least one M3 branch occlusion w/ pial collateral retrograde filling     Cardiac Evaluation:   Ef 55-60%. Normal ventricular function    Assessment/Plan:     2/26/17 - IR w/ successful aspiration thrombectomy of R ICA proximal thrombus, underlying vessel irregularity, ? FMD w/ dissection; distal M3 occlusion persists; no tPA d/t Xarelto --> NCC for monitoring    2/27/17 - Passed swallow study, recommended dental soft diet. Echo finished this morning, results pending. MRI showing scattered foci of infarct within right MCA  distribution. Left sided weakness improving. Carotid ultrasound shows 60-79% R ICA stenosis    2/28/17 - 's this AM. NCC started lisinopril 40 mg daily with SBP improving this afternoon to 160's.  NCC switched her xarelto to dabigatran as patient was an xarelto and still had embolic suspected stroke.      * Cerebral infarction due to thrombosis of right carotid artery  Thrombosis found in irregular loop of R ICA extracranial segment during DSA, s/p successful aspiration thrombectomy.   Distal artery to artery thromboembolism found in M3 branch which was too distal for intervention.  Underlying etiology of carotid lesion either dissection given irregularity vs. Atherosclerotic plaque w/ in-situ thrombosis; underlying FMD a possibility  -did not tolerate MRA yesterday. Will need to obtain outside records of angiogram.   · Continue Dabigatran  · BP goal < 180/100, given continued distal branch occlusion.  · TTE did not show any major abnormalities  · Carotid US showed 60-79% stenosis in R ICA  · PT/OT/SLP      Type 2 diabetes mellitus without complication  Goal -180  Overall Goal A1c < 7    Left arm weakness  PT/OT/SLP evaluation and treatment ongoing      SUHAS Mtz  Comprehensive Stroke Center  Department of Vascular Neurology   [facility]

## 2021-11-27 PROBLEM — I26.99 PULMONARY EMBOLISM, BILATERAL: Status: ACTIVE | Noted: 2021-01-01

## 2021-11-27 PROBLEM — R91.8 ABNORMAL CHEST X-RAY WITH MULTIPLE LUNG NODULES: Status: ACTIVE | Noted: 2021-01-01

## 2021-11-27 PROBLEM — E78.5 HLD (HYPERLIPIDEMIA): Status: ACTIVE | Noted: 2021-01-01

## 2021-11-27 PROBLEM — N85.8 UTERINE MASS: Status: ACTIVE | Noted: 2021-01-01

## 2021-11-29 PROBLEM — N93.9 VAGINAL BLEEDING: Status: ACTIVE | Noted: 2021-01-01

## 2021-11-29 PROBLEM — K59.00 CONSTIPATION: Status: ACTIVE | Noted: 2021-01-01

## 2021-12-02 NOTE — PLAN OF CARE
Nimco Peres RN  Writer called [facility]'s pharmacy at this time to follow up on the medication refills . Writer spoke with Isauro at this time to clarify that they received the 3 prescriptions that were sent on 11/29/2021 frim the patient's provider CHRISTOS Cordero states they are ready for  . Writer states she will call the patient.    Writer called , and left a message to the patient that his prescriptions are refilled , and ready for pick.       Problem: SLP Goal  Goal: SLP Goal  Speech Language Pathology Goals  Goals expected to be met by 3/6  1. Pt. Will participate in speech language eval-met  2. Tolerate soft diet with thin liquids with no s/s of aspiration-ongoing  Updated Speech Language Pathology Goals to be addressed daily, M-F,  All goals expected to be met by 3/8  2. Pt will demonstrate understanding of OMEs indptly for home program.   3. Pt will implement simple speech strategies in conversation indptly.  4. Pt will complete delayed recall tasks with 90% accy indptly.   5. Pt will complete assessment of higher level cognition/organization to determine additional need for tx.  6. Pt will complete assessment of reading, writing, visual spatial skills to determine additional need for tx         Outcome: Ongoing (interventions implemented as appropriate)  Pt tolerating current diet well, making steady progress. AMANDA Kowalski, CCC/SLP, 3/2/2017

## 2021-12-03 PROBLEM — I26.99 OTHER ACUTE PULMONARY EMBOLISM WITHOUT ACUTE COR PULMONALE: Status: ACTIVE | Noted: 2021-01-01

## 2021-12-05 PROBLEM — J96.01 ACUTE HYPOXEMIC RESPIRATORY FAILURE: Status: ACTIVE | Noted: 2021-01-01

## 2021-12-08 PROBLEM — E53.8 B12 DEFICIENCY: Status: ACTIVE | Noted: 2021-01-01

## 2021-12-08 PROBLEM — E44.0 MODERATE MALNUTRITION: Status: ACTIVE | Noted: 2021-01-01

## 2021-12-08 PROBLEM — C78.00 METASTASIS TO LUNG: Status: ACTIVE | Noted: 2021-01-01

## 2021-12-08 PROBLEM — E55.9 VITAMIN D DEFICIENCY: Status: ACTIVE | Noted: 2021-01-01

## 2021-12-10 PROBLEM — C55 LEIOMYOSARCOMA OF UTERUS: Status: ACTIVE | Noted: 2021-01-01

## 2021-12-14 PROBLEM — J96.01 ACUTE HYPOXEMIC RESPIRATORY FAILURE: Status: RESOLVED | Noted: 2021-01-01 | Resolved: 2021-01-01

## 2021-12-14 NOTE — TELEPHONE ENCOUNTER
Good afternoon. Terry this patient has [place]. Please see chart. She has concerns about her ability to pay her bills. Please reach out for financial advisory Thank you.  Venita, same issue, her oncologist ordered a breathing med Combivent that costs 400 each. Can you please review her meds/financial situation with her?  I told her to expect a call from each of you. Thanks in advance.

## 2022-01-01 ENCOUNTER — ANTI-COAG VISIT (OUTPATIENT)
Dept: CARDIOLOGY | Facility: CLINIC | Age: 57
End: 2022-01-01
Payer: COMMERCIAL

## 2022-01-01 ENCOUNTER — HOSPITAL ENCOUNTER (INPATIENT)
Facility: HOSPITAL | Age: 57
LOS: 1 days | Discharge: HOSPICE | DRG: 951 | End: 2022-01-06
Attending: EMERGENCY MEDICINE | Admitting: OBSTETRICS & GYNECOLOGY
Payer: COMMERCIAL

## 2022-01-01 ENCOUNTER — PATIENT MESSAGE (OUTPATIENT)
Dept: INTERNAL MEDICINE | Facility: CLINIC | Age: 57
End: 2022-01-01
Payer: COMMERCIAL

## 2022-01-01 ENCOUNTER — HOSPITAL ENCOUNTER (INPATIENT)
Facility: HOSPITAL | Age: 57
LOS: 2 days | Discharge: HOSPICE | DRG: 189 | End: 2022-01-05
Attending: EMERGENCY MEDICINE | Admitting: OBSTETRICS & GYNECOLOGY
Payer: COMMERCIAL

## 2022-01-01 VITALS
SYSTOLIC BLOOD PRESSURE: 99 MMHG | OXYGEN SATURATION: 95 % | WEIGHT: 143.94 LBS | DIASTOLIC BLOOD PRESSURE: 57 MMHG | TEMPERATURE: 98 F | HEART RATE: 120 BPM | RESPIRATION RATE: 18 BRPM | BODY MASS INDEX: 25.5 KG/M2

## 2022-01-01 VITALS
DIASTOLIC BLOOD PRESSURE: 73 MMHG | HEART RATE: 142 BPM | HEIGHT: 63 IN | SYSTOLIC BLOOD PRESSURE: 164 MMHG | OXYGEN SATURATION: 100 % | BODY MASS INDEX: 25.5 KG/M2 | TEMPERATURE: 98 F | RESPIRATION RATE: 34 BRPM | WEIGHT: 143.94 LBS

## 2022-01-01 DIAGNOSIS — R06.02 SOB (SHORTNESS OF BREATH): ICD-10-CM

## 2022-01-01 DIAGNOSIS — C78.02 MALIGNANT NEOPLASM METASTATIC TO BOTH LUNGS: Primary | ICD-10-CM

## 2022-01-01 DIAGNOSIS — C55 LEIOMYOSARCOMA OF UTERUS: ICD-10-CM

## 2022-01-01 DIAGNOSIS — J96.01 ACUTE HYPOXEMIC RESPIRATORY FAILURE: ICD-10-CM

## 2022-01-01 DIAGNOSIS — I26.99 OTHER ACUTE PULMONARY EMBOLISM WITHOUT ACUTE COR PULMONALE: ICD-10-CM

## 2022-01-01 DIAGNOSIS — Z51.5 COMFORT MEASURES ONLY STATUS: Primary | ICD-10-CM

## 2022-01-01 DIAGNOSIS — Z79.01 LONG TERM (CURRENT) USE OF ANTICOAGULANTS: Primary | ICD-10-CM

## 2022-01-01 DIAGNOSIS — J45.909 UNCOMPLICATED ASTHMA, UNSPECIFIED ASTHMA SEVERITY, UNSPECIFIED WHETHER PERSISTENT: ICD-10-CM

## 2022-01-01 DIAGNOSIS — C78.01 MALIGNANT NEOPLASM METASTATIC TO BOTH LUNGS: Primary | ICD-10-CM

## 2022-01-01 LAB
ALBUMIN SERPL BCP-MCNC: 3.1 G/DL (ref 3.5–5.2)
ALLENS TEST: ABNORMAL
ALP SERPL-CCNC: 131 U/L (ref 55–135)
ALT SERPL W/O P-5'-P-CCNC: 11 U/L (ref 10–44)
ANION GAP SERPL CALC-SCNC: 10 MMOL/L (ref 8–16)
ANION GAP SERPL CALC-SCNC: 14 MMOL/L (ref 8–16)
ANISOCYTOSIS BLD QL SMEAR: ABNORMAL
APTT BLDCRRT: 41.2 SEC (ref 21–32)
AST SERPL-CCNC: 28 U/L (ref 10–40)
B-HCG UR QL: NEGATIVE
BACTERIA BLD CULT: NORMAL
BASOPHILS # BLD AUTO: ABNORMAL K/UL (ref 0–0.2)
BASOPHILS NFR BLD: 1 % (ref 0–1.9)
BILIRUB SERPL-MCNC: 0.5 MG/DL (ref 0.1–1)
BNP SERPL-MCNC: 24 PG/ML (ref 0–99)
BUN SERPL-MCNC: 17 MG/DL (ref 6–20)
BUN SERPL-MCNC: 18 MG/DL (ref 6–20)
BUN SERPL-MCNC: 24 MG/DL (ref 6–30)
CALCIUM SERPL-MCNC: 9.5 MG/DL (ref 8.7–10.5)
CALCIUM SERPL-MCNC: 9.7 MG/DL (ref 8.7–10.5)
CHLORIDE SERPL-SCNC: 102 MMOL/L (ref 95–110)
CHLORIDE SERPL-SCNC: 102 MMOL/L (ref 95–110)
CHLORIDE SERPL-SCNC: 103 MMOL/L (ref 95–110)
CO2 SERPL-SCNC: 20 MMOL/L (ref 23–29)
CO2 SERPL-SCNC: 26 MMOL/L (ref 23–29)
CREAT SERPL-MCNC: 0.9 MG/DL (ref 0.5–1.4)
CREAT SERPL-MCNC: 1 MG/DL (ref 0.5–1.4)
CREAT SERPL-MCNC: 1.3 MG/DL (ref 0.5–1.4)
CTP QC/QA: YES
CTP QC/QA: YES
DELSYS: ABNORMAL
DIFFERENTIAL METHOD: ABNORMAL
EOSINOPHIL # BLD AUTO: ABNORMAL K/UL (ref 0–0.5)
EOSINOPHIL NFR BLD: 0 % (ref 0–8)
EP: 7
ERYTHROCYTE [DISTWIDTH] IN BLOOD BY AUTOMATED COUNT: 19.9 % (ref 11.5–14.5)
ERYTHROCYTE [SEDIMENTATION RATE] IN BLOOD BY WESTERGREN METHOD: 26 MM/H
EST. GFR  (AFRICAN AMERICAN): 53.4 ML/MIN/1.73 M^2
EST. GFR  (AFRICAN AMERICAN): >60 ML/MIN/1.73 M^2
EST. GFR  (NON AFRICAN AMERICAN): 46.3 ML/MIN/1.73 M^2
EST. GFR  (NON AFRICAN AMERICAN): >60 ML/MIN/1.73 M^2
FINAL PATHOLOGIC DIAGNOSIS: NORMAL
FIO2: 60
GLUCOSE SERPL-MCNC: 168 MG/DL (ref 70–110)
GLUCOSE SERPL-MCNC: 376 MG/DL (ref 70–110)
GLUCOSE SERPL-MCNC: 378 MG/DL (ref 70–110)
GROSS: NORMAL
HCO3 UR-SCNC: 24.2 MMOL/L (ref 24–28)
HCT VFR BLD AUTO: 35 % (ref 37–48.5)
HCT VFR BLD CALC: 35 %PCV (ref 36–54)
HGB BLD-MCNC: 11 G/DL (ref 12–16)
IMM GRANULOCYTES # BLD AUTO: ABNORMAL K/UL (ref 0–0.04)
IMM GRANULOCYTES NFR BLD AUTO: ABNORMAL % (ref 0–0.5)
INR PPP: 2.2 (ref 0.8–1.2)
INR PPP: 2.5 (ref 0.8–1.2)
INR PPP: 2.7 (ref 0.8–1.2)
INR PPP: 2.9 (ref 0.8–1.2)
IP: 14
LACTATE SERPL-SCNC: 2.3 MMOL/L (ref 0.5–2.2)
LYMPHOCYTES # BLD AUTO: ABNORMAL K/UL (ref 1–4.8)
LYMPHOCYTES NFR BLD: 10 % (ref 18–48)
Lab: NORMAL
MCH RBC QN AUTO: 33.7 PG (ref 27–31)
MCHC RBC AUTO-ENTMCNC: 31.4 G/DL (ref 32–36)
MCV RBC AUTO: 107 FL (ref 82–98)
MICROSCOPIC EXAM: NORMAL
MODE: ABNORMAL
MONOCYTES # BLD AUTO: ABNORMAL K/UL (ref 0.3–1)
MONOCYTES NFR BLD: 0 % (ref 4–15)
NEUTROPHILS NFR BLD: 89 % (ref 38–73)
NRBC BLD-RTO: 0 /100 WBC
OVALOCYTES BLD QL SMEAR: ABNORMAL
PCO2 BLDA: 39.7 MMHG (ref 35–45)
PH SMN: 7.39 [PH] (ref 7.35–7.45)
PLATELET # BLD AUTO: 145 K/UL (ref 150–450)
PLATELET BLD QL SMEAR: ABNORMAL
PMV BLD AUTO: 11.3 FL (ref 9.2–12.9)
PO2 BLDA: 37 MMHG (ref 40–60)
POC BE: -1 MMOL/L
POC IONIZED CALCIUM: 1.18 MMOL/L (ref 1.06–1.42)
POC SATURATED O2: 71 % (ref 95–100)
POC TCO2 (MEASURED): 25 MMOL/L (ref 23–29)
POC TCO2: 25 MMOL/L (ref 24–29)
POCT GLUCOSE: 117 MG/DL (ref 70–110)
POCT GLUCOSE: 133 MG/DL (ref 70–110)
POCT GLUCOSE: 135 MG/DL (ref 70–110)
POCT GLUCOSE: 142 MG/DL (ref 70–110)
POCT GLUCOSE: 162 MG/DL (ref 70–110)
POCT GLUCOSE: 163 MG/DL (ref 70–110)
POCT GLUCOSE: 178 MG/DL (ref 70–110)
POCT GLUCOSE: 231 MG/DL (ref 70–110)
POCT GLUCOSE: 29 MG/DL (ref 70–110)
POCT GLUCOSE: 343 MG/DL (ref 70–110)
POCT GLUCOSE: 70 MG/DL (ref 70–110)
POCT GLUCOSE: 92 MG/DL (ref 70–110)
POIKILOCYTOSIS BLD QL SMEAR: SLIGHT
POLYCHROMASIA BLD QL SMEAR: ABNORMAL
POTASSIUM BLD-SCNC: 5.5 MMOL/L (ref 3.5–5.1)
POTASSIUM SERPL-SCNC: 4.2 MMOL/L (ref 3.5–5.1)
POTASSIUM SERPL-SCNC: 5.6 MMOL/L (ref 3.5–5.1)
PROT SERPL-MCNC: 8 G/DL (ref 6–8.4)
PROTHROMBIN TIME: 23.2 SEC (ref 9–12.5)
PROTHROMBIN TIME: 26.1 SEC (ref 9–12.5)
PROTHROMBIN TIME: 27.7 SEC (ref 9–12.5)
PROTHROMBIN TIME: 29.3 SEC (ref 9–12.5)
RBC # BLD AUTO: 3.26 M/UL (ref 4–5.4)
SAMPLE: ABNORMAL
SAMPLE: ABNORMAL
SARS-COV-2 RDRP RESP QL NAA+PROBE: NEGATIVE
SCHISTOCYTES BLD QL SMEAR: ABNORMAL
SITE: ABNORMAL
SODIUM BLD-SCNC: 137 MMOL/L (ref 136–145)
SODIUM SERPL-SCNC: 136 MMOL/L (ref 136–145)
SODIUM SERPL-SCNC: 139 MMOL/L (ref 136–145)
SP02: 100
SPONT RATE: 40
SUPPLEMENTAL DIAGNOSIS: NORMAL
TROPONIN I SERPL DL<=0.01 NG/ML-MCNC: 0.01 NG/ML (ref 0–0.03)
WBC # BLD AUTO: 5.37 K/UL (ref 3.9–12.7)

## 2022-01-01 PROCEDURE — 85610 PROTHROMBIN TIME: CPT | Performed by: EMERGENCY MEDICINE

## 2022-01-01 PROCEDURE — 94640 AIRWAY INHALATION TREATMENT: CPT

## 2022-01-01 PROCEDURE — 99233 PR SUBSEQUENT HOSPITAL CARE,LEVL III: ICD-10-PCS | Mod: ,,, | Performed by: OBSTETRICS & GYNECOLOGY

## 2022-01-01 PROCEDURE — 99285 EMERGENCY DEPT VISIT HI MDM: CPT | Mod: ,,, | Performed by: EMERGENCY MEDICINE

## 2022-01-01 PROCEDURE — 96366 THER/PROPH/DIAG IV INF ADDON: CPT

## 2022-01-01 PROCEDURE — 96375 TX/PRO/DX INJ NEW DRUG ADDON: CPT

## 2022-01-01 PROCEDURE — 80047 BASIC METABLC PNL IONIZED CA: CPT

## 2022-01-01 PROCEDURE — 85610 PROTHROMBIN TIME: CPT | Performed by: STUDENT IN AN ORGANIZED HEALTH CARE EDUCATION/TRAINING PROGRAM

## 2022-01-01 PROCEDURE — 25000242 PHARM REV CODE 250 ALT 637 W/ HCPCS: Performed by: STUDENT IN AN ORGANIZED HEALTH CARE EDUCATION/TRAINING PROGRAM

## 2022-01-01 PROCEDURE — 36415 COLL VENOUS BLD VENIPUNCTURE: CPT | Performed by: STUDENT IN AN ORGANIZED HEALTH CARE EDUCATION/TRAINING PROGRAM

## 2022-01-01 PROCEDURE — 85007 BL SMEAR W/DIFF WBC COUNT: CPT | Performed by: EMERGENCY MEDICINE

## 2022-01-01 PROCEDURE — 63600175 PHARM REV CODE 636 W HCPCS: Performed by: INTERNAL MEDICINE

## 2022-01-01 PROCEDURE — 63600175 PHARM REV CODE 636 W HCPCS: Performed by: STUDENT IN AN ORGANIZED HEALTH CARE EDUCATION/TRAINING PROGRAM

## 2022-01-01 PROCEDURE — 99233 PR SUBSEQUENT HOSPITAL CARE,LEVL III: ICD-10-PCS | Mod: ,,, | Performed by: NURSE PRACTITIONER

## 2022-01-01 PROCEDURE — 99284 EMERGENCY DEPT VISIT MOD MDM: CPT | Mod: ,,, | Performed by: NURSE PRACTITIONER

## 2022-01-01 PROCEDURE — 83605 ASSAY OF LACTIC ACID: CPT | Performed by: EMERGENCY MEDICINE

## 2022-01-01 PROCEDURE — 99900035 HC TECH TIME PER 15 MIN (STAT)

## 2022-01-01 PROCEDURE — 94761 N-INVAS EAR/PLS OXIMETRY MLT: CPT

## 2022-01-01 PROCEDURE — 25000003 PHARM REV CODE 250: Performed by: STUDENT IN AN ORGANIZED HEALTH CARE EDUCATION/TRAINING PROGRAM

## 2022-01-01 PROCEDURE — 99497 ADVNCD CARE PLAN 30 MIN: CPT | Mod: 25,,, | Performed by: NURSE PRACTITIONER

## 2022-01-01 PROCEDURE — 99233 SBSQ HOSP IP/OBS HIGH 50: CPT | Mod: ,,, | Performed by: OBSTETRICS & GYNECOLOGY

## 2022-01-01 PROCEDURE — 96374 THER/PROPH/DIAG INJ IV PUSH: CPT

## 2022-01-01 PROCEDURE — 85027 COMPLETE CBC AUTOMATED: CPT | Performed by: EMERGENCY MEDICINE

## 2022-01-01 PROCEDURE — 99239 PR HOSPITAL DISCHARGE DAY,>30 MIN: ICD-10-PCS | Mod: ,,, | Performed by: OBSTETRICS & GYNECOLOGY

## 2022-01-01 PROCEDURE — 93010 ELECTROCARDIOGRAM REPORT: CPT | Mod: ,,, | Performed by: INTERNAL MEDICINE

## 2022-01-01 PROCEDURE — 99223 PR INITIAL HOSPITAL CARE,LEVL III: ICD-10-PCS | Mod: ,,, | Performed by: NURSE PRACTITIONER

## 2022-01-01 PROCEDURE — 94660 CPAP INITIATION&MGMT: CPT

## 2022-01-01 PROCEDURE — U0002 COVID-19 LAB TEST NON-CDC: HCPCS | Performed by: EMERGENCY MEDICINE

## 2022-01-01 PROCEDURE — 27000221 HC OXYGEN, UP TO 24 HOURS

## 2022-01-01 PROCEDURE — 99284 PR EMERGENCY DEPT VISIT,LEVEL IV: ICD-10-PCS | Mod: ,,, | Performed by: NURSE PRACTITIONER

## 2022-01-01 PROCEDURE — 84484 ASSAY OF TROPONIN QUANT: CPT | Performed by: EMERGENCY MEDICINE

## 2022-01-01 PROCEDURE — 99285 EMERGENCY DEPT VISIT HI MDM: CPT | Mod: 25

## 2022-01-01 PROCEDURE — 82803 BLOOD GASES ANY COMBINATION: CPT

## 2022-01-01 PROCEDURE — 25000003 PHARM REV CODE 250: Performed by: EMERGENCY MEDICINE

## 2022-01-01 PROCEDURE — 96365 THER/PROPH/DIAG IV INF INIT: CPT

## 2022-01-01 PROCEDURE — 85730 THROMBOPLASTIN TIME PARTIAL: CPT | Performed by: STUDENT IN AN ORGANIZED HEALTH CARE EDUCATION/TRAINING PROGRAM

## 2022-01-01 PROCEDURE — 99497 PR ADVNCD CARE PLAN 30 MIN: ICD-10-PCS | Mod: 25,,, | Performed by: NURSE PRACTITIONER

## 2022-01-01 PROCEDURE — 99285 PR EMERGENCY DEPT VISIT,LEVEL V: ICD-10-PCS | Mod: CS,,, | Performed by: EMERGENCY MEDICINE

## 2022-01-01 PROCEDURE — 27000190 HC CPAP FULL FACE MASK W/VALVE

## 2022-01-01 PROCEDURE — 87040 BLOOD CULTURE FOR BACTERIA: CPT | Performed by: EMERGENCY MEDICINE

## 2022-01-01 PROCEDURE — 99285 EMERGENCY DEPT VISIT HI MDM: CPT | Mod: CS,,, | Performed by: EMERGENCY MEDICINE

## 2022-01-01 PROCEDURE — 99291 CRITICAL CARE FIRST HOUR: CPT | Mod: 25

## 2022-01-01 PROCEDURE — 25000242 PHARM REV CODE 250 ALT 637 W/ HCPCS: Performed by: EMERGENCY MEDICINE

## 2022-01-01 PROCEDURE — 81025 URINE PREGNANCY TEST: CPT | Performed by: EMERGENCY MEDICINE

## 2022-01-01 PROCEDURE — 93308 TTE F-UP OR LMTD: CPT | Mod: 26,,, | Performed by: EMERGENCY MEDICINE

## 2022-01-01 PROCEDURE — C9399 UNCLASSIFIED DRUGS OR BIOLOG: HCPCS | Performed by: STUDENT IN AN ORGANIZED HEALTH CARE EDUCATION/TRAINING PROGRAM

## 2022-01-01 PROCEDURE — 99233 SBSQ HOSP IP/OBS HIGH 50: CPT | Mod: ,,, | Performed by: NURSE PRACTITIONER

## 2022-01-01 PROCEDURE — 99223 PR INITIAL HOSPITAL CARE,LEVL III: ICD-10-PCS | Mod: ,,, | Performed by: OBSTETRICS & GYNECOLOGY

## 2022-01-01 PROCEDURE — 93308 PR ECHO HEART XTHORACIC,LIMITED: ICD-10-PCS | Mod: 26,,, | Performed by: EMERGENCY MEDICINE

## 2022-01-01 PROCEDURE — 80053 COMPREHEN METABOLIC PANEL: CPT | Performed by: EMERGENCY MEDICINE

## 2022-01-01 PROCEDURE — 93005 ELECTROCARDIOGRAM TRACING: CPT

## 2022-01-01 PROCEDURE — 27100171 HC OXYGEN HIGH FLOW UP TO 24 HOURS

## 2022-01-01 PROCEDURE — 96372 THER/PROPH/DIAG INJ SC/IM: CPT | Mod: 59

## 2022-01-01 PROCEDURE — 12000002 HC ACUTE/MED SURGE SEMI-PRIVATE ROOM

## 2022-01-01 PROCEDURE — 20600001 HC STEP DOWN PRIVATE ROOM

## 2022-01-01 PROCEDURE — 82962 GLUCOSE BLOOD TEST: CPT

## 2022-01-01 PROCEDURE — 99223 1ST HOSP IP/OBS HIGH 75: CPT | Mod: ,,, | Performed by: NURSE PRACTITIONER

## 2022-01-01 PROCEDURE — 96361 HYDRATE IV INFUSION ADD-ON: CPT

## 2022-01-01 PROCEDURE — 99239 HOSP IP/OBS DSCHRG MGMT >30: CPT | Mod: ,,, | Performed by: OBSTETRICS & GYNECOLOGY

## 2022-01-01 PROCEDURE — 63600175 PHARM REV CODE 636 W HCPCS: Performed by: EMERGENCY MEDICINE

## 2022-01-01 PROCEDURE — 63600175 PHARM REV CODE 636 W HCPCS: Performed by: NURSE PRACTITIONER

## 2022-01-01 PROCEDURE — 83880 ASSAY OF NATRIURETIC PEPTIDE: CPT | Performed by: EMERGENCY MEDICINE

## 2022-01-01 PROCEDURE — 99223 1ST HOSP IP/OBS HIGH 75: CPT | Mod: ,,, | Performed by: OBSTETRICS & GYNECOLOGY

## 2022-01-01 PROCEDURE — 25500020 PHARM REV CODE 255: Performed by: EMERGENCY MEDICINE

## 2022-01-01 PROCEDURE — 93010 EKG 12-LEAD: ICD-10-PCS | Mod: ,,, | Performed by: INTERNAL MEDICINE

## 2022-01-01 PROCEDURE — 96367 TX/PROPH/DG ADDL SEQ IV INF: CPT

## 2022-01-01 PROCEDURE — 80048 BASIC METABOLIC PNL TOTAL CA: CPT | Performed by: STUDENT IN AN ORGANIZED HEALTH CARE EDUCATION/TRAINING PROGRAM

## 2022-01-01 PROCEDURE — 99285 PR EMERGENCY DEPT VISIT,LEVEL V: ICD-10-PCS | Mod: ,,, | Performed by: EMERGENCY MEDICINE

## 2022-01-01 RX ORDER — SODIUM CHLORIDE, SODIUM LACTATE, POTASSIUM CHLORIDE, CALCIUM CHLORIDE 600; 310; 30; 20 MG/100ML; MG/100ML; MG/100ML; MG/100ML
INJECTION, SOLUTION INTRAVENOUS CONTINUOUS
Status: DISCONTINUED | OUTPATIENT
Start: 2022-01-01 | End: 2022-01-01

## 2022-01-01 RX ORDER — LORAZEPAM 2 MG/ML
0.5 INJECTION INTRAMUSCULAR
Status: DISCONTINUED | OUTPATIENT
Start: 2022-01-01 | End: 2022-01-01 | Stop reason: HOSPADM

## 2022-01-01 RX ORDER — AMLODIPINE BESYLATE 10 MG/1
10 TABLET ORAL DAILY
Status: DISCONTINUED | OUTPATIENT
Start: 2022-01-01 | End: 2022-01-01 | Stop reason: HOSPADM

## 2022-01-01 RX ORDER — ALBUTEROL SULFATE 2.5 MG/.5ML
2.5 SOLUTION RESPIRATORY (INHALATION) EVERY 4 HOURS
Refills: 1 | Status: DISCONTINUED | OUTPATIENT
Start: 2022-01-01 | End: 2022-01-01

## 2022-01-01 RX ORDER — LORAZEPAM 2 MG/ML
0.5 INJECTION INTRAMUSCULAR EVERY 30 MIN PRN
Status: DISCONTINUED | OUTPATIENT
Start: 2022-01-01 | End: 2022-01-01 | Stop reason: HOSPADM

## 2022-01-01 RX ORDER — HYDROMORPHONE HYDROCHLORIDE 1 MG/ML
0.5 INJECTION, SOLUTION INTRAMUSCULAR; INTRAVENOUS; SUBCUTANEOUS EVERY 4 HOURS PRN
Status: DISCONTINUED | OUTPATIENT
Start: 2022-01-01 | End: 2022-01-01

## 2022-01-01 RX ORDER — NIFEDIPINE 30 MG/1
30 TABLET, EXTENDED RELEASE ORAL DAILY
Status: DISCONTINUED | OUTPATIENT
Start: 2022-01-01 | End: 2022-01-01

## 2022-01-01 RX ORDER — IBUPROFEN 200 MG
24 TABLET ORAL
Status: DISCONTINUED | OUTPATIENT
Start: 2022-01-01 | End: 2022-01-01 | Stop reason: HOSPADM

## 2022-01-01 RX ORDER — LORAZEPAM 1 MG/1
1 TABLET ORAL EVERY 6 HOURS PRN
Status: DISCONTINUED | OUTPATIENT
Start: 2022-01-01 | End: 2022-01-01 | Stop reason: HOSPADM

## 2022-01-01 RX ORDER — GLUCAGON 1 MG
1 KIT INJECTION
Status: DISCONTINUED | OUTPATIENT
Start: 2022-01-01 | End: 2022-01-01 | Stop reason: HOSPADM

## 2022-01-01 RX ORDER — IPRATROPIUM BROMIDE AND ALBUTEROL SULFATE 2.5; .5 MG/3ML; MG/3ML
3 SOLUTION RESPIRATORY (INHALATION)
Status: COMPLETED | OUTPATIENT
Start: 2022-01-01 | End: 2022-01-01

## 2022-01-01 RX ORDER — LORAZEPAM 2 MG/ML
0.5 INJECTION INTRAMUSCULAR
Status: COMPLETED | OUTPATIENT
Start: 2022-01-01 | End: 2022-01-01

## 2022-01-01 RX ORDER — BENZONATATE 100 MG/1
100 CAPSULE ORAL 3 TIMES DAILY PRN
Status: DISCONTINUED | OUTPATIENT
Start: 2022-01-01 | End: 2022-01-01 | Stop reason: HOSPADM

## 2022-01-01 RX ORDER — ADHESIVE BANDAGE
30 BANDAGE TOPICAL DAILY PRN
Status: DISCONTINUED | OUTPATIENT
Start: 2022-01-01 | End: 2022-01-01 | Stop reason: HOSPADM

## 2022-01-01 RX ORDER — LEVALBUTEROL 1.25 MG/.5ML
1.25 SOLUTION, CONCENTRATE RESPIRATORY (INHALATION) EVERY 4 HOURS
Status: DISCONTINUED | OUTPATIENT
Start: 2022-01-01 | End: 2022-01-01 | Stop reason: HOSPADM

## 2022-01-01 RX ORDER — CLOPIDOGREL BISULFATE 75 MG/1
75 TABLET ORAL DAILY
Status: DISCONTINUED | OUTPATIENT
Start: 2022-01-01 | End: 2022-01-01 | Stop reason: HOSPADM

## 2022-01-01 RX ORDER — SODIUM CHLORIDE 0.9 % (FLUSH) 0.9 %
2 SYRINGE (ML) INJECTION
Status: DISCONTINUED | OUTPATIENT
Start: 2022-01-01 | End: 2022-01-01 | Stop reason: HOSPADM

## 2022-01-01 RX ORDER — ATORVASTATIN CALCIUM 10 MG/1
10 TABLET, FILM COATED ORAL DAILY
Status: DISCONTINUED | OUTPATIENT
Start: 2022-01-01 | End: 2022-01-01

## 2022-01-01 RX ORDER — POLYETHYLENE GLYCOL 3350 17 G/17G
17 POWDER, FOR SOLUTION ORAL 2 TIMES DAILY PRN
Status: DISCONTINUED | OUTPATIENT
Start: 2022-01-01 | End: 2022-01-01 | Stop reason: HOSPADM

## 2022-01-01 RX ORDER — HYDROMORPHONE HYDROCHLORIDE 1 MG/ML
0.5 INJECTION, SOLUTION INTRAMUSCULAR; INTRAVENOUS; SUBCUTANEOUS EVERY 6 HOURS PRN
Status: DISCONTINUED | OUTPATIENT
Start: 2022-01-01 | End: 2022-01-01

## 2022-01-01 RX ORDER — SCOLOPAMINE TRANSDERMAL SYSTEM 1 MG/1
1 PATCH, EXTENDED RELEASE TRANSDERMAL
Status: DISCONTINUED | OUTPATIENT
Start: 2022-01-01 | End: 2022-01-01 | Stop reason: HOSPADM

## 2022-01-01 RX ORDER — IPRATROPIUM BROMIDE AND ALBUTEROL SULFATE 2.5; .5 MG/3ML; MG/3ML
3 SOLUTION RESPIRATORY (INHALATION) EVERY 4 HOURS PRN
Status: DISCONTINUED | OUTPATIENT
Start: 2022-01-01 | End: 2022-01-01 | Stop reason: HOSPADM

## 2022-01-01 RX ORDER — INSULIN ASPART 100 [IU]/ML
5 INJECTION, SOLUTION INTRAVENOUS; SUBCUTANEOUS ONCE
Status: COMPLETED | OUTPATIENT
Start: 2022-01-01 | End: 2022-01-01

## 2022-01-01 RX ORDER — AMOXICILLIN 250 MG
1 CAPSULE ORAL DAILY
Status: DISCONTINUED | OUTPATIENT
Start: 2022-01-01 | End: 2022-01-01 | Stop reason: HOSPADM

## 2022-01-01 RX ORDER — MORPHINE SULFATE 2 MG/ML
2 INJECTION, SOLUTION INTRAMUSCULAR; INTRAVENOUS ONCE
Status: COMPLETED | OUTPATIENT
Start: 2022-01-01 | End: 2022-01-01

## 2022-01-01 RX ORDER — ACETAMINOPHEN 325 MG/1
650 TABLET ORAL EVERY 6 HOURS PRN
Status: DISCONTINUED | OUTPATIENT
Start: 2022-01-01 | End: 2022-01-01 | Stop reason: HOSPADM

## 2022-01-01 RX ORDER — CALCIUM CARBONATE 200(500)MG
500 TABLET,CHEWABLE ORAL 3 TIMES DAILY PRN
Status: DISCONTINUED | OUTPATIENT
Start: 2022-01-01 | End: 2022-01-01 | Stop reason: HOSPADM

## 2022-01-01 RX ORDER — PROCHLORPERAZINE EDISYLATE 5 MG/ML
5 INJECTION INTRAMUSCULAR; INTRAVENOUS EVERY 6 HOURS PRN
Status: DISCONTINUED | OUTPATIENT
Start: 2022-01-01 | End: 2022-01-01 | Stop reason: HOSPADM

## 2022-01-01 RX ORDER — ONDANSETRON 2 MG/ML
4 INJECTION INTRAMUSCULAR; INTRAVENOUS
Status: COMPLETED | OUTPATIENT
Start: 2022-01-01 | End: 2022-01-01

## 2022-01-01 RX ORDER — METHYLPREDNISOLONE SOD SUCC 125 MG
40 VIAL (EA) INJECTION ONCE
Status: COMPLETED | OUTPATIENT
Start: 2022-01-01 | End: 2022-01-01

## 2022-01-01 RX ORDER — WARFARIN 2.5 MG/1
2.5 TABLET ORAL DAILY
Status: DISCONTINUED | OUTPATIENT
Start: 2022-01-01 | End: 2022-01-01 | Stop reason: HOSPADM

## 2022-01-01 RX ORDER — OXYCODONE HYDROCHLORIDE 5 MG/1
5 TABLET ORAL EVERY 6 HOURS PRN
Status: DISCONTINUED | OUTPATIENT
Start: 2022-01-01 | End: 2022-01-01 | Stop reason: HOSPADM

## 2022-01-01 RX ORDER — OXYCODONE HYDROCHLORIDE 10 MG/1
10 TABLET ORAL EVERY 6 HOURS PRN
Status: DISCONTINUED | OUTPATIENT
Start: 2022-01-01 | End: 2022-01-01 | Stop reason: HOSPADM

## 2022-01-01 RX ORDER — FLUTICASONE FUROATE AND VILANTEROL 100; 25 UG/1; UG/1
1 POWDER RESPIRATORY (INHALATION) DAILY
Refills: 3 | Status: DISCONTINUED | OUTPATIENT
Start: 2022-01-01 | End: 2022-01-01 | Stop reason: HOSPADM

## 2022-01-01 RX ORDER — LORAZEPAM 2 MG/ML
1 INJECTION INTRAMUSCULAR ONCE
Status: COMPLETED | OUTPATIENT
Start: 2022-01-01 | End: 2022-01-01

## 2022-01-01 RX ORDER — LORAZEPAM 1 MG/1
1 TABLET ORAL EVERY 30 MIN PRN
Status: DISCONTINUED | OUTPATIENT
Start: 2022-01-01 | End: 2022-01-01 | Stop reason: HOSPADM

## 2022-01-01 RX ORDER — IBUPROFEN 200 MG
16 TABLET ORAL
Status: DISCONTINUED | OUTPATIENT
Start: 2022-01-01 | End: 2022-01-01 | Stop reason: HOSPADM

## 2022-01-01 RX ORDER — ONDANSETRON 8 MG/1
8 TABLET, ORALLY DISINTEGRATING ORAL EVERY 8 HOURS PRN
Status: DISCONTINUED | OUTPATIENT
Start: 2022-01-01 | End: 2022-01-01 | Stop reason: HOSPADM

## 2022-01-01 RX ORDER — HYDROXYZINE PAMOATE 25 MG/1
25 CAPSULE ORAL EVERY 8 HOURS PRN
Status: DISCONTINUED | OUTPATIENT
Start: 2022-01-01 | End: 2022-01-01 | Stop reason: HOSPADM

## 2022-01-01 RX ORDER — WARFARIN SODIUM 5 MG/1
5 TABLET ORAL DAILY
Status: DISCONTINUED | OUTPATIENT
Start: 2022-01-01 | End: 2022-01-01

## 2022-01-01 RX ORDER — VANCOMYCIN HCL IN 5 % DEXTROSE 1G/250ML
1000 PLASTIC BAG, INJECTION (ML) INTRAVENOUS
Status: COMPLETED | OUTPATIENT
Start: 2022-01-01 | End: 2022-01-01

## 2022-01-01 RX ORDER — INSULIN ASPART 100 [IU]/ML
1-10 INJECTION, SOLUTION INTRAVENOUS; SUBCUTANEOUS
Status: DISCONTINUED | OUTPATIENT
Start: 2022-01-01 | End: 2022-01-01 | Stop reason: HOSPADM

## 2022-01-01 RX ORDER — LORAZEPAM 2 MG/ML
1 INJECTION INTRAMUSCULAR EVERY 30 MIN PRN
Status: DISCONTINUED | OUTPATIENT
Start: 2022-01-01 | End: 2022-01-01 | Stop reason: HOSPADM

## 2022-01-01 RX ORDER — LORAZEPAM 1 MG/1
1 TABLET ORAL EVERY 6 HOURS PRN
Qty: 30 TABLET | Refills: 3 | Status: SHIPPED | OUTPATIENT
Start: 2022-01-01 | End: 2022-02-04

## 2022-01-01 RX ORDER — ONDANSETRON 2 MG/ML
8 INJECTION INTRAMUSCULAR; INTRAVENOUS EVERY 8 HOURS PRN
Status: DISCONTINUED | OUTPATIENT
Start: 2022-01-01 | End: 2022-01-01 | Stop reason: HOSPADM

## 2022-01-01 RX ORDER — HYDROMORPHONE HYDROCHLORIDE 1 MG/ML
0.5 INJECTION, SOLUTION INTRAMUSCULAR; INTRAVENOUS; SUBCUTANEOUS
Status: DISCONTINUED | OUTPATIENT
Start: 2022-01-01 | End: 2022-01-01 | Stop reason: HOSPADM

## 2022-01-01 RX ORDER — MORPHINE SULFATE 2 MG/ML
2 INJECTION, SOLUTION INTRAMUSCULAR; INTRAVENOUS EVERY 4 HOURS PRN
Status: DISCONTINUED | OUTPATIENT
Start: 2022-01-01 | End: 2022-01-01 | Stop reason: HOSPADM

## 2022-01-01 RX ORDER — SIMETHICONE 80 MG
1 TABLET,CHEWABLE ORAL 3 TIMES DAILY PRN
Status: DISCONTINUED | OUTPATIENT
Start: 2022-01-01 | End: 2022-01-01 | Stop reason: HOSPADM

## 2022-01-01 RX ORDER — HYDRALAZINE HYDROCHLORIDE 20 MG/ML
10 INJECTION INTRAMUSCULAR; INTRAVENOUS EVERY 6 HOURS PRN
Status: DISCONTINUED | OUTPATIENT
Start: 2022-01-01 | End: 2022-01-01 | Stop reason: HOSPADM

## 2022-01-01 RX ORDER — MORPHINE SULFATE 2 MG/ML
2 INJECTION, SOLUTION INTRAMUSCULAR; INTRAVENOUS
Status: COMPLETED | OUTPATIENT
Start: 2022-01-01 | End: 2022-01-01

## 2022-01-01 RX ADMIN — LORAZEPAM 1 MG: 2 INJECTION INTRAMUSCULAR; INTRAVENOUS at 12:01

## 2022-01-01 RX ADMIN — PIPERACILLIN AND TAZOBACTAM 4.5 G: 4; .5 INJECTION, POWDER, LYOPHILIZED, FOR SOLUTION INTRAVENOUS; PARENTERAL at 10:01

## 2022-01-01 RX ADMIN — ALBUTEROL SULFATE 2.5 MG: 2.5 SOLUTION RESPIRATORY (INHALATION) at 08:01

## 2022-01-01 RX ADMIN — IPRATROPIUM BROMIDE AND ALBUTEROL SULFATE 3 ML: 2.5; .5 SOLUTION RESPIRATORY (INHALATION) at 02:01

## 2022-01-01 RX ADMIN — IOHEXOL 100 ML: 350 INJECTION, SOLUTION INTRAVENOUS at 09:01

## 2022-01-01 RX ADMIN — INSULIN DETEMIR 20 UNITS: 100 INJECTION, SOLUTION SUBCUTANEOUS at 08:01

## 2022-01-01 RX ADMIN — MORPHINE SULFATE 2 MG: 2 INJECTION, SOLUTION INTRAMUSCULAR; INTRAVENOUS at 08:01

## 2022-01-01 RX ADMIN — HYDROMORPHONE HYDROCHLORIDE 0.5 MG: 1 INJECTION, SOLUTION INTRAMUSCULAR; INTRAVENOUS; SUBCUTANEOUS at 04:01

## 2022-01-01 RX ADMIN — OXYCODONE HYDROCHLORIDE 10 MG: 10 TABLET ORAL at 09:01

## 2022-01-01 RX ADMIN — IPRATROPIUM BROMIDE AND ALBUTEROL SULFATE 3 ML: .5; 3 SOLUTION RESPIRATORY (INHALATION) at 09:01

## 2022-01-01 RX ADMIN — LORAZEPAM 0.5 MG: 2 INJECTION INTRAMUSCULAR; INTRAVENOUS at 09:01

## 2022-01-01 RX ADMIN — WARFARIN SODIUM 2.5 MG: 2.5 TABLET ORAL at 05:01

## 2022-01-01 RX ADMIN — MORPHINE SULFATE 2 MG: 2 INJECTION, SOLUTION INTRAMUSCULAR; INTRAVENOUS at 11:01

## 2022-01-01 RX ADMIN — LORAZEPAM 1 MG: 2 INJECTION INTRAMUSCULAR; INTRAVENOUS at 03:01

## 2022-01-01 RX ADMIN — LEVALBUTEROL 1.25 MG: 1.25 SOLUTION, CONCENTRATE RESPIRATORY (INHALATION) at 11:01

## 2022-01-01 RX ADMIN — ONDANSETRON 4 MG: 2 INJECTION INTRAMUSCULAR; INTRAVENOUS at 09:01

## 2022-01-01 RX ADMIN — VANCOMYCIN HYDROCHLORIDE 1000 MG: 1 INJECTION, POWDER, LYOPHILIZED, FOR SOLUTION INTRAVENOUS at 11:01

## 2022-01-01 RX ADMIN — BENZONATATE 100 MG: 100 CAPSULE ORAL at 07:01

## 2022-01-01 RX ADMIN — MORPHINE SULFATE 2 MG: 2 INJECTION, SOLUTION INTRAMUSCULAR; INTRAVENOUS at 10:01

## 2022-01-01 RX ADMIN — OXYCODONE 5 MG: 5 TABLET ORAL at 04:01

## 2022-01-01 RX ADMIN — ALBUTEROL SULFATE 2.5 MG: 2.5 SOLUTION RESPIRATORY (INHALATION) at 11:01

## 2022-01-01 RX ADMIN — BENZONATATE 100 MG: 100 CAPSULE ORAL at 05:01

## 2022-01-01 RX ADMIN — SODIUM CHLORIDE, SODIUM LACTATE, POTASSIUM CHLORIDE, AND CALCIUM CHLORIDE 500 ML: .6; .31; .03; .02 INJECTION, SOLUTION INTRAVENOUS at 10:01

## 2022-01-01 RX ADMIN — HYDROXYZINE PAMOATE 25 MG: 25 CAPSULE ORAL at 06:01

## 2022-01-01 RX ADMIN — Medication 24 G: at 01:01

## 2022-01-01 RX ADMIN — FLUTICASONE FUROATE AND VILANTEROL TRIFENATATE 1 PUFF: 100; 25 POWDER RESPIRATORY (INHALATION) at 08:01

## 2022-01-01 RX ADMIN — HYDROMORPHONE HYDROCHLORIDE 0.5 MG: 1 INJECTION, SOLUTION INTRAMUSCULAR; INTRAVENOUS; SUBCUTANEOUS at 03:01

## 2022-01-01 RX ADMIN — ALBUTEROL SULFATE 2.5 MG: 2.5 SOLUTION RESPIRATORY (INHALATION) at 04:01

## 2022-01-01 RX ADMIN — HYDROMORPHONE HYDROCHLORIDE 0.5 MG: 1 INJECTION, SOLUTION INTRAMUSCULAR; INTRAVENOUS; SUBCUTANEOUS at 02:01

## 2022-01-01 RX ADMIN — ALBUTEROL SULFATE 2.5 MG: 2.5 SOLUTION RESPIRATORY (INHALATION) at 07:01

## 2022-01-01 RX ADMIN — INSULIN DETEMIR 20 UNITS: 100 INJECTION, SOLUTION SUBCUTANEOUS at 09:01

## 2022-01-01 RX ADMIN — DOCUSATE SODIUM 50MG AND SENNOSIDES 8.6MG 1 TABLET: 8.6; 5 TABLET, FILM COATED ORAL at 08:01

## 2022-01-01 RX ADMIN — HYDROXYZINE PAMOATE 25 MG: 25 CAPSULE ORAL at 08:01

## 2022-01-01 RX ADMIN — LEVALBUTEROL 1.25 MG: 1.25 SOLUTION, CONCENTRATE RESPIRATORY (INHALATION) at 07:01

## 2022-01-01 RX ADMIN — INSULIN ASPART 2 UNITS: 100 INJECTION, SOLUTION INTRAVENOUS; SUBCUTANEOUS at 01:01

## 2022-01-01 RX ADMIN — HYDROXYZINE PAMOATE 25 MG: 25 CAPSULE ORAL at 12:01

## 2022-01-01 RX ADMIN — ALBUTEROL SULFATE 2.5 MG: 2.5 SOLUTION RESPIRATORY (INHALATION) at 01:01

## 2022-01-01 RX ADMIN — SODIUM CHLORIDE, SODIUM LACTATE, POTASSIUM CHLORIDE, AND CALCIUM CHLORIDE: .6; .31; .03; .02 INJECTION, SOLUTION INTRAVENOUS at 08:01

## 2022-01-01 RX ADMIN — HYDROMORPHONE HYDROCHLORIDE 0.5 MG: 1 INJECTION, SOLUTION INTRAMUSCULAR; INTRAVENOUS; SUBCUTANEOUS at 11:01

## 2022-01-01 RX ADMIN — IPRATROPIUM BROMIDE AND ALBUTEROL SULFATE 3 ML: .5; 2.5 SOLUTION RESPIRATORY (INHALATION) at 09:01

## 2022-01-01 RX ADMIN — INSULIN ASPART 4 UNITS: 100 INJECTION, SOLUTION INTRAVENOUS; SUBCUTANEOUS at 09:01

## 2022-01-01 RX ADMIN — BENZONATATE 100 MG: 100 CAPSULE ORAL at 12:01

## 2022-01-01 RX ADMIN — IPRATROPIUM BROMIDE AND ALBUTEROL SULFATE 3 ML: .5; 2.5 SOLUTION RESPIRATORY (INHALATION) at 08:01

## 2022-01-01 RX ADMIN — AMLODIPINE BESYLATE 10 MG: 10 TABLET ORAL at 08:01

## 2022-01-01 RX ADMIN — LORAZEPAM 0.5 MG: 2 INJECTION INTRAMUSCULAR; INTRAVENOUS at 01:01

## 2022-01-01 RX ADMIN — INSULIN ASPART 2 UNITS: 100 INJECTION, SOLUTION INTRAVENOUS; SUBCUTANEOUS at 09:01

## 2022-01-01 RX ADMIN — BENZONATATE 100 MG: 100 CAPSULE ORAL at 08:01

## 2022-01-01 RX ADMIN — INSULIN ASPART 5 UNITS: 100 INJECTION, SOLUTION INTRAVENOUS; SUBCUTANEOUS at 02:01

## 2022-01-01 RX ADMIN — LEVALBUTEROL 1.25 MG: 1.25 SOLUTION, CONCENTRATE RESPIRATORY (INHALATION) at 04:01

## 2022-01-01 RX ADMIN — ALBUTEROL SULFATE 2.5 MG: 2.5 SOLUTION RESPIRATORY (INHALATION) at 03:01

## 2022-01-01 RX ADMIN — ALBUTEROL SULFATE 2.5 MG: 2.5 SOLUTION RESPIRATORY (INHALATION) at 06:01

## 2022-01-01 RX ADMIN — SCOPALAMINE 1 PATCH: 1 PATCH, EXTENDED RELEASE TRANSDERMAL at 11:01

## 2022-01-01 RX ADMIN — OXYCODONE HYDROCHLORIDE 10 MG: 10 TABLET ORAL at 02:01

## 2022-01-01 RX ADMIN — CLOPIDOGREL 75 MG: 75 TABLET, FILM COATED ORAL at 08:01

## 2022-01-01 RX ADMIN — ATORVASTATIN CALCIUM 10 MG: 10 TABLET, FILM COATED ORAL at 08:01

## 2022-01-01 RX ADMIN — METHYLPREDNISOLONE SODIUM SUCCINATE 40 MG: 125 INJECTION, POWDER, FOR SOLUTION INTRAMUSCULAR; INTRAVENOUS at 11:01

## 2022-01-03 NOTE — ASSESSMENT & PLAN NOTE
Patient with Hypoxic Respiratory failure which is Acute.  she is not on home oxygen. Supplemental oxygen was provided and noted- Oxygen Concentration (%):  [60] 60.   Signs/symptoms of respiratory failure include- tachypnea and increased work of breathing. Contributing diagnoses includes - Pleural effusion, Pulmonary Embolus and metastatic lung disease Labs and images were reviewed. Patient Has recent ABG, which has been reviewed. Will treat underlying causes and adjust management of respiratory failure as follows-     --multifactorial secondary to known PE (no new clot burden seen on imaging today), pleural effusion and metastatic disease  --bipap initiated in ED for work of breathing; able to wean to 2L NC during evaluation with sats maintaining at 97%  --new pleural effusion present compared with imaging from 11/26/22, however it is relatively small in size and location and pt's current use of anticoagulation preclude safe thoracentesis at this time  --interval enlargement of LLL mass compared with imaging from 11/26/22  --recommend continuing anticoagulation for known PE (cuirrently on coumadin with therapeutic INR)  --consider low dose morphine prn for dyspnea & pain control

## 2022-01-03 NOTE — ED NOTES
Hospital medicine at bedside. Pt removed from biPAP, placed on 2L NC. Sat 98%. Heart rate 125. Pt tolerating well

## 2022-01-03 NOTE — ASSESSMENT & PLAN NOTE
- Decreased size of clot burden on CT  - continue home warfarin 2.5 mg qD  - PT/INR therapeutic at 29.3/2.9  - SCDs  - encourage ambulation when patient able to tolerate

## 2022-01-03 NOTE — HPI
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 She received her first dose of doxorubicin on 12/27. Patient states about two days ago she had acute onset of dyspnea while at rest at home. She subsequently had progressive worsening with increased work of breathing. She was taking her nebulizer at home frequently but reports this did not help her symptoms. She denies fever, chest pain, palpitations, wheezing, abdominal pain, LE swelling, or cough. She is compliant with her warfarin and other medications.     She was found to have sat of 85% with EMS and placed on 15L, then switched to NIPPV on arrival to ED. She was afebrile, , /33. Labs notable for lactic of 2.3, normal BNP/trop, INR 2.9, and normal leukocyte count. CTA chest showed decreased clot burden of known VTE, enlargement of LLL mass, interval development of left lower lobe pleural effusion, and interval enlargement of pulmonary nodules. She was given duonebs x3, vanc/zosn. MICU was consulted for increased WOB.

## 2022-01-03 NOTE — ED PROVIDER NOTES
CC: Shortness of Breath (Starting today, 10 duoneb treatments at home, Hx: lung cancer, sating 85% on RA with EMS, placed on 15L cipap sating 100%. Pt denies cp)      History provided by:   Patient   Family: aunt    HPI: Lyssa Gandara is a 56 y.o. year old female past medical history of asthma, hypertension, PE, lung metastases, CVA, diabetes, scleroderma who presents to the ED for shortness of breath for the last 3-4 days patient denies any cough, chest pain, sore throat, fever    She is compliant with her Coumadin history of PE DVT, history of uterine leiomyosarcoma      Spoke to her aunt on the phone, the diagnosis of leiomyosarcoma is a new diagnosis, just has the first chemo treatment on 12//27/2021      Past Medical History:   Diagnosis Date    Asthma     Carotid stenosis     Dyspnea on exertion     Hypertension 3/8/2016    Internal carotid artery dissection 2/26/2017    Long term current use of anticoagulant therapy 3/8/2016    Metastasis to lung 11/27/2021    PE (pulmonary embolism)     Hx of multiple DVT/PE    Presence of IVC filter     Scleroderma     Stroke 02/2017    Type 2 diabetes mellitus without complication      Past Surgical History:   Procedure Laterality Date    ARTERIAL THROMBECTOMY Right 02/26/2017    right extracranial ICA thrombectomy by Interventional Radiology    DANETTE FILTER PLACEMENT       Family History   Problem Relation Age of Onset    Breast cancer Mother 60    Hypertension Father     No Known Problems Brother     No Known Problems Maternal Grandmother     No Known Problems Maternal Grandfather     No Known Problems Paternal Grandmother     No Known Problems Paternal Grandfather     Diabetes Mellitus Maternal Uncle     No Known Problems Maternal Aunt     No Known Problems Paternal Aunt     No Known Problems Paternal Uncle     Colon cancer Neg Hx     Ovarian cancer Neg Hx      No current facility-administered medications on file prior to  "encounter.     Current Outpatient Medications on File Prior to Encounter   Medication Sig Dispense Refill    albuterol (PROVENTIL) 2.5 mg /3 mL (0.083 %) nebulizer solution Take 3 mLs (2.5 mg total) by nebulization every 6 (six) hours as needed for Wheezing or Shortness of Breath. 1 each 1    blood sugar diagnostic Strp 200 strips by Misc.(Non-Drug; Combo Route) route 2 (two) times daily. 200 strip 12    blood-glucose meter kit To check BG TID times daily, to use with insurance preferred meter 1 each 0    cholecalciferol, vitamin D3, (VITAMIN D3) 50 mcg (2,000 unit) Tab Take 1 tablet (2,000 Units total) by mouth once daily. 30 tablet 5    clopidogreL (PLAVIX) 75 mg tablet Take 1 tablet (75 mg total) by mouth once daily. 30 tablet 5    cyanocobalamin (VITAMIN B-12) 1000 MCG tablet Take 1 tablet (1,000 mcg total) by mouth once daily. 30 tablet 5    fluticasone-salmeterol diskus inhaler 250-50 mcg Inhale 1 puff into the lungs 2 (two) times daily. Controller 60 each 3    hydrOXYzine HCL (ATARAX) 25 MG tablet Take 1 tablet (25 mg total) by mouth 4 (four) times daily as needed for Anxiety. 30 tablet 3    insulin (LANTUS SOLOSTAR U-100 INSULIN) glargine 100 units/mL (3mL) SubQ pen Inject 20 Units into the skin once daily. 6 mL 11    insulin aspart U-100 (NOVOLOG) 100 unit/mL (3 mL) InPn pen Inject 2-8 Units into the skin before meals as needed for High Blood Sugar. Dose as per sliding scale chart. 15 mL 8    insulin syringe-needle U-100 (BD INSULIN SYRINGE ULTRA-FINE) 1 mL 31 gauge x 5/16 Syrg To use 4 times daily with insulin 120 each 2    insulin syringe-needle U-100 0.3 mL 29 gauge x 1/2" Syrg Use to inject insulin into the skin before meals as needed. 100 each 0    ipratropium-albuteroL (COMBIVENT)  mcg/actuation inhaler Inhale 1 puff into the lungs 4 (four) times daily. Rescue 1 each 2    ipratropium-albuteroL (COMBIVENT)  mcg/actuation inhaler Inhale 1 puff into the lungs 4 (four) times " "daily. Rescue 1 each 0    lancets (ONETOUCH ULTRASOFT LANCETS) Misc 1 application by Misc.(Non-Drug; Combo Route) route 2 (two) times daily before meals. 200 each 11    lancing device with lancets Kit 1 Device by Misc.(Non-Drug; Combo Route) route 2 (two) times daily with meals. 1 each 0    metFORMIN (GLUCOPHAGE-XR) 500 MG ER 24hr tablet Take 1 tablet (500 mg total) by mouth 2 (two) times daily with meals. 360 tablet 0    metoclopramide HCl (REGLAN) 5 MG tablet Take 1 tablet (5 mg total) by mouth every 6 (six) hours as needed (nausea/vomiting). 30 tablet 1    ondansetron (ZOFRAN-ODT) 4 MG TbDL Take 1 tablet (4 mg total) by mouth every 8 (eight) hours as needed (nausea). 30 tablet 0    oxyCODONE (OXY-IR) 5 mg Cap Take 2 capsules (10 mg total) by mouth every 4 (four) hours as needed for Pain (If pain isn't relieved with scheduled tylenol and ibuprofen). 30 capsule 0    pen needle, diabetic (NOVOFINE 32) 32 gauge x 1/4" Ndle Use to inject insulin into the skin. 100 each 0    pen needle, diabetic 31 gauge x 3/16" Ndle Use to inject insulin into the skin once a day. 100 each 0    polyethylene glycol (GLYCOLAX) 17 gram PwPk Take 17 g by mouth 2 (two) times daily as needed (CONSTIPATION).  0    prochlorperazine (COMPAZINE) 5 MG tablet Take 1 tablet (5 mg total) by mouth every 6 (six) hours as needed for Nausea. 30 tablet 1    senna-docusate 8.6-50 mg (PERICOLACE) 8.6-50 mg per tablet Take 1 tablet by mouth once daily.      simvastatin (ZOCOR) 20 MG tablet Take 1 tablet (20 mg total) by mouth every evening. 30 tablet 5    warfarin (COUMADIN) 5 MG tablet Take 1 tablet (5 mg total) by mouth Daily. 30 tablet 11     Plasma, human, normal  Social History     Socioeconomic History    Marital status: Single   Occupational History    Occupation: Special     Occupation: [employer]   Tobacco Use    Smoking status: Never Smoker    Smokeless tobacco: Never Used   Substance and Sexual Activity    " Alcohol use: Yes     Alcohol/week: 0.0 standard drinks     Comment: 2 beers a day    Drug use: No    Sexual activity: Not Currently     Partners: Male     Birth control/protection: Post-menopausal, None     Social Determinants of Health     Financial Resource Strain: Medium Risk    Difficulty of Paying Living Expenses: Somewhat hard   Food Insecurity: Food Insecurity Present    Worried About Running Out of Food in the Last Year: Sometimes true    Ran Out of Food in the Last Year: Never true   Transportation Needs: No Transportation Needs    Lack of Transportation (Medical): No    Lack of Transportation (Non-Medical): No   Physical Activity: Inactive    Days of Exercise per Week: 0 days    Minutes of Exercise per Session: 0 min   Stress: Stress Concern Present    Feeling of Stress : Very much   Social Connections: Unknown    Frequency of Communication with Friends and Family: Three times a week    Frequency of Social Gatherings with Friends and Family: Once a week    Active Member of Clubs or Organizations: No    Attends Club or Organization Meetings: Never    Marital Status: Never    Housing Stability: Low Risk     Unable to Pay for Housing in the Last Year: No    Number of Places Lived in the Last Year: 1    Unstable Housing in the Last Year: No       ROS:     Constitutional : neg for fever, neg for weakness  HENT neg for head injury, neg for sore throat  Eyes: neg for visual changes, neg for eye pain  Resp pos for SOB, neg for cough  Cardiac  neg for chest pain, neg for palpitations  GI neg for abd pain, neg for nausea, neg for vomiting   neg for urinary changes  Neuro neg for focal weakness or numbness  Skin neg for skin rash  MSK: neg for myalgia, neg for arthralgia  ALL: Plasma, human, normal    PHYSICAL EXAM:  Vitals:    01/02/22 1958   BP: (!) 147/33   Pulse: (!) 147   Resp: (!) 24     VS: triage VS reviewed    general: tachyonic, dyspneic, on CPAP  HENT: neck symmetric, trachea  midline  Eyes: PERRL,no conjunctival injection and symmetrical eyelids  CV: RRR, no  murmurs, no rubs, no gallops, no LE edema  Resp: dyspneic using accessory muscles of respiration, left lung base absent BS  ABD:  soft, ND, no masses, + normal BS, NT  Renal: No CVAT  Neuro: AAO x 3, 5/5 strength x 4 extremities, sensation intact, face symmetric, speech normal  MSK: NC/AT, extremities w/out deformity   Skin: warm, dry            DATA & INTERVENTIONS:    LABS reviewed:  Labs Reviewed   CULTURE, BLOOD   CULTURE, BLOOD   COMPREHENSIVE METABOLIC PANEL   CBC W/ AUTO DIFFERENTIAL   LACTIC ACID, PLASMA   TROPONIN I   B-TYPE NATRIURETIC PEPTIDE   SARS-COV-2 RDRP GENE   ISTAT CHEM8       RADIOLOGY reviewed:  Imaging Results    None         MEDICATIONS/FLUIDS:  Medications   albuterol-ipratropium 2.5 mg-0.5 mg/3 mL nebulizer solution 3 mL (has no administration in time range)         MDM:  Lyssa Gandara is a 56 y.o. year old female who presents to the ED for sob x 4 days    Bedside echo with left sided pleural effusion, tachycadic  bedside CXR w/ lung mass and left side pleural effusion    DDX includes but not limited to: peric eff vs pleural eff vs pna vs PE    Due to concern for pleural effusion as the etiology of the SOB, 500 ml IVF given on presentation    8:42 PM CT tech aware patient needs CT chest stat  patient reports she still has menstrual periods, denies being pregnant, she agrees to have the CT before the pregnancy test is back    Labs ordered and reviewed:   Cbc  cmp  Trop  poc covid  istat Cr 0.8. , K 5.5  VBG pH 7.3, pCO2 39  INR  BNP    Medication given in the ED: vanc, zosyn, duonebs    CXR (ordered and reviewed): Numerous bilateral pulmonary masses, similar to prior.   Interval increase in effusion at the left base.   Interval increased left upper lobe atelectasis partially silhouetting the upper left heart border.   Imagings independently visualized: yes    Bedside Ultrasound (see report  under imaging tab in Epic): left side lung effusion    EKG pending    Old records obtained and reviewed:   Chest x-ray December 2nd with intrathoracic mass lesion  November 26, 2021 CTA chest1. New pulmonary thromboemboli within the left pulmonary artery extending into upper and lower lobe segmental branches.  Chronic pulmonary thromboembolus within the right pulmonary artery with complete occlusion of the right inter lobar artery.  With new pulmonary and uterine masses clinical picture concerning for malignancy, therefore, cannot rule out tumor thrombus.  No evidence of right heart strain.  2. Chronic mild dilatation of the main pulmonary artery which can be seen in the setting of pulmonary hypertension.  Correlate clinically.  3. New large pulmonary mass in the posterolateral left lower lobe abutting the spine medially and aorta anteriorly and encasing the adjacent ribs.  Unsure of possible epidural extension.  Multiple other soft tissue lesions of various sizes throughout the lungs.  Findings concerning for metastatic disease.  However, cannot rule out primary malignancy or infectious/inflammatory process.  4. New large heterogenous mass replacing the uterus with hypodense regions associated with calcification possibly indicating areas of necrosis.  Findings concerning for primary malignancy.  Differential to include leiomyoma, leiomyosarcoma, endometrial sarcoma.  5. Infrarenal IVC filter in place with the struts outside the IVC lumen.      Patient was signed-out to Dr. Sims     at the change of shift with plan for: CTA chest, labs pending. If large/sadle PE to consult cardiology, if no PE or small, consult medical ICU for admission.      IMPRESSION:  1.) acute respiratory distress  2.) left pleural effusion  3.) lung masses    Dispo: likely admit    Aggregate Critical Care Time by Attending (exclusive of procedural time) = 30 minutes         During the Emergency Depment visit, there was a high probability of  imminent or life threatening deterioration in the patient's condition necessitating medical decision  making of a high complexity. Organ systems that were compromised/potentially compromised                     cardiovascular  pulmonary                   and/or there was potential for sepsis or metabolic failure.     Pt required constant monitoring because of the potential for them to deteriorate at any moment. Critical care was time spent personally by me on the following activities:  Development of treatment plan with patient or surrogate; discussion with consultant, evaluation of patient's response to treatment, examination of patient, obtaining history from patient or surrogate, ordering and performing treatments and interventions, ordering and reviewing of laboratory studies, ordering and review of radiographic studies, vitals, re-evaluation of patient' condition and review of old charts            The failure to initiate the interventions performed in the Emergency Department on an urgent basis would have likely resulted in sudden, clinically significant or life threatening deterioration in the patient's condition.                              Angie Funes MD  01/03/22 2032

## 2022-01-03 NOTE — CONSULTS
Palliative consult received for goals of care/advance care planning.      Chart reviewed.  Pt is a 56-year-old female with a medical history of uterine leiomyosarcoma with metastasis to liver and lung who presented to ED with worsening shortness of breath and was admitted for supportive care. She has had significant progression of disease and hospice has been recommended by Gyn-onc.  Pt has been most interested in continuing chemotherapy.  Palliative will meet with her to discuss goals of care.      Full palliative consult to follow.

## 2022-01-03 NOTE — ASSESSMENT & PLAN NOTE
- see full onc history in HPI  - recent diagnosis of Stage IV LMS  - s/p C1 on 12/27/21, well-tolerated

## 2022-01-03 NOTE — CONSULTS
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 She received her first dose of doxorubicin on 12/27. Patient states about two days ago she had acute onset of dyspnea while at rest at home. She subsequently had progressive worsening with increased work of breathing. She was taking her nebulizer at home frequently but reports this did not help her symptoms. She denies fever, chest pain, palpitations, wheezing, abdominal pain, LE swelling, or cough. She is compliant with her warfarin and other medications.     She was found to have sat of 85% with EMS and placed on 15L, then switched to NIPPV on arrival to ED. She was afebrile, , /33. Labs notable for lactic of 2.3, normal BNP/trop, INR 2.9, and normal leukocyte count. CTA chest showed decreased clot burden of known VTE, enlargement of LLL mass, interval development of left lower lobe pleural effusion, and interval enlargement of pulmonary nodules. She was given duonebs x3, vanc/zosn. MICU was consulted for increased WOB.       Hospital/ICU Course:  No notes on file    Past Medical History:   Diagnosis Date    Asthma     Carotid stenosis     Dyspnea on exertion     Hypertension 3/8/2016    Internal carotid artery dissection 2/26/2017    Long term current use of anticoagulant therapy 3/8/2016    Metastasis to lung 11/27/2021     PE (pulmonary embolism)     Hx of multiple DVT/PE    Presence of IVC filter     Scleroderma     Stroke 02/2017    Type 2 diabetes mellitus without complication        Past Surgical History:   Procedure Laterality Date    ARTERIAL THROMBECTOMY Right 02/26/2017    right extracranial ICA thrombectomy by Interventional Radiology    DANETTE FILTER PLACEMENT         Review of patient's allergies indicates:   Allergen Reactions    Plasma, human, normal        Family History     Problem Relation (Age of Onset)    Breast cancer Mother (60)    Diabetes Mellitus Maternal Uncle    Hypertension Father    No Known Problems Brother, Maternal Grandmother, Maternal Grandfather, Paternal Grandmother, Paternal Grandfather, Maternal Aunt, Paternal Aunt, Paternal Uncle        Tobacco Use    Smoking status: Never Smoker    Smokeless tobacco: Never Used   Substance and Sexual Activity    Alcohol use: Yes     Alcohol/week: 0.0 standard drinks     Comment: 2 beers a day    Drug use: No    Sexual activity: Not Currently     Partners: Male     Birth control/protection: Post-menopausal, None      Review of Systems   Constitutional: Positive for fever. Negative for chills.   HENT: Negative for congestion, trouble swallowing and voice change.    Eyes: Negative for pain and visual disturbance.   Respiratory: Positive for shortness of breath. Negative for cough, chest tightness and wheezing.    Cardiovascular: Negative for chest pain, palpitations (present on arrival to ED, denies at time of critical care eval) and leg swelling.   Gastrointestinal: Negative for abdominal pain, nausea and vomiting.   Genitourinary: Negative for difficulty urinating.   Musculoskeletal: Negative for arthralgias, back pain and myalgias.   Skin: Negative for rash and wound.   Allergic/Immunologic: Positive for immunocompromised state.   Neurological: Negative for syncope, light-headedness and headaches.   Hematological: Bruises/bleeds easily.    Psychiatric/Behavioral: Negative for agitation and confusion. The patient is nervous/anxious.      Objective:     Vital Signs (Most Recent):  Pulse: (!) 136 (01/02/22 2100)  Resp: (!) 37 (01/02/22 2100)  BP: (!) 147/33 (01/02/22 1958)  SpO2: 100 % (01/02/22 2100) Vital Signs (24h Range):  Pulse:  [136-147] 136  Resp:  [24-40] 37  SpO2:  [100 %] 100 %  BP: (147)/(33) 147/33   Weight: 68.9 kg (152 lb)  Body mass index is 26.93 kg/m².    No intake or output data in the 24 hours ending 01/02/22 2321    Physical Exam  Constitutional:       General: She is not in acute distress.     Appearance: Normal appearance. She is not ill-appearing or diaphoretic.   HENT:      Head: Normocephalic and atraumatic.      Nose: Nose normal.      Mouth/Throat:      Mouth: Mucous membranes are moist.   Eyes:      Extraocular Movements: Extraocular movements intact.      Conjunctiva/sclera: Conjunctivae normal.   Cardiovascular:      Rate and Rhythm: Regular rhythm. Tachycardia present.      Pulses: Normal pulses.      Heart sounds: Normal heart sounds.   Pulmonary:      Breath sounds: No wheezing or rales.      Comments: Increased wob with accessory muscle use, although speaking in complete sentences without difficulty with subjective improvement in dyspnea since arrival to the ED  No change in wob with NIV vs LFNC  Oxygenating well on 2L NC  Decreased BS L lung base, otherwise CTAB    Abdominal:      General: Bowel sounds are normal. There is no distension.      Palpations: Abdomen is soft.      Tenderness: There is no abdominal tenderness. There is no guarding or rebound.   Musculoskeletal:      Cervical back: Normal range of motion and neck supple. No rigidity.      Right lower leg: No edema.      Left lower leg: No edema.   Skin:     General: Skin is warm and dry.   Neurological:      General: No focal deficit present.      Mental Status: She is alert and oriented to person, place, and time.   Psychiatric:         Mood and Affect:  Mood normal.         Behavior: Behavior normal.         Vents:  Oxygen Concentration (%): 60 (01/02/22 2100)  Lines/Drains/Airways     Peripheral Intravenous Line                 Peripheral IV - Single Lumen 01/02/22 2121 20 G Anterior;Proximal;Right Forearm <1 day              Significant Labs:    CBC/Anemia Profile:  Recent Labs   Lab 01/02/22 2035 01/02/22 2038   WBC 5.37  --    HGB 11.0*  --    HCT 35.0* 35*   *  --    *  --    RDW 19.9*  --         Chemistries:  Recent Labs   Lab 01/02/22 2035      K 5.6*      CO2 20*   BUN 18   CREATININE 1.3   CALCIUM 9.7   ALBUMIN 3.1*   PROT 8.0   BILITOT 0.5   ALKPHOS 131   ALT 11   AST 28       All pertinent labs within the past 24 hours have been reviewed.    Significant Imaging: I have reviewed all pertinent imaging results/findings within the past 24 hours.      ABG  Recent Labs   Lab 01/02/22 2055   PH 7.393   PO2 37*   PCO2 39.7   HCO3 24.2   BE -1     Assessment/Plan:     Pulmonary  * Acute hypoxemic respiratory failure  Patient with Hypoxic Respiratory failure which is Acute.  she is not on home oxygen. Supplemental oxygen was provided and noted- Oxygen Concentration (%):  [60] 60.   Signs/symptoms of respiratory failure include- tachypnea and increased work of breathing. Contributing diagnoses includes - Pleural effusion, Pulmonary Embolus and metastatic lung disease Labs and images were reviewed. Patient Has recent ABG, which has been reviewed. Will treat underlying causes and adjust management of respiratory failure as follows-     --bipap initiated in ED for work of breathing; able to wean to 2L NC during evaluation with sats maintaining at 97%  --new pleural effusion present compared with imaging from 11/26/22, however it is relatively small in size and location and pt's current use of anticoagulation preclude safe thoracentesis at this time  --interval enlargement of LLL mass compared with imaging from 11/26/22  --recommend  continuing anticoagulation for known PE (cuirrently on coumadin with therapeutic INR)  --consider low dose morphine prn for dyspnea & pain control       O2 weaned to 2L. Pt does not need continuous bipap. No indication for ICU level of care at this time.     Thank you for your consult. I will sign off. Please contact us if you have any additional questions.     Case discussed with Dr. Mychal Bhat.     I spent >50 minutes reviewing patient records, examining, and counseling the patient with greater than 50% of the time spent with direct patient care and coordination.        Cyndy Carter, NP  Critical Care Medicine  [facility] - [facility] Dept    56 yof with LMS metastatic to lungs presenting to the ED with worsening symptoms of dyspnea.     - PE looks improved on this exam & is unlikely to be contributing to worsening symptoms of dyspnea.   - [place]wick of pulmonary disease is increasing, particularly in the left pleural-based mass. & the pt is symptomatic. Recommend consultation with palliative care for symptom management & advanced care planning.  - ICU was consulted for admission to ICU for non-invasive ventilation. The pt does not have an indication for non-invasive ventilation at this time. Recommend admission to gyn onc service.       Mychal Bhat MD  [facility]

## 2022-01-03 NOTE — ASSESSMENT & PLAN NOTE
- Pulse:  [128-147] 128, Resp:  [15-40] 35, SpO2:  [98 %-100 %] 98 %  - Initially on 15L > BiPap > 2L NC  - Increased WOB, decreased L Lung sounds  - CTA with enlarged pulmonary mass (14.5cm), small loculated pleural effusion, enlargement of lung nodules, and decrease of known PE volume  - Will admit for O2 and supportive care  - Currently on 2L NC, titrate as appropriate  - Tessalon pearls PRN for cough  - Anticipate need for home O2

## 2022-01-03 NOTE — HPI
Lyssa Gandara is a 55 yo with Stage IV LMS with PMH DM2, h/o PE, h/o stroke, asthma, HTN, and HLD who presents with worsening shortness of breath. She had her first round of doxorubicin chemotherapy on 12/27 which she tolerated well. However, on Tuesday 12/28 she experienced worsening SOB. She initially was able to manage at home with increased use of her duonebs, however the SOB ultimately worsened. She is unable to walk without dyspnea and has difficulty breathing at rest. She has a slight nonproductive cough and occasionally feels feverish, although she has not taken her temperature at home. Denies nausea/vomiting, diarrhea/constipation. She is urinating without difficulty. She has light vaginal bleeding that she wears a panty liner for. She reports back pain that is controlled with pain medication at home.  Patient was transported to [facility] ED via EMS on 15L with O2 sats at 85%. She was started on BiPaP in the ED; VSS. She was ultimately transitioned to 2L NC with improvement of O2 sat to 98%. Labs notable for lactate of 2.3, normal troponin/BNP, therapeutic INR 2.9, normal CBC, and elevated glucose of 378. A CTA was performed and demonstrated decreased clot burden of known PE, however enlargement of known left lung met to 14.5 cm, new pleural effusion, and enlargement of pulmonary nodules.    Oncology History   Leiomyosarcoma of uterus   11/26/2021 Imaging Significant Findings     CT C/A/P w/: Multiple bilateral pulmonary metastases. Large uterine mass.  No omental caking, lymphadenopathy, or ascites.      12/2/2021 Biopsy     CT guided biopsy of lung: ER-, MMR? LMS      12/13/2021 -  Chemotherapy     Treatment Summary   Plan Name: OP DOXORUBICIN Q3W  Treatment Goal: Palliative  Status: Active  Start Date: 12/13/2021 (Planned)  End Date: 8/1/2022 (Planned)  Provider: Darrick Archibald MD  Chemotherapy: DOXOrubicin chemo injection 136 mg, 75 mg/m2, Intravenous, Clinic/HOD 1 time, 0 of 12 cycles    12/27/2021-  Chemotherapy C1 Doxorubicin

## 2022-01-03 NOTE — SUBJECTIVE & OBJECTIVE
Oncology Treatment Plan:   OP DOXORUBICIN Q3W    Past Medical History:   Diagnosis Date    Asthma     Carotid stenosis     Dyspnea on exertion     Hypertension 3/8/2016    Internal carotid artery dissection 2/26/2017    Long term current use of anticoagulant therapy 3/8/2016    Metastasis to lung 11/27/2021    PE (pulmonary embolism)     Hx of multiple DVT/PE    Presence of IVC filter     Scleroderma     Stroke 02/2017    Type 2 diabetes mellitus without complication      Past Surgical History:   Procedure Laterality Date    ARTERIAL THROMBECTOMY Right 02/26/2017    right extracranial ICA thrombectomy by Interventional Radiology    DANETTE FILTER PLACEMENT       Family History     Problem Relation (Age of Onset)    Breast cancer Mother (60)    Diabetes Mellitus Maternal Uncle    Hypertension Father    No Known Problems Brother, Maternal Grandmother, Maternal Grandfather, Paternal Grandmother, Paternal Grandfather, Maternal Aunt, Paternal Aunt, Paternal Uncle        Tobacco Use    Smoking status: Never Smoker    Smokeless tobacco: Never Used   Substance and Sexual Activity    Alcohol use: Yes     Alcohol/week: 0.0 standard drinks     Comment: 2 beers a day    Drug use: No    Sexual activity: Not Currently     Partners: Male     Birth control/protection: Post-menopausal, None       (Not in a hospital admission)      Review of patient's allergies indicates:   Allergen Reactions    Plasma, human, normal        Review of Systems   Constitutional: Positive for fatigue. Negative for chills and fever.   Respiratory: Positive for cough and shortness of breath.    Cardiovascular: Negative for chest pain and palpitations.   Gastrointestinal: Negative for abdominal pain, constipation, diarrhea, nausea and vomiting.   Genitourinary: Positive for vaginal bleeding. Negative for dysuria, urgency, vaginal discharge and vaginal pain.   Musculoskeletal: Negative for arthralgias.   Integumentary:  Negative for  rash.   Neurological: Negative for syncope and headaches.      Objective:     Vital Signs (Most Recent):  Temp: 98.7 °F (37.1 °C) (01/02/22 2345)  Pulse: (!) 128 (01/02/22 2345)  Resp: (!) 35 (01/02/22 2345)  BP: (!) 140/94 (01/02/22 2345)  SpO2: 98 % (01/02/22 2345) Vital Signs (24h Range):  Temp:  [98.7 °F (37.1 °C)] 98.7 °F (37.1 °C)  Pulse:  [128-147] 128  Resp:  [15-40] 35  SpO2:  [98 %-100 %] 98 %  BP: (140-147)/(33-94) 140/94     Weight: 68.9 kg (152 lb)  Body mass index is 26.93 kg/m².    Physical Exam:   Constitutional: She is oriented to person, place, and time. She appears well-developed and well-nourished. No distress.    HENT:   Head: Normocephalic and atraumatic.    Eyes: EOM are normal.     Cardiovascular: Normal rate.     Pulmonary/Chest: Tachypnea noted. No respiratory distress. She has decreased breath sounds in the left upper field, the left middle field and the left lower field.   Dyspnea noted. Able to speak in full sentences, but pauses frequently. On 2L NC.        Abdominal: Soft. There is no abdominal tenderness.             Musculoskeletal: Normal range of motion.       Neurological: She is alert and oriented to person, place, and time.    Skin: Skin is warm and dry. She is not diaphoretic.    Psychiatric: She has a normal mood and affect.       Laboratory:  CBC:   Recent Labs   Lab 01/02/22 2035 01/02/22 2038   WBC 5.37  --    HGB 11.0*  --    HCT 35.0* 35*   *  --     and CMP:   Recent Labs   Lab 01/02/22 2035      K 5.6*      CO2 20*   *   BUN 18   CREATININE 1.3   CALCIUM 9.7   PROT 8.0   ALBUMIN 3.1*   BILITOT 0.5   ALKPHOS 131   AST 28   ALT 11   ANIONGAP 14   EGFRNONAA 46.3*     PT/INR: 29.3/2.9  Lactate: 2.3  Troponin negative  BNP: 24    Diagnostic Results:  Imaging Results           CTA Chest Non-Coronary (PE Study) (Final result)  Result time 01/02/22 22:31:28    Final result by Teto Ontiveros MD (01/02/22 22:31:28)                 Impression:       1. No large new central pulmonary thromboembolism noting suboptimal bolus timing.  Decreased clot burden in the left pulmonary/left lower lobe arteries with some residual clot.  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  No right heart strain.  2. Interval enlargement of left lower lobe mass now measuring up to 14.5 cm.  Mass extends toward the mediastinum/left hilum as detailed above and results in rightward mediastinal shift.  3. Interval development of left pleural effusion, possibly loculated.  4. Interval enlargement of multiple bilateral pulmonary soft tissue masses.  5. Enlarged prevascular lymph node, new from prior.  6. Small pericardial effusion, new from prior.  This report was flagged in Epic as abnormal.    Electronically signed by resident: Oswaldo Mackay  Date:    01/02/2022  Time:    21:36    Electronically signed by: Teto Ontiveros MD  Date:    01/02/2022  Time:    22:31             Narrative:    EXAMINATION:  CTA CHEST NON CORONARY    CLINICAL HISTORY:  Pulmonary embolism (PE) suspected, high prob;    TECHNIQUE:  Low dose axial images, sagittal and coronal reformations were obtained from the thoracic inlet to the lung bases following the IV administration of 100 mL of Omnipaque 350.  Contrast timing was optimized to evaluate the pulmonary arteries.    COMPARISON:  CTA chest 11/26/2021, 08/05/2016    FINDINGS:  Base of Neck:  No significant abnormality.    Thoracic soft tissues:  No significant abnormality.    Aorta: Left-sided aortic arch with common origin of the brachiocephalic and left common carotid arteries.  The thoracic aorta is normal in caliber without significant calcific atherosclerosis .    Heart: Heart is not enlarged.  New small pericardial effusion. Coronary artery calcific atherosclerosis.    Pulmonary vasculature: Bolus timing suboptimal to evaluate for thromboembolism.  The pulmonary arteries distribute normally.  Decreased clot burden in the left pulmonary/left lower  lobe artery with some residual clot.  Assessment of the right upper lobe artery limited by artifact from contrast bolus within the SVC.  Occlusion of the right interlobar artery similar to prior exams dating back to 08/05/2016.  No new large central pulmonary thromboembolism    Kim/Mediastinum:  Rightward mediastinal shift.  Enlarged prevascular lymph node measuring 1.5 cm in short axis (axial series 2, image 151) additional prominent mediastinal lymph nodes.    Airways:  Trachea is midline and proximal airways are patent without significant abnormality.    Lungs: CTA technique sacrifices pulmonary parenchymal fine detail and contrast resolution in order to optimize assessment of pulmonary arteries.  Interval enlargement of left lower lobe mass measuring 6.7 x 11.3 x 14.5 cm (axial series 2, image 260 and coronal series 601, image 165).  The mass extends toward the mediastinum/left hilum and appears to be abutting possibly encasing the left lower lobe pulmonary artery, left inferior pulmonary vein, and left lower lobe bronchus.  Mass is also abutting the aorta and left atrium this mass extends into the posterior left chest wall with suspected involvement of the left paraspinal musculature for an axial series 2, image 196).  Interval development of left pleural effusion which may be loculated given its presence laterally and within the major fissure.  There is associated volume loss mainly in the left lower lobe.  Interval enlargement of multiple bilateral pulmonary soft tissue masses, for example a right upper lobe mass measuring 3.7 cm (axial series 2, image 102), previously measuring approximately 3.0 cm and left lower lobe mass measuring 3.5 cm (axial series 2, image 281), previously measuring approximately 2.7 cm.  Fibrotic change/scattered bands of subsegmental atelectasis mainly in the right middle and lower lobes.    Esophagus: Normal in course and caliber.    Upper abdomen: Partially visualized  cholelithiasis.    Bones:  Degenerative changes of the visualized osseous structures without acute fracture or lytic or sclerotic lesions.                               X-Ray Chest AP Portable (Final result)  Result time 01/02/22 20:33:51    Final result by Teto Ontiveros MD (01/02/22 20:33:51)                 Impression:      Numerous bilateral pulmonary masses, similar to prior.    Interval increase in effusion at the left base.    Interval increased left upper lobe atelectasis partially silhouetting the upper left heart border.      Electronically signed by: Teto Ontiveros MD  Date:    01/02/2022  Time:    20:33             Narrative:    EXAMINATION:  XR CHEST AP PORTABLE    CLINICAL HISTORY:  sob;    TECHNIQUE:  Single frontal view of the chest was performed.    COMPARISON:  12/02/2021.    FINDINGS:  Numerous bilateral pulmonary masses, similar to prior.    Interval increase in effusion at the left base.  Interval increased left upper lobe atelectasis partially silhouetting the upper left heart border.    Heart and lungs otherwise appear unchanged when allowing for differences in technique and positioning.

## 2022-01-03 NOTE — NURSING
Arrived to room via stretcher. AAO x's 3. Verbal, obeys command. Respirations even with tachypnea and frequent nonproductive cough noted with oxygen at 3 liters via nasal cannula. Skin warm and dry. Denies pain. Denies nausea. Plan of care discussed with patient. Bed in lowest position, wheels locked, side rails up x's 2, call light in reach. Instructed to call for assistance as needed, verbalizes understanding. Will continue to monitor.

## 2022-01-03 NOTE — ASSESSMENT & PLAN NOTE
-  on arrival, given 5u per SSI  - continue determir 20 u qD  - SSI ordered (patient usually does home SSI, does not have scheduled mealtime insulin)  - BG AC qHS  - Last A1C 8.7

## 2022-01-03 NOTE — ASSESSMENT & PLAN NOTE
- Pulse:  [128-147] 128, Resp:  [15-40] 35, SpO2:  [98 %-100 %] 98 %  - Initially on 15L > BiPap > 2L NC  - Increased WOB, decreased L Lung sounds  - CTA with enlarged pulmonary mass (14.5cm), small loculated pleural effusion, enlargement of lung nodules, and decrease of known PE volume  - CBC wnl, K 5.6, Cr 1.3  - LA 2.3  - neg troponins, BNP 24  - EKG with sinus tach  - Will admit for O2 and supportive care  - Currently on 2L NC, titrate as appropriate  - Tessalon pearls PRN for cough  - Anticipate need for home O2

## 2022-01-03 NOTE — SUBJECTIVE & OBJECTIVE
Past Medical History:   Diagnosis Date    Asthma     Carotid stenosis     Dyspnea on exertion     Hypertension 3/8/2016    Internal carotid artery dissection 2/26/2017    Long term current use of anticoagulant therapy 3/8/2016    Metastasis to lung 11/27/2021    PE (pulmonary embolism)     Hx of multiple DVT/PE    Presence of IVC filter     Scleroderma     Stroke 02/2017    Type 2 diabetes mellitus without complication        Past Surgical History:   Procedure Laterality Date    ARTERIAL THROMBECTOMY Right 02/26/2017    right extracranial ICA thrombectomy by Interventional Radiology    DANETTE FILTER PLACEMENT         Review of patient's allergies indicates:   Allergen Reactions    Plasma, human, normal        Family History     Problem Relation (Age of Onset)    Breast cancer Mother (60)    Diabetes Mellitus Maternal Uncle    Hypertension Father    No Known Problems Brother, Maternal Grandmother, Maternal Grandfather, Paternal Grandmother, Paternal Grandfather, Maternal Aunt, Paternal Aunt, Paternal Uncle        Tobacco Use    Smoking status: Never Smoker    Smokeless tobacco: Never Used   Substance and Sexual Activity    Alcohol use: Yes     Alcohol/week: 0.0 standard drinks     Comment: 2 beers a day    Drug use: No    Sexual activity: Not Currently     Partners: Male     Birth control/protection: Post-menopausal, None      Review of Systems   Constitutional: Positive for fever. Negative for chills.   HENT: Negative for congestion, trouble swallowing and voice change.    Eyes: Negative for pain and visual disturbance.   Respiratory: Positive for shortness of breath. Negative for cough, chest tightness and wheezing.    Cardiovascular: Negative for chest pain, palpitations (present on arrival to ED, denies at time of critical care eval) and leg swelling.   Gastrointestinal: Negative for abdominal pain, nausea and vomiting.   Genitourinary: Negative for difficulty urinating.   Musculoskeletal:  Negative for arthralgias, back pain and myalgias.   Skin: Negative for rash and wound.   Allergic/Immunologic: Positive for immunocompromised state.   Neurological: Negative for syncope, light-headedness and headaches.   Hematological: Bruises/bleeds easily.   Psychiatric/Behavioral: Negative for agitation and confusion. The patient is nervous/anxious.      Objective:     Vital Signs (Most Recent):  Pulse: (!) 136 (01/02/22 2100)  Resp: (!) 37 (01/02/22 2100)  BP: (!) 147/33 (01/02/22 1958)  SpO2: 100 % (01/02/22 2100) Vital Signs (24h Range):  Pulse:  [136-147] 136  Resp:  [24-40] 37  SpO2:  [100 %] 100 %  BP: (147)/(33) 147/33   Weight: 68.9 kg (152 lb)  Body mass index is 26.93 kg/m².    No intake or output data in the 24 hours ending 01/02/22 2321    Physical Exam  Constitutional:       General: She is not in acute distress.     Appearance: Normal appearance. She is not ill-appearing or diaphoretic.   HENT:      Head: Normocephalic and atraumatic.      Nose: Nose normal.      Mouth/Throat:      Mouth: Mucous membranes are moist.   Eyes:      Extraocular Movements: Extraocular movements intact.      Conjunctiva/sclera: Conjunctivae normal.   Cardiovascular:      Rate and Rhythm: Regular rhythm. Tachycardia present.      Pulses: Normal pulses.      Heart sounds: Normal heart sounds.   Pulmonary:      Breath sounds: No wheezing or rales.      Comments: Increased wob with accessory muscle use, although speaking in complete sentences without difficulty with subjective improvement in dyspnea since arrival to the ED  No change in wob with NIV vs LFNC  Oxygenating well on 2L NC  Decreased BS L lung base, otherwise CTAB    Abdominal:      General: Bowel sounds are normal. There is no distension.      Palpations: Abdomen is soft.      Tenderness: There is no abdominal tenderness. There is no guarding or rebound.   Musculoskeletal:      Cervical back: Normal range of motion and neck supple. No rigidity.      Right lower  leg: No edema.      Left lower leg: No edema.   Skin:     General: Skin is warm and dry.   Neurological:      General: No focal deficit present.      Mental Status: She is alert and oriented to person, place, and time.   Psychiatric:         Mood and Affect: Mood normal.         Behavior: Behavior normal.         Vents:  Oxygen Concentration (%): 60 (01/02/22 2100)  Lines/Drains/Airways     Peripheral Intravenous Line                 Peripheral IV - Single Lumen 01/02/22 2121 20 G Anterior;Proximal;Right Forearm <1 day              Significant Labs:    CBC/Anemia Profile:  Recent Labs   Lab 01/02/22 2035 01/02/22 2038   WBC 5.37  --    HGB 11.0*  --    HCT 35.0* 35*   *  --    *  --    RDW 19.9*  --         Chemistries:  Recent Labs   Lab 01/02/22 2035      K 5.6*      CO2 20*   BUN 18   CREATININE 1.3   CALCIUM 9.7   ALBUMIN 3.1*   PROT 8.0   BILITOT 0.5   ALKPHOS 131   ALT 11   AST 28       All pertinent labs within the past 24 hours have been reviewed.    Significant Imaging: I have reviewed all pertinent imaging results/findings within the past 24 hours.

## 2022-01-03 NOTE — H&P
[facility] - Emergency Dept  Gynecologic Oncology  H&P    Patient Name: Lyssa Gandara  MRN: [id number]  Admission Date: 1/2/2022  Primary Care Provider: Devin Fields MD   Principal Problem: Acute hypoxemic respiratory failure    Subjective:     Chief Complaint/Reason for Admission: shortness of breath    History of Present Illness:  Lyssa Gandara is a 57 yo with Stage IV LMS with PMH DM2, h/o PE, h/o stroke, asthma, HTN, and HLD who presents with worsening shortness of breath. She had her first round of doxorubicin chemotherapy on 12/27 which she tolerated well. However, on Tuesday 12/28 she experienced worsening SOB. She initially was able to manage at home with increased use of her duonebs, however the SOB ultimately worsened. She is unable to walk without dyspnea and has difficulty breathing at rest. She has a slight nonproductive cough and occasionally feels feverish, although she has not taken her temperature at home. Denies nausea/vomiting, diarrhea/constipation. She is urinating without difficulty. She has light vaginal bleeding that she wears a panty liner for. She reports back pain that is controlled with pain medication at home.  Patient was transported to [facility] ED via EMS on 15L with O2 sats at 85%. She was started on BiPaP in the ED; VSS. She was ultimately transitioned to 2L NC with improvement of O2 sat to 98%. Labs notable for lactate of 2.3, normal troponin/BNP, therapeutic INR 2.9, normal CBC, and elevated glucose of 378. A CTA was performed and demonstrated decreased clot burden of known PE, however enlargement of known left lung met to 14.5 cm, new pleural effusion, and enlargement of pulmonary nodules.    Oncology History   Leiomyosarcoma of uterus   11/26/2021 Imaging Significant Findings     CT C/A/P w/: Multiple bilateral pulmonary metastases. Large uterine mass.  No omental caking, lymphadenopathy, or ascites.      12/2/2021 Biopsy     CT guided biopsy of lung: ER-, MMR? LMS       12/13/2021 -  Chemotherapy     Treatment Summary   Plan Name: OP DOXORUBICIN Q3W  Treatment Goal: Palliative  Status: Active  Start Date: 12/13/2021 (Planned)  End Date: 8/1/2022 (Planned)  Provider: Darrick Archibald MD  Chemotherapy: DOXOrubicin chemo injection 136 mg, 75 mg/m2, Intravenous, Clinic/HOD 1 time, 0 of 12 cycles   12/27/2021-  Chemotherapy C1 Doxorubicin      Hospital Course:  No notes on file    Oncology Treatment Plan:   OP DOXORUBICIN Q3W    Past Medical History:   Diagnosis Date    Asthma     Carotid stenosis     Dyspnea on exertion     Hypertension 3/8/2016    Internal carotid artery dissection 2/26/2017    Long term current use of anticoagulant therapy 3/8/2016    Metastasis to lung 11/27/2021    PE (pulmonary embolism)     Hx of multiple DVT/PE    Presence of IVC filter     Scleroderma     Stroke 02/2017    Type 2 diabetes mellitus without complication      Past Surgical History:   Procedure Laterality Date    ARTERIAL THROMBECTOMY Right 02/26/2017    right extracranial ICA thrombectomy by Interventional Radiology    DANETTE FILTER PLACEMENT       Family History     Problem Relation (Age of Onset)    Breast cancer Mother (60)    Diabetes Mellitus Maternal Uncle    Hypertension Father    No Known Problems Brother, Maternal Grandmother, Maternal Grandfather, Paternal Grandmother, Paternal Grandfather, Maternal Aunt, Paternal Aunt, Paternal Uncle        Tobacco Use    Smoking status: Never Smoker    Smokeless tobacco: Never Used   Substance and Sexual Activity    Alcohol use: Yes     Alcohol/week: 0.0 standard drinks     Comment: 2 beers a day    Drug use: No    Sexual activity: Not Currently     Partners: Male     Birth control/protection: Post-menopausal, None       (Not in a hospital admission)      Review of patient's allergies indicates:   Allergen Reactions    Plasma, human, normal        Review of Systems   Constitutional: Positive for fatigue. Negative for chills and  fever.   Respiratory: Positive for cough and shortness of breath.    Cardiovascular: Negative for chest pain and palpitations.   Gastrointestinal: Negative for abdominal pain, constipation, diarrhea, nausea and vomiting.   Genitourinary: Positive for vaginal bleeding. Negative for dysuria, urgency, vaginal discharge and vaginal pain.   Musculoskeletal: Negative for arthralgias.   Integumentary:  Negative for rash.   Neurological: Negative for syncope and headaches.      Objective:     Vital Signs (Most Recent):  Temp: 98.7 °F (37.1 °C) (01/02/22 2345)  Pulse: (!) 128 (01/02/22 2345)  Resp: (!) 35 (01/02/22 2345)  BP: (!) 140/94 (01/02/22 2345)  SpO2: 98 % (01/02/22 2345) Vital Signs (24h Range):  Temp:  [98.7 °F (37.1 °C)] 98.7 °F (37.1 °C)  Pulse:  [128-147] 128  Resp:  [15-40] 35  SpO2:  [98 %-100 %] 98 %  BP: (140-147)/(33-94) 140/94     Weight: 68.9 kg (152 lb)  Body mass index is 26.93 kg/m².    Physical Exam:   Constitutional: She is oriented to person, place, and time. She appears well-developed and well-nourished. No distress.    HENT:   Head: Normocephalic and atraumatic.    Eyes: EOM are normal.     Cardiovascular: Normal rate.     Pulmonary/Chest: Tachypnea noted. No respiratory distress. She has decreased breath sounds in the left upper field, the left middle field and the left lower field.   Dyspnea noted. Able to speak in full sentences, but pauses frequently. On 2L NC.        Abdominal: Soft. There is no abdominal tenderness.             Musculoskeletal: Normal range of motion.       Neurological: She is alert and oriented to person, place, and time.    Skin: Skin is warm and dry. She is not diaphoretic.    Psychiatric: She has a normal mood and affect.       Laboratory:  CBC:   Recent Labs   Lab 01/02/22 2035 01/02/22 2038   WBC 5.37  --    HGB 11.0*  --    HCT 35.0* 35*   *  --     and CMP:   Recent Labs   Lab 01/02/22 2035      K 5.6*      CO2 20*   *   BUN 18    CREATININE 1.3   CALCIUM 9.7   PROT 8.0   ALBUMIN 3.1*   BILITOT 0.5   ALKPHOS 131   AST 28   ALT 11   ANIONGAP 14   EGFRNONAA 46.3*     PT/INR: 29.3/2.9  Lactate: 2.3  Troponin negative  BNP: 24    Diagnostic Results:  Imaging Results           CTA Chest Non-Coronary (PE Study) (Final result)  Result time 01/02/22 22:31:28    Final result by Teto Ontiveros MD (01/02/22 22:31:28)                 Impression:      1. No large new central pulmonary thromboembolism noting suboptimal bolus timing.  Decreased clot burden in the left pulmonary/left lower lobe arteries with some residual clot.  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  No right heart strain.  2. Interval enlargement of left lower lobe mass now measuring up to 14.5 cm.  Mass extends toward the mediastinum/left hilum as detailed above and results in rightward mediastinal shift.  3. Interval development of left pleural effusion, possibly loculated.  4. Interval enlargement of multiple bilateral pulmonary soft tissue masses.  5. Enlarged prevascular lymph node, new from prior.  6. Small pericardial effusion, new from prior.  This report was flagged in Epic as abnormal.    Electronically signed by resident: Oswaldo Mackay  Date:    01/02/2022  Time:    21:36    Electronically signed by: Teto Ontiveros MD  Date:    01/02/2022  Time:    22:31             Narrative:    EXAMINATION:  CTA CHEST NON CORONARY    CLINICAL HISTORY:  Pulmonary embolism (PE) suspected, high prob;    TECHNIQUE:  Low dose axial images, sagittal and coronal reformations were obtained from the thoracic inlet to the lung bases following the IV administration of 100 mL of Omnipaque 350.  Contrast timing was optimized to evaluate the pulmonary arteries.    COMPARISON:  CTA chest 11/26/2021, 08/05/2016    FINDINGS:  Base of Neck:  No significant abnormality.    Thoracic soft tissues:  No significant abnormality.    Aorta: Left-sided aortic arch with common origin of the  brachiocephalic and left common carotid arteries.  The thoracic aorta is normal in caliber without significant calcific atherosclerosis .    Heart: Heart is not enlarged.  New small pericardial effusion. Coronary artery calcific atherosclerosis.    Pulmonary vasculature: Bolus timing suboptimal to evaluate for thromboembolism.  The pulmonary arteries distribute normally.  Decreased clot burden in the left pulmonary/left lower lobe artery with some residual clot.  Assessment of the right upper lobe artery limited by artifact from contrast bolus within the SVC.  Occlusion of the right interlobar artery similar to prior exams dating back to 08/05/2016.  No new large central pulmonary thromboembolism    Kim/Mediastinum:  Rightward mediastinal shift.  Enlarged prevascular lymph node measuring 1.5 cm in short axis (axial series 2, image 151) additional prominent mediastinal lymph nodes.    Airways:  Trachea is midline and proximal airways are patent without significant abnormality.    Lungs: CTA technique sacrifices pulmonary parenchymal fine detail and contrast resolution in order to optimize assessment of pulmonary arteries.  Interval enlargement of left lower lobe mass measuring 6.7 x 11.3 x 14.5 cm (axial series 2, image 260 and coronal series 601, image 165).  The mass extends toward the mediastinum/left hilum and appears to be abutting possibly encasing the left lower lobe pulmonary artery, left inferior pulmonary vein, and left lower lobe bronchus.  Mass is also abutting the aorta and left atrium this mass extends into the posterior left chest wall with suspected involvement of the left paraspinal musculature for an axial series 2, image 196).  Interval development of left pleural effusion which may be loculated given its presence laterally and within the major fissure.  There is associated volume loss mainly in the left lower lobe.  Interval enlargement of multiple bilateral pulmonary soft tissue masses, for  example a right upper lobe mass measuring 3.7 cm (axial series 2, image 102), previously measuring approximately 3.0 cm and left lower lobe mass measuring 3.5 cm (axial series 2, image 281), previously measuring approximately 2.7 cm.  Fibrotic change/scattered bands of subsegmental atelectasis mainly in the right middle and lower lobes.    Esophagus: Normal in course and caliber.    Upper abdomen: Partially visualized cholelithiasis.    Bones:  Degenerative changes of the visualized osseous structures without acute fracture or lytic or sclerotic lesions.                               X-Ray Chest AP Portable (Final result)  Result time 01/02/22 20:33:51    Final result by Teto Ontiveros MD (01/02/22 20:33:51)                 Impression:      Numerous bilateral pulmonary masses, similar to prior.    Interval increase in effusion at the left base.    Interval increased left upper lobe atelectasis partially silhouetting the upper left heart border.      Electronically signed by: Teto Ontiveros MD  Date:    01/02/2022  Time:    20:33             Narrative:    EXAMINATION:  XR CHEST AP PORTABLE    CLINICAL HISTORY:  sob;    TECHNIQUE:  Single frontal view of the chest was performed.    COMPARISON:  12/02/2021.    FINDINGS:  Numerous bilateral pulmonary masses, similar to prior.    Interval increase in effusion at the left base.  Interval increased left upper lobe atelectasis partially silhouetting the upper left heart border.    Heart and lungs otherwise appear unchanged when allowing for differences in technique and positioning.                                  Assessment/Plan:     * Acute hypoxemic respiratory failure  - Pulse:  [128-147] 128, Resp:  [15-40] 35, SpO2:  [98 %-100 %] 98 %  - Initially on 15L > BiPap > 2L NC  - Increased WOB, decreased L Lung sounds  - CTA with enlarged pulmonary mass (14.5cm), small loculated pleural effusion, enlargement of lung nodules, and decrease of known PE volume  - CBC wnl, K  5.6, Cr 1.3  - LA 2.3  - neg troponins, BNP 24  - EKG with sinus tach  - Will admit for O2 and supportive care  - Currently on 2L NC, titrate as appropriate  - Tessalon pearls PRN for cough  - Anticipate need for home O2      Leiomyosarcoma of uterus  - see full onc history in HPI  - recent diagnosis of Stage IV LMS  - s/p C1 on 12/27/21, well-tolerated    HLD (hyperlipidemia)  - atorvastatin 10 mg qD    Metastasis to lung  - Known mets to lungs including LLL mass  - Interval worsening of pulmonary nodules, new loculated small pleural effusion, increasing size of known mass to 14.5 cm with mediastinal shift  - New O2 requirement, anticipate need for home O2    Cerebral infarction due to thrombosis of right carotid artery 2/26/17  - no residual deficits  - Continue home plavix 75 mg qD    Type 2 diabetes mellitus with renal complication  -  on arrival, given 5u per SSI  - continue determir 20 u qD  - SSI ordered (patient usually does home SSI, does not have scheduled mealtime insulin)  - BG AC qHS  - Last A1C 8.7    Asthma  - continue home albuterol nebs q4 hr  - continue home fluticasone inhaler BID  - continue home combivent rescue inhaler PRN    Essential hypertension  - BP: (140-147)/(33-94) 140/94  - no medications at home  - hydralazine 10 mg PRN for BP >180/110      Personal history of pulmonary embolism  - Decreased size of clot burden on CT  - continue home warfarin 2.5 mg qD  - PT/INR therapeutic at 29.3/2.9  - SCDs  - encourage ambulation when patient able to tolerate        Sonja Sequeira MD  Gynecologic Oncology  [facility] - Emergency Dept

## 2022-01-03 NOTE — ASSESSMENT & PLAN NOTE
- Known mets to lungs including LLL mass  - Interval worsening of pulmonary nodules, new loculated small pleural effusion, increasing size of known mass to 14.5 cm with mediastinal shift  - New O2 requirement, anticipate need for home O2

## 2022-01-03 NOTE — CARE UPDATE
RAPID RESPONSE NURSE ROUND       Rounding completed with charge RN, Elizabeth. Tachycardic HR 110s. No concerns verbalized at this time. Instructed to call [phone] for further concerns or assistance.

## 2022-01-03 NOTE — ASSESSMENT & PLAN NOTE
- continue home albuterol nebs q4 hr  - continue home fluticasone inhaler BID  - continue home combivent rescue inhaler PRN

## 2022-01-03 NOTE — CONSULTS
Please see H&P dated 1/3/22 by this author for full consult note.    In short, 57 yo with stage IV LMS and complex medical history presenting with worsening SOB. New O2 requirement, interval enlargement of lung mets including 14.5 cm mass. Will admit for supportive care.    Sonja Sequeira MD/MPH  OB/GYN PGY2

## 2022-01-03 NOTE — NURSING
POCT BG 29 for lunchtime accucheck. 24g chewable glucose tabs given PO. MD notified of events. Asymptomatic at this time; starting to eat lunch.

## 2022-01-03 NOTE — ED NOTES
I-STAT Chem-8+ Results:   Value Reference Range   Sodium 137 136-145 mmol/L   Potassium  5.5 3.5-5.1 mmol/L   Chloride 102  mmol/L   Ionized Calcium 1.18 1.06-1.42 mmol/L   CO2 (measured) 25 23-29 mmol/L   Glucose 376  mg/dL   BUN 24 6-30 mg/dL   Creatinine 0.9 0.5-1.4 mg/dL   Hematocrit 35 36-54%

## 2022-01-03 NOTE — ED PROVIDER NOTES
Patient was signed out to me by Dr. Funes. Briefly, this is a 57yo F hx leiomyosarcoma presenting to the ER with SOB, pending CTA r/o PE though anticoagulated.    PE:   Const: Awake and alert, chronically ill appearing  Resp: tachypneic with increased WOB, bipap in place.   Neuro: Moves all extremities equally  Psych: Normal mood and affect    Data:  Results for orders placed or performed during the hospital encounter of 01/02/22   Comprehensive metabolic panel   Result Value Ref Range    Sodium 136 136 - 145 mmol/L    Potassium 5.6 (H) 3.5 - 5.1 mmol/L    Chloride 102 95 - 110 mmol/L    CO2 20 (L) 23 - 29 mmol/L    Glucose 378 (H) 70 - 110 mg/dL    BUN 18 6 - 20 mg/dL    Creatinine 1.3 0.5 - 1.4 mg/dL    Calcium 9.7 8.7 - 10.5 mg/dL    Total Protein 8.0 6.0 - 8.4 g/dL    Albumin 3.1 (L) 3.5 - 5.2 g/dL    Total Bilirubin 0.5 0.1 - 1.0 mg/dL    Alkaline Phosphatase 131 55 - 135 U/L    AST 28 10 - 40 U/L    ALT 11 10 - 44 U/L    Anion Gap 14 8 - 16 mmol/L    eGFR if African American 53.4 (A) >60 mL/min/1.73 m^2    eGFR if non  46.3 (A) >60 mL/min/1.73 m^2   CBC auto differential   Result Value Ref Range    WBC 5.37 3.90 - 12.70 K/uL    RBC 3.26 (L) 4.00 - 5.40 M/uL    Hemoglobin 11.0 (L) 12.0 - 16.0 g/dL    Hematocrit 35.0 (L) 37.0 - 48.5 %     (H) 82 - 98 fL    MCH 33.7 (H) 27.0 - 31.0 pg    MCHC 31.4 (L) 32.0 - 36.0 g/dL    RDW 19.9 (H) 11.5 - 14.5 %    Platelets 145 (L) 150 - 450 K/uL    MPV 11.3 9.2 - 12.9 fL    Immature Granulocytes CANCELED 0.0 - 0.5 %    Immature Grans (Abs) CANCELED 0.00 - 0.04 K/uL    Lymph # CANCELED 1.0 - 4.8 K/uL    Mono # CANCELED 0.3 - 1.0 K/uL    Eos # CANCELED 0.0 - 0.5 K/uL    Baso # CANCELED 0.00 - 0.20 K/uL    nRBC 0 0 /100 WBC    Gran % 89.0 (H) 38.0 - 73.0 %    Lymph % 10.0 (L) 18.0 - 48.0 %    Mono % 0.0 (L) 4.0 - 15.0 %    Eosinophil % 0.0 0.0 - 8.0 %    Basophil % 1.0 0.0 - 1.9 %    Platelet Estimate Decreased (A)     Aniso Moderate     Poik Slight      Poly Occasional     Ovalocytes Occasional     Fragmented Cells Occasional     Differential Method Manual    Lactic acid, plasma   Result Value Ref Range    Lactate (Lactic Acid) 2.3 (H) 0.5 - 2.2 mmol/L   Troponin I   Result Value Ref Range    Troponin I 0.008 0.000 - 0.026 ng/mL   Brain natriuretic peptide   Result Value Ref Range    BNP 24 0 - 99 pg/mL   Protime-INR   Result Value Ref Range    Prothrombin Time 29.3 (H) 9.0 - 12.5 sec    INR 2.9 (H) 0.8 - 1.2   POCT COVID-19 Rapid Screening   Result Value Ref Range    POC Rapid COVID Negative Negative     Acceptable Yes    ISTAT PROCEDURE   Result Value Ref Range    POC Glucose 376 (H) 70 - 110 mg/dL    POC BUN 24 6 - 30 mg/dL    POC Creatinine 0.9 0.5 - 1.4 mg/dL    POC Sodium 137 136 - 145 mmol/L    POC Potassium 5.5 (H) 3.5 - 5.1 mmol/L    POC Chloride 102 95 - 110 mmol/L    POC TCO2 (MEASURED) 25 23 - 29 mmol/L    POC Ionized Calcium 1.18 1.06 - 1.42 mmol/L    POC Hematocrit 35 (L) 36 - 54 %PCV    Sample BACILIO    ISTAT PROCEDURE   Result Value Ref Range    POC PH 7.393 7.35 - 7.45    POC PCO2 39.7 35 - 45 mmHg    POC PO2 37 (L) 40 - 60 mmHg    POC HCO3 24.2 24 - 28 mmol/L    POC BE -1 -2 to 2 mmol/L    POC SATURATED O2 71 (L) 95 - 100 %    POC TCO2 25 24 - 29 mmol/L    Rate 26     Sample VENOUS     Site RF     Allens Test N/A     DelSys CPAP/BiPAP     Mode BiPAP     FiO2 60     Spont Rate 40     Sp02 100     IP 14     EP 7       Imaging Results               CTA Chest Non-Coronary (PE Study) (Final result)  Result time 01/02/22 22:31:28      Final result by Teto Ontiveros MD (01/02/22 22:31:28)                   Impression:      1. No large new central pulmonary thromboembolism noting suboptimal bolus timing.  Decreased clot burden in the left pulmonary/left lower lobe arteries with some residual clot.  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  No right heart strain.  2. Interval enlargement of left lower lobe mass now measuring up to  14.5 cm.  Mass extends toward the mediastinum/left hilum as detailed above and results in rightward mediastinal shift.  3. Interval development of left pleural effusion, possibly loculated.  4. Interval enlargement of multiple bilateral pulmonary soft tissue masses.  5. Enlarged prevascular lymph node, new from prior.  6. Small pericardial effusion, new from prior.  This report was flagged in Epic as abnormal.    Electronically signed by resident: Oswaldo Mackay  Date:    01/02/2022  Time:    21:36    Electronically signed by: Teto Ontiveros MD  Date:    01/02/2022  Time:    22:31               Narrative:    EXAMINATION:  CTA CHEST NON CORONARY    CLINICAL HISTORY:  Pulmonary embolism (PE) suspected, high prob;    TECHNIQUE:  Low dose axial images, sagittal and coronal reformations were obtained from the thoracic inlet to the lung bases following the IV administration of 100 mL of Omnipaque 350.  Contrast timing was optimized to evaluate the pulmonary arteries.    COMPARISON:  CTA chest 11/26/2021, 08/05/2016    FINDINGS:  Base of Neck:  No significant abnormality.    Thoracic soft tissues:  No significant abnormality.    Aorta: Left-sided aortic arch with common origin of the brachiocephalic and left common carotid arteries.  The thoracic aorta is normal in caliber without significant calcific atherosclerosis .    Heart: Heart is not enlarged.  New small pericardial effusion. Coronary artery calcific atherosclerosis.    Pulmonary vasculature: Bolus timing suboptimal to evaluate for thromboembolism.  The pulmonary arteries distribute normally.  Decreased clot burden in the left pulmonary/left lower lobe artery with some residual clot.  Assessment of the right upper lobe artery limited by artifact from contrast bolus within the SVC.  Occlusion of the right interlobar artery similar to prior exams dating back to 08/05/2016.  No new large central pulmonary thromboembolism    Kim/Mediastinum:  Rightward mediastinal  shift.  Enlarged prevascular lymph node measuring 1.5 cm in short axis (axial series 2, image 151) additional prominent mediastinal lymph nodes.    Airways:  Trachea is midline and proximal airways are patent without significant abnormality.    Lungs: CTA technique sacrifices pulmonary parenchymal fine detail and contrast resolution in order to optimize assessment of pulmonary arteries.  Interval enlargement of left lower lobe mass measuring 6.7 x 11.3 x 14.5 cm (axial series 2, image 260 and coronal series 601, image 165).  The mass extends toward the mediastinum/left hilum and appears to be abutting possibly encasing the left lower lobe pulmonary artery, left inferior pulmonary vein, and left lower lobe bronchus.  Mass is also abutting the aorta and left atrium this mass extends into the posterior left chest wall with suspected involvement of the left paraspinal musculature for an axial series 2, image 196).  Interval development of left pleural effusion which may be loculated given its presence laterally and within the major fissure.  There is associated volume loss mainly in the left lower lobe.  Interval enlargement of multiple bilateral pulmonary soft tissue masses, for example a right upper lobe mass measuring 3.7 cm (axial series 2, image 102), previously measuring approximately 3.0 cm and left lower lobe mass measuring 3.5 cm (axial series 2, image 281), previously measuring approximately 2.7 cm.  Fibrotic change/scattered bands of subsegmental atelectasis mainly in the right middle and lower lobes.    Esophagus: Normal in course and caliber.    Upper abdomen: Partially visualized cholelithiasis.    Bones:  Degenerative changes of the visualized osseous structures without acute fracture or lytic or sclerotic lesions.                                       X-Ray Chest AP Portable (Final result)  Result time 01/02/22 20:33:51      Final result by Teto Ontiveros MD (01/02/22 20:33:51)                    Impression:      Numerous bilateral pulmonary masses, similar to prior.    Interval increase in effusion at the left base.    Interval increased left upper lobe atelectasis partially silhouetting the upper left heart border.      Electronically signed by: Teto Ontiveros MD  Date:    01/02/2022  Time:    20:33               Narrative:    EXAMINATION:  XR CHEST AP PORTABLE    CLINICAL HISTORY:  sob;    TECHNIQUE:  Single frontal view of the chest was performed.    COMPARISON:  12/02/2021.    FINDINGS:  Numerous bilateral pulmonary masses, similar to prior.    Interval increase in effusion at the left base.  Interval increased left upper lobe atelectasis partially silhouetting the upper left heart border.    Heart and lungs otherwise appear unchanged when allowing for differences in technique and positioning.                                       MDM:   CTA negative for PE, shows worsened LLL mass, likely the cause of SOB. MICU consulted, recommend gyn/onc consultation. Patient admitted to gyn/onc for further evaluation and treatment.      Kalee Sims MD  01/03/22 0932

## 2022-01-04 NOTE — ASSESSMENT & PLAN NOTE
- BP: (117-176)/(59-90) 129/59  - started on amlodipine 10 mg qD  - hydralazine 10 mg PRN for BP >180/110

## 2022-01-04 NOTE — ASSESSMENT & PLAN NOTE
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 MICU consulted    Hypoxia is multifactorial given asthma,  PE and worsening metastatic disease    Plan:  · Repeat CXR-->No significant change compared to admission CXR  · Pt does have signficant wheezing on exam: Continue DuoNEbs q4 hours- If tachcyardia persists could try Xopenex Nebs.  · Will give a dose of Methylprednisone for wheezing--> Reassess and can repeat dose daily.  · Consider repeat TTE to re-assess EF given treatment with doxorrubicin  · Pt remains stable on floor- Even in the case of worsening respiratory status, she is a poor candidate for mechanical intubation given advanced metastatic disease to lungs.  · Continue Palliative Care talks with patient and family.

## 2022-01-04 NOTE — NURSING
Oxygen saturations 83 % on O2 @ 3 liters. Dr. Puente notified, rapid response notified. Critical care consult entered. Breathing treatment completed. Chest x ray ordered, telemetry ordered. Will continue to monitor.

## 2022-01-04 NOTE — ASSESSMENT & PLAN NOTE
- Initially on 15L > BiPap > 2L NC > now on high flow with intermittant Bipap  - Increased WOB, decreased L Lung sounds  - CTA with enlarged pulmonary mass (14.5cm), small loculated pleural effusion, enlargement of lung nodules, and decrease of known PE volume  - CBC wnl, K 5.6, Cr 1.3  - LA 2.3  - neg troponins, BNP 24  - EKG with sinus tach  - Overnight on 1/3/22, 2 acute decompensations in O2 sat. Critical care consulted, stable for floor. Given solumedrol and breathing treatment with improvement. Patient remains on high flow (7L) with intermittent bipap use.  - Tessalon pearls PRN for cough  - Anticipate need for home O2

## 2022-01-04 NOTE — SUBJECTIVE & OBJECTIVE
Palliative Encounter  Pt resting, appears comfortable, no distress noted.  Palliative services introduced and pt was receptive to visit.  She was on the phone with family members (Celi Ingram, aunt, and Gerard) and included them in palliative discussion.  We discussed pt's current medical condition and compared this to previous condition prior to hospitalization.  Approx 2 months ago, pt denies shortness of breath but currently is unable to walk short distances without experiencing dyspnea and does experience dyspnea at rest, requiring supplemental oxygen.  We discussed concern for continued decline in medical status related to aggressive cancer.  We reviewed events that occurred last night, including two episodes of desaturation, and the concern for continued decline.  We hope for the best but it is important to plan for all scenarios and very important to determine pt's values and preferences of care.  We discussed scenarios and possible outcomes in which it would not be possible to ask pt for her preferences at that time, including sudden cardiac and/or respiratory decline.  We reviewed code status options, including full code status vs DNR status and the risks and benefits of each. We discussed concern regarding pt's overall prognosis and the concern that if pt were to require CPR in the event of cardiopulmonary arrest, it may be more harmful than beneficial, in light of current condition. Invited pt and family to ask questions and all questions were answered. Pt and family express interest in continuing all standard care possible but in the event of cardiopulmonary arrest, considering DNR status.  They would like to continue discussion together and ask that we meet at 1100 to continue goals of care discussion. We also discussed the importance of determining healthcare power of .  Pt will continue to make medical decisions for herself but if pt becomes unable to make medical decisions for herself, she  would want her aunt, Celi Ingram ([phone]) to make medical decisions for her.  Palliative will follow up at 11am as per family's request.        Acute hypoxemic respiratory failure  -continue management as per primary team  -we discussed the possibility of sudden decline and concern that if pt were to require ventilation, pt may be very difficult to wean from ventilator  -pt is considering all options for care and continuing discussion with family    Leiomyosarcoma of uterus  -continue management as per primary team  -discussed overall prognosis    Metastasis to lung  -continue management as per primary team  -pt with worsening of pulmonary nodules, new loculated small pleural effusion, increasing size of known mass to 14.5 with mediastinal shift  -new supplemental oxygen requirement  -symptoms of dyspnea (at rest and with minimal activity) are new  -pt is considering all options for care and continuing discussion with family    Cerebral infarction due to thrombosis of right carotid artery 2/26/17  -continue management as per primary team    Asthma  -continue management as per primary team    Past Medical History:   Diagnosis Date    Asthma     Carotid stenosis     Dyspnea on exertion     Hypertension 3/8/2016    Internal carotid artery dissection 2/26/2017    Long term current use of anticoagulant therapy 3/8/2016    Metastasis to lung 11/27/2021    PE (pulmonary embolism)     Hx of multiple DVT/PE    Presence of IVC filter     Scleroderma     Stroke 02/2017    Type 2 diabetes mellitus without complication        Past Surgical History:   Procedure Laterality Date    ARTERIAL THROMBECTOMY Right 02/26/2017    right extracranial ICA thrombectomy by Interventional Radiology    DANETTE FILTER PLACEMENT         Review of patient's allergies indicates:   Allergen Reactions    Plasma, human, normal        Medications:  Continuous Infusions:  Scheduled Meds:   albuterol sulfate  2.5 mg  Nebulization Q4H    amLODIPine  10 mg Oral Daily    clopidogreL  75 mg Oral Daily    fluticasone furoate-vilanteroL  1 puff Inhalation Daily    insulin detemir U-100  20 Units Subcutaneous Daily    senna-docusate 8.6-50 mg  1 tablet Oral Daily    warfarin  2.5 mg Oral Daily     PRN Meds:acetaminophen, albuterol-ipratropium, benzonatate, calcium carbonate, dextrose 50%, dextrose 50%, glucagon (human recombinant), glucose, glucose, hydrALAZINE, HYDROmorphone, insulin aspart U-100, magnesium hydroxide 400 mg/5 ml, ondansetron, oxyCODONE, oxyCODONE, polyethylene glycol, prochlorperazine, simethicone, sodium chloride 0.9%    Family History     Problem Relation (Age of Onset)    Breast cancer Mother (60)    Diabetes Mellitus Maternal Uncle    Hypertension Father    No Known Problems Brother, Maternal Grandmother, Maternal Grandfather, Paternal Grandmother, Paternal Grandfather, Maternal Aunt, Paternal Aunt, Paternal Uncle        Tobacco Use    Smoking status: Never Smoker    Smokeless tobacco: Never Used   Substance and Sexual Activity    Alcohol use: Yes     Alcohol/week: 0.0 standard drinks     Comment: 2 beers a day    Drug use: No    Sexual activity: Not Currently     Partners: Male     Birth control/protection: Post-menopausal, None       Review of Systems   Constitutional: Positive for activity change and fatigue. Negative for fever.   HENT: Negative for rhinorrhea, sinus pressure, sinus pain, sneezing and sore throat.    Eyes: Negative for discharge and itching.   Respiratory: Positive for shortness of breath.    Gastrointestinal: Negative for constipation and diarrhea.   Endocrine: Negative for cold intolerance and heat intolerance.   Genitourinary: Negative for flank pain.   Musculoskeletal: Negative for neck pain and neck stiffness.   Skin: Negative for rash.   Allergic/Immunologic: Negative for environmental allergies and food allergies.   Neurological: Negative for speech difficulty.    Psychiatric/Behavioral: Negative for confusion, decreased concentration and hallucinations.     Objective:     Vital Signs (Most Recent):  Temp: 98.2 °F (36.8 °C) (01/04/22 0800)  Pulse: (!) 116 (01/04/22 0807)  Resp: (!) 22 (01/04/22 0807)  BP: (!) 138/91 (01/04/22 0800)  SpO2: 98 % (01/04/22 0807) Vital Signs (24h Range):  Temp:  [97.4 °F (36.3 °C)-98.7 °F (37.1 °C)] 98.2 °F (36.8 °C)  Pulse:  [] 116  Resp:  [18-25] 22  SpO2:  [82 %-100 %] 98 %  BP: (117-176)/(59-91) 138/91     Weight: 65.3 kg (143 lb 15.4 oz)  Body mass index is 25.5 kg/m².    Physical Exam  Vitals and nursing note reviewed.   Constitutional:       General: She is not in acute distress.     Interventions: Nasal cannula in place.   HENT:      Head: Normocephalic and atraumatic.      Right Ear: External ear normal.      Left Ear: External ear normal.      Nose: Nose normal. No congestion or rhinorrhea.   Eyes:      General:         Right eye: No discharge.         Left eye: No discharge.      Conjunctiva/sclera: Conjunctivae normal.   Cardiovascular:      Rate and Rhythm: Tachycardia present.   Pulmonary:      Effort: Pulmonary effort is normal. No respiratory distress.   Musculoskeletal:         General: No signs of injury.      Cervical back: Normal range of motion and neck supple.   Skin:     General: Skin is warm and dry.      Findings: No rash.   Neurological:      Mental Status: She is alert and oriented to person, place, and time.   Psychiatric:         Behavior: Behavior normal.         Thought Content: Thought content normal.         Judgment: Judgment normal.         Review of Symptoms    Symptom Assessment (ESAS 0-10 Scale)  Pain:  0  Dyspnea:  0  Anxiety:  0  Nausea:  0  Depression:  0  Anorexia:  0  Fatigue:  0  Insomnia:  0  Restlessness:  0  Agitation:  0         Performance Status:  50    Living Arrangements:  Lives with family        Advance Care Planning   Advance Directives:   Living Will: No    LaPOST: No    Do Not  Resuscitate Status: No    Medical Power of : No      Decision Making:  Patient answered questions         Significant Labs: All pertinent labs within the past 24 hours have been reviewed.  CBC:   Recent Labs   Lab 01/02/22 2035 01/02/22 2038   WBC 5.37  --    HGB 11.0*  --    HCT 35.0* 35*   *  --    *  --      BMP:  No results for input(s): GLU, NA, K, CL, CO2, BUN, CREATININE, CALCIUM, MG in the last 24 hours.  LFT:  Lab Results   Component Value Date    AST 28 01/02/2022    ALKPHOS 131 01/02/2022    BILITOT 0.5 01/02/2022     Albumin:   Albumin   Date Value Ref Range Status   01/02/2022 3.1 (L) 3.5 - 5.2 g/dL Final     Protein:   Total Protein   Date Value Ref Range Status   01/02/2022 8.0 6.0 - 8.4 g/dL Final     Lactic acid:   Lab Results   Component Value Date    LACTATE 2.3 (H) 01/02/2022       Significant Imaging: I have reviewed all pertinent imaging results/findings within the past 24 hours.   X-Ray Chest 1 View  Narrative: EXAMINATION:  XR CHEST 1 VIEW    CLINICAL HISTORY:  acute respiratory distress;    TECHNIQUE:  Single frontal view of the chest was performed.    COMPARISON:  01/02/2022    FINDINGS:  Suboptimal inspiration.    Stable cardiac silhouette.    Multiple bilateral pulmonary masses, largest at the left lung base retrocardiac region.  Findings consistent with metastatic disease.    Loculated pleural effusion along the major fissure, left lateral wall and left lung base.    Bibasilar atelectasis with possible mild interstitial infiltrates bilaterally also.  Impression: See above.  Findings are similar to the recent prior study.    Electronically signed by: Loco Rogers  Date:    01/03/2022  Time:    22:20  ED US Echo  Exam : Angie Funes  POCUS_Cardiac:    Exam Information:  Exam category:  Diagnostic  Consent obtained?:  Yes    Indication(s) for Exam:  Dyspnea    Views Obtained:  Parasternal long-axis, Parasternal short-axis, Subxiphoid, Apical  4-chamber    Interpretation:  Other:  technically difficult exam, heart is tachycardic, large left pleural effusion, likely small pericardial effusion    Confirmatory study:  Confirmatory study done?:  CT    Electronically signed by Angie Funes on Monday, January 3, 2022 at 10:09 PM

## 2022-01-04 NOTE — PLAN OF CARE
[facility]  Department of Hospital Medicine  [address] [place] [address]  [phone] after hours  [phone] fax    HOSPICE  ORDERS    01/04/2022    Admit to Hospice:  Home Service     Diagnoses:   Active Hospital Problems    Diagnosis  POA    *Acute hypoxemic respiratory failure [J96.01]  Yes    Leiomyosarcoma of uterus [C55]  Yes    Metastasis to lung [C78.00]  Yes     BIOPSY OF LEFT LOWER LOBE:   SARCOMA COMPATIBLE WITH METASTATIC LEIOMYOSARCOMA- 12/2/2021 - likely uterine origin      HLD (hyperlipidemia) [E78.5]  Yes    Cerebral infarction due to thrombosis of right carotid artery 2/26/17 [I63.031]  Yes     Lab Results   Component Value Date    CHOL 172 04/08/2021    TRIG 100 04/08/2021    HDL 65 04/08/2021    LDLCALC 87.0 04/08/2021    CHOLHDL 37.8 04/08/2021          Asthma [J45.909]  Yes    Type 2 diabetes mellitus with renal complication [E11.29]  Yes    Personal history of pulmonary embolism [Z86.711]  Yes    Essential hypertension [I10]  Yes      Resolved Hospital Problems   No resolved problems to display.       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    Code Status: DNI    Allergies:   Review of patient's allergies indicates:   Allergen Reactions    Plasma, human, normal        Diet: Regular    Activities: As tolerated    Nursing: Per Hospice Routine.    Oxygen: High Flow O2 (currently on 8L, although titrate PRN) with intermittent Bipap as needed (pt has been requiring overnight)     Other Miscellaneous Care: PRN    Medications:      Medication List      CHANGE how you take these medications    ipratropium-albuteroL  mcg/actuation  "inhaler  Commonly known as: CombiVENT  Inhale 1 puff into the lungs 4 (four) times daily. Rescue  What changed: Another medication with the same name was removed. Continue taking this medication, and follow the directions you see here.        CONTINUE taking these medications    albuterol 2.5 mg /3 mL (0.083 %) nebulizer solution  Commonly known as: PROVENTIL  Take 3 mLs (2.5 mg total) by nebulization every 6 (six) hours as needed for Wheezing or Shortness of Breath.     blood sugar diagnostic Strp  200 strips by Misc.(Non-Drug; Combo Route) route 2 (two) times daily.     blood-glucose meter kit  To check BG TID times daily, to use with insurance preferred meter     clopidogreL 75 mg tablet  Commonly known as: PLAVIX  Take 1 tablet (75 mg total) by mouth once daily.     cyanocobalamin 1000 MCG tablet  Commonly known as: VITAMIN B-12  Take 1 tablet (1,000 mcg total) by mouth once daily.     fluticasone-salmeterol 250-50 mcg/dose 250-50 mcg/dose diskus inhaler  Commonly known as: ADVAIR  Inhale 1 puff into the lungs 2 (two) times daily. Controller     hydrOXYzine HCL 25 MG tablet  Commonly known as: ATARAX  Take 1 tablet (25 mg total) by mouth 4 (four) times daily as needed for Anxiety.     insulin syringe-needle U-100 0.3 mL 29 gauge x 1/2" Syrg  Use to inject insulin into the skin before meals as needed.     insulin syringe-needle U-100 1 mL 31 gauge x 5/16 Syrg  Commonly known as: BD INSULIN SYRINGE ULTRA-FINE  To use 4 times daily with insulin     lancets Misc  Commonly known as: ONETOUCH ULTRASOFT LANCETS  1 application by Misc.(Non-Drug; Combo Route) route 2 (two) times daily before meals.     lancing device with lancets Kit  1 Device by Misc.(Non-Drug; Combo Route) route 2 (two) times daily with meals.     LANTUS SOLOSTAR U-100 INSULIN glargine 100 units/mL (3mL) SubQ pen  Generic drug: insulin  Inject 20 Units into the skin once daily.     metoclopramide HCl 5 MG tablet  Commonly known as: REGLAN  Take 1 " "tablet (5 mg total) by mouth every 6 (six) hours as needed (nausea/vomiting).     NovoLOG Flexpen U-100 Insulin 100 unit/mL (3 mL) Inpn pen  Generic drug: insulin aspart U-100  Inject 2-8 Units into the skin before meals as needed for High Blood Sugar. Dose as per sliding scale chart.     ondansetron 4 MG Tbdl  Commonly known as: ZOFRAN-ODT  Take 1 tablet (4 mg total) by mouth every 8 (eight) hours as needed (nausea).     oxyCODONE 5 mg Cap  Commonly known as: OXY-IR  Take 2 capsules (10 mg total) by mouth every 4 (four) hours as needed for Pain (If pain isn't relieved with scheduled tylenol and ibuprofen).     * pen needle, diabetic 31 gauge x 3/16" Ndle  Use to inject insulin into the skin once a day.     * NOVOFINE 32 32 gauge x 1/4" Ndle  Generic drug: pen needle, diabetic  Use to inject insulin into the skin.     polyethylene glycol 17 gram Pwpk  Commonly known as: GLYCOLAX  Take 17 g by mouth 2 (two) times daily as needed (CONSTIPATION).     prochlorperazine 5 MG tablet  Commonly known as: COMPAZINE  Take 1 tablet (5 mg total) by mouth every 6 (six) hours as needed for Nausea.     senna-docusate 8.6-50 mg 8.6-50 mg per tablet  Commonly known as: PERICOLACE  Take 1 tablet by mouth once daily.     simvastatin 20 MG tablet  Commonly known as: ZOCOR  Take 1 tablet (20 mg total) by mouth every evening.     VITAMIN D3 50 mcg (2,000 unit) Tab  Generic drug: cholecalciferol (vitamin D3)  Take 1 tablet (2,000 Units total) by mouth once daily.     warfarin 5 MG tablet  Commonly known as: COUMADIN  Take 1 tablet (5 mg total) by mouth Daily.         * This list has 2 medication(s) that are the same as other medications prescribed for you. Read the directions carefully, and ask your doctor or other care provider to review them with you.            STOP taking these medications    metFORMIN 500 MG ER 24hr tablet  Commonly known as: GLUCOPHAGE-XR              DIABETES CARE:  Nurse to perform and educate diabetic management " with blood glucose monitoring:      Fingerstick blood sugar AC and HS    Report CBG < 60 or > 350 to physician.         Insulin Sliding Scale         Glucose  Novolog Insulin Subcutaneous        0 - 60   Orange juice or glucose tablet      No insulin   201-250  2 units   251-300  4 units   301-350  6 units   351-400  8 units   >400   10 units then call physician      Future Orders:  Hospice Medical Director may dictate new orders for comfortable care measures & sign death certificate.        _________________________________  Sonja Sequeira MD  01/04/2022

## 2022-01-04 NOTE — HPI
Ms. Gandara is a 56-year-old female with Stage IV LMS with a past medical history that includes HTN, HLD, asthma, DMII, history of stroke, that presented with increased shortness of breath.  She completed first round of doxorubicin on 12/27/21 and tolerated well but on 12/28/21, she experienced worsening shortness of breath that was managed at home initially with duonebs but eventually worsened.  She is unable to walk short distances without dyspnea and c/o dyspnea at rest.  She c/o slight nonproductive cough and occasionally feels subjectively feverish.  Denied n/v/d.  Arrived to [facility] via EMS on 15L with oxygen saturation at 85% and started on Bipap in the ED then ultimately transitioned to 2LNC with improvement of O2 saturations to 98%.  Labs showed lactate of 2.3, normal troponin/BNP, therapeutic INR 2.9, CBC wnl, and elevated glucose of 378.  CTA showed decreased clot burden of known PE and enlargement of known left lung metastasis to 14.5 cm, new pleural effusion, and enlargement of pulmonary nodules. Overnight, she experienced two acute respiratory decompensations.  The first occurred while on 2L/min via NC (83%).  CXR WNL, breathing treatment administered and pt improved.  Second desaturation to 69% while on high flow NC, although this was possibly related to an issue with tubing connection.  Pt improved when tubing issue resolved but was intermittently placed on BiPap.  Palliative medicine was consulted for goals of care/advance care planning.

## 2022-01-04 NOTE — CARE UPDATE
RAPID RESPONSE NURSE ROUND       Rounding completed with charge RN, Camryn. No concerns verbalized at this time. Instructed to call [phone] for further concerns or assistance.

## 2022-01-04 NOTE — PROGRESS NOTES
[facility] - Oncology ([facility])  Gynecologic Oncology  Progress Note      Patient Name: Lyssa Gandara  MRN: [id number]  Admission Date: 1/2/2022  Hospital Length of Stay: 1 days  Attending Provider: Darrick Archibald MD  Primary Care Provider: Devin Fields MD  Principal Problem: Acute hypoxemic respiratory failure    Follow-up For: * No surgery found *  Post-Operative Day:    Subjective:      History of Present Illness:  Lyssa Gandara is a 55 yo with Stage IV LMS with PMH DM2, h/o PE, h/o stroke, asthma, HTN, and HLD who presents with worsening shortness of breath. She had her first round of doxorubicin chemotherapy on 12/27 which she tolerated well. However, on Tuesday 12/28 she experienced worsening SOB. She initially was able to manage at home with increased use of her duonebs, however the SOB ultimately worsened. She is unable to walk without dyspnea and has difficulty breathing at rest. She has a slight nonproductive cough and occasionally feels feverish, although she has not taken her temperature at home. Denies nausea/vomiting, diarrhea/constipation. She is urinating without difficulty. She has light vaginal bleeding that she wears a panty liner for. She reports back pain that is controlled with pain medication at home.  [place] [facility] [place] with O2 sats at 85%. She was started on BiPaP in the ED; VSS. She was ultimately transitioned to 2L NC with improvement of O2 sat to 98%. Labs notable for lactate of 2.3, normal troponin/BNP, therapeutic INR 2.9, normal CBC, and elevated glucose of 378. A CTA was performed and demonstrated decreased clot burden of known PE, however enlargement of known left lung met to 14.5 cm, new pleural effusion, and enlargement of pulmonary nodules.    Oncology History   Leiomyosarcoma of uterus   11/26/2021 Imaging Significant Findings     CT C/A/P w/: Multiple bilateral pulmonary metastases. Large uterine mass.  No omental caking, lymphadenopathy,  or ascites.      12/2/2021 Biopsy     CT guided biopsy of lung: ER-, MMR? LMS      12/13/2021 -  Chemotherapy     Treatment Summary   Plan Name: OP DOXORUBICIN Q3W  Treatment Goal: Palliative  Status: Active  Start Date: 12/13/2021 (Planned)  End Date: 8/1/2022 (Planned)  Provider: Darrick Archibald MD  Chemotherapy: DOXOrubicin chemo injection 136 mg, 75 mg/m2, Intravenous, Clinic/HOD 1 time, 0 of 12 cycles   12/27/2021-  Chemotherapy C1 Doxorubicin      Hospital Course:  01/03/2022- HD#1. Admitted for O2 in setting of increased lung mass. Initially required Bipap, now on NC.  01/04/2022- HD#2. Overnight two significant desat events. First on 2L NC, CXR wnl, started on solumedrol, given breathing treatment, critical care consulted, patient improved and stable for floor. Second on high flow, was placed on BIPAP temporarily with improvement. Now on 7L. Continued slight cough. No pain. Tolerating diet and ambulating. Pallative care consult in place to assist with code status and goals of care discussion.       Interval History:     Overnight, two acute respiratory decompensations. First while on 2L NC, patient desatted to 83%. CXR was within normal limits, patient was given breathing treatment and started on solumedrol. Critical care was consulted; patient improved and was deemed stable for floor. Second desaturation to 69% while on high flow NC, although possibly an issue with tubing connection. Patient rapidly improved when tubing was fixed, but was intermittently placed on Bipap per respiratory.    This morning, patient is resting in bed on high flow O2 (7L). Reports that the events overnight were stressful to her as they came out of nowhere. Denies pain. Ambulating and voiding without difficulty. Continued mild vaginal spotting. She feels more comfortable on the high flow O2.    Scheduled Meds:   albuterol sulfate  2.5 mg Nebulization Q4H    amLODIPine  10 mg Oral Daily    clopidogreL  75 mg Oral Daily     fluticasone furoate-vilanteroL  1 puff Inhalation Daily    insulin detemir U-100  20 Units Subcutaneous Daily    senna-docusate 8.6-50 mg  1 tablet Oral Daily    warfarin  2.5 mg Oral Daily     Continuous Infusions:  PRN Meds:acetaminophen, albuterol-ipratropium, benzonatate, calcium carbonate, dextrose 50%, dextrose 50%, glucagon (human recombinant), glucose, glucose, hydrALAZINE, HYDROmorphone, insulin aspart U-100, magnesium hydroxide 400 mg/5 ml, ondansetron, oxyCODONE, oxyCODONE, polyethylene glycol, prochlorperazine, simethicone, sodium chloride 0.9%    Review of patient's allergies indicates:   Allergen Reactions    Plasma, human, normal        Objective:     Vital Signs (Most Recent):  Temp: 97.5 °F (36.4 °C) (01/04/22 0445)  Pulse: 99 (01/04/22 0455)  Resp: (!) 23 (01/04/22 0455)  BP: (!) 129/59 (01/04/22 0445)  SpO2: 99 % (01/04/22 0455) Vital Signs (24h Range):  Temp:  [97.4 °F (36.3 °C)-98.7 °F (37.1 °C)] 97.5 °F (36.4 °C)  Pulse:  [] 99  Resp:  [16-25] 23  SpO2:  [82 %-100 %] 99 %  BP: (117-176)/(59-90) 129/59     Weight: 65.3 kg (143 lb 15.4 oz)  Body mass index is 25.5 kg/m².    Intake/Output - Last 3 Shifts       01/02 0700  01/03 0659 01/03 0700  01/04 0659    P.O. 240 480    I.V. (mL/kg)  1231.8 (18.9)    IV Piggyback 350     Total Intake(mL/kg) 590 (9) 1711.8 (26.2)    Urine (mL/kg/hr) 400 400 (0.3)    Total Output 400 400    Net +190 +1311.8          Urine Occurrence  1 x             Physical Exam:   Constitutional: She is oriented to person, place, and time. She appears well-developed and well-nourished. No distress.    HENT:   Head: Normocephalic and atraumatic.    Eyes: EOM are normal.     Cardiovascular: Normal rate.     Pulmonary/Chest: No tachypnea. No respiratory distress. She has decreased breath sounds in the left upper field, the left middle field and the left lower field.   Dyspnea noted. Able to speak in full sentences, but pauses frequently. On 7L via high flow.         Abdominal: Soft. There is no abdominal tenderness.             Musculoskeletal: Normal range of motion.       Neurological: She is alert and oriented to person, place, and time.    Skin: Skin is warm and dry. She is not diaphoretic.    Psychiatric: She has a normal mood and affect.       Lines/Drains/Airways     Peripheral Intravenous Line                 Peripheral IV - Single Lumen 01/03/22 0400 20 G Anterior;Left;Proximal Forearm 1 day              Diagnostic Results:  Imaging Results           CTA Chest Non-Coronary (PE Study) (Final result)  Result time 01/02/22 22:31:28    Final result by Teto Ontiveros MD (01/02/22 22:31:28)                 Impression:      1. No large new central pulmonary thromboembolism noting suboptimal bolus timing.  Decreased clot burden in the left pulmonary/left lower lobe arteries with some residual clot.  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  No right heart strain.  2. Interval enlargement of left lower lobe mass now measuring up to 14.5 cm.  Mass extends toward the mediastinum/left hilum as detailed above and results in rightward mediastinal shift.  3. Interval development of left pleural effusion, possibly loculated.  4. Interval enlargement of multiple bilateral pulmonary soft tissue masses.  5. Enlarged prevascular lymph node, new from prior.  6. Small pericardial effusion, new from prior.  This report was flagged in Epic as abnormal.    Electronically signed by resident: Oswaldo Mackay  Date:    01/02/2022  Time:    21:36    Electronically signed by: Teto Ontiveros MD  Date:    01/02/2022  Time:    22:31             Narrative:    EXAMINATION:  CTA CHEST NON CORONARY    CLINICAL HISTORY:  Pulmonary embolism (PE) suspected, high prob;    TECHNIQUE:  Low dose axial images, sagittal and coronal reformations were obtained from the thoracic inlet to the lung bases following the IV administration of 100 mL of Omnipaque 350.  Contrast timing was optimized to  evaluate the pulmonary arteries.    COMPARISON:  CTA chest 11/26/2021, 08/05/2016    FINDINGS:  Base of Neck:  No significant abnormality.    Thoracic soft tissues:  No significant abnormality.    Aorta: Left-sided aortic arch with common origin of the brachiocephalic and left common carotid arteries.  The thoracic aorta is normal in caliber without significant calcific atherosclerosis .    Heart: Heart is not enlarged.  New small pericardial effusion. Coronary artery calcific atherosclerosis.    Pulmonary vasculature: Bolus timing suboptimal to evaluate for thromboembolism.  The pulmonary arteries distribute normally.  Decreased clot burden in the left pulmonary/left lower lobe artery with some residual clot.  Assessment of the right upper lobe artery limited by artifact from contrast bolus within the SVC.  Occlusion of the right interlobar artery similar to prior exams dating back to 08/05/2016.  No new large central pulmonary thromboembolism    Kim/Mediastinum:  Rightward mediastinal shift.  Enlarged prevascular lymph node measuring 1.5 cm in short axis (axial series 2, image 151) additional prominent mediastinal lymph nodes.    Airways:  Trachea is midline and proximal airways are patent without significant abnormality.    Lungs: CTA technique sacrifices pulmonary parenchymal fine detail and contrast resolution in order to optimize assessment of pulmonary arteries.  Interval enlargement of left lower lobe mass measuring 6.7 x 11.3 x 14.5 cm (axial series 2, image 260 and coronal series 601, image 165).  The mass extends toward the mediastinum/left hilum and appears to be abutting possibly encasing the left lower lobe pulmonary artery, left inferior pulmonary vein, and left lower lobe bronchus.  Mass is also abutting the aorta and left atrium this mass extends into the posterior left chest wall with suspected involvement of the left paraspinal musculature for an axial series 2, image 196).  Interval development  of left pleural effusion which may be loculated given its presence laterally and within the major fissure.  There is associated volume loss mainly in the left lower lobe.  Interval enlargement of multiple bilateral pulmonary soft tissue masses, for example a right upper lobe mass measuring 3.7 cm (axial series 2, image 102), previously measuring approximately 3.0 cm and left lower lobe mass measuring 3.5 cm (axial series 2, image 281), previously measuring approximately 2.7 cm.  Fibrotic change/scattered bands of subsegmental atelectasis mainly in the right middle and lower lobes.    Esophagus: Normal in course and caliber.    Upper abdomen: Partially visualized cholelithiasis.    Bones:  Degenerative changes of the visualized osseous structures without acute fracture or lytic or sclerotic lesions.                               X-Ray Chest AP Portable (Final result)  Result time 01/02/22 20:33:51    Final result by Teto Ontiveros MD (01/02/22 20:33:51)                 Impression:      Numerous bilateral pulmonary masses, similar to prior.    Interval increase in effusion at the left base.    Interval increased left upper lobe atelectasis partially silhouetting the upper left heart border.      Electronically signed by: Teto Ontiveros MD  Date:    01/02/2022  Time:    20:33             Narrative:    EXAMINATION:  XR CHEST AP PORTABLE    CLINICAL HISTORY:  sob;    TECHNIQUE:  Single frontal view of the chest was performed.    COMPARISON:  12/02/2021.    FINDINGS:  Numerous bilateral pulmonary masses, similar to prior.    Interval increase in effusion at the left base.  Interval increased left upper lobe atelectasis partially silhouetting the upper left heart border.    Heart and lungs otherwise appear unchanged when allowing for differences in technique and positioning.                                  Assessment/Plan:     * Acute hypoxemic respiratory failure  - Initially on 15L > BiPap > 2L NC > now on high  flow with intermittant Bipap  - Increased WOB, decreased L Lung sounds  - CTA with enlarged pulmonary mass (14.5cm), small loculated pleural effusion, enlargement of lung nodules, and decrease of known PE volume  - CBC wnl, K 5.6, Cr 1.3  - LA 2.3  - neg troponins, BNP 24  - EKG with sinus tach  - Overnight on 1/3/22, 2 acute decompensations in O2 sat. Critical care consulted, stable for floor. Given solumedrol and breathing treatment with improvement. Patient remains on high flow (7L) with intermittent bipap use.  - Tessalon pearls PRN for cough  - Anticipate need for home O2      Leiomyosarcoma of uterus  - see full onc history in HPI  - recent diagnosis of Stage IV LMS  - s/p C1 on 21, well-tolerated    HLD (hyperlipidemia)  - atorvastatin 10 mg qD    Metastasis to lung  - Known mets to lungs including LLL mass  - Interval worsening of pulmonary nodules, new loculated small pleural effusion, increasing size of known mass to 14.5 cm with mediastinal shift  - New O2 requirement, anticipate need for home O2  - Extensive discussion regarding prognosis and advanced disease. Palliative care consulted, appreciate recs.  - Patient elects full code.    Cerebral infarction due to thrombosis of right carotid artery 17  - no residual deficits  - Continue home plavix 75 mg qD    Type 2 diabetes mellitus with renal complication  -  on arrival, given 5u per SSI  - continue determir 20 u qD  - SSI ordered (patient usually does home SSI, does not have scheduled mealtime insulin)  - BG AC qHS  - Last A1C 8.7  - B-162  - Asymptomatic hypoglycemia resolved with glucose tabs    Asthma  - continue home albuterol nebs q4 hr  - continue home fluticasone inhaler BID  - continue home combivent rescue inhaler PRN    Essential hypertension  - BP: (117-176)/(59-90) 129/59  - started on amlodipine 10 mg qD  - hydralazine 10 mg PRN for BP >180/110      Personal history of pulmonary embolism  - Decreased size of clot  burden on CT  - continue home warfarin 2.5 mg qD  - PT/INR therapeutic at 23.2/2.2  - SCDs  - encourage ambulation when patient able to tolerate      VTE Risk Mitigation (From admission, onward)         Ordered     warfarin (COUMADIN) tablet 2.5 mg  Daily         01/03/22 0101     IP VTE HIGH RISK PATIENT  Once         01/03/22 0039     Place sequential compression device  Until discontinued         01/03/22 0039                Sonja Sequeira MD  Gynecologic Oncology  [facility] - Oncology ([facility])

## 2022-01-04 NOTE — HOSPITAL COURSE
01/03/2022- HD#1. Admitted for O2 in setting of increased lung mass. Initially required Bipap, now on NC.  01/04/2022- HD#2. Overnight two significant desat events. First on 2L NC, CXR wnl, started on solumedrol, given breathing treatment, critical care consulted, patient improved and stable for floor. Second on high flow, was placed on BIPAP temporarily with improvement. Now on 7L. Continued slight cough. No pain. Tolerating diet and ambulating. Pallative care consult in place to assist with code status and goals of care discussion.   01/05/2022- HD#3. Overnight one desat event to 83% on 7L NC. Started on BIPAP for 3 hours with improvement. Given ativan 1 mg and dilaudid 0.5mg. Continued slight cough. No pain. Tolerating diet and ambulating. -178, required 4U insulin with breakfast and 2U with lunch. Extensive goals of care conversation yesterday with assistance of palliative care and social work. Pt elects to go home on hospice, is DNI, but unsure about DNR. Anticipate discharge today.

## 2022-01-04 NOTE — ASSESSMENT & PLAN NOTE
- Known mets to lungs including LLL mass  - Interval worsening of pulmonary nodules, new loculated small pleural effusion, increasing size of known mass to 14.5 cm with mediastinal shift  - New O2 requirement, anticipate need for home O2  - Extensive discussion regarding prognosis and advanced disease. Palliative care consulted, appreciate recs.  - Patient elects full code.

## 2022-01-04 NOTE — NURSING
Oxygen saturation 82% on O2 @ 3 liters nasal cannula, tachy in 140's - 150's. Dr. Puente notified, rapid response notified, respiratory called to bedside for breathing treatment. 40 mg IV solumedrol given. Saturation at 98% on high flow nasal cannula at 8 liters. Will continue to monitor.

## 2022-01-04 NOTE — CARE UPDATE
RAPID RESPONSE NURSE ROUND       Rounding completed with charge RN, Angelica. No concerns verbalized at this time. Instructed to call [phone] for further concerns or assistance. Notification made to Dr. Puente for Bipap, recommended per MICU. Orders placed. Pt doing well

## 2022-01-04 NOTE — CARE UPDATE
1/3 2014 pt called for RT and RN O2 sats 69%; (discovered tubing had disconnected from connector pt was on RA) pt placed on HIFlow NC and given resp tx; O2 sats increased to 97% after tx;; pt placed back on 3 lpm NC: O2 sats 100% pt resting at this time

## 2022-01-04 NOTE — ASSESSMENT & PLAN NOTE
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 MICU consulted    Hypoxia is multifactorial given asthma,  PE and worsening metastatic disease    Plan:  · Repeat CXR-->No significant change compared to admission CXR  · Pt does have signficant wheezing on exam: Continue DuoNEbs q4 hours- If tachcyardia persists could try Xopenex Nebs.  · Will give a dose of Methylprednisone for wheezing--> Reassess and can repeat dose daily.  · Consider repeat TTE to re-assess EF given treatment with doxorrubicin  · Consider stopping IVF if patient is eating and drinking.  · Pt remains stable on floor- Even in the case of worsening respiratory status, she is a poor candidate for mechanical intubation given advanced metastatic disease to lungs.  · Continue Palliative Care talks with patient and family.

## 2022-01-04 NOTE — PLAN OF CARE
POC reviewed with patient; no acute events this shift. Afebrile. VSS on 2.5L NC. Oxy, Dilaudid, and Ativan given PRN. IVFs started. Hypoglycemic at lunchtime; resolved with PRN glucose tabs. Ambulates independently to bathroom. All needs addressed. Will continue to monitor with frequent rounds and clustered care to promote rest.

## 2022-01-04 NOTE — PROGRESS NOTES
"Met with pt. This am to discuss SW'er role and assistance with DC planning. Pt. Asked if she could get her Aunt (Celi Ingram) on the phone for the discussion. Discussed plans for DC to include possible hospice. Pt. And aunt with multiple questions about service. Explained role of hospice and what services they can provide, answered all questions. Pt. Expresses desire to go home and be with her family. She is very hopeful and feels that she will beat this cancer and has a strong brandan in God. She stated "God is not done with me yet". Pts. Aunt has had previous experience with [facility] ([phone]) and would like a referral to this agency. Will contact [facility] and begin referral process.   "

## 2022-01-04 NOTE — PROGRESS NOTES
Admit Assessment    Patient Identification  Lyssa Gandara   :  1965  Admit Date:  2022  Attending Provider:  Darrick Archibald MD              Referral:   Pt was admitted to  with a diagnosis of Acute hypoxemic respiratory failure, and was admitted this hospital stay due to SOB (shortness of breath) [R06.02].   is involved was referred to the Social Work Department via routine referral.  Patient presents as a 56 y.o. year old single female.    Persons interviewed: patient and Aunt (Celi IngramJeavwbv-[phone])    Living Situation:  Pt. States that she currently resides at home with her 2 Aunts. She states that she has been very independent with all adl's prior to admission. Pt. Currently works as a  ([employer]). Pt. Worked until [employer] and is now on STD thru her employer.     Resides at [address] [place] [address] [facility] [address], phone: [phone] (home).      (RETIRED) Functional Status Prior  Ambulation Prior: 0-->independent  Transferrin-->independent  Toiletin-->independent  Bathin-->independent  Dressin-->independent  Eatin-->independent  Communication: understands/communicates without difficulty  Swallowing: swallows foods/liquids without difficulty    Current or Past Agencies and Description of Services/Supplies    DME  Agency Name: none  Agency Phone Number: n/a       Home Health  Agency Name: none  Agency Phone Number: n/a  Services: n/a    IV Infusion  Agency Name: none  Agency Phone Number: n/a    Nutrition: oral    Outpatient Pharmacy:     [facility]  [place], [place] - [address] [facility]  [address] [facility] [address]  Phone: [phone] Fax: [phone]    [id number] - [place], [place] - [address] [facility] [address]  [address]  [place] [address]  Phone: [phone] Fax:  [phone]      Patient Preference of agencies include: None noted    Patient/Caregiver informed of right to choose providers or agencies.  Patient provides permission to release any necessary information to [facility] and to Non[facility] agencies as needed to facilitate patient care, treatment planning, and patient discharge planning.  Written and verbal resources provided.      Coping: pt. States that she is doing OK. She states that she has a very supportive family and they are very involved in her care. Pt. States that she understands the severity of her illness but she has a strong brandan and believes that God will remain by her side. Pt. Given the opportunity to discuss her brandan and how this has helped her cope with current medical issues.           Adjustment to Diagnosis and Treatment: appropriate      Emotional/Behavioral/Cognitive Issues: none noted            History/Current Symptoms of Anxiety/Depression: Yes (related to recent illness)  History/Current Substance Use:   Social History     Tobacco Use    Smoking status: Never Smoker    Smokeless tobacco: Never Used   Substance and Sexual Activity    Alcohol use: Yes     Alcohol/week: 0.0 standard drinks     Comment: 2 beers a day    Drug use: No    Sexual activity: Not Currently     Partners: Male     Birth control/protection: Post-menopausal, None       Indications of Abuse/Neglect: No:   Abuse Screen (yes response referral indicated)  Feels Unsafe at Home or Work/School: no  Physical Signs of Abuse Present: no    Financial:  Payer/Plan Subscr  Sex Relation Sub. Ins. ID Effective Group Num   1. BLUE CROSS BL* BILLY ZHAO* 1965 Female Self [id number]                                   [address] [address]                            Other identified concerns/needs: may need hospice upon DC    Plan: to return home with help from her Aunts.    Interventions/Referrals: TBD  Patient/caregiver engaged in treatment planning  process.     providing psychosocial and supportive counseling, resources, education, assistance and discharge planning as appropriate.  Patient/caregiver state understanding of  available resources,  following, remains available. Provided pt. With 'er phone # and encouraged pt/family to call if needed. Phone # provided. Will follow.

## 2022-01-04 NOTE — PLAN OF CARE
POC reviewed with patient; no acute events this shift. Afebrile; VSS. Maintaining sats above 98% on 8L high-flow NC while at rest; if taking bites or up to commode will desat to 60-70%. In-room pulse ox and continuous telemetry in place for close monitoring (multiple viewing points throughout unit). Tessalon Pearles and Vistaril given PRN. Supplemental insulin given per ac/hs scale. Commode at bedside. Code status changed to DNI, per Palliative team discussions; planned for d/c home with hospice tomorrow, pending HME delivery, per SW. All needs addressed. Will continue to monitor with frequent rounds and clustered care to promote rest.

## 2022-01-04 NOTE — CARE UPDATE
Pt placed on BIPAP as per order; stayed with pt until HR and RR decreased; pt neri BIPAP at this time

## 2022-01-04 NOTE — HPI
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 She had her first round of doxorubicin chemotherapy on 12/27 which she tolerated well. However, on Tuesday 12/28 she experienced worsening SOB. She initially was able to manage at home with increased use of her duonebs, however the SOB ultimately worsened. She is unable to walk without dyspnea and has difficulty breathing at rest. She has a slight nonproductive cough and occasionally feels feverish, although she has not taken her temperature at home.     On admission patient was hypoxic to 85%, placed on BiPAP. A CTA was repeated which reported the known PE with no signs of new emboli, however, it did show progression of the lung metastatic disease. She was noted to be wheezing and she is being treated with Duonebs. Today, however, she has been more tachycardic up to 140s, sats 83% on 3L and for that reason Critical Care was consulted.

## 2022-01-04 NOTE — SUBJECTIVE & OBJECTIVE
Past Medical History:   Diagnosis Date    Asthma     Carotid stenosis     Dyspnea on exertion     Hypertension 3/8/2016    Internal carotid artery dissection 2/26/2017    Long term current use of anticoagulant therapy 3/8/2016    Metastasis to lung 11/27/2021    PE (pulmonary embolism)     Hx of multiple DVT/PE    Presence of IVC filter     Scleroderma     Stroke 02/2017    Type 2 diabetes mellitus without complication        Past Surgical History:   Procedure Laterality Date    ARTERIAL THROMBECTOMY Right 02/26/2017    right extracranial ICA thrombectomy by Interventional Radiology    DANETTE FILTER PLACEMENT         Review of patient's allergies indicates:   Allergen Reactions    Plasma, human, normal        Family History     Problem Relation (Age of Onset)    Breast cancer Mother (60)    Diabetes Mellitus Maternal Uncle    Hypertension Father    No Known Problems Brother, Maternal Grandmother, Maternal Grandfather, Paternal Grandmother, Paternal Grandfather, Maternal Aunt, Paternal Aunt, Paternal Uncle        Tobacco Use    Smoking status: Never Smoker    Smokeless tobacco: Never Used   Substance and Sexual Activity    Alcohol use: Yes     Alcohol/week: 0.0 standard drinks     Comment: 2 beers a day    Drug use: No    Sexual activity: Not Currently     Partners: Male     Birth control/protection: Post-menopausal, None      Review of Systems   Constitutional: Negative for activity change, appetite change, fatigue and fever.   HENT: Negative for congestion and facial swelling.    Respiratory: Positive for cough, shortness of breath and wheezing. Negative for stridor.    Cardiovascular: Negative for chest pain, palpitations and leg swelling.   Gastrointestinal: Negative for abdominal pain, diarrhea, nausea and vomiting.   Endocrine: Negative.    Genitourinary: Negative for dysuria and hematuria.   Musculoskeletal: Negative.    Skin: Negative.    Neurological: Negative.      Objective:      Vital Signs (Most Recent):  Temp: 98.2 °F (36.8 °C) (01/03/22 2017)  Pulse: (!) 139 (01/03/22 2047)  Resp: (!) 24 (01/03/22 2155)  BP: (!) 137/90 (01/03/22 2047)  SpO2: 99 % (01/03/22 2047) Vital Signs (24h Range):  Temp:  [97.4 °F (36.3 °C)-98.7 °F (37.1 °C)] 98.2 °F (36.8 °C)  Pulse:  [] 139  Resp:  [15-35] 24  SpO2:  [83 %-100 %] 99 %  BP: (119-176)/(75-94) 137/90   Weight: 65.3 kg (143 lb 15.4 oz)  Body mass index is 25.5 kg/m².      Intake/Output Summary (Last 24 hours) at 1/3/2022 2246  Last data filed at 1/3/2022 0418  Gross per 24 hour   Intake 590 ml   Output 400 ml   Net 190 ml       Physical Exam  Vitals reviewed.   Constitutional:       General: She is not in acute distress.     Appearance: She is well-developed and well-nourished.   HENT:      Head: Normocephalic and atraumatic.      Mouth/Throat:      Mouth: Oropharynx is clear and moist.   Eyes:      Pupils: Pupils are equal, round, and reactive to light.   Neck:      Vascular: No JVD.   Cardiovascular:      Rate and Rhythm: Normal rate and regular rhythm.      Heart sounds: Normal heart sounds. No murmur heard.      Pulmonary:      Effort: Pulmonary effort is normal. No respiratory distress.      Breath sounds: Normal breath sounds. No wheezing or rales.      Comments: Decreased breath sounds on LL base  Diffuse bilateral wheezing  Abdominal:      General: Bowel sounds are normal. There is no distension.      Palpations: Abdomen is soft.      Tenderness: There is no abdominal tenderness.   Musculoskeletal:         General: No deformity or edema. Normal range of motion.      Cervical back: Normal range of motion and neck supple.   Skin:     General: Skin is warm.   Neurological:      Mental Status: She is alert and oriented to person, place, and time.         Vents:  Oxygen Concentration (%): 60 (01/02/22 2100)  Lines/Drains/Airways     Peripheral Intravenous Line                 Peripheral IV - Single Lumen 01/03/22 0400 20 G  Anterior;Left;Proximal Forearm <1 day              Significant Labs:    CBC/Anemia Profile:  Recent Labs   Lab 01/02/22 2035 01/02/22 2038   WBC 5.37  --    HGB 11.0*  --    HCT 35.0* 35*   *  --    *  --    RDW 19.9*  --         Chemistries:  Recent Labs   Lab 01/02/22 2035 01/03/22  0401    139   K 5.6* 4.2    103   CO2 20* 26   BUN 18 17   CREATININE 1.3 1.0   CALCIUM 9.7 9.5   ALBUMIN 3.1*  --    PROT 8.0  --    BILITOT 0.5  --    ALKPHOS 131  --    ALT 11  --    AST 28  --        ABGs:   Recent Labs   Lab 01/02/22 2055   PH 7.393   PCO2 39.7   HCO3 24.2   POCSATURATED 71*   BE -1     BMP:   Recent Labs   Lab 01/03/22 0401   *      K 4.2      CO2 26   BUN 17   CREATININE 1.0   CALCIUM 9.5     CMP:   Recent Labs   Lab 01/02/22 2035 01/03/22  0401    139   K 5.6* 4.2    103   CO2 20* 26   * 168*   BUN 18 17   CREATININE 1.3 1.0   CALCIUM 9.7 9.5   PROT 8.0  --    ALBUMIN 3.1*  --    BILITOT 0.5  --    ALKPHOS 131  --    AST 28  --    ALT 11  --    ANIONGAP 14 10   EGFRNONAA 46.3* >60.0       Significant Imaging: I have reviewed all pertinent imaging results/findings within the past 24 hours.

## 2022-01-04 NOTE — PROGRESS NOTES
Pt and family member (Via phone) met with rep Joie) from [facility] ([phone]) and discussed hospice. Pt. In agreement with a referral and desires to go home as soon as all arrangements have been made. Per [facility] at hospice, equipment can be arranged and delivered to pts. Home by tomorrow in the morning. Spoke with pt. And informed her of the above. She is in agreement with the plan. Will follow.

## 2022-01-04 NOTE — SUBJECTIVE & OBJECTIVE
Interval History:     Overnight, two acute respiratory decompensations. First while on 2L NC, patient desatted to 83%. CXR was within normal limits, patient was given breathing treatment and started on solumedrol. Critical care was consulted; patient improved and was deemed stable for floor. Second desaturation to 69% while on high flow NC, although possibly an issue with tubing connection. Patient rapidly improved when tubing was fixed, but was intermittently placed on Bipap per respiratory.    This morning, patient is resting in bed on high flow O2 (7L). Reports that the events overnight were stressful to her as they came out of nowhere. Denies pain. Ambulating and voiding without difficulty. Continued mild vaginal spotting. She feels more comfortable on the high flow O2.    Scheduled Meds:   albuterol sulfate  2.5 mg Nebulization Q4H    amLODIPine  10 mg Oral Daily    clopidogreL  75 mg Oral Daily    fluticasone furoate-vilanteroL  1 puff Inhalation Daily    insulin detemir U-100  20 Units Subcutaneous Daily    senna-docusate 8.6-50 mg  1 tablet Oral Daily    warfarin  2.5 mg Oral Daily     Continuous Infusions:  PRN Meds:acetaminophen, albuterol-ipratropium, benzonatate, calcium carbonate, dextrose 50%, dextrose 50%, glucagon (human recombinant), glucose, glucose, hydrALAZINE, HYDROmorphone, insulin aspart U-100, magnesium hydroxide 400 mg/5 ml, ondansetron, oxyCODONE, oxyCODONE, polyethylene glycol, prochlorperazine, simethicone, sodium chloride 0.9%    Review of patient's allergies indicates:   Allergen Reactions    Plasma, human, normal        Objective:     Vital Signs (Most Recent):  Temp: 97.5 °F (36.4 °C) (01/04/22 0445)  Pulse: 99 (01/04/22 0455)  Resp: (!) 23 (01/04/22 0455)  BP: (!) 129/59 (01/04/22 0445)  SpO2: 99 % (01/04/22 0455) Vital Signs (24h Range):  Temp:  [97.4 °F (36.3 °C)-98.7 °F (37.1 °C)] 97.5 °F (36.4 °C)  Pulse:  [] 99  Resp:  [16-25] 23  SpO2:  [82 %-100 %] 99 %  BP:  (117-176)/(59-90) 129/59     Weight: 65.3 kg (143 lb 15.4 oz)  Body mass index is 25.5 kg/m².    Intake/Output - Last 3 Shifts       01/02 0700 01/03 0659 01/03 0700 01/04 0659    P.O. 240 480    I.V. (mL/kg)  1231.8 (18.9)    IV Piggyback 350     Total Intake(mL/kg) 590 (9) 1711.8 (26.2)    Urine (mL/kg/hr) 400 400 (0.3)    Total Output 400 400    Net +190 +1311.8          Urine Occurrence  1 x             Physical Exam:   Constitutional: She is oriented to person, place, and time. She appears well-developed and well-nourished. No distress.    HENT:   Head: Normocephalic and atraumatic.    Eyes: EOM are normal.     Cardiovascular: Normal rate.     Pulmonary/Chest: No tachypnea. No respiratory distress. She has decreased breath sounds in the left upper field, the left middle field and the left lower field.   Dyspnea noted. Able to speak in full sentences, but pauses frequently. On 7L via high flow.        Abdominal: Soft. There is no abdominal tenderness.             Musculoskeletal: Normal range of motion.       Neurological: She is alert and oriented to person, place, and time.    Skin: Skin is warm and dry. She is not diaphoretic.    Psychiatric: She has a normal mood and affect.       Lines/Drains/Airways     Peripheral Intravenous Line                 Peripheral IV - Single Lumen 01/03/22 0400 20 G Anterior;Left;Proximal Forearm 1 day              Diagnostic Results:  Imaging Results           CTA Chest Non-Coronary (PE Study) (Final result)  Result time 01/02/22 22:31:28    Final result by Teto Ontiveros MD (01/02/22 22:31:28)                 Impression:      1. No large new central pulmonary thromboembolism noting suboptimal bolus timing.  Decreased clot burden in the left pulmonary/left lower lobe arteries with some residual clot.  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  No right heart strain.  2. Interval enlargement of left lower lobe mass now measuring up to 14.5 cm.  Mass extends toward  the mediastinum/left hilum as detailed above and results in rightward mediastinal shift.  3. Interval development of left pleural effusion, possibly loculated.  4. Interval enlargement of multiple bilateral pulmonary soft tissue masses.  5. Enlarged prevascular lymph node, new from prior.  6. Small pericardial effusion, new from prior.  This report was flagged in Epic as abnormal.    Electronically signed by resident: Oswaldo Mackay  Date:    01/02/2022  Time:    21:36    Electronically signed by: Teto Ontiveros MD  Date:    01/02/2022  Time:    22:31             Narrative:    EXAMINATION:  CTA CHEST NON CORONARY    CLINICAL HISTORY:  Pulmonary embolism (PE) suspected, high prob;    TECHNIQUE:  Low dose axial images, sagittal and coronal reformations were obtained from the thoracic inlet to the lung bases following the IV administration of 100 mL of Omnipaque 350.  Contrast timing was optimized to evaluate the pulmonary arteries.    COMPARISON:  CTA chest 11/26/2021, 08/05/2016    FINDINGS:  Base of Neck:  No significant abnormality.    Thoracic soft tissues:  No significant abnormality.    Aorta: Left-sided aortic arch with common origin of the brachiocephalic and left common carotid arteries.  The thoracic aorta is normal in caliber without significant calcific atherosclerosis .    Heart: Heart is not enlarged.  New small pericardial effusion. Coronary artery calcific atherosclerosis.    Pulmonary vasculature: Bolus timing suboptimal to evaluate for thromboembolism.  The pulmonary arteries distribute normally.  Decreased clot burden in the left pulmonary/left lower lobe artery with some residual clot.  Assessment of the right upper lobe artery limited by artifact from contrast bolus within the SVC.  Occlusion of the right interlobar artery similar to prior exams dating back to 08/05/2016.  No new large central pulmonary thromboembolism    Kim/Mediastinum:  Rightward mediastinal shift.  Enlarged prevascular lymph  node measuring 1.5 cm in short axis (axial series 2, image 151) additional prominent mediastinal lymph nodes.    Airways:  Trachea is midline and proximal airways are patent without significant abnormality.    Lungs: CTA technique sacrifices pulmonary parenchymal fine detail and contrast resolution in order to optimize assessment of pulmonary arteries.  Interval enlargement of left lower lobe mass measuring 6.7 x 11.3 x 14.5 cm (axial series 2, image 260 and coronal series 601, image 165).  The mass extends toward the mediastinum/left hilum and appears to be abutting possibly encasing the left lower lobe pulmonary artery, left inferior pulmonary vein, and left lower lobe bronchus.  Mass is also abutting the aorta and left atrium this mass extends into the posterior left chest wall with suspected involvement of the left paraspinal musculature for an axial series 2, image 196).  Interval development of left pleural effusion which may be loculated given its presence laterally and within the major fissure.  There is associated volume loss mainly in the left lower lobe.  Interval enlargement of multiple bilateral pulmonary soft tissue masses, for example a right upper lobe mass measuring 3.7 cm (axial series 2, image 102), previously measuring approximately 3.0 cm and left lower lobe mass measuring 3.5 cm (axial series 2, image 281), previously measuring approximately 2.7 cm.  Fibrotic change/scattered bands of subsegmental atelectasis mainly in the right middle and lower lobes.    Esophagus: Normal in course and caliber.    Upper abdomen: Partially visualized cholelithiasis.    Bones:  Degenerative changes of the visualized osseous structures without acute fracture or lytic or sclerotic lesions.                               X-Ray Chest AP Portable (Final result)  Result time 01/02/22 20:33:51    Final result by Teto Ontiveros MD (01/02/22 20:33:51)                 Impression:      Numerous bilateral pulmonary  masses, similar to prior.    Interval increase in effusion at the left base.    Interval increased left upper lobe atelectasis partially silhouetting the upper left heart border.      Electronically signed by: Teto Ontiveros MD  Date:    01/02/2022  Time:    20:33             Narrative:    EXAMINATION:  XR CHEST AP PORTABLE    CLINICAL HISTORY:  sob;    TECHNIQUE:  Single frontal view of the chest was performed.    COMPARISON:  12/02/2021.    FINDINGS:  Numerous bilateral pulmonary masses, similar to prior.    Interval increase in effusion at the left base.  Interval increased left upper lobe atelectasis partially silhouetting the upper left heart border.    Heart and lungs otherwise appear unchanged when allowing for differences in technique and positioning.                                 Handoff

## 2022-01-04 NOTE — ASSESSMENT & PLAN NOTE
-  on arrival, given 5u per SSI  - continue determir 20 u qD  - SSI ordered (patient usually does home SSI, does not have scheduled mealtime insulin)  - BG AC qHS  - Last A1C 8.7  - B-162  - Asymptomatic hypoglycemia resolved with glucose tabs

## 2022-01-04 NOTE — CONSULTS
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 She received her first dose of doxorubicin on 12/27. Patient states about two days ago she had acute onset of dyspnea while at rest at home. She subsequently had progressive worsening with increased work of breathing. She was taking her nebulizer at home frequently but reports this did not help her symptoms. She denies fever, chest pain, palpitations, wheezing, abdominal pain, LE swelling, or cough. She is compliant with her warfarin and other medications.     She was found to have sat of 85% with EMS and placed on 15L, then switched to NIPPV on arrival to ED. She was afebrile, , /33. Labs notable for lactic of 2.3, normal BNP/trop, INR 2.9, and normal leukocyte count. CTA chest showed decreased clot burden of known VTE, enlargement of LLL mass, interval development of left lower lobe pleural effusion, and interval enlargement of pulmonary nodules. She was given duonebs x3, vanc/zosn. MICU was consulted for increased WOB.       Hospital/ICU Course:  No notes on file    Past Medical History:   Diagnosis Date    Asthma     Carotid stenosis     Dyspnea on exertion     Hypertension 3/8/2016    Internal carotid artery dissection 2/26/2017    Long term current use of anticoagulant therapy 3/8/2016    Metastasis to lung 11/27/2021     PE (pulmonary embolism)     Hx of multiple DVT/PE    Presence of IVC filter     Scleroderma     Stroke 02/2017    Type 2 diabetes mellitus without complication        Past Surgical History:   Procedure Laterality Date    ARTERIAL THROMBECTOMY Right 02/26/2017    right extracranial ICA thrombectomy by Interventional Radiology    DANETTE FILTER PLACEMENT         Review of patient's allergies indicates:   Allergen Reactions    Plasma, human, normal        Family History     Problem Relation (Age of Onset)    Breast cancer Mother (60)    Diabetes Mellitus Maternal Uncle    Hypertension Father    No Known Problems Brother, Maternal Grandmother, Maternal Grandfather, Paternal Grandmother, Paternal Grandfather, Maternal Aunt, Paternal Aunt, Paternal Uncle        Tobacco Use    Smoking status: Never Smoker    Smokeless tobacco: Never Used   Substance and Sexual Activity    Alcohol use: Yes     Alcohol/week: 0.0 standard drinks     Comment: 2 beers a day    Drug use: No    Sexual activity: Not Currently     Partners: Male     Birth control/protection: Post-menopausal, None      Review of Systems   Constitutional: Negative for activity change, appetite change, fatigue and fever.   HENT: Negative for congestion and facial swelling.    Respiratory: Positive for cough, shortness of breath and wheezing. Negative for stridor.    Cardiovascular: Negative for chest pain, palpitations and leg swelling.   Gastrointestinal: Negative for abdominal pain, diarrhea, nausea and vomiting.   Endocrine: Negative.    Genitourinary: Negative for dysuria and hematuria.   Musculoskeletal: Negative.    Skin: Negative.    Neurological: Negative.      Objective:     Vital Signs (Most Recent):  Temp: 98.2 °F (36.8 °C) (01/03/22 2017)  Pulse: (!) 139 (01/03/22 2047)  Resp: (!) 24 (01/03/22 2155)  BP: (!) 137/90 (01/03/22 2047)  SpO2: 99 % (01/03/22 2047) Vital Signs (24h Range):  Temp:  [97.4 °F (36.3 °C)-98.7 °F (37.1 °C)] 98.2 °F  (36.8 °C)  Pulse:  [] 139  Resp:  [15-35] 24  SpO2:  [83 %-100 %] 99 %  BP: (119-176)/(75-94) 137/90   Weight: 65.3 kg (143 lb 15.4 oz)  Body mass index is 25.5 kg/m².      Intake/Output Summary (Last 24 hours) at 1/3/2022 2246  Last data filed at 1/3/2022 0418  Gross per 24 hour   Intake 590 ml   Output 400 ml   Net 190 ml       Physical Exam  Vitals reviewed.   Constitutional:       General: She is not in acute distress.     Appearance: She is well-developed and well-nourished.   HENT:      Head: Normocephalic and atraumatic.      Mouth/Throat:      Mouth: Oropharynx is clear and moist.   Eyes:      Pupils: Pupils are equal, round, and reactive to light.   Neck:      Vascular: No JVD.   Cardiovascular:      Rate and Rhythm: Normal rate and regular rhythm.      Heart sounds: Normal heart sounds. No murmur heard.      Pulmonary:      Effort: Pulmonary effort is normal. No respiratory distress.      Breath sounds: Normal breath sounds. No wheezing or rales.      Comments: Decreased breath sounds on LL base  Diffuse bilateral wheezing  Abdominal:      General: Bowel sounds are normal. There is no distension.      Palpations: Abdomen is soft.      Tenderness: There is no abdominal tenderness.   Musculoskeletal:         General: No deformity or edema. Normal range of motion.      Cervical back: Normal range of motion and neck supple.   Skin:     General: Skin is warm.   Neurological:      Mental Status: She is alert and oriented to person, place, and time.         Vents:  Oxygen Concentration (%): 60 (01/02/22 2100)  Lines/Drains/Airways     Peripheral Intravenous Line                 Peripheral IV - Single Lumen 01/03/22 0400 20 G Anterior;Left;Proximal Forearm <1 day              Significant Labs:    CBC/Anemia Profile:  Recent Labs   Lab 01/02/22 2035 01/02/22 2038   WBC 5.37  --    HGB 11.0*  --    HCT 35.0* 35*   *  --    *  --    RDW 19.9*  --         Chemistries:  Recent Labs   Lab  01/02/22 2035 01/03/22  0401    139   K 5.6* 4.2    103   CO2 20* 26   BUN 18 17   CREATININE 1.3 1.0   CALCIUM 9.7 9.5   ALBUMIN 3.1*  --    PROT 8.0  --    BILITOT 0.5  --    ALKPHOS 131  --    ALT 11  --    AST 28  --        ABGs:   Recent Labs   Lab 01/02/22 2055   PH 7.393   PCO2 39.7   HCO3 24.2   POCSATURATED 71*   BE -1     BMP:   Recent Labs   Lab 01/03/22 0401   *      K 4.2      CO2 26   BUN 17   CREATININE 1.0   CALCIUM 9.5     CMP:   Recent Labs   Lab 01/02/22 2035 01/03/22  0401    139   K 5.6* 4.2    103   CO2 20* 26   * 168*   BUN 18 17   CREATININE 1.3 1.0   CALCIUM 9.7 9.5   PROT 8.0  --    ALBUMIN 3.1*  --    BILITOT 0.5  --    ALKPHOS 131  --    AST 28  --    ALT 11  --    ANIONGAP 14 10   EGFRNONAA 46.3* >60.0       Significant Imaging: I have reviewed all pertinent imaging results/findings within the past 24 hours.      ABG  Recent Labs   Lab 01/02/22 2055   PH 7.393   PO2 37*   PCO2 39.7   HCO3 24.2   BE -1     Assessment/Plan:     Pulmonary  * Acute hypoxemic respiratory failure  57 yo female with uterine leiomyosarcoma with metastasis to liver and lung, known PE on warfarin, presenting with worsening SOB after her last chemotherapy on 12/27  Last TTE on 11/2021 with EF 63%  CTA here with known PE with no new clot burden, however, does have increased metastatic disease of the lung  This evening with worsening work of breathing and tachycardic to 140s. MICU consulted    Hypoxia is multifactorial given asthma,  PE and worsening metastatic disease    Plan:  · Repeat CXR-->No significant change compared to admission CXR  · Pt does have signficant wheezing on exam: Continue DuoNEbs q4 hours- If tachcyardia persists could try Xopenex Nebs.  · Will give a dose of Methylprednisone for wheezing--> Reassess and can repeat dose daily.  · Consider repeat TTE to re-assess EF given treatment with doxorrubicin  · Consider stopping IVF if patient is  eating and drinking.  · Pt remains stable on floor- Even in the case of worsening respiratory status, she is a poor candidate for mechanical intubation given advanced metastatic disease to lungs.  · Continue Palliative Care talks with patient and family.       Thank you for your consult. Please call MICU with questions     Reji Ortez MD  Critical Care Medicine  [facility] - Oncology ([facility])

## 2022-01-04 NOTE — ASSESSMENT & PLAN NOTE
- Decreased size of clot burden on CT  - continue home warfarin 2.5 mg qD  - PT/INR therapeutic at 23.2/2.2  - SCDs  - encourage ambulation when patient able to tolerate

## 2022-01-04 NOTE — PLAN OF CARE
Plan of care discussed with patient at start of shift. Free from falls and injuries. Low oxygen saturation x's 2 this shift. Dr. Puente notified, critical care consulted, chest xray done, telemetry initiated, IV solumedrol given, patient placed on 8 liters high flow NC, and currently on bipap. Tachycardia noted, physician aware. Oxycodone 10 mg PO given x's 1 for pain. Denies nausea. Frequent checks for pain and safety maintained. Bed in lowest position, wheels locked, side rails up x's 2, call light in reach. Instructed to call for assistance as needed, verbalizes understanding. Will continue to monitor.

## 2022-01-04 NOTE — CONSULTS
[facility] - Oncology (Hospital)  Palliative Medicine  Consult Note    Patient Name: Lyssa Gandara  MRN: [id number]  Admission Date: 1/2/2022  Hospital Length of Stay: 1 days  Code Status: Partial Code   Attending Provider: Darrick Archibald MD  Consulting Provider: Celine Galvez NP  Primary Care Physician: Devin Fields MD  Principal Problem:Acute hypoxemic respiratory failure    Patient information was obtained from patient, relative(s), past medical records and primary team.      Consults  Assessment/Plan:     Palliative Encounter:   -Discussed current medical status, prognosis, preferences of care  -Pt is considering full code status vs DNR status and would like to continue discussion with her family.  Palliative will follow up at 11am as per pt's request.  -Discussed the importance of pt continuing to make her own medical decisions but in the event she is unable to make medical decisions for herself at any point, she would want her aunt, Celi Ingram ([phone]) to make medical decisions for her.   Addendum:    -Continued discussion of goals of care (see below addendum).    -pt confirms interest in discharge to home hospice as soon as possible  -remains full code, considering DNR as she is aware of the risks of resuscitation and poor chance of recovery in light of co-morbidities.  She wants to continue discussions tomorrow (whether at home with hospice or hospital).     Thank you for your consult. I will follow-up with patient. Please contact us if you have any additional questions.    Subjective:     HPI:   Ms. Gandara is a 56-year-old female with Stage IV LMS with a past medical history that includes HTN, HLD, asthma, DMII, history of stroke, that presented with increased shortness of breath.  She completed first round of doxorubicin on 12/27/21 and tolerated well but on 12/28/21, she experienced worsening shortness of breath that was managed at home initially with duonebs but eventually worsened.   She is unable to walk short distances without dyspnea and c/o dyspnea at rest.  She c/o slight nonproductive cough and occasionally feels subjectively feverish.  Denied n/v/d.  Arrived to [facility] via EMS on 15L with oxygen saturation at 85% and started on Bipap in the ED then ultimately transitioned to 2LNC with improvement of O2 saturations to 98%.  Labs showed lactate of 2.3, normal troponin/BNP, therapeutic INR 2.9, CBC wnl, and elevated glucose of 378.  CTA showed decreased clot burden of known PE and enlargement of known left lung metastasis to 14.5 cm, new pleural effusion, and enlargement of pulmonary nodules. Overnight, she experienced two acute respiratory decompensations.  The first occurred while on 2L/min via NC (83%).  CXR WNL, breathing treatment administered and pt improved.  Second desaturation to 69% while on high flow NC, although this was possibly related to an issue with tubing connection.  Pt improved when tubing issue resolved but was intermittently placed on BiPap.  Palliative medicine was consulted for goals of care/advance care planning.        Palliative Encounter  Pt resting, appears comfortable, no distress noted.  Palliative services introduced and pt was receptive to visit.  She was on the phone with family members (Celi Ingram, aunt, and Gerard) and included them in palliative discussion.  We discussed pt's current medical condition and compared this to previous condition prior to hospitalization.  Approx 2 months ago, pt denies shortness of breath but currently is unable to walk short distances without experiencing dyspnea and does experience dyspnea at rest, requiring supplemental oxygen.  We discussed concern for continued decline in medical status related to aggressive cancer.  We reviewed events that occurred last night, including two episodes of desaturation, and the concern for continued decline.  We hope for the best but it is important to plan for all scenarios and very important  to determine pt's values and preferences of care.  We discussed scenarios and possible outcomes in which it would not be possible to ask pt for her preferences at that time, including sudden cardiac and/or respiratory decline.  We reviewed code status options, including full code status vs DNR status and the risks and benefits of each. We discussed concern regarding pt's overall prognosis and the concern that if pt were to require CPR in the event of cardiopulmonary arrest, it may be more harmful than beneficial, in light of current condition. Invited pt and family to ask questions and all questions were answered. Pt and family express interest in continuing all standard care possible but in the event of cardiopulmonary arrest, considering DNR status.  They would like to continue discussion together and ask that we meet at 1100 to continue goals of care discussion. We also discussed the importance of determining healthcare power of .  Pt will continue to make medical decisions for herself but if pt becomes unable to make medical decisions for herself, she would want her aunt, Celi Ingram ([phone]) to make medical decisions for her.  Palliative will follow up at 11am as per family's request.        Acute hypoxemic respiratory failure  -continue management as per primary team  -we discussed the possibility of sudden decline and concern that if pt were to require ventilation, pt may be very difficult to wean from ventilator  -pt is considering all options for care and continuing discussion with family    Leiomyosarcoma of uterus  -continue management as per primary team  -discussed overall prognosis    Metastasis to lung  -continue management as per primary team  -pt with worsening of pulmonary nodules, new loculated small pleural effusion, increasing size of known mass to 14.5 with mediastinal shift  -new supplemental oxygen requirement  -symptoms of dyspnea (at rest and with minimal activity) are  new  -pt is considering all options for care and continuing discussion with family    Cerebral infarction due to thrombosis of right carotid artery 2/26/17  -continue management as per primary team    Asthma  -continue management as per primary team    Past Medical History:   Diagnosis Date    Asthma     Carotid stenosis     Dyspnea on exertion     Hypertension 3/8/2016    Internal carotid artery dissection 2/26/2017    Long term current use of anticoagulant therapy 3/8/2016    Metastasis to lung 11/27/2021    PE (pulmonary embolism)     Hx of multiple DVT/PE    Presence of IVC filter     Scleroderma     Stroke 02/2017    Type 2 diabetes mellitus without complication        Past Surgical History:   Procedure Laterality Date    ARTERIAL THROMBECTOMY Right 02/26/2017    right extracranial ICA thrombectomy by Interventional Radiology    DANETTE FILTER PLACEMENT         Review of patient's allergies indicates:   Allergen Reactions    Plasma, human, normal        Medications:  Continuous Infusions:  Scheduled Meds:   albuterol sulfate  2.5 mg Nebulization Q4H    amLODIPine  10 mg Oral Daily    clopidogreL  75 mg Oral Daily    fluticasone furoate-vilanteroL  1 puff Inhalation Daily    insulin detemir U-100  20 Units Subcutaneous Daily    senna-docusate 8.6-50 mg  1 tablet Oral Daily    warfarin  2.5 mg Oral Daily     PRN Meds:acetaminophen, albuterol-ipratropium, benzonatate, calcium carbonate, dextrose 50%, dextrose 50%, glucagon (human recombinant), glucose, glucose, hydrALAZINE, HYDROmorphone, insulin aspart U-100, magnesium hydroxide 400 mg/5 ml, ondansetron, oxyCODONE, oxyCODONE, polyethylene glycol, prochlorperazine, simethicone, sodium chloride 0.9%    Family History     Problem Relation (Age of Onset)    Breast cancer Mother (60)    Diabetes Mellitus Maternal Uncle    Hypertension Father    No Known Problems Brother, Maternal Grandmother, Maternal Grandfather, Paternal Grandmother,  Paternal Grandfather, Maternal Aunt, Paternal Aunt, Paternal Uncle        Tobacco Use    Smoking status: Never Smoker    Smokeless tobacco: Never Used   Substance and Sexual Activity    Alcohol use: Yes     Alcohol/week: 0.0 standard drinks     Comment: 2 beers a day    Drug use: No    Sexual activity: Not Currently     Partners: Male     Birth control/protection: Post-menopausal, None       Review of Systems   Constitutional: Positive for activity change and fatigue. Negative for fever.   HENT: Negative for rhinorrhea, sinus pressure, sinus pain, sneezing and sore throat.    Eyes: Negative for discharge and itching.   Respiratory: Positive for shortness of breath.    Gastrointestinal: Negative for constipation and diarrhea.   Endocrine: Negative for cold intolerance and heat intolerance.   Genitourinary: Negative for flank pain.   Musculoskeletal: Negative for neck pain and neck stiffness.   Skin: Negative for rash.   Allergic/Immunologic: Negative for environmental allergies and food allergies.   Neurological: Negative for speech difficulty.   Psychiatric/Behavioral: Negative for confusion, decreased concentration and hallucinations.     Objective:     Vital Signs (Most Recent):  Temp: 98.2 °F (36.8 °C) (01/04/22 0800)  Pulse: (!) 116 (01/04/22 0807)  Resp: (!) 22 (01/04/22 0807)  BP: (!) 138/91 (01/04/22 0800)  SpO2: 98 % (01/04/22 0807) Vital Signs (24h Range):  Temp:  [97.4 °F (36.3 °C)-98.7 °F (37.1 °C)] 98.2 °F (36.8 °C)  Pulse:  [] 116  Resp:  [18-25] 22  SpO2:  [82 %-100 %] 98 %  BP: (117-176)/(59-91) 138/91     Weight: 65.3 kg (143 lb 15.4 oz)  Body mass index is 25.5 kg/m².    Physical Exam  Vitals and nursing note reviewed.   Constitutional:       General: She is not in acute distress.     Interventions: Nasal cannula in place.   HENT:      Head: Normocephalic and atraumatic.      Right Ear: External ear normal.      Left Ear: External ear normal.      Nose: Nose normal. No congestion or  rhinorrhea.   Eyes:      General:         Right eye: No discharge.         Left eye: No discharge.      Conjunctiva/sclera: Conjunctivae normal.   Cardiovascular:      Rate and Rhythm: Tachycardia present.   Pulmonary:      Effort: Pulmonary effort is normal. No respiratory distress.   Musculoskeletal:         General: No signs of injury.      Cervical back: Normal range of motion and neck supple.   Skin:     General: Skin is warm and dry.      Findings: No rash.   Neurological:      Mental Status: She is alert and oriented to person, place, and time.   Psychiatric:         Behavior: Behavior normal.         Thought Content: Thought content normal.         Judgment: Judgment normal.         Review of Symptoms    Symptom Assessment (ESAS 0-10 Scale)  Pain:  0  Dyspnea:  0  Anxiety:  0  Nausea:  0  Depression:  0  Anorexia:  0  Fatigue:  0  Insomnia:  0  Restlessness:  0  Agitation:  0         Performance Status:  50    Living Arrangements:  Lives with family        Advance Care Planning   Advance Directives:   Living Will: No    LaPOST: No    Do Not Resuscitate Status: No    Medical Power of : No      Decision Making:  Patient answered questions         Significant Labs: All pertinent labs within the past 24 hours have been reviewed.  CBC:   Recent Labs   Lab 01/02/22 2035 01/02/22 2038   WBC 5.37  --    HGB 11.0*  --    HCT 35.0* 35*   *  --    *  --      BMP:  No results for input(s): GLU, NA, K, CL, CO2, BUN, CREATININE, CALCIUM, MG in the last 24 hours.  LFT:  Lab Results   Component Value Date    AST 28 01/02/2022    ALKPHOS 131 01/02/2022    BILITOT 0.5 01/02/2022     Albumin:   Albumin   Date Value Ref Range Status   01/02/2022 3.1 (L) 3.5 - 5.2 g/dL Final     Protein:   Total Protein   Date Value Ref Range Status   01/02/2022 8.0 6.0 - 8.4 g/dL Final     Lactic acid:   Lab Results   Component Value Date    LACTATE 2.3 (H) 01/02/2022       Significant Imaging: I have reviewed all  pertinent imaging results/findings within the past 24 hours.   X-Ray Chest 1 View  Narrative: EXAMINATION:  XR CHEST 1 VIEW    CLINICAL HISTORY:  acute respiratory distress;    TECHNIQUE:  Single frontal view of the chest was performed.    COMPARISON:  01/02/2022    FINDINGS:  Suboptimal inspiration.    Stable cardiac silhouette.    Multiple bilateral pulmonary masses, largest at the left lung base retrocardiac region.  Findings consistent with metastatic disease.    Loculated pleural effusion along the major fissure, left lateral wall and left lung base.    Bibasilar atelectasis with possible mild interstitial infiltrates bilaterally also.  Impression: See above.  Findings are similar to the recent prior study.    Electronically signed by: Loco Rogers  Date:    01/03/2022  Time:    22:20  ED US Echo  Exam : Angie Funes  POCUS_Cardiac:    Exam Information:  Exam category:  Diagnostic  Consent obtained?:  Yes    Indication(s) for Exam:  Dyspnea    Views Obtained:  Parasternal long-axis, Parasternal short-axis, Subxiphoid, Apical 4-chamber    Interpretation:  Other:  technically difficult exam, heart is tachycardic, large left pleural effusion, likely small pericardial effusion    Confirmatory study:  Confirmatory study done?:  CT    Electronically signed by Angie Funes on Monday, January 3, 2022 at 10:09 PM          > 50% of 70 min visit spent in chart review, face to face discussion of goals of care,  symptom assessment, coordination of care and emotional support.    Celine Galvez NP  Palliative Medicine  [facility] - Oncology ([facility])      Addendum:  1100:  Patient resting, no distress. She has spoken with her family and although she is considering DNR status, she would like to continue to consider.  She is aware that if intubated it would be difficult to wean ventilator and states she does not want to be intubated long-term but she is understandably overwhelmed and wants to continue  discussion tomorrow, whether at home or in hospital.  She confirms her interest in discharge to home hospice, aware of the benefits hospice can provide.  Her mother was enrolled in home hospice for multiple years prior to her death and pt wants to focus on comfort. Pt expresses the difficulty she has in accepting new prognosis, given her age and prior functional status.  She relies heavily on her brandan and has support from her friends, family, and prayer partners.  Offered  visit and she declines at this time. Healthcare power of  completed as per her request and copy on file.    30 min spent in ACP

## 2022-01-05 PROBLEM — Z51.5 COMFORT MEASURES ONLY STATUS: Status: ACTIVE | Noted: 2022-01-01

## 2022-01-05 NOTE — PROGRESS NOTES
Per Cecilia at [facility], they will be managing pt's warfarin. Pt's nurse is [place] and Dr. Janelle Ny is her MD.     Update: advised Marlene at [facility] that warfarin dosing is supposed to be 2.5mg daily

## 2022-01-05 NOTE — SUBJECTIVE & OBJECTIVE
Interval History:     Overnight, one acute decompensation to 83% O2 sat while on 7L NC, tachy to 150s. Given ativan 1 mg and dilaudid 0.5 mg and started on BIPAP with improvement in O2 sat and mild improvement in tachycardia. Remained on BIPAP for 3 hours, now on 3L NC.    This morning, patient is resting in bed on 3L NC. Repors being cold, but otherwise no complaints. Denies pain. Continued cough. Ambulating and voiding without difficulty.    Scheduled Meds:   amLODIPine  10 mg Oral Daily    clopidogreL  75 mg Oral Daily    fluticasone furoate-vilanteroL  1 puff Inhalation Daily    insulin detemir U-100  20 Units Subcutaneous Daily    levalbuterol  1.25 mg Nebulization Q4H    senna-docusate 8.6-50 mg  1 tablet Oral Daily    warfarin  2.5 mg Oral Daily     Continuous Infusions:  PRN Meds:acetaminophen, albuterol-ipratropium, benzonatate, calcium carbonate, dextrose 50%, dextrose 50%, glucagon (human recombinant), glucose, glucose, hydrALAZINE, HYDROmorphone, hydrOXYzine pamoate, insulin aspart U-100, magnesium hydroxide 400 mg/5 ml, ondansetron, oxyCODONE, oxyCODONE, polyethylene glycol, prochlorperazine, simethicone, sodium chloride 0.9%    Review of patient's allergies indicates:   Allergen Reactions    Plasma, human, normal        Objective:     Vital Signs (Most Recent):  Temp: 98.6 °F (37 °C) (01/05/22 0351)  Pulse: (!) 133 (01/05/22 0442)  Resp: (!) 21 (01/05/22 0442)  BP: 120/79 (01/05/22 0351)  SpO2: 100 % (01/05/22 0442) Vital Signs (24h Range):  Temp:  [97.1 °F (36.2 °C)-98.6 °F (37 °C)] 98.6 °F (37 °C)  Pulse:  [108-157] 133  Resp:  [16-25] 21  SpO2:  [91 %-100 %] 100 %  BP: (120-165)/(79-92) 120/79     Weight: 65.3 kg (143 lb 15.4 oz)  Body mass index is 25.5 kg/m².    Intake/Output - Last 3 Shifts       01/03 0700  01/04 0659 01/04 0700 01/05 0659    P.O. 480 480    I.V. (mL/kg) 1231.8 (18.9)     Total Intake(mL/kg) 1711.8 (26.2) 480 (7.4)    Urine (mL/kg/hr) 400 (0.3) 450 (0.3)    Stool  0     Total Output 400 450    Net +1311.8 +30          Urine Occurrence 1 x     Stool Occurrence  0 x             Physical Exam:   Constitutional: She is oriented to person, place, and time. She appears well-developed and well-nourished. No distress.    HENT:   Head: Normocephalic and atraumatic.    Eyes: EOM are normal.     Cardiovascular: Normal rate.     Pulmonary/Chest: Tachypnea noted. No respiratory distress.   Able to speak in full sentences, but pauses frequently. On 3L NC.        Abdominal: Soft. There is no abdominal tenderness.             Musculoskeletal: Normal range of motion.       Neurological: She is alert and oriented to person, place, and time.    Skin: Skin is warm and dry. She is not diaphoretic.    Psychiatric: She has a normal mood and affect.       Lines/Drains/Airways     Peripheral Intravenous Line                 Peripheral IV - Single Lumen 01/03/22 0400 20 G Anterior;Left;Proximal Forearm 2 days              Diagnostic Results:  Imaging Results           CTA Chest Non-Coronary (PE Study) (Final result)  Result time 01/02/22 22:31:28    Final result by Teto Ontiveros MD (01/02/22 22:31:28)                 Impression:      1. No large new central pulmonary thromboembolism noting suboptimal bolus timing.  Decreased clot burden in the left pulmonary/left lower lobe arteries with some residual clot.  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  No right heart strain.  2. Interval enlargement of left lower lobe mass now measuring up to 14.5 cm.  Mass extends toward the mediastinum/left hilum as detailed above and results in rightward mediastinal shift.  3. Interval development of left pleural effusion, possibly loculated.  4. Interval enlargement of multiple bilateral pulmonary soft tissue masses.  5. Enlarged prevascular lymph node, new from prior.  6. Small pericardial effusion, new from prior.  This report was flagged in Epic as abnormal.    Electronically signed by resident: Oswaldo  Thompson  Date:    01/02/2022  Time:    21:36    Electronically signed by: Teto Ontiveros MD  Date:    01/02/2022  Time:    22:31             Narrative:    EXAMINATION:  CTA CHEST NON CORONARY    CLINICAL HISTORY:  Pulmonary embolism (PE) suspected, high prob;    TECHNIQUE:  Low dose axial images, sagittal and coronal reformations were obtained from the thoracic inlet to the lung bases following the IV administration of 100 mL of Omnipaque 350.  Contrast timing was optimized to evaluate the pulmonary arteries.    COMPARISON:  CTA chest 11/26/2021, 08/05/2016    FINDINGS:  Base of Neck:  No significant abnormality.    Thoracic soft tissues:  No significant abnormality.    Aorta: Left-sided aortic arch with common origin of the brachiocephalic and left common carotid arteries.  The thoracic aorta is normal in caliber without significant calcific atherosclerosis .    Heart: Heart is not enlarged.  New small pericardial effusion. Coronary artery calcific atherosclerosis.    Pulmonary vasculature: Bolus timing suboptimal to evaluate for thromboembolism.  The pulmonary arteries distribute normally.  Decreased clot burden in the left pulmonary/left lower lobe artery with some residual clot.  Assessment of the right upper lobe artery limited by artifact from contrast bolus within the SVC.  Occlusion of the right interlobar artery similar to prior exams dating back to 08/05/2016.  No new large central pulmonary thromboembolism    Kim/Mediastinum:  Rightward mediastinal shift.  Enlarged prevascular lymph node measuring 1.5 cm in short axis (axial series 2, image 151) additional prominent mediastinal lymph nodes.    Airways:  Trachea is midline and proximal airways are patent without significant abnormality.    Lungs: CTA technique sacrifices pulmonary parenchymal fine detail and contrast resolution in order to optimize assessment of pulmonary arteries.  Interval enlargement of left lower lobe mass measuring 6.7 x 11.3 x 14.5  cm (axial series 2, image 260 and coronal series 601, image 165).  The mass extends toward the mediastinum/left hilum and appears to be abutting possibly encasing the left lower lobe pulmonary artery, left inferior pulmonary vein, and left lower lobe bronchus.  Mass is also abutting the aorta and left atrium this mass extends into the posterior left chest wall with suspected involvement of the left paraspinal musculature for an axial series 2, image 196).  Interval development of left pleural effusion which may be loculated given its presence laterally and within the major fissure.  There is associated volume loss mainly in the left lower lobe.  Interval enlargement of multiple bilateral pulmonary soft tissue masses, for example a right upper lobe mass measuring 3.7 cm (axial series 2, image 102), previously measuring approximately 3.0 cm and left lower lobe mass measuring 3.5 cm (axial series 2, image 281), previously measuring approximately 2.7 cm.  Fibrotic change/scattered bands of subsegmental atelectasis mainly in the right middle and lower lobes.    Esophagus: Normal in course and caliber.    Upper abdomen: Partially visualized cholelithiasis.    Bones:  Degenerative changes of the visualized osseous structures without acute fracture or lytic or sclerotic lesions.                               X-Ray Chest AP Portable (Final result)  Result time 01/02/22 20:33:51    Final result by Teto Ontiveros MD (01/02/22 20:33:51)                 Impression:      Numerous bilateral pulmonary masses, similar to prior.    Interval increase in effusion at the left base.    Interval increased left upper lobe atelectasis partially silhouetting the upper left heart border.      Electronically signed by: Teto Ontiveros MD  Date:    01/02/2022  Time:    20:33             Narrative:    EXAMINATION:  XR CHEST AP PORTABLE    CLINICAL HISTORY:  sob;    TECHNIQUE:  Single frontal view of the chest was  performed.    COMPARISON:  12/02/2021.    FINDINGS:  Numerous bilateral pulmonary masses, similar to prior.    Interval increase in effusion at the left base.  Interval increased left upper lobe atelectasis partially silhouetting the upper left heart border.    Heart and lungs otherwise appear unchanged when allowing for differences in technique and positioning.

## 2022-01-05 NOTE — PROGRESS NOTES
All arrangements have been made for pt. To DC home with [facility]. Arranged for transport thru [facility] with request for stretcher transport. 3pm  requested. Spoke with pts. Aunt (Zoe) who confirms that equipment has been delivered to the home including oxygen/Bipap. Pt. Aware of the plan and in agreement. No other needs identified.

## 2022-01-05 NOTE — ASSESSMENT & PLAN NOTE
-  on arrival, given 5u per SSI  - continue determir 20 u qD  - SSI ordered (patient usually does home SSI, does not have scheduled mealtime insulin)  - BG AC qHS  - Last A1C 8.7  - B-178, required 4U with breakfast and 2U with lunch

## 2022-01-05 NOTE — ASSESSMENT & PLAN NOTE
- BP: (120-165)/(79-92) 120/79  - started on amlodipine 10 mg qD  - hydralazine 10 mg PRN for BP >180/110

## 2022-01-05 NOTE — CARE UPDATE
"RAPID RESPONSE NURSE AI ALERT       AI alert received.    Chart Reviewed: 01/04/2022, 11:20 PM    MRN: [id number]  Bed: 843/843 A    Dx: Acute hypoxemic respiratory failure    Lyssa Gandara has a past medical history of Asthma, Carotid stenosis, Dyspnea on exertion, Hypertension, Internal carotid artery dissection, Long term current use of anticoagulant therapy, Metastasis to lung, PE (pulmonary embolism), Presence of IVC filter, Scleroderma, Stroke, and Type 2 diabetes mellitus without complication.    Last VS: /82   Pulse (!) 157   Temp 97.4 °F (36.3 °C)   Resp (!) 25   Ht 5' 3" (1.6 m)   Wt 65.3 kg (143 lb 15.4 oz)   LMP 01/03/2022   SpO2 (!) 91%   Breastfeeding No   BMI 25.50 kg/m²     24H Vital Sign Range:  Temp:  [97.1 °F (36.2 °C)-98.2 °F (36.8 °C)]   Pulse:  []   Resp:  [16-25]   BP: (129-138)/(59-91)   SpO2:  [91 %-100 %]     Level of Consciousness (AVPU): alert    Recent Labs     01/02/22 2035 01/02/22 2038   WBC 5.37  --    HGB 11.0*  --    HCT 35.0* 35*   *  --        Recent Labs     01/02/22 2035 01/03/22  0401    139   K 5.6* 4.2    103   CO2 20* 26   CREATININE 1.3 1.0   * 168*        Recent Labs     01/02/22 2055   PH 7.393   PCO2 39.7   PO2 37*   HCO3 24.2   POCSATURATED 71*   BE -1        OXYGEN:  Flow (L/min): 3  Oxygen Concentration (%): (S) 50 (O2 decreased to 35%)  O2 Device (Oxygen Therapy): nasal cannula w/ humidification    MEWS score: 4    Bedside RN, Chedler and Charge RN, Ashley contacted. Per Bedside RN, patient complained of shortness of breath and is currently being placed on BiPAP per RRT. Instructed to call [phone] for further concerns or assistance.    Vanda Chirinos RN        "

## 2022-01-05 NOTE — ASSESSMENT & PLAN NOTE
- Known mets to lungs including LLL mass  - Interval worsening of pulmonary nodules, new loculated small pleural effusion, increasing size of known mass to 14.5 cm with mediastinal shift  - New O2 requirement, currently on 3L NC  - Extensive discussion regarding prognosis and advanced disease. Palliative care consulted, appreciate recs.  - Patient elects [facility]  - Elects for home hospice. Appreciate assistance of SW and palliative care in this difficult discussion.   - Anticipate DC home with hospice today pending HME.

## 2022-01-05 NOTE — SUBJECTIVE & OBJECTIVE
Palliative Encounter  Follow up palliative visit to continue goals of care discussion.  Pt c/o dyspnea, appears uncomfortable with increased work of breathing.  RN also entered room, adjusted bipap mask.  Ativan was administered in the last few minutes.  Discussed the benefit of opioids regarding dyspnea and pt requests medication for dyspnea relief. Dilaudid already ordered, would recommend administering for dyspnea as well as pain.  Discussed with Dr. Sequeira and RN.    -Palliative will follow up after dilaudid dose provided.  Consider continuing dilaudid for dyspnea as well as pain and if effective, opioids can be administered at home with hospice care for dyspnea.   -If pt requires continuous bipap, she would be eligible for inpatient hospice but this may not align with goals of care as returning home has been very important to her.  With home hospice, she can continue bipap as needed.      Acute hypoxemic respiratory failure  -continue management as per primary team  -we discussed the possibility of sudden decline and concern that if pt were to require ventilation, pt may be very difficult to wean from ventilator  -pt is considering all options for care and continuing discussion with family     Leiomyosarcoma of uterus  -continue management as per primary team  -pt/family aware of overall prognosis     Metastasis to lung  -continue management as per primary team  -pt with worsening of pulmonary nodules, new loculated small pleural effusion, increasing size of known mass to 14.5 with mediastinal shift  -new supplemental oxygen requirement  -symptoms of dyspnea (at rest and with minimal activity) are new. Recommend continuing opioids for dyspnea  -pt is considering all options for care and continuing discussion with family     Cerebral infarction due to thrombosis of right carotid artery 2/26/17  -continue management as per primary team     Asthma  -continue management as per primary  team      Medications:  Continuous Infusions:  Scheduled Meds:   amLODIPine  10 mg Oral Daily    clopidogreL  75 mg Oral Daily    fluticasone furoate-vilanteroL  1 puff Inhalation Daily    insulin detemir U-100  20 Units Subcutaneous Daily    levalbuterol  1.25 mg Nebulization Q4H    senna-docusate 8.6-50 mg  1 tablet Oral Daily    warfarin  2.5 mg Oral Daily     PRN Meds:acetaminophen, albuterol-ipratropium, benzonatate, calcium carbonate, dextrose 50%, dextrose 50%, glucagon (human recombinant), glucose, glucose, hydrALAZINE, HYDROmorphone, hydrOXYzine pamoate, insulin aspart U-100, lorazepam, LORazepam, magnesium hydroxide 400 mg/5 ml, ondansetron, oxyCODONE, oxyCODONE, polyethylene glycol, prochlorperazine, simethicone, sodium chloride 0.9%    Objective:     Vital Signs (Most Recent):  Temp: 97.8 °F (36.6 °C) (01/05/22 0846)  Pulse: (!) 140 (01/05/22 0846)  Resp: (!) 30 (01/05/22 1105)  BP: (!) 129/57 (01/05/22 0846)  SpO2: 100 % (01/05/22 0846) Vital Signs (24h Range):  Temp:  [97.1 °F (36.2 °C)-98.6 °F (37 °C)] 97.8 °F (36.6 °C)  Pulse:  [108-157] 140  Resp:  [16-32] 30  SpO2:  [91 %-100 %] 100 %  BP: (120-165)/(57-92) 129/57     Weight: 65.3 kg (143 lb 15.4 oz)  Body mass index is 25.5 kg/m².    Physical Exam  Vitals and nursing note reviewed.   Constitutional:       General: She is awake. She is in acute distress.      Appearance: She is ill-appearing.      Interventions: Face mask in place.   HENT:      Head: Normocephalic and atraumatic.      Right Ear: External ear normal.      Left Ear: External ear normal.      Nose: Nose normal. No congestion or rhinorrhea.   Eyes:      General:         Right eye: No discharge.         Left eye: No discharge.      Conjunctiva/sclera: Conjunctivae normal.   Cardiovascular:      Rate and Rhythm: Normal rate.   Pulmonary:      Effort: Tachypnea present. No respiratory distress (increased work of breathing).   Abdominal:      Palpations: Abdomen is soft.    Musculoskeletal:         General: Normal range of motion.      Cervical back: Normal range of motion.   Skin:     General: Skin is warm and dry.   Neurological:      Mental Status: She is alert and oriented to person, place, and time.         Palliative Exam    Advance Care Planning   Advance Care Planning       Significant Labs: All pertinent labs within the past 24 hours have been reviewed.  CBC:   Recent Labs   Lab 01/02/22 2035 01/02/22 2038   WBC 5.37  --    HGB 11.0*  --    HCT 35.0* 35*   *  --    *  --      BMP:  No results for input(s): GLU, NA, K, CL, CO2, BUN, CREATININE, CALCIUM, MG in the last 24 hours.  LFT:  Lab Results   Component Value Date    AST 28 01/02/2022    ALKPHOS 131 01/02/2022    BILITOT 0.5 01/02/2022     Albumin:   Albumin   Date Value Ref Range Status   01/02/2022 3.1 (L) 3.5 - 5.2 g/dL Final     Protein:   Total Protein   Date Value Ref Range Status   01/02/2022 8.0 6.0 - 8.4 g/dL Final     Lactic acid:   Lab Results   Component Value Date    LACTATE 2.3 (H) 01/02/2022       Significant Imaging: I have reviewed all pertinent imaging results/findings within the past 24 hours.

## 2022-01-05 NOTE — CARE UPDATE
RAPID RESPONSE NURSE ROUND       Rounding completed with charge RN, Ashley. No concerns verbalized at this time. Instructed to call [phone] for further concerns or assistance.

## 2022-01-05 NOTE — PROGRESS NOTES
[facility] - Oncology ([facility])  Gynecologic Oncology  Progress Note      Patient Name: Lyssa Gandara  MRN: [id number]  Admission Date: 1/2/2022  Hospital Length of Stay: 2 days  Attending Provider: Darrick Archibald MD  Primary Care Provider: Devin Fields MD  Principal Problem: Acute hypoxemic respiratory failure    Follow-up For: * No surgery found *  Post-Operative Day:    Subjective:      History of Present Illness:  Lyssa Gandara is a 55 yo with Stage IV LMS with PMH DM2, h/o PE, h/o stroke, asthma, HTN, and HLD who presents with worsening shortness of breath. She had her first round of doxorubicin chemotherapy on 12/27 which she tolerated well. However, on Tuesday 12/28 she experienced worsening SOB. She initially was able to manage at home with increased use of her duonebs, however the SOB ultimately worsened. She is unable to walk without dyspnea and has difficulty breathing at rest. She has a slight nonproductive cough and occasionally feels feverish, although she has not taken her temperature at home. Denies nausea/vomiting, diarrhea/constipation. She is urinating without difficulty. She has light vaginal bleeding that she wears a panty liner for. She reports back pain that is controlled with pain medication at home.  Patient was transported to [facility] via EMS on 15L with O2 sats at 85%. She was started on BiPaP in the ED; VSS. She was ultimately transitioned to 2L NC with improvement of O2 sat to 98%. Labs notable for lactate of 2.3, normal troponin/BNP, therapeutic INR 2.9, normal CBC, and elevated glucose of 378. A CTA was performed and demonstrated decreased clot burden of known PE, however enlargement of known left lung met to 14.5 cm, new pleural effusion, and enlargement of pulmonary nodules.    Oncology History   Leiomyosarcoma of uterus   11/26/2021 Imaging Significant Findings     CT C/A/P w/: Multiple bilateral pulmonary metastases. Large uterine mass.  No omental caking, lymphadenopathy,  or ascites.      12/2/2021 Biopsy     CT guided biopsy of lung: ER-, MMR? LMS      12/13/2021 -  Chemotherapy     Treatment Summary   Plan Name: OP DOXORUBICIN Q3W  Treatment Goal: Palliative  Status: Active  Start Date: 12/13/2021 (Planned)  End Date: 8/1/2022 (Planned)  Provider: Darrick Archibald MD  Chemotherapy: DOXOrubicin chemo injection 136 mg, 75 mg/m2, Intravenous, Clinic/HOD 1 time, 0 of 12 cycles   12/27/2021-  Chemotherapy C1 Doxorubicin      Hospital Course:  01/03/2022- HD#1. Admitted for O2 in setting of increased lung mass. Initially required Bipap, now on NC.  01/04/2022- HD#2. Overnight two significant desat events. First on 2L NC, CXR wnl, started on solumedrol, given breathing treatment, critical care consulted, patient improved and stable for floor. Second on high flow, was placed on BIPAP temporarily with improvement. Now on 7L. Continued slight cough. No pain. Tolerating diet and ambulating. Pallative care consult in place to assist with code status and goals of care discussion.   01/05/2022- HD#3. Overnight one desat event to 83% on 7L NC. Started on BIPAP for 3 hours with improvement. Given ativan 1 mg and dilaudid 0.5mg. Continued slight cough. No pain. Tolerating diet and ambulating. -178, required 4U insulin with breakfast and 2U with lunch. Extensive goals of care conversation yesterday with assistance of palliative care and social work. Pt elects to go home on hospice, is DNI, but unsure about DNR. Anticipate discharge today.      Interval History:     Overnight, one acute decompensation to 83% O2 sat while on 7L NC, tachy to 150s. Given ativan 1 mg and dilaudid 0.5 mg and started on BIPAP with improvement in O2 sat and mild improvement in tachycardia. Remained on BIPAP for 3 hours, now on 3L NC.    This morning, patient is resting in bed on 3L NC. Repors being cold, but otherwise no complaints. Denies pain. Continued cough. Ambulating and voiding without difficulty.    Scheduled  Meds:   amLODIPine  10 mg Oral Daily    clopidogreL  75 mg Oral Daily    fluticasone furoate-vilanteroL  1 puff Inhalation Daily    insulin detemir U-100  20 Units Subcutaneous Daily    levalbuterol  1.25 mg Nebulization Q4H    senna-docusate 8.6-50 mg  1 tablet Oral Daily    warfarin  2.5 mg Oral Daily     Continuous Infusions:  PRN Meds:acetaminophen, albuterol-ipratropium, benzonatate, calcium carbonate, dextrose 50%, dextrose 50%, glucagon (human recombinant), glucose, glucose, hydrALAZINE, HYDROmorphone, hydrOXYzine pamoate, insulin aspart U-100, magnesium hydroxide 400 mg/5 ml, ondansetron, oxyCODONE, oxyCODONE, polyethylene glycol, prochlorperazine, simethicone, sodium chloride 0.9%    Review of patient's allergies indicates:   Allergen Reactions    Plasma, human, normal        Objective:     Vital Signs (Most Recent):  Temp: 98.6 °F (37 °C) (01/05/22 0351)  Pulse: (!) 133 (01/05/22 0442)  Resp: (!) 21 (01/05/22 0442)  BP: 120/79 (01/05/22 0351)  SpO2: 100 % (01/05/22 0442) Vital Signs (24h Range):  Temp:  [97.1 °F (36.2 °C)-98.6 °F (37 °C)] 98.6 °F (37 °C)  Pulse:  [108-157] 133  Resp:  [16-25] 21  SpO2:  [91 %-100 %] 100 %  BP: (120-165)/(79-92) 120/79     Weight: 65.3 kg (143 lb 15.4 oz)  Body mass index is 25.5 kg/m².    Intake/Output - Last 3 Shifts       01/03 0700  01/04 0659 01/04 0700  01/05 0659    P.O. 480 480    I.V. (mL/kg) 1231.8 (18.9)     Total Intake(mL/kg) 1711.8 (26.2) 480 (7.4)    Urine (mL/kg/hr) 400 (0.3) 450 (0.3)    Stool  0    Total Output 400 450    Net +1311.8 +30          Urine Occurrence 1 x     Stool Occurrence  0 x             Physical Exam:   Constitutional: She is oriented to person, place, and time. She appears well-developed and well-nourished. No distress.    HENT:   Head: Normocephalic and atraumatic.    Eyes: EOM are normal.     Cardiovascular: Normal rate.     Pulmonary/Chest: Tachypnea noted. No respiratory distress.   Able to speak in full sentences, but pauses  frequently. On 3L NC.        Abdominal: Soft. There is no abdominal tenderness.             Musculoskeletal: Normal range of motion.       Neurological: She is alert and oriented to person, place, and time.    Skin: Skin is warm and dry. She is not diaphoretic.    Psychiatric: She has a normal mood and affect.       Lines/Drains/Airways     Peripheral Intravenous Line                 Peripheral IV - Single Lumen 01/03/22 0400 20 G Anterior;Left;Proximal Forearm 2 days              Diagnostic Results:  Imaging Results           CTA Chest Non-Coronary (PE Study) (Final result)  Result time 01/02/22 22:31:28    Final result by Teto Ontiveros MD (01/02/22 22:31:28)                 Impression:      1. No large new central pulmonary thromboembolism noting suboptimal bolus timing.  Decreased clot burden in the left pulmonary/left lower lobe arteries with some residual clot.  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  No right heart strain.  2. Interval enlargement of left lower lobe mass now measuring up to 14.5 cm.  Mass extends toward the mediastinum/left hilum as detailed above and results in rightward mediastinal shift.  3. Interval development of left pleural effusion, possibly loculated.  4. Interval enlargement of multiple bilateral pulmonary soft tissue masses.  5. Enlarged prevascular lymph node, new from prior.  6. Small pericardial effusion, new from prior.  This report was flagged in Epic as abnormal.    Electronically signed by resident: Oswaldo Mackay  Date:    01/02/2022  Time:    21:36    Electronically signed by: Teto Ontiveros MD  Date:    01/02/2022  Time:    22:31             Narrative:    EXAMINATION:  CTA CHEST NON CORONARY    CLINICAL HISTORY:  Pulmonary embolism (PE) suspected, high prob;    TECHNIQUE:  Low dose axial images, sagittal and coronal reformations were obtained from the thoracic inlet to the lung bases following the IV administration of 100 mL of Omnipaque 350.  Contrast  timing was optimized to evaluate the pulmonary arteries.    COMPARISON:  CTA chest 11/26/2021, 08/05/2016    FINDINGS:  Base of Neck:  No significant abnormality.    Thoracic soft tissues:  No significant abnormality.    Aorta: Left-sided aortic arch with common origin of the brachiocephalic and left common carotid arteries.  The thoracic aorta is normal in caliber without significant calcific atherosclerosis .    Heart: Heart is not enlarged.  New small pericardial effusion. Coronary artery calcific atherosclerosis.    Pulmonary vasculature: Bolus timing suboptimal to evaluate for thromboembolism.  The pulmonary arteries distribute normally.  Decreased clot burden in the left pulmonary/left lower lobe artery with some residual clot.  Assessment of the right upper lobe artery limited by artifact from contrast bolus within the SVC.  Occlusion of the right interlobar artery similar to prior exams dating back to 08/05/2016.  No new large central pulmonary thromboembolism    Kim/Mediastinum:  Rightward mediastinal shift.  Enlarged prevascular lymph node measuring 1.5 cm in short axis (axial series 2, image 151) additional prominent mediastinal lymph nodes.    Airways:  Trachea is midline and proximal airways are patent without significant abnormality.    Lungs: CTA technique sacrifices pulmonary parenchymal fine detail and contrast resolution in order to optimize assessment of pulmonary arteries.  Interval enlargement of left lower lobe mass measuring 6.7 x 11.3 x 14.5 cm (axial series 2, image 260 and coronal series 601, image 165).  The mass extends toward the mediastinum/left hilum and appears to be abutting possibly encasing the left lower lobe pulmonary artery, left inferior pulmonary vein, and left lower lobe bronchus.  Mass is also abutting the aorta and left atrium this mass extends into the posterior left chest wall with suspected involvement of the left paraspinal musculature for an axial series 2, image  196).  Interval development of left pleural effusion which may be loculated given its presence laterally and within the major fissure.  There is associated volume loss mainly in the left lower lobe.  Interval enlargement of multiple bilateral pulmonary soft tissue masses, for example a right upper lobe mass measuring 3.7 cm (axial series 2, image 102), previously measuring approximately 3.0 cm and left lower lobe mass measuring 3.5 cm (axial series 2, image 281), previously measuring approximately 2.7 cm.  Fibrotic change/scattered bands of subsegmental atelectasis mainly in the right middle and lower lobes.    Esophagus: Normal in course and caliber.    Upper abdomen: Partially visualized cholelithiasis.    Bones:  Degenerative changes of the visualized osseous structures without acute fracture or lytic or sclerotic lesions.                               X-Ray Chest AP Portable (Final result)  Result time 01/02/22 20:33:51    Final result by Teto Ontiveros MD (01/02/22 20:33:51)                 Impression:      Numerous bilateral pulmonary masses, similar to prior.    Interval increase in effusion at the left base.    Interval increased left upper lobe atelectasis partially silhouetting the upper left heart border.      Electronically signed by: Teto Ontiveros MD  Date:    01/02/2022  Time:    20:33             Narrative:    EXAMINATION:  XR CHEST AP PORTABLE    CLINICAL HISTORY:  sob;    TECHNIQUE:  Single frontal view of the chest was performed.    COMPARISON:  12/02/2021.    FINDINGS:  Numerous bilateral pulmonary masses, similar to prior.    Interval increase in effusion at the left base.  Interval increased left upper lobe atelectasis partially silhouetting the upper left heart border.    Heart and lungs otherwise appear unchanged when allowing for differences in technique and positioning.                                  Assessment/Plan:     * Acute hypoxemic respiratory failure  - Initially on 15L >  BiPap > 2L NC > now on high flow with intermittant Bipap  - Increased WOB, decreased L Lung sounds  - CTA with enlarged pulmonary mass (14.5cm), small loculated pleural effusion, enlargement of lung nodules, and decrease of known PE volume  - CBC wnl, K 5.6, Cr 1.3  - LA 2.3  - neg troponins, BNP 24  - EKG with sinus tach  - Overnight on 22, 1 acute decompensations in O2 sat. Given ativan and dilaudid for anxiety, started on BIPAP for 3 hours with improvement in VS.  - Tessalon pearls PRN for cough      Leiomyosarcoma of uterus  - see full onc history in HPI  - recent diagnosis of Stage IV LMS  - s/p C1 on 21, well-tolerated  - Pt elects for home hospice, pending HME delivery    HLD (hyperlipidemia)  - atorvastatin 10 mg qD    Metastasis to lung  - Known mets to lungs including LLL mass  - Interval worsening of pulmonary nodules, new loculated small pleural effusion, increasing size of known mass to 14.5 cm with mediastinal shift  - New O2 requirement, currently on 3L NC  - Extensive discussion regarding prognosis and advanced disease. Palliative care consulted, appreciate recs.  - Patient elects DNI  - Elects for home hospice. Appreciate assistance of SW and palliative care in this difficult discussion.   - Anticipate DC home with hospice today pending HME.    Cerebral infarction due to thrombosis of right carotid artery 17  - no residual deficits  - Continue home plavix 75 mg qD    Type 2 diabetes mellitus with renal complication  -  on arrival, given 5u per SSI  - continue determir 20 u qD  - SSI ordered (patient usually does home SSI, does not have scheduled mealtime insulin)  - BG AC qHS  - Last A1C 8.7  - B-178, required 4U with breakfast and 2U with lunch    Asthma  - continue home albuterol nebs q4 hr, switched to zopanex nebs due to tachycardia overnight  - continue home fluticasone inhaler BID  - continue home combivent rescue inhaler PRN    Essential hypertension  - BP:  (120-165)/(79-92) 120/79  - started on amlodipine 10 mg qD  - hydralazine 10 mg PRN for BP >180/110      Personal history of pulmonary embolism  - Decreased size of clot burden on CT  - continue home warfarin 2.5 mg qD  - PT/INR therapeutic at 23.2/2.2  - SCDs  - encourage ambulation when patient able to tolerate      VTE Risk Mitigation (From admission, onward)         Ordered     warfarin (COUMADIN) tablet 2.5 mg  Daily         01/03/22 0101     IP VTE HIGH RISK PATIENT  Once         01/03/22 0039     Place sequential compression device  Until discontinued         01/03/22 0039              Sonja Sequeira MD  Gynecologic Oncology  [facility] - Oncology ([facility])

## 2022-01-05 NOTE — ASSESSMENT & PLAN NOTE
- Initially on 15L > BiPap > 2L NC > now on high flow with intermittant Bipap  - Increased WOB, decreased L Lung sounds  - CTA with enlarged pulmonary mass (14.5cm), small loculated pleural effusion, enlargement of lung nodules, and decrease of known PE volume  - CBC wnl, K 5.6, Cr 1.3  - LA 2.3  - neg troponins, BNP 24  - EKG with sinus tach  - Overnight on 1/4/22, 1 acute decompensations in O2 sat. Given ativan and dilaudid for anxiety, started on BIPAP for 3 hours with improvement in VS.  - Tessalon pearls PRN for cough

## 2022-01-05 NOTE — DISCHARGE SUMMARY
[facility] - Oncology ([facility])  Obstetrics & Gynecology  Discharge Summary    Patient Name: Lyssa Gandara  MRN: [id number]  Admission Date: 1/2/2022  Hospital Length of Stay: 2 days  Discharge Date and Time:  01/05/2022 12:07 PM  Attending Physician: Darrick Archibald MD   Discharging Provider: Sonja Sequeira MD  Primary Care Provider: Devin Fields MD    HPI:  No notes on file    Hospital Course:  No notes on file    Goals of Care Treatment Preferences:  Code Status: Partial Code      * No surgery found *     Consults (From admission, onward)        Status Ordering Provider     Inpatient consult to Critical Care Medicine  Once        Provider:  (Not yet assigned)    Completed ADA MAURO     Inpatient consult to Palliative Care  Once        Provider:  (Not yet assigned)    Completed SONJA SEQUEIRA     Inpatient consult to Critical Care Medicine  Once        Provider:  (Not yet assigned)    Completed HANNAH MCHUGH     Inpatient consult to Gynecologic Oncology  Once        Provider:  (Not yet assigned)    Completed SONJA SEQUEIRA          Significant Diagnostic Studies: Labs: CMP No results for input(s): NA, K, CL, CO2, GLU, BUN, CREATININE, CALCIUM, PROT, ALBUMIN, BILITOT, ALKPHOS, AST, ALT, ANIONGAP, ESTGFRAFRICA, EGFRNONAA in the last 48 hours. and CBC No results for input(s): WBC, HGB, HCT, PLT in the last 48 hours.  Radiology: CT scan: CT ABDOMEN PELVIS WITH CONTRAST: No results found for this visit on 01/02/22.      Pending Diagnostic Studies:     None        Final Active Diagnoses:    Diagnosis Date Noted POA    PRINCIPAL PROBLEM:  Acute hypoxemic respiratory failure [J96.01] 12/05/2021 Yes    SOB (shortness of breath) [R06.02]  Yes    Leiomyosarcoma of uterus [C55] 12/10/2021 Yes    Metastasis to lung [C78.00] 11/27/2021 Yes    HLD (hyperlipidemia) [E78.5] 11/27/2021 Yes    Cerebral infarction due to thrombosis of right carotid artery 2/26/17 [I63.031] 02/26/2017 Yes    Asthma  [J45.909]  Yes    Type 2 diabetes mellitus with renal complication [E11.29]  Yes    Personal history of pulmonary embolism [Z86.711] 03/08/2016 Yes    Essential hypertension [I10] 03/08/2016 Yes      Problems Resolved During this Admission:        Discharged Condition: stable    Disposition: Hospice/Home    Follow Up:    Patient Instructions:      Diet Adult Regular     Notify your health care provider if you experience any of the following:  temperature >100.4     Notify your health care provider if you experience any of the following:  persistent nausea and vomiting or diarrhea     Notify your health care provider if you experience any of the following:  severe uncontrolled pain     Notify your health care provider if you experience any of the following:  difficulty breathing or increased cough     Notify your health care provider if you experience any of the following:  severe persistent headache     Notify your health care provider if you experience any of the following:  worsening rash     Notify your health care provider if you experience any of the following:  persistent dizziness, light-headedness, or visual disturbances     Notify your health care provider if you experience any of the following:  increased confusion or weakness     No driving until:   Order Comments: No driving until not taking narcotic pain medication.     Activity as tolerated     Medications:  Reconciled Home Medications:      Medication List      START taking these medications    LORazepam 1 MG tablet  Commonly known as: ATIVAN  Take 1 tablet (1 mg total) by mouth every 6 (six) hours as needed for Anxiety.        CHANGE how you take these medications    ipratropium-albuteroL  mcg/actuation inhaler  Commonly known as: CombiVENT  Inhale 1 puff into the lungs 4 (four) times daily. Rescue  What changed: Another medication with the same name was removed. Continue taking this medication, and follow the directions you see here.       "  CONTINUE taking these medications    albuterol 2.5 mg /3 mL (0.083 %) nebulizer solution  Commonly known as: PROVENTIL  Take 3 mLs (2.5 mg total) by nebulization every 6 (six) hours as needed for Wheezing or Shortness of Breath.     blood sugar diagnostic Strp  200 strips by Misc.(Non-Drug; Combo Route) route 2 (two) times daily.     blood-glucose meter kit  To check BG TID times daily, to use with insurance preferred meter     clopidogreL 75 mg tablet  Commonly known as: PLAVIX  Take 1 tablet (75 mg total) by mouth once daily.     cyanocobalamin 1000 MCG tablet  Commonly known as: VITAMIN B-12  Take 1 tablet (1,000 mcg total) by mouth once daily.     fluticasone-salmeterol 250-50 mcg/dose 250-50 mcg/dose diskus inhaler  Commonly known as: ADVAIR  Inhale 1 puff into the lungs 2 (two) times daily. Controller     hydrOXYzine HCL 25 MG tablet  Commonly known as: ATARAX  Take 1 tablet (25 mg total) by mouth 4 (four) times daily as needed for Anxiety.     insulin syringe-needle U-100 0.3 mL 29 gauge x 1/2" Syrg  Use to inject insulin into the skin before meals as needed.     insulin syringe-needle U-100 1 mL 31 gauge x 5/16 Syrg  Commonly known as: BD INSULIN SYRINGE ULTRA-FINE  To use 4 times daily with insulin     lancets Misc  Commonly known as: ONETOUCH ULTRASOFT LANCETS  1 application by Misc.(Non-Drug; Combo Route) route 2 (two) times daily before meals.     lancing device with lancets Kit  1 Device by Misc.(Non-Drug; Combo Route) route 2 (two) times daily with meals.     LANTUS SOLOSTAR U-100 INSULIN glargine 100 units/mL (3mL) SubQ pen  Generic drug: insulin  Inject 20 Units into the skin once daily.     metoclopramide HCl 5 MG tablet  Commonly known as: REGLAN  Take 1 tablet (5 mg total) by mouth every 6 (six) hours as needed (nausea/vomiting).     NovoLOG Flexpen U-100 Insulin 100 unit/mL (3 mL) Inpn pen  Generic drug: insulin aspart U-100  Inject 2-8 Units into the skin before meals as needed for High Blood " "Sugar. Dose as per sliding scale chart.     ondansetron 4 MG Tbdl  Commonly known as: ZOFRAN-ODT  Take 1 tablet (4 mg total) by mouth every 8 (eight) hours as needed (nausea).     oxyCODONE 5 mg Cap  Commonly known as: OXY-IR  Take 2 capsules (10 mg total) by mouth every 4 (four) hours as needed for Pain (If pain isn't relieved with scheduled tylenol and ibuprofen).     * pen needle, diabetic 31 gauge x 3/16" Ndle  Use to inject insulin into the skin once a day.     * NOVOFINE 32 32 gauge x 1/4" Ndle  Generic drug: pen needle, diabetic  Use to inject insulin into the skin.     polyethylene glycol 17 gram Pwpk  Commonly known as: GLYCOLAX  Take 17 g by mouth 2 (two) times daily as needed (CONSTIPATION).     prochlorperazine 5 MG tablet  Commonly known as: COMPAZINE  Take 1 tablet (5 mg total) by mouth every 6 (six) hours as needed for Nausea.     senna-docusate 8.6-50 mg 8.6-50 mg per tablet  Commonly known as: PERICOLACE  Take 1 tablet by mouth once daily.     simvastatin 20 MG tablet  Commonly known as: ZOCOR  Take 1 tablet (20 mg total) by mouth every evening.     VITAMIN D3 50 mcg (2,000 unit) Tab  Generic drug: cholecalciferol (vitamin D3)  Take 1 tablet (2,000 Units total) by mouth once daily.     warfarin 5 MG tablet  Commonly known as: COUMADIN  Take 1 tablet (5 mg total) by mouth Daily.         * This list has 2 medication(s) that are the same as other medications prescribed for you. Read the directions carefully, and ask your doctor or other care provider to review them with you.            STOP taking these medications    metFORMIN 500 MG ER 24hr tablet  Commonly known as: GLUCOPHAGE-XR            Sonja Sequeira MD  Obstetrics & Gynecology  [facility] - Oncology ([facility])  "

## 2022-01-05 NOTE — PROGRESS NOTES
Discharge instructions given to pt.  Peripheral IV D/C'd with catheter tip intact.  Patient is being transported home to hospice by an ambulance.  Awaiting transport.      ROAD TEST  O=SpO2 100% on BiPAP  A=Ambulating with assitance  D=IV D/C'd  T=Tolerating regular diet  E=Voids  S=Performs self care with assistance

## 2022-01-05 NOTE — ASSESSMENT & PLAN NOTE
- see full onc history in HPI  - recent diagnosis of Stage IV LMS  - s/p C1 on 12/27/21, well-tolerated  - Pt elects for home hospice, pending HME delivery

## 2022-01-05 NOTE — PLAN OF CARE
POC reviewed with patient. AOX4, high anxiety, was very out breath saturating 82-89% on 7 L and tachy in the 150s. Called RT for breathing treatment and patient was put on the bipap machine  sitill tachy in 160s Dilaudid given for pain. Discussed patient condition with oncall MD who ordered 1 mg of ativan IV. Patient wore the Bipap mask for 3 hours. Patient is now 6 L sat 100%, tachy in the 120s. Will continue to monitor patient closely.

## 2022-01-05 NOTE — PROGRESS NOTES
[facility] - Oncology ([facility])  Palliative Medicine  Progress Note    Patient Name: Lyssa Gandara  MRN: [id number]  Admission Date: 1/2/2022  Hospital Length of Stay: 2 days  Code Status: Partial Code   Attending Provider: Darrick Archibald MD  Consulting Provider: Celine Galvez NP  Primary Care Physician: Devin Fields MD  Principal Problem:Acute hypoxemic respiratory failure    Patient information was obtained from patient, past medical records and primary team.      Assessment/Plan:   Palliative Encounter:  -Palliative will follow up after dilaudid dose provided (pt unable to fully participate in discussion d/t dyspnea).  Consider continuing dilaudid for dyspnea as well as pain and if effective, opioids can be administered at home with hospice care for dyspnea.   -If pt requires continuous bipap, she would be eligible for inpatient hospice but this may not align with goals of care as returning home has been very important to her.  With home hospice, she can continue bipap as needed when at home.   Addendum:  Pt appears comfortable, increased work of breathing resolved after dilaudid and ativan.  Pt awakens easily but due to drowsiness, she is not able to fully participate in goals of care discussion at this time.     I will follow-up with patient. Please contact us if you have any additional questions.    Subjective:     Chief Complaint:   Chief Complaint   Patient presents with    Shortness of Breath     Starting today, 10 duoneb treatments at home, Hx: lung cancer, sating 85% on RA with EMS, placed on 15L cipap sating 100%. Pt denies cp       HPI:   Ms. Gandara is a 56-year-old female with Stage IV LMS with a past medical history that includes HTN, HLD, asthma, DMII, history of stroke, that presented with increased shortness of breath.  She completed first round of doxorubicin on 12/27/21 and tolerated well but on 12/28/21, she experienced worsening shortness of breath that was managed at home initially  with duonebs but eventually worsened.  She is unable to walk short distances without dyspnea and c/o dyspnea at rest.  She c/o slight nonproductive cough and occasionally feels subjectively feverish.  Denied n/v/d.  Arrived to [facility] via EMS on 15L with oxygen saturation at 85% and started on Bipap in the ED then ultimately transitioned to 2LNC with improvement of O2 saturations to 98%.  Labs showed lactate of 2.3, normal troponin/BNP, therapeutic INR 2.9, CBC wnl, and elevated glucose of 378.  CTA showed decreased clot burden of known PE and enlargement of known left lung metastasis to 14.5 cm, new pleural effusion, and enlargement of pulmonary nodules. Overnight, she experienced two acute respiratory decompensations.  The first occurred while on 2L/min via NC (83%).  CXR WNL, breathing treatment administered and pt improved.  Second desaturation to 69% while on high flow NC, although this was possibly related to an issue with tubing connection.  Pt improved when tubing issue resolved but was intermittently placed on BiPap.  Palliative medicine was consulted for goals of care/advance care planning.        Hospital Course:  No notes on file    Palliative Encounter  Follow up palliative visit to continue goals of care discussion.  Pt c/o dyspnea, appears uncomfortable with increased work of breathing.  RN also entered room, adjusted bipap mask.  Ativan was administered in the last few minutes.  Discussed the benefit of opioids regarding dyspnea and pt requests medication for dyspnea relief. Dilaudid already ordered, would recommend administering for dyspnea as well as pain.  Discussed with Dr. Sequeira and RN.    -Palliative will follow up after dilaudid dose provided.  Consider continuing dilaudid for dyspnea as well as pain and if effective, opioids can be administered at home with hospice care for dyspnea.   -If pt requires continuous bipap, she would be eligible for inpatient hospice but this may not align with goals  of care as returning home has been very important to her.  With home hospice, she can continue bipap as needed.      Acute hypoxemic respiratory failure  -continue management as per primary team  -we discussed the possibility of sudden decline and concern that if pt were to require ventilation, pt may be very difficult to wean from ventilator  -pt is considering all options for care and continuing discussion with family     Leiomyosarcoma of uterus  -continue management as per primary team  -pt/family aware of overall prognosis     Metastasis to lung  -continue management as per primary team  -pt with worsening of pulmonary nodules, new loculated small pleural effusion, increasing size of known mass to 14.5 with mediastinal shift  -new supplemental oxygen requirement  -symptoms of dyspnea (at rest and with minimal activity) are new. Recommend continuing opioids for dyspnea  -pt is considering all options for care and continuing discussion with family     Cerebral infarction due to thrombosis of right carotid artery 2/26/17  -continue management as per primary team     Asthma  -continue management as per primary team      Medications:  Continuous Infusions:  Scheduled Meds:   amLODIPine  10 mg Oral Daily    clopidogreL  75 mg Oral Daily    fluticasone furoate-vilanteroL  1 puff Inhalation Daily    insulin detemir U-100  20 Units Subcutaneous Daily    levalbuterol  1.25 mg Nebulization Q4H    senna-docusate 8.6-50 mg  1 tablet Oral Daily    warfarin  2.5 mg Oral Daily     PRN Meds:acetaminophen, albuterol-ipratropium, benzonatate, calcium carbonate, dextrose 50%, dextrose 50%, glucagon (human recombinant), glucose, glucose, hydrALAZINE, HYDROmorphone, hydrOXYzine pamoate, insulin aspart U-100, lorazepam, LORazepam, magnesium hydroxide 400 mg/5 ml, ondansetron, oxyCODONE, oxyCODONE, polyethylene glycol, prochlorperazine, simethicone, sodium chloride 0.9%    Objective:     Vital Signs (Most Recent):  Temp: 97.8  °F (36.6 °C) (01/05/22 0846)  Pulse: (!) 140 (01/05/22 0846)  Resp: (!) 30 (01/05/22 1105)  BP: (!) 129/57 (01/05/22 0846)  SpO2: 100 % (01/05/22 0846) Vital Signs (24h Range):  Temp:  [97.1 °F (36.2 °C)-98.6 °F (37 °C)] 97.8 °F (36.6 °C)  Pulse:  [108-157] 140  Resp:  [16-32] 30  SpO2:  [91 %-100 %] 100 %  BP: (120-165)/(57-92) 129/57     Weight: 65.3 kg (143 lb 15.4 oz)  Body mass index is 25.5 kg/m².    Physical Exam  Vitals and nursing note reviewed.   Constitutional:       General: She is awake. She is in acute distress.      Appearance: She is ill-appearing.      Interventions: Face mask in place.   HENT:      Head: Normocephalic and atraumatic.      Right Ear: External ear normal.      Left Ear: External ear normal.      Nose: Nose normal. No congestion or rhinorrhea.   Eyes:      General:         Right eye: No discharge.         Left eye: No discharge.      Conjunctiva/sclera: Conjunctivae normal.   Cardiovascular:      Rate and Rhythm: Normal rate.   Pulmonary:      Effort: Tachypnea present. No respiratory distress (increased work of breathing).   Abdominal:      Palpations: Abdomen is soft.   Musculoskeletal:         General: Normal range of motion.      Cervical back: Normal range of motion.   Skin:     General: Skin is warm and dry.   Neurological:      Mental Status: She is alert and oriented to person, place, and time.         Palliative Exam    Advance Care Planning   Advance Care Planning       Significant Labs: All pertinent labs within the past 24 hours have been reviewed.  CBC:   Recent Labs   Lab 01/02/22 2035 01/02/22 2038   WBC 5.37  --    HGB 11.0*  --    HCT 35.0* 35*   *  --    *  --      BMP:  No results for input(s): GLU, NA, K, CL, CO2, BUN, CREATININE, CALCIUM, MG in the last 24 hours.  LFT:  Lab Results   Component Value Date    AST 28 01/02/2022    ALKPHOS 131 01/02/2022    BILITOT 0.5 01/02/2022     Albumin:   Albumin   Date Value Ref Range Status   01/02/2022 3.1 (L)  3.5 - 5.2 g/dL Final     Protein:   Total Protein   Date Value Ref Range Status   01/02/2022 8.0 6.0 - 8.4 g/dL Final     Lactic acid:   Lab Results   Component Value Date    LACTATE 2.3 (H) 01/02/2022       Significant Imaging: I have reviewed all pertinent imaging results/findings within the past 24 hours.      Celine Galvez NP  Palliative Medicine  [facility] - Oncology ([facility])      Addendum: 1130:  Pt appears comfortable, increased work of breathing resolved after dilaudid and ativan.  Pt awakens easily but due to drowsiness, she is not able to fully participate in goals of care discussion at this time.

## 2022-01-05 NOTE — PROGRESS NOTES
Pt re-admitted 1/2 and to be d/c today on home hospice. Pt is SOB with mets to lungs worsening. Per s/w note, [facility] ([phone]), will be taking over care. Please contact them if our services are needed or if they will be managing warfarin.      If our services, will still be needed. Pt should have INR repeated 1/6. She was maintained on 2.5mg daily while admitted with good INRs. Therefore continue this dose.     Update: warfarin management transferring to Hospice care. We will d/c coumadin clinic referral.

## 2022-01-05 NOTE — ASSESSMENT & PLAN NOTE
- continue home albuterol nebs q4 hr, switched to zopanex nebs due to tachycardia overnight  - continue home fluticasone inhaler BID  - continue home combivent rescue inhaler PRN

## 2022-01-06 NOTE — PROGRESS NOTES
Oncology LCSW received a secure chat message requesting assistance with arranging inpatient hospice for pt. LCSW met with pt, pt's Aunt (Celi Ingram), and Darrick Archibald MD. During meeting pt's BiPAP was in place; pt was unable to participate in conversation.   LCSW discussed inpatient hospice options. Ms. Ingram's 1st choice is for pt to stay at [facility] as an inpatient; 2nd choice is for pt to transfer to inpatient hospice at [facility] on [facility]. Oncology charge nurse, Camryn, reports that [facility] does not have an available inpatient hospice bed at this time.   LCSW faxed and emailed pt's clinical information to [facility].   LCSW informed [facility] navigator, Radha [phone], of referral. Radha reports that she will be at pt's bedside at approximately 11:45AM. Olivia reports that she will email a CTI form for completion.

## 2022-01-06 NOTE — SUBJECTIVE & OBJECTIVE
Palliative Encounter  Palliative medicine consult received for advance care planning/goals of care.  Pt resting, on bipap, in process of new IV placement, unable to fully participate in goals of care discussion.  She appears drowsy, awakens easily.  Her aunt/HCPOA, Celi Ingram, at bedside, who was receptive to palliative visit.  We reviewed previous discussion regarding code status and Ms. Ingram confirmed pt's choice of DNR status (pt previously undecided, although had been leaning towards DNR status).  Ms. Ingram describes pt's admission to home hospice and advice from hospice RN to call 911 after sudden decline in condition.  Ms. Ingram describes the decline, stating that pt was able to talk and interact yesterday and declined quickly, becoming unable to interact at home due to severity of SOB in the evening.  Currently, pt appears comfortable and Ms. Ingram is satisfied with comfort medication regimen.  She confirms pt's goals to continue with comfort care and we discussed options including comfort care in hospital vs inpatient hospice.  Ms. Ingram is interested in meeting with inpatient [facility] rep, due to proximity to pt's home/family.        Leiomyosarcoma of uterus  -prognosis remains poor after discharge to home hospice yesterday and sudden decline in status.   -DNR/DNI status confirmed  -Pt is eligible for inpatient hospice and family is interested in meeting with [facility] inpatient representative.   -Continue current comfort medications    Past Medical History:   Diagnosis Date    Asthma     Carotid stenosis     Dyspnea on exertion     Hypertension 3/8/2016    Internal carotid artery dissection 2/26/2017    Long term current use of anticoagulant therapy 3/8/2016    Metastasis to lung 11/27/2021    PE (pulmonary embolism)     Hx of multiple DVT/PE    Presence of IVC filter     Scleroderma     Stroke 02/2017    Type 2 diabetes mellitus without complication        Past Surgical History:    Procedure Laterality Date    ARTERIAL THROMBECTOMY Right 02/26/2017    right extracranial ICA thrombectomy by Interventional Radiology    DANETTE FILTER PLACEMENT         Review of patient's allergies indicates:   Allergen Reactions    Plasma, human, normal        Medications:  Continuous Infusions:  Scheduled Meds:   scopolamine  1 patch Transdermal Q3 Days     PRN Meds:lorazepam, lorazepam, LORazepam, morphine, ondansetron    Family History     Problem Relation (Age of Onset)    Breast cancer Mother (60)    Diabetes Mellitus Maternal Uncle    Hypertension Father    No Known Problems Brother, Maternal Grandmother, Maternal Grandfather, Paternal Grandmother, Paternal Grandfather, Maternal Aunt, Paternal Aunt, Paternal Uncle        Tobacco Use    Smoking status: Never Smoker    Smokeless tobacco: Never Used   Substance and Sexual Activity    Alcohol use: Not Currently     Alcohol/week: 0.0 standard drinks     Comment: 2 beers a day    Drug use: No    Sexual activity: Not Currently     Partners: Male     Birth control/protection: Post-menopausal, None       Review of Systems   Unable to perform ROS: Acuity of condition     Objective:     Vital Signs (Most Recent):  Temp: 98.1 °F (36.7 °C) (01/06/22 0430)  Pulse: (!) 120 (01/06/22 0430)  Resp: 18 (01/06/22 0830)  BP: (!) 99/57 (01/06/22 0430)  SpO2: (!) 86 % (01/06/22 0815) Vital Signs (24h Range):  Temp:  [97.8 °F (36.6 °C)-98.2 °F (36.8 °C)] 98.1 °F (36.7 °C)  Pulse:  [120-170] 120  Resp:  [18-34] 18  SpO2:  [39 %-100 %] 86 %  BP: ()/(55-75) 99/57     Weight: 65.3 kg (143 lb 15.4 oz)  Body mass index is 25.5 kg/m².    Physical Exam  Vitals and nursing note reviewed.   Constitutional:       Appearance: She is ill-appearing.      Interventions: Face mask in place.   HENT:      Head: Normocephalic and atraumatic.      Right Ear: External ear normal.      Left Ear: External ear normal.      Nose: Nose normal. No congestion or rhinorrhea.   Eyes:       General:         Right eye: No discharge.         Left eye: No discharge.   Cardiovascular:      Rate and Rhythm: Normal rate.   Pulmonary:      Effort: Pulmonary effort is normal. No respiratory distress.   Abdominal:      General: Abdomen is flat.   Musculoskeletal:         General: No signs of injury.      Cervical back: Normal range of motion.   Skin:     General: Skin is warm and dry.   Psychiatric:         Behavior: Behavior normal.         Palliative Exam    Advance Care Planning   Advance Directives:   Do Not Resuscitate Status: Yes    Medical Power of : Yes      Decision Making:  Family answered questions and Patient unable to communicate due to disease severity/cognitive impairment         Significant Labs: All pertinent labs within the past 24 hours have been reviewed.  CBC:   Recent Labs   Lab 01/02/22 2035 01/02/22 2038   WBC 5.37  --    HGB 11.0*  --    HCT 35.0* 35*   *  --    *  --      BMP:  No results for input(s): GLU, NA, K, CL, CO2, BUN, CREATININE, CALCIUM, MG in the last 24 hours.  LFT:  Lab Results   Component Value Date    AST 28 01/02/2022    ALKPHOS 131 01/02/2022    BILITOT 0.5 01/02/2022     Albumin:   Albumin   Date Value Ref Range Status   01/02/2022 3.1 (L) 3.5 - 5.2 g/dL Final     Protein:   Total Protein   Date Value Ref Range Status   01/02/2022 8.0 6.0 - 8.4 g/dL Final     Lactic acid:   Lab Results   Component Value Date    LACTATE 2.3 (H) 01/02/2022       Significant Imaging: I have reviewed all pertinent imaging results/findings within the past 24 hours.   X-Ray Chest 1 View  Narrative: EXAMINATION:  XR CHEST 1 VIEW    CLINICAL HISTORY:  acute respiratory distress;    TECHNIQUE:  Single frontal view of the chest was performed.    COMPARISON:  01/02/2022    FINDINGS:  Suboptimal inspiration.    Stable cardiac silhouette.    Multiple bilateral pulmonary masses, largest at the left lung base retrocardiac region.  Findings consistent with metastatic  disease.    Loculated pleural effusion along the major fissure, left lateral wall and left lung base.    Bibasilar atelectasis with possible mild interstitial infiltrates bilaterally also.  Impression: See above.  Findings are similar to the recent prior study.    Electronically signed by: Loco Rogers  Date:    01/03/2022  Time:    22:20  ED US Echo  Exam : Angie Funes  POCUS_Cardiac:    Exam Information:  Exam category:  Diagnostic  Consent obtained?:  Yes    Indication(s) for Exam:  Dyspnea    Views Obtained:  Parasternal long-axis, Parasternal short-axis, Subxiphoid, Apical 4-chamber    Interpretation:  Other:  technically difficult exam, heart is tachycardic, large left pleural effusion, likely small pericardial effusion    Confirmatory study:  Confirmatory study done?:  CT    Electronically signed by Angie Funes on Monday, January 3, 2022 at 10:09 PM

## 2022-01-06 NOTE — PROGRESS NOTES
Oncology LCSW received a call from [facility] Navigator, Radha [phone]. Radha is requesting for the bedside nurse to call report to [phone]. Radha is also requesting for the IV or otjher access to stay in place. LCSW followed up with Hortencia with [facility]. Hortencia reports that they are running behind and will  pt at approximately 6P today. LCSW informed bedside nurse of plan via secure chat.     [facility] [phone]     [facility]  Address: [address] [place] [address]  Phone: [phone]    Geraldine Beasley [facility]  License # [id number]  [email or web address]  [facility] Social Worker  P. [phone]; F. [phone]

## 2022-01-06 NOTE — ED NOTES
Patient's family member at bedside. Clarifies that patient is DNR/DNI and was solely comfort measures taken to reduce patient's pain level while in the ED.

## 2022-01-06 NOTE — DISCHARGE SUMMARY
Discharge Summary  Gynecology Oncology      Admit Date: 2022    Discharge Date and Time: 2022 12:28 PM    Attending Physician: Darrick Archibald MD    Principal Diagnoses: Leiomyosarcoma of uterus    Active Hospital Problems    Diagnosis  POA    *Leiomyosarcoma of uterus [C55]  Yes    Comfort measures only status [Z51.5]  Not Applicable      Resolved Hospital Problems   No resolved problems to display.     Discharged Condition: poor- to inpatient hospice    Hospital Course:   Lyssa Gandara is a 56 y.o. y.o.  female who presented on 2022 for shortness of breath in setting of advanced LMS. She was discharged home to hospice and then returned with increased shortness of breath. Her family requests inpatient hospice care. She was started on BiPap in combination with high flow NRB. Inpatient hospice was arranged with [facility].    Consults: None    Significant Diagnostic Studies:  Recent Labs   Lab 22   WBC 5.37  --    HGB 11.0*  --    HCT 35.0* 35*   *  --    *  --         Treatments:   1. Bipap    Disposition: Hospice/Medical Facility    Patient Instructions:   Current Discharge Medication List      CONTINUE these medications which have NOT CHANGED    Details   albuterol (PROVENTIL) 2.5 mg /3 mL (0.083 %) nebulizer solution Take 3 mLs (2.5 mg total) by nebulization every 6 (six) hours as needed for Wheezing or Shortness of Breath.  Qty: 1 each, Refills: 1    Comments: MAY SUBSTITUTE ANY GENERIC ALBUTEROL NEBULIZER SOLUTION COVERED BY INSURANCE. Dispense 1 box of unit dose      blood sugar diagnostic Strp 200 strips by Misc.(Non-Drug; Combo Route) route 2 (two) times daily.  Qty: 200 strip, Refills: 12      blood-glucose meter kit To check BG TID times daily, to use with insurance preferred meter  Qty: 1 each, Refills: 0    Associated Diagnoses: Type 2 diabetes mellitus with stage 3 chronic kidney disease, with long-term current use of insulin     "  cholecalciferol, vitamin D3, (VITAMIN D3) 50 mcg (2,000 unit) Tab Take 1 tablet (2,000 Units total) by mouth once daily.  Qty: 30 tablet, Refills: 5      clopidogreL (PLAVIX) 75 mg tablet Take 1 tablet (75 mg total) by mouth once daily.  Qty: 30 tablet, Refills: 5      cyanocobalamin (VITAMIN B-12) 1000 MCG tablet Take 1 tablet (1,000 mcg total) by mouth once daily.  Qty: 30 tablet, Refills: 5      fluticasone-salmeterol diskus inhaler 250-50 mcg Inhale 1 puff into the lungs 2 (two) times daily. Controller  Qty: 60 each, Refills: 3    Associated Diagnoses: Asthma, unspecified asthma severity, unspecified whether complicated, unspecified whether persistent      hydrOXYzine HCL (ATARAX) 25 MG tablet Take 1 tablet (25 mg total) by mouth 4 (four) times daily as needed for Anxiety.  Qty: 30 tablet, Refills: 3      insulin (LANTUS SOLOSTAR U-100 INSULIN) glargine 100 units/mL (3mL) SubQ pen Inject 20 Units into the skin once daily.  Qty: 6 mL, Refills: 11      insulin aspart U-100 (NOVOLOG) 100 unit/mL (3 mL) InPn pen Inject 2-8 Units into the skin before meals as needed for High Blood Sugar. Dose as per sliding scale chart.  Qty: 15 mL, Refills: 8      insulin syringe-needle U-100 (BD INSULIN SYRINGE ULTRA-FINE) 1 mL 31 gauge x 5/16 Syrg To use 4 times daily with insulin  Qty: 120 each, Refills: 2    Associated Diagnoses: Type 2 diabetes mellitus with stage 3a chronic kidney disease, with long-term current use of insulin      insulin syringe-needle U-100 0.3 mL 29 gauge x 1/2" Syrg Use to inject insulin into the skin before meals as needed.  Qty: 100 each, Refills: 0      ipratropium-albuteroL (COMBIVENT)  mcg/actuation inhaler Inhale 1 puff into the lungs 4 (four) times daily. Rescue  Qty: 1 each, Refills: 2    Associated Diagnoses: Asthma, unspecified asthma severity, unspecified whether complicated, unspecified whether persistent      lancets (ONETOUCH ULTRASOFT LANCETS) Misc 1 application by Misc.(Non-Drug; " "Combo Route) route 2 (two) times daily before meals.  Qty: 200 each, Refills: 11    Associated Diagnoses: Type 2 diabetes mellitus with stage 3 chronic kidney disease, with long-term current use of insulin      lancing device with lancets Kit 1 Device by Misc.(Non-Drug; Combo Route) route 2 (two) times daily with meals.  Qty: 1 each, Refills: 0    Associated Diagnoses: Type 2 diabetes mellitus with stage 3 chronic kidney disease, with long-term current use of insulin      LORazepam (ATIVAN) 1 MG tablet Take 1 tablet (1 mg total) by mouth every 6 (six) hours as needed for Anxiety.  Qty: 30 tablet, Refills: 3      metoclopramide HCl (REGLAN) 5 MG tablet Take 1 tablet (5 mg total) by mouth every 6 (six) hours as needed (nausea/vomiting).  Qty: 30 tablet, Refills: 1      ondansetron (ZOFRAN-ODT) 4 MG TbDL Take 1 tablet (4 mg total) by mouth every 8 (eight) hours as needed (nausea).  Qty: 30 tablet, Refills: 0    Associated Diagnoses: Nausea      oxyCODONE (OXY-IR) 5 mg Cap Take 2 capsules (10 mg total) by mouth every 4 (four) hours as needed for Pain (If pain isn't relieved with scheduled tylenol and ibuprofen).  Qty: 30 capsule, Refills: 0    Comments: Quantity prescribed more than 7 day supply? Yes, quantity medically necessary  Associated Diagnoses: Malignant neoplasm of endometrium      pen needle, diabetic (NOVOFINE 32) 32 gauge x 1/4" Ndle Use to inject insulin into the skin.  Qty: 100 each, Refills: 0      pen needle, diabetic 31 gauge x 3/16" Ndle Use to inject insulin into the skin once a day.  Qty: 100 each, Refills: 0      polyethylene glycol (GLYCOLAX) 17 gram PwPk Take 17 g by mouth 2 (two) times daily as needed (CONSTIPATION).  Refills: 0      prochlorperazine (COMPAZINE) 5 MG tablet Take 1 tablet (5 mg total) by mouth every 6 (six) hours as needed for Nausea.  Qty: 30 tablet, Refills: 1    Associated Diagnoses: Leiomyosarcoma of uterus      senna-docusate 8.6-50 mg (PERICOLACE) 8.6-50 mg per tablet Take 1 " tablet by mouth once daily.      simvastatin (ZOCOR) 20 MG tablet Take 1 tablet (20 mg total) by mouth every evening.  Qty: 30 tablet, Refills: 5    Associated Diagnoses: Carotid artery stenosis, unilateral, right      warfarin (COUMADIN) 5 MG tablet Take 1 tablet (5 mg total) by mouth Daily.  Qty: 30 tablet, Refills: 11    Comments: Patient lost her Rx and needs refill today. May need insurance override  Associated Diagnoses: Other acute pulmonary embolism without acute cor pulmonale; Long term (current) use of anticoagulants             Discharge Procedure Orders   Activity as tolerated           Sonja Sequeira MD/MPH  OB/GYN PGY2

## 2022-01-06 NOTE — SUBJECTIVE & OBJECTIVE
Oncology History   Leiomyosarcoma of uterus   2021 Imaging Significant Findings    CT C/A/P w/: Multiple bilateral pulmonary metastases. Large uterine mass.  No omental caking, lymphadenopathy, or ascites.     2021 Biopsy    CT guided biopsy of lung: ER-, MMR? LMS     2021 -  Chemotherapy    Treatment Summary   Plan Name: OP DOXORUBICIN Q3W  Treatment Goal: Palliative  Status: Active  Start Date: 2021  End Date: 2022 (Planned)  Provider: Darrick Archibald MD  Chemotherapy: DOXOrubicin chemo injection 136 mg, 75 mg/m2 = 136 mg, Intravenous, Clinic/HOD 1 time, 1 of 12 cycles  Administration: 136 mg (2021)           OB History    Para Term  AB Living   1 0 0 0 1 0   SAB IAB Ectopic Multiple Live Births   0 0 0 0 0      # Outcome Date GA Lbr Gal/2nd Weight Sex Delivery Anes PTL Lv   1 AB  20w0d    OTHER  Y ND      Complications: Dysfunctional Labor     Past Medical History:   Diagnosis Date    Asthma     Carotid stenosis     Dyspnea on exertion     Hypertension 3/8/2016    Internal carotid artery dissection 2017    Long term current use of anticoagulant therapy 3/8/2016    Metastasis to lung 2021    PE (pulmonary embolism)     Hx of multiple DVT/PE    Presence of IVC filter     Scleroderma     Stroke 2017    Type 2 diabetes mellitus without complication      Past Surgical History:   Procedure Laterality Date    ARTERIAL THROMBECTOMY Right 2017    right extracranial ICA thrombectomy by Interventional Radiology    DANETTE FILTER PLACEMENT         (Not in a hospital admission)      Review of patient's allergies indicates:   Allergen Reactions    Plasma, human, normal         Family History     Problem Relation (Age of Onset)    Breast cancer Mother (60)    Diabetes Mellitus Maternal Uncle    Hypertension Father    No Known Problems Brother, Maternal Grandmother, Maternal Grandfather, Paternal Grandmother, Paternal Grandfather, Maternal  Aunt, Paternal Aunt, Paternal Uncle        Tobacco Use    Smoking status: Never Smoker    Smokeless tobacco: Never Used   Substance and Sexual Activity    Alcohol use: Not Currently     Alcohol/week: 0.0 standard drinks     Comment: 2 beers a day    Drug use: No    Sexual activity: Not Currently     Partners: Male     Birth control/protection: Post-menopausal, None     Review of Systems   Unable to perform ROS: Other    Patient unable to participate in ROS due to sedation. Presented with complaint of shortness of breath.   Objective:     Vital Signs (Most Recent):  Temp: 98.2 °F (36.8 °C) (01/05/22 1936)  Pulse: (!) 142 (01/05/22 2208)  Resp: (!) 28 (01/05/22 2210)  BP: (!) 91/55 (01/05/22 2208)  SpO2: 95 % (01/05/22 2208) Vital Signs (24h Range):  Temp:  [97.8 °F (36.6 °C)-98.6 °F (37 °C)] 98.2 °F (36.8 °C)  Pulse:  [118-170] 142  Resp:  [16-34] 28  SpO2:  [39 %-100 %] 95 %  BP: ()/(55-92) 91/55     Weight: 65.3 kg (143 lb 15.4 oz)  Body mass index is 25.5 kg/m².    Patient's last menstrual period was 01/03/2022.    Physical Exam:   Constitutional: She appears distressed.   Ill appearing     HENT:   Head: Normocephalic and atraumatic.      Cardiovascular: Normal rate and regular rhythm.     Pulmonary/Chest: She is in respiratory distress.   Significant respiratory distress with increased work of breathing; tachypnea         Abdominal: Soft. She exhibits no distension. There is no abdominal tenderness.             Musculoskeletal: Normal range of motion and moves all extremeties. No tenderness or edema.       Neurological:   Responsive to voice but unable to answer questions     Skin: Skin is warm and dry. No rash noted.    Psychiatric:   Unable to assess       Laboratory:  CBC: No results for input(s): WBC, RBC, HGB, HCT, PLT, MCV, MCH, MCHC in the last 48 hours.    Diagnostic Results:  n/a

## 2022-01-06 NOTE — ED PROVIDER NOTES
Encounter Date: 1/5/2022       History     Chief Complaint   Patient presents with    Shortness of Breath     Pt with Stage 4 Lung Cancer brought by [facility]. Per family pt supposed to be on hospice but were concerned about pt condition. Pt is DNI.      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  Per aunt, patient is DNR and DNI.  Wanted to follow through with comfort measures at home but when they were discharged from the hospital, home hospice did not have medications or resources to be able to provide pain control. Patient was discharged on oral medications but is not able to swallow, does not have a PEG and does not have an NGT for medications. As a result, were instructed to go back to the hospital for assistance. Aunt is concerned that patient appears uncomfortable and is working to breath. Not interested in escalating care or getting extensive work-up. Would only like to address her anxiety and pain inpatient. Aunt who is POA understands patient has a poor prognosis and may die soon.     Review of patient's allergies indicates:   Allergen Reactions    Plasma, human, normal      Past Medical History:   Diagnosis Date    Asthma     Carotid stenosis     Dyspnea on exertion     Hypertension 3/8/2016    Internal carotid artery dissection 2/26/2017    Long term current use of anticoagulant therapy 3/8/2016    Metastasis to lung 11/27/2021    PE (pulmonary embolism)     Hx of multiple DVT/PE    Presence of IVC filter     Scleroderma     Stroke 02/2017    Type 2 diabetes mellitus without complication      Past Surgical History:   Procedure Laterality Date    ARTERIAL THROMBECTOMY Right 02/26/2017    right extracranial ICA thrombectomy by Interventional Radiology    DANETTE FILTER PLACEMENT       Family History   Problem  Relation Age of Onset    Breast cancer Mother 60    Hypertension Father     No Known Problems Brother     No Known Problems Maternal Grandmother     No Known Problems Maternal Grandfather     No Known Problems Paternal Grandmother     No Known Problems Paternal Grandfather     Diabetes Mellitus Maternal Uncle     No Known Problems Maternal Aunt     No Known Problems Paternal Aunt     No Known Problems Paternal Uncle     Colon cancer Neg Hx     Ovarian cancer Neg Hx      Social History     Tobacco Use    Smoking status: Never Smoker    Smokeless tobacco: Never Used   Substance Use Topics    Alcohol use: Not Currently     Alcohol/week: 0.0 standard drinks     Comment: 2 beers a day    Drug use: No     Review of Systems   Unable to perform ROS: Acuity of condition       Physical Exam     Initial Vitals [01/05/22 1936]   BP Pulse Resp Temp SpO2   124/60 (!) 170 (!) 28 98.2 °F (36.8 °C) 100 %      MAP       --         Physical Exam    Nursing note and vitals reviewed.  Constitutional: She appears well-developed. She is not diaphoretic.   Chronically ill-appearing female who looks older than stated age on BiPAP with increased work of breathing.   HENT:   Head: Normocephalic and atraumatic.   Nose: Nose normal.   Eyes: Conjunctivae and EOM are normal. Pupils are equal, round, and reactive to light. No scleral icterus.   Neck: Neck supple.   Cardiovascular:   Murmur heard.  Tachycardia   Pulmonary/Chest: No stridor. She is in respiratory distress. She has no wheezes. She has no rales.   Abdominal: She exhibits distension. There is abdominal tenderness.   Musculoskeletal:         General: No edema.      Cervical back: Neck supple.     Neurological: She is alert.   Skin: Skin is warm. Capillary refill takes less than 2 seconds. No rash noted.         ED Course   Procedures  Labs Reviewed   SARS-COV-2 RDRP GENE   POCT GLUCOSE MONITORING CONTINUOUS          Imaging Results    None          Medications    LORazepam tablet 1 mg (has no administration in time range)   ondansetron injection 8 mg (has no administration in time range)   morphine injection 2 mg (has no administration in time range)   scopolamine 1.3-1.5 mg (1 mg over 3 days) 1 patch (1 patch Transdermal Patch Applied 1/5/22 2351)   lorazepam injection 0.5 mg (0.5 mg Intravenous Given 1/6/22 0120)   lorazepam injection 1 mg (has no administration in time range)   lorazepam injection 0.5 mg (0.5 mg Intravenous Given 1/5/22 2122)   albuterol-ipratropium 2.5 mg-0.5 mg/3 mL nebulizer solution 3 mL (3 mLs Nebulization Given 1/5/22 2131)   ondansetron injection 4 mg (4 mg Intravenous Given 1/5/22 2123)   lactated ringers bolus 500 mL (0 mLs Intravenous Stopped 1/6/22 0015)   morphine injection 2 mg (2 mg Intravenous Given 1/5/22 2210)           APC / Resident Notes:   Patient is a 56-year-old female with metastatic leiomyosarcoma of the uterus recently discharged on home hospice but unable to receive pain medications for anxiety medication due to patient being unable to swallow, re-presented to the hospital with aunt/POA for inpatient hospice.  Aunt would like to continue with comfort measures including pain control and management of anxiety/air hunger but does not want to escalate care, do an invasive procedures, or get labs at this time.  Patient remains DNR/DNI.  Though she was hypotensive and tachycardic with significant hypoxia work of breathing on BiPAP, and primarily for comfort measures including Ativan for air hunger anxiety, scopolamine patch for nausea, albuterol inhaler for shortness breath, and morphine for pain control.  Consulted to Gyne Onc team for inpatient comfort measures as hospice and palliative are not available at this time.    Ash Jackson MD  [facility], PGY4  01/06/2022 6:04 AM                Attending Attestation:   Physician Attestation Statement for Resident:  As the supervising MD   Physician Attestation  Statement: I have personally seen and examined this patient.   I agree with the above history. -: Case discussed with patient's aunt, here POA. They are aware of the severity of the patient's illness and again elect DNR, comfort care only.  Will defer additional IV sticks for diagnostics.  Will focus on symptom control with scopolamine patch, Ativan, morphine.  Gyn-Onc consulted for admission.  Patient signed out to incoming MD at shift change.   As the supervising MD I agree with the above PE.    As the supervising MD I agree with the above treatment, course, plan, and disposition.                         Clinical Impression:   Final diagnoses:  [C55] Leiomyosarcoma of uterus          ED Disposition Condition    Admit               Ash Jackson MD  Resident  01/06/22 0604

## 2022-01-06 NOTE — PROGRESS NOTES
Oncology LCSW met with pt, pt's Aunt Celi Ingram, and [facility] hospice navigator  At bedside. Pt was sleeping with bipap in place during meeting. LCSW provided Radha with documentation of negative Covid test, inpatient hospice order and completed CTE form. Radha reports that she will arrange to have a room cleaned at [facility] inpatient hospice and will call LCSW when ready for transportation to be arranged and for nurse to call report. Ms. Ingram is in agreement with plan and is currently informing family members.  LCSW will follow and assist with discharge.     Geraldine Beasley, INDIRA  License # [id number]  [email or web address]  [facility] Work[facility]  P. [phone]; F. [phone]

## 2022-01-06 NOTE — PROGRESS NOTES
[facility]  Gynecology Oncology  Progress Note     Patient Name: Lyssa Gandara  MRN: [id number]  Admission Date: 1/5/2022  Primary Care Provider: Devin Fields MD     Subjective:      Chief Complaint/Reason for Admission: Shortness of Breath      History of Present Illness:  Ms. Gandara is a 57 yo female who represented day of discharge from home hospice. Per family member at bedside appropriate hospice measure who not in place for her at home. Patient with significant respiratory burden of disease requiring O2 support. During recent stay she would frequently desat to 68-70s during exertion. Patient had strongly desired discharge to home hospice and had previously declined inpatient hospice earlier today.      HPI from previous admission: Lyssa Gandara is a 57 yo with Stage IV LMS with PMH DM2, h/o PE, h/o stroke, asthma, HTN, and HLD who presents with worsening shortness of breath. She had her first round of doxorubicin chemotherapy on 12/27 which she tolerated well. However, on Tuesday 12/28 she experienced worsening SOB. She initially was able to manage at home with increased use of her duonebs, however the SOB ultimately worsened. She is unable to walk without dyspnea and has difficulty breathing at rest. She has a slight nonproductive cough and occasionally feels feverish, although she has not taken her temperature at home. Denies nausea/vomiting, diarrhea/constipation. She is urinating without difficulty. She has light vaginal bleeding that she wears a panty liner for. She reports back pain that is controlled with pain medication at home.  [place] [facility] [place] on 15L with O2 sats at 85%. She was started on BiPaP in the ED; VSS. She was ultimately transitioned to 2L NC with improvement of O2 sat to 98%. Labs notable for lactate of 2.3, normal troponin/BNP, therapeutic INR 2.9, normal CBC, and elevated glucose of 378. A CTA was performed and demonstrated decreased clot burden of  known PE, however enlargement of known left lung met to 14.5 cm, new pleural effusion, and enlargement of pulmonary nodules.        Oncology History   Leiomyosarcoma of uterus   11/26/2021 Imaging Significant Findings     CT C/A/P w/: Multiple bilateral pulmonary metastases. Large uterine mass.  No omental caking, lymphadenopathy, or ascites.      12/2/2021 Biopsy     CT guided biopsy of lung: ER-, MMR? LMS      12/27/2021 -  Chemotherapy     Treatment Summary   Plan Name: OP DOXORUBICIN Q3W  Treatment Goal: Palliative  Status: Active  Start Date: 12/27/2021  End Date: 8/1/2022 (Planned)  Provider: Darrick Archibald MD  Chemotherapy: DOXOrubicin chemo injection 136 mg, 75 mg/m2 = 136 mg, Intravenous, Clinic/HOD 1 time, 1 of 12 cycles  Administration: 136 mg (12/27/2021)         Interval History:   Patient resting in bed on BIPAP. Aunt Celi Ingram at bedside. Responsive to voice, but unable to answer questions. Appears comfortable on BIPAP, although more ill-appearing.      Objective:   Temp:  [97.8 °F (36.6 °C)-98.2 °F (36.8 °C)] 98.1 °F (36.7 °C)  Pulse:  [120-170] 120  Resp:  [16-34] 18  SpO2:  [39 %-100 %] 98 %  BP: ()/(55-83) 99/57       Physical Exam:   Constitutional: Ill appearing, on BIPAP   HENT:   Head: Normocephalic and atraumatic.      Cardiovascular: Normal rate and regular rhythm.     Pulmonary/Chest: She is in respiratory distress.   Significant increased work of breathing; tachypnea          Abdominal: Soft. She exhibits no distension. There is no abdominal tenderness.             Musculoskeletal: Normal range of motion and moves all extremeties. No tenderness or edema.       Neurological:   Responsive to voice but unable to answer questions     Skin: Skin is warm and dry. No rash noted.    Psychiatric:   Unable to assess         Laboratory:  CBC: No results for input(s): WBC, RBC, HGB, HCT, PLT, MCV, MCH, MCHC in the last 48 hours.     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

## 2022-01-06 NOTE — PLAN OF CARE
[facility]  Department of Hospital Medicine  [address] [place] [address]  [phone] after hours  [phone] fax    HOSPICE  ORDERS    01/06/2022    Admit to Hospice:   Inpatient Service    Diagnoses:   Active Hospital Problems    Diagnosis  POA    *Leiomyosarcoma of uterus [C55]  Yes    Comfort measures only status [Z51.5]  Not Applicable      Resolved Hospital Problems   No resolved problems to display.       Hospice Qualifying Diagnoses: Metastatic Leiomyosarcoma       Patient has a life expectancy < 6 months due to:  1) Primary Hospice Diagnosis:  Metastatic leiomyosarcoma  2) Comorbid Conditions Contributing to Decline: HTN, HLD, asthma, h/o PE, h/o stroke, DM2, scleroderma    Vital Signs: Routine per Hospice Protocol.    Code Status: DNR/DNI    Allergies:   Review of patient's allergies indicates:   Allergen Reactions    Plasma, human, normal        Diet: as tolerated    Activities: As tolerated    Nursing: Per Hospice Routine.      Oxygen: Currently on 15L via NRB, Bipap intermittently    Other Miscellaneous Care: BiPAP PRN (currently FiO2 of 100, inspiratory pressure 12, expiratory pressure of 5)    Medications:        Medication List      CONTINUE taking these medications    albuterol 2.5 mg /3 mL (0.083 %) nebulizer solution  Commonly known as: PROVENTIL  Take 3 mLs (2.5 mg total) by nebulization every 6 (six) hours as needed for Wheezing or Shortness of Breath.     blood sugar diagnostic Strp  200 strips by Misc.(Non-Drug; Combo Route) route 2 (two) times daily.     blood-glucose meter kit  To check BG TID times daily, to use with insurance preferred meter     clopidogreL 75 mg tablet  Commonly known as: PLAVIX  Take 1 tablet (75 mg total) by mouth once daily.     cyanocobalamin 1000 MCG tablet  Commonly known as: VITAMIN B-12  Take 1 tablet (1,000 mcg total) by mouth once daily.     fluticasone-salmeterol 250-50 mcg/dose 250-50 mcg/dose diskus  "inhaler  Commonly known as: ADVAIR  Inhale 1 puff into the lungs 2 (two) times daily. Controller     hydrOXYzine HCL 25 MG tablet  Commonly known as: ATARAX  Take 1 tablet (25 mg total) by mouth 4 (four) times daily as needed for Anxiety.     insulin syringe-needle U-100 0.3 mL 29 gauge x 1/2" Syrg  Use to inject insulin into the skin before meals as needed.     insulin syringe-needle U-100 1 mL 31 gauge x 5/16 Syrg  Commonly known as: BD INSULIN SYRINGE ULTRA-FINE  To use 4 times daily with insulin     ipratropium-albuteroL  mcg/actuation inhaler  Commonly known as: CombiVENT  Inhale 1 puff into the lungs 4 (four) times daily. Rescue     lancets Misc  Commonly known as: ONETOUCH ULTRASOFT LANCETS  1 application by Misc.(Non-Drug; Combo Route) route 2 (two) times daily before meals.     lancing device with lancets Kit  1 Device by Misc.(Non-Drug; Combo Route) route 2 (two) times daily with meals.     LANTUS SOLOSTAR U-100 INSULIN glargine 100 units/mL (3mL) SubQ pen  Generic drug: insulin  Inject 20 Units into the skin once daily.     LORazepam 1 MG tablet  Commonly known as: ATIVAN  Take 1 tablet (1 mg total) by mouth every 6 (six) hours as needed for Anxiety.     metoclopramide HCl 5 MG tablet  Commonly known as: REGLAN  Take 1 tablet (5 mg total) by mouth every 6 (six) hours as needed (nausea/vomiting).     NovoLOG Flexpen U-100 Insulin 100 unit/mL (3 mL) Inpn pen  Generic drug: insulin aspart U-100  Inject 2-8 Units into the skin before meals as needed for High Blood Sugar. Dose as per sliding scale chart.     ondansetron 4 MG Tbdl  Commonly known as: ZOFRAN-ODT  Take 1 tablet (4 mg total) by mouth every 8 (eight) hours as needed (nausea).     oxyCODONE 5 mg Cap  Commonly known as: OXY-IR  Take 2 capsules (10 mg total) by mouth every 4 (four) hours as needed for Pain (If pain isn't relieved with scheduled tylenol and ibuprofen).     * pen needle, diabetic 31 gauge x 3/16" Ndle  Use to inject insulin into " "the skin once a day.     * NOVOFINE 32 32 gauge x 1/4" Ndle  Generic drug: pen needle, diabetic  Use to inject insulin into the skin.     polyethylene glycol 17 gram Pwpk  Commonly known as: GLYCOLAX  Take 17 g by mouth 2 (two) times daily as needed (CONSTIPATION).     prochlorperazine 5 MG tablet  Commonly known as: COMPAZINE  Take 1 tablet (5 mg total) by mouth every 6 (six) hours as needed for Nausea.     senna-docusate 8.6-50 mg 8.6-50 mg per tablet  Commonly known as: PERICOLACE  Take 1 tablet by mouth once daily.     simvastatin 20 MG tablet  Commonly known as: ZOCOR  Take 1 tablet (20 mg total) by mouth every evening.     VITAMIN D3 50 mcg (2,000 unit) Tab  Generic drug: cholecalciferol (vitamin D3)  Take 1 tablet (2,000 Units total) by mouth once daily.     warfarin 5 MG tablet  Commonly known as: COUMADIN  Take 1 tablet (5 mg total) by mouth Daily.         * This list has 2 medication(s) that are the same as other medications prescribed for you. Read the directions carefully, and ask your doctor or other care provider to review them with you.                  DIABETES CARE:  Nurse to perform and educate diabetic management with blood glucose monitoring:        Fingerstick blood sugar a.m. and p.m.    Report CBG < 60 or > 350 to physician.         Insulin Sliding Scale         Glucose  Novolog Insulin Subcutaneous        0 - 60   Orange juice or glucose tablet      No insulin   201-250  2 units   251-300  4 units   301-350  6 units   351-400  8 units   >400   10 units then call physician      Future Orders:  Hospice Medical Director may dictate new orders for comfortable care measures & sign death certificate.        _________________________________  Sonja Sequeira MD  01/06/2022      "

## 2022-01-06 NOTE — HPI
Ms. Gandara is a 57 yo female who represented day of discharge from home hospice. Per family member at bedside appropriate hospice measure who not in place for her at home. Patient with significant respiratory burden of disease requiring O2 support. During recent stay she would frequently desat to 68-70s during exertion. Patient had strongly desired discharge to home hospice and had previously declined inpatient hospice earlier today.      HPI from previous admission: Lyssa Gandara is a 57 yo with Stage IV LMS with PMH DM2, h/o PE, h/o stroke, asthma, HTN, and HLD who presents with worsening shortness of breath. She had her first round of doxorubicin chemotherapy on 12/27 which she tolerated well. However, on Tuesday 12/28 she experienced worsening SOB. She initially was able to manage at home with increased use of her duonebs, however the SOB ultimately worsened. She is unable to walk without dyspnea and has difficulty breathing at rest. She has a slight nonproductive cough and occasionally feels feverish, although she has not taken her temperature at home. Denies nausea/vomiting, diarrhea/constipation. She is urinating without difficulty. She has light vaginal bleeding that she wears a panty liner for. She reports back pain that is controlled with pain medication at home.  Patient was transported to [facility] ED via EMS on 15L with O2 sats at 85%. She was started on BiPaP in the ED; VSS. She was ultimately transitioned to 2L NC with improvement of O2 sat to 98%. Labs notable for lactate of 2.3, normal troponin/BNP, therapeutic INR 2.9, normal CBC, and elevated glucose of 378. A CTA was performed and demonstrated decreased clot burden of known PE, however enlargement of known left lung met to 14.5 cm, new pleural effusion, and enlargement of pulmonary nodules.

## 2022-01-06 NOTE — CONSULTS
[facility] ([facility])  Palliative Medicine  Consult Note    Patient Name: Lyssa Gandara  MRN: [id number]  Admission Date: 1/5/2022  Hospital Length of Stay: 1 days  Code Status: DNR   Attending Provider: Darrick Archibald MD  Consulting Provider: Celine Galvez NP  Primary Care Physician: Devin Fields MD  Principal Problem:Leiomyosarcoma of uterus    Patient information was obtained from patient, relative(s), past medical records and primary team.      Inpatient consult to Palliative Care  Consult performed by: Celine Galvez NP  Consult ordered by: Maryjo Conte MD  Reason for consult: goals of care        Assessment/Plan:     Palliative Encounter:   -prognosis remains poor after discharge to home hospice yesterday and sudden decline in status.   -DNR/DNI status confirmed.  Pt/family goals:  Comfort-focused care  -Pt is eligible for inpatient hospice and family is interested in meeting with [facility] inpatient representative.   -Continue current comfort medications      Thank you for your consult. I will follow-up with patient. Please contact us if you have any additional questions.    Subjective:     HPI:   Ms. Gandara is a 57 yo female who represented day of discharge from home hospice. Per family member at bedside appropriate hospice measure who not in place for her at home. Patient with significant respiratory burden of disease requiring O2 support. During recent stay she would frequently desat to 68-70s during exertion. Patient had strongly desired discharge to home hospice and had previously declined inpatient hospice earlier today.      HPI from previous admission: Lyssa Gandara is a 57 yo with Stage IV LMS with PMH DM2, h/o PE, h/o stroke, asthma, HTN, and HLD who presents with worsening shortness of breath. She had her first round of doxorubicin chemotherapy on 12/27 which she tolerated well. However, on Tuesday 12/28 she experienced worsening SOB. She initially was able to  manage at home with increased use of her duonebs, however the SOB ultimately worsened. She is unable to walk without dyspnea and has difficulty breathing at rest. She has a slight nonproductive cough and occasionally feels feverish, although she has not taken her temperature at home. Denies nausea/vomiting, diarrhea/constipation. She is urinating without difficulty. She has light vaginal bleeding that she wears a panty liner for. She reports back pain that is controlled with pain medication at home.  Patient was [place] [facility] [place] on 15L with O2 sats at 85%. She was started on BiPaP in the ED; VSS. She was ultimately transitioned to 2L NC with improvement of O2 sat to 98%. Labs notable for lactate of 2.3, normal troponin/BNP, therapeutic INR 2.9, normal CBC, and elevated glucose of 378. A CTA was performed and demonstrated decreased clot burden of known PE, however enlargement of known left lung met to 14.5 cm, new pleural effusion, and enlargement of pulmonary nodules.      Hospital Course:  No notes on file    Palliative Encounter  Palliative medicine consult received for advance care planning/goals of care.  Pt resting, on bipap, in process of new IV placement, unable to fully participate in goals of care discussion.  She appears drowsy, awakens easily.  Her aunt/HCPOA, Celi Ingram, at bedside, who was receptive to palliative visit.  We reviewed previous discussion regarding code status and Ms. Ingram confirmed pt's choice of DNR status (pt previously undecided, although had been leaning towards DNR status).  Ms. Ingram describes pt's admission to home hospice and advice from hospice RN to call 911 after sudden decline in condition.  Ms. Ingram describes the decline, stating that pt was able to talk and interact yesterday and declined quickly, becoming unable to interact at home due to severity of SOB in the evening.  Currently, pt appears comfortable and Ms. Ingram is satisfied with comfort  medication regimen.  She confirms pt's goals to continue with comfort care and we discussed options including comfort care in hospital vs inpatient hospice.  Ms. Ingram is interested in meeting with inpatient [facility] rep, due to proximity to pt's home/family.        Leiomyosarcoma of uterus  -prognosis remains poor after discharge to home hospice yesterday and sudden decline in status.   -DNR/DNI status confirmed  -Pt is eligible for inpatient hospice and family is interested in meeting with [facility] inpatient representative.   -Continue current comfort medications    Past Medical History:   Diagnosis Date    Asthma     Carotid stenosis     Dyspnea on exertion     Hypertension 3/8/2016    Internal carotid artery dissection 2/26/2017    Long term current use of anticoagulant therapy 3/8/2016    Metastasis to lung 11/27/2021    PE (pulmonary embolism)     Hx of multiple DVT/PE    Presence of IVC filter     Scleroderma     Stroke 02/2017    Type 2 diabetes mellitus without complication        Past Surgical History:   Procedure Laterality Date    ARTERIAL THROMBECTOMY Right 02/26/2017    right extracranial ICA thrombectomy by Interventional Radiology    DANETTE FILTER PLACEMENT         Review of patient's allergies indicates:   Allergen Reactions    Plasma, human, normal        Medications:  Continuous Infusions:  Scheduled Meds:   scopolamine  1 patch Transdermal Q3 Days     PRN Meds:lorazepam, lorazepam, LORazepam, morphine, ondansetron    Family History     Problem Relation (Age of Onset)    Breast cancer Mother (60)    Diabetes Mellitus Maternal Uncle    Hypertension Father    No Known Problems Brother, Maternal Grandmother, Maternal Grandfather, Paternal Grandmother, Paternal Grandfather, Maternal Aunt, Paternal Aunt, Paternal Uncle        Tobacco Use    Smoking status: Never Smoker    Smokeless tobacco: Never Used   Substance and Sexual Activity    Alcohol use: Not Currently      Alcohol/week: 0.0 standard drinks     Comment: 2 beers a day    Drug use: No    Sexual activity: Not Currently     Partners: Male     Birth control/protection: Post-menopausal, None       Review of Systems   Unable to perform ROS: Acuity of condition     Objective:     Vital Signs (Most Recent):  Temp: 98.1 °F (36.7 °C) (01/06/22 0430)  Pulse: (!) 120 (01/06/22 0430)  Resp: 18 (01/06/22 0830)  BP: (!) 99/57 (01/06/22 0430)  SpO2: (!) 86 % (01/06/22 0815) Vital Signs (24h Range):  Temp:  [97.8 °F (36.6 °C)-98.2 °F (36.8 °C)] 98.1 °F (36.7 °C)  Pulse:  [120-170] 120  Resp:  [18-34] 18  SpO2:  [39 %-100 %] 86 %  BP: ()/(55-75) 99/57     Weight: 65.3 kg (143 lb 15.4 oz)  Body mass index is 25.5 kg/m².    Physical Exam  Vitals and nursing note reviewed.   Constitutional:       Appearance: She is ill-appearing.      Interventions: Face mask in place.   HENT:      Head: Normocephalic and atraumatic.      Right Ear: External ear normal.      Left Ear: External ear normal.      Nose: Nose normal. No congestion or rhinorrhea.   Eyes:      General:         Right eye: No discharge.         Left eye: No discharge.   Cardiovascular:      Rate and Rhythm: Normal rate.   Pulmonary:      Effort: Pulmonary effort is normal. No respiratory distress.   Abdominal:      General: Abdomen is flat.   Musculoskeletal:         General: No signs of injury.      Cervical back: Normal range of motion.   Skin:     General: Skin is warm and dry.   Psychiatric:         Behavior: Behavior normal.         Palliative Exam    Advance Care Planning   Advance Directives:   Do Not Resuscitate Status: Yes    Medical Power of : Yes      Decision Making:  Family answered questions and Patient unable to communicate due to disease severity/cognitive impairment         Significant Labs: All pertinent labs within the past 24 hours have been reviewed.  CBC:   Recent Labs   Lab 01/02/22 2035 01/02/22 2038   WBC 5.37  --    HGB 11.0*  --    HCT  35.0* 35*   *  --    *  --      BMP:  No results for input(s): GLU, NA, K, CL, CO2, BUN, CREATININE, CALCIUM, MG in the last 24 hours.  LFT:  Lab Results   Component Value Date    AST 28 01/02/2022    ALKPHOS 131 01/02/2022    BILITOT 0.5 01/02/2022     Albumin:   Albumin   Date Value Ref Range Status   01/02/2022 3.1 (L) 3.5 - 5.2 g/dL Final     Protein:   Total Protein   Date Value Ref Range Status   01/02/2022 8.0 6.0 - 8.4 g/dL Final     Lactic acid:   Lab Results   Component Value Date    LACTATE 2.3 (H) 01/02/2022       Significant Imaging: I have reviewed all pertinent imaging results/findings within the past 24 hours.   X-Ray Chest 1 View  Narrative: EXAMINATION:  XR CHEST 1 VIEW    CLINICAL HISTORY:  acute respiratory distress;    TECHNIQUE:  Single frontal view of the chest was performed.    COMPARISON:  01/02/2022    FINDINGS:  Suboptimal inspiration.    Stable cardiac silhouette.    Multiple bilateral pulmonary masses, largest at the left lung base retrocardiac region.  Findings consistent with metastatic disease.    Loculated pleural effusion along the major fissure, left lateral wall and left lung base.    Bibasilar atelectasis with possible mild interstitial infiltrates bilaterally also.  Impression: See above.  Findings are similar to the recent prior study.    Electronically signed by: Loco Rogers  Date:    01/03/2022  Time:    22:20  ED US Echo  Exam : Angie Funes  POCUS_Cardiac:    Exam Information:  Exam category:  Diagnostic  Consent obtained?:  Yes    Indication(s) for Exam:  Dyspnea    Views Obtained:  Parasternal long-axis, Parasternal short-axis, Subxiphoid, Apical 4-chamber    Interpretation:  Other:  technically difficult exam, heart is tachycardic, large left pleural effusion, likely small pericardial effusion    Confirmatory study:  Confirmatory study done?:  CT    Electronically signed by Angie Funes on Monday, January 3, 2022 at 10:09 PM          >  50% of 70 min visit spent in chart review, face to face discussion of goals of care,  symptom assessment, coordination of care and emotional support.    Celine Galvez NP  Palliative Medicine  [facility] - Oncology ([facility])

## 2022-01-06 NOTE — HPI
Ms. Gandara is a 55 yo female who represented day of discharge from home hospice. Per family member at bedside appropriate hospice measure who not in place for her at home. Patient with significant respiratory burden of disease requiring O2 support. During recent stay she would frequently desat to 68-70s during exertion. Patient had strongly desired discharge to home hospice and had previously declined inpatient hospice earlier today.     HPI from previous admission: Lyssa Gandara is a 55 yo with Stage IV LMS with PMH DM2, h/o PE, h/o stroke, asthma, HTN, and HLD who presents with worsening shortness of breath. She had her first round of doxorubicin chemotherapy on 12/27 which she tolerated well. However, on Tuesday 12/28 she experienced worsening SOB. She initially was able to manage at home with increased use of her duonebs, however the SOB ultimately worsened. She is unable to walk without dyspnea and has difficulty breathing at rest. She has a slight nonproductive cough and occasionally feels feverish, although she has not taken her temperature at home. Denies nausea/vomiting, diarrhea/constipation. She is urinating without difficulty. She has light vaginal bleeding that she wears a panty liner for. She reports back pain that is controlled with pain medication at home.  Patient was transported to [facility] ED via EMS on 15L with O2 sats at 85%. She was started on BiPaP in the ED; VSS. She was ultimately transitioned to 2L NC with improvement of O2 sat to 98%. Labs notable for lactate of 2.3, normal troponin/BNP, therapeutic INR 2.9, normal CBC, and elevated glucose of 378. A CTA was performed and demonstrated decreased clot burden of known PE, however enlargement of known left lung met to 14.5 cm, new pleural effusion, and enlargement of pulmonary nodules.

## 2022-01-06 NOTE — NURSING
Patient arrived from ED on NR mask saturating 98%. Family member at bedside. Patient is admitted for comfort measures ativan and morphine on board. Patient was put on Bipap per MD order. NPO. IV meds only. Palliative care and inpatient hospice consulted. Will continue to monitor patient.

## 2022-01-06 NOTE — H&P
[facility]  Obstetrics & Gynecology  History & Physical    Patient Name: Lyssa Gandara  MRN: [id number]  Admission Date: 1/5/2022  Primary Care Provider: Devin Fields MD    Subjective:     Chief Complaint/Reason for Admission: Shortness of Breath     History of Present Illness:  Ms. Gandara is a 57 yo female who represented day of discharge from home hospice. Per family member at bedside appropriate hospice measure who not in place for her at home. Patient with significant respiratory burden of disease requiring O2 support. During recent stay she would frequently desat to 68-70s during exertion. Patient had strongly desired discharge to home hospice and had previously declined inpatient hospice earlier today.     HPI from previous admission: Lyssa Gandara is a 57 yo with Stage IV LMS with PMH DM2, h/o PE, h/o stroke, asthma, HTN, and HLD who presents with worsening shortness of breath. She had her first round of doxorubicin chemotherapy on 12/27 which she tolerated well. However, on Tuesday 12/28 she experienced worsening SOB. She initially was able to manage at home with increased use of her duonebs, however the SOB ultimately worsened. She is unable to walk without dyspnea and has difficulty breathing at rest. She has a slight nonproductive cough and occasionally feels feverish, although she has not taken her temperature at home. Denies nausea/vomiting, diarrhea/constipation. She is urinating without difficulty. She has light vaginal bleeding that she wears a panty liner for. She reports back pain that is controlled with pain medication at home.  Patient was transported to [facility] ED via EMS on 15L with O2 sats at 85%. She was started on BiPaP in the ED; VSS. She was ultimately transitioned to 2L NC with improvement of O2 sat to 98%. Labs notable for lactate of 2.3, normal troponin/BNP, therapeutic INR 2.9, normal CBC, and elevated glucose of 378. A CTA was performed and demonstrated  decreased clot burden of known PE, however enlargement of known left lung met to 14.5 cm, new pleural effusion, and enlargement of pulmonary nodules.      Oncology History   Leiomyosarcoma of uterus   2021 Imaging Significant Findings    CT C/A/P w/: Multiple bilateral pulmonary metastases. Large uterine mass.  No omental caking, lymphadenopathy, or ascites.     2021 Biopsy    CT guided biopsy of lung: ER-, MMR? LMS     2021 -  Chemotherapy    Treatment Summary   Plan Name: OP DOXORUBICIN Q3W  Treatment Goal: Palliative  Status: Active  Start Date: 2021  End Date: 2022 (Planned)  Provider: Darrick Archibald MD  Chemotherapy: DOXOrubicin chemo injection 136 mg, 75 mg/m2 = 136 mg, Intravenous, Clinic/HOD 1 time, 1 of 12 cycles  Administration: 136 mg (2021)           OB History    Para Term  AB Living   1 0 0 0 1 0   SAB IAB Ectopic Multiple Live Births   0 0 0 0 0      # Outcome Date GA Lbr Gal/2nd Weight Sex Delivery Anes PTL Lv   1 AB  20w0d    OTHER  Y ND      Complications: Dysfunctional Labor     Past Medical History:   Diagnosis Date    Asthma     Carotid stenosis     Dyspnea on exertion     Hypertension 3/8/2016    Internal carotid artery dissection 2017    Long term current use of anticoagulant therapy 3/8/2016    Metastasis to lung 2021    PE (pulmonary embolism)     Hx of multiple DVT/PE    Presence of IVC filter     Scleroderma     Stroke 2017    Type 2 diabetes mellitus without complication      Past Surgical History:   Procedure Laterality Date    ARTERIAL THROMBECTOMY Right 2017    right extracranial ICA thrombectomy by Interventional Radiology    DANETTE FILTER PLACEMENT         (Not in a hospital admission)      Review of patient's allergies indicates:   Allergen Reactions    Plasma, human, normal         Family History     Problem Relation (Age of Onset)    Breast cancer Mother (60)    Diabetes Mellitus Maternal  Uncle    Hypertension Father    No Known Problems Brother, Maternal Grandmother, Maternal Grandfather, Paternal Grandmother, Paternal Grandfather, Maternal Aunt, Paternal Aunt, Paternal Uncle        Tobacco Use    Smoking status: Never Smoker    Smokeless tobacco: Never Used   Substance and Sexual Activity    Alcohol use: Not Currently     Alcohol/week: 0.0 standard drinks     Comment: 2 beers a day    Drug use: No    Sexual activity: Not Currently     Partners: Male     Birth control/protection: Post-menopausal, None     Review of Systems   Unable to perform ROS: Other    Patient unable to participate in ROS due to sedation. Presented with complaint of shortness of breath.   Objective:     Vital Signs (Most Recent):  Temp: 98.2 °F (36.8 °C) (01/05/22 1936)  Pulse: (!) 142 (01/05/22 2208)  Resp: (!) 28 (01/05/22 2210)  BP: (!) 91/55 (01/05/22 2208)  SpO2: 95 % (01/05/22 2208) Vital Signs (24h Range):  Temp:  [97.8 °F (36.6 °C)-98.6 °F (37 °C)] 98.2 °F (36.8 °C)  Pulse:  [118-170] 142  Resp:  [16-34] 28  SpO2:  [39 %-100 %] 95 %  BP: ()/(55-92) 91/55     Weight: 65.3 kg (143 lb 15.4 oz)  Body mass index is 25.5 kg/m².    Patient's last menstrual period was 01/03/2022.    Physical Exam:   Constitutional: She appears distressed.   Ill appearing     HENT:   Head: Normocephalic and atraumatic.      Cardiovascular: Normal rate and regular rhythm.     Pulmonary/Chest: She is in respiratory distress.   Significant respiratory distress with increased work of breathing; tachypnea         Abdominal: Soft. She exhibits no distension. There is no abdominal tenderness.             Musculoskeletal: Normal range of motion and moves all extremeties. No tenderness or edema.       Neurological:   Responsive to voice but unable to answer questions     Skin: Skin is warm and dry. No rash noted.    Psychiatric:   Unable to assess       Laboratory:  CBC: No results for input(s): WBC, RBC, HGB, HCT, PLT, MCV, MCH, MCHC in the  last 48 hours.    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

## 2022-01-06 NOTE — ED TRIAGE NOTES
"Lyssa Gandara, an 56 y.o. female presents to the ED via EMS for evaluation for tachypnea, SOB, and "worsening condition" per the family member. Patient is unable to provide history. Per the patient's family member, the patient was d/c home for home hospice and according to the hospice RN the pt was "too unstable for hospice and to call 911".       Chief Complaint   Patient presents with    Shortness of Breath     Pt with Stage 4 Lung Cancer brought by [facility]. Per family pt supposed to be on hospice but were concerned about pt condition. Pt is DNI.      Review of patient's allergies indicates:   Allergen Reactions    Plasma, human, normal      Past Medical History:   Diagnosis Date    Asthma     Carotid stenosis     Dyspnea on exertion     Hypertension 3/8/2016    Internal carotid artery dissection 2/26/2017    Long term current use of anticoagulant therapy 3/8/2016    Metastasis to lung 11/27/2021    PE (pulmonary embolism)     Hx of multiple DVT/PE    Presence of IVC filter     Scleroderma     Stroke 02/2017    Type 2 diabetes mellitus without complication        "

## 2022-01-07 NOTE — PROGRESS NOTES
Road Test  Oxygen-Pt being transported with continuous BiPAP in place.   Ambulation-Pt is bedridden.   Devices-Transferred with no devices.   Tolerating-Pt is NPO.  Elimination-Pt is continent.  Self Care-Needs assistance with self care.   Teaching-Verbal and written discharge teaching given to family at bedside.    Patient being transferred to [facility]. Peripheral IV left in place per request by [facility]. Discharge paperwork discussed. Medications reviewed. Patient has all belongings and has no questions at this time.

## 2022-01-10 ENCOUNTER — DOCUMENTATION ONLY (OUTPATIENT)
Dept: HEMATOLOGY/ONCOLOGY | Facility: CLINIC | Age: 57
End: 2022-01-10
Payer: COMMERCIAL

## 2022-01-25 NOTE — TELEPHONE ENCOUNTER
Informed pt aunt form received, labeled and emailed to disability dept. Pt aunt informed me she has pathology report from pt portal, as well as other requested documents. She stated she needs itemized billed. I informed her that can be requested from billing dept or she could potentially print pt billing statement from portal.

## 2022-01-25 NOTE — TELEPHONE ENCOUNTER
----- Message from Nathan Greenberg sent at 1/25/2022  8:40 AM CST -----  Regarding: Pt Advice  Contact: Celi (aunt)  Name of Who is Calling: Celi (aunt)      What is the request in detail: Would like to speak with staff in regards to obtaining an email address to send over insurance documentation to be filled out. Patient passed on 01/07. Will also use patient's portal to send information at this time. Please advise      Can the clinic reply by [facility]: yes      What Number to Call Back [facility]: [phone]

## 2022-03-02 ENCOUNTER — TELEPHONE (OUTPATIENT)
Dept: PULMONOLOGY | Facility: CLINIC | Age: 57
End: 2022-03-02
Payer: COMMERCIAL

## 2022-03-02 NOTE — TELEPHONE ENCOUNTER
Left message on patient voicemail, informing her that I'm contacting her in regards to scheduling her appointment with one of our providers. I also advised patient that if she wants to schedule appointment or if she has any questions or concerns, she may contact the office. Office number has been provided.

## 2022-03-21 NOTE — MR AVS SNAPSHOT
[facility]  [address]  [facility] [address]  Phone: [phone]                  Lyssa Gandara   3/13/2017 1:30 PM   Initial consult    Description:  Female : 1965   Provider:  Davey Morris MD   Department:  [facility]           Reason for Visit     Peripheral Vascular Disease                To Do List           Future Appointments        Provider Department Dept Phone    3/17/2017 8:00 AM MD [facility] [phone]    3/20/2017 10:00 AM Dillan Fritz MD [facility] [phone]    3/24/2017 9:00 AM Alis Fuller OT [facility] [phone]    3/27/2017 8:15 AM Alis Fuller OT [facility] [phone]    3/29/2017 8:15 AM Alis Fuller OT [facility] [phone]      Goals (5 Years of Data)              16    COMPLETED: Patient/caregiver will have an action plan in place to manage and prevent complications of DM, Asthma, Long-term anti-coagulant, HTN therapy prior to discharge from [facility].   No change  On track    Notes - Note edited  2016  2:36 PM by Angelica Lombardo RN    Overall Time to Completion  2 months from 10/24/2016    Short Term Goals  Patient/caregiver will discuss health care needs with Physician and care team during visits or using Patient Portal 2 weeks  Interventions   · Collaborate with Physician as appropriate to meet patient needs.  · Empower patient/caregiver to discuss treatment plan with Physician/care team.  · Facilitate to Medical appointments.  · Provide contact information for [facility] on [facility] contact information.  · Provide contact information for Outpatient Case Management contact information.  · Assess for availability of working blood pressure cuff in home setting.  · Assess for availability of working glucometer in home setting.   Status  · Partially met    Patient/caregiver will  verbalize 4 signs and symptoms of Hypertension Diabetes Asthma Deep Vein Thrombosis within 1 month.   Interventions   · Refer to Clinical Pharmacist.  · Refer to Dietician.  · Facilitate to referral to Diabetic educator.  · Facilitate to referral to Endocrinologist.  · Encourage Dietary Compliance.  · Encourage compliance with annual vaccinations.  · Encourage compliance with preventive screenings.  · Mail Blood glucose logs for home use.  · Mail Blood pressure logs for home use.   Status  · Partially met    Patient/caregiver will verbalize 3 ways of preventing complications due to disease process within 1 month.  Interventions   · Empower patient/caregiver to discuss treatment plan with Physician/care team.  · Encourage Dietary Compliance.  · Encourage Medication Compliance.   Status  · Partially met       Clinical Reference Documents Added to Patient Instructions       Document    ASTHMA, CONTROLLING YOUR TRIGGERS: ALLERGENS (ENGLISH)    ASTHMA, UNDERSTANDING (ENGLISH)    BLOOD SUGAR, LOW; HYPOGLYCEMIA (ENGLISH)    DIABETES, HEALTHY MEALS FOR (ENGLISH)    DIABETES, LONG-TERM COMPLICATIONS (ENGLISH)    DIABETES, MEAL PLANNING (ENGLISH)    DIABETES, SERVING AND PORTION SIZES, LEARNING ABOUT (ENGLISH)    DIABETES: EXAMS AND TESTS (ENGLISH)    DIET, LOW-SALT (ENGLISH)    EATING HEART-HEALTHY FOODS (ENGLISH)    HIGH BLOOD PRESSURE, WHAT IS?  (ENGLISH)    HYPERGLYCEMIA (HIGH BLOOD SUGAR) (ENGLISH)    HYPERTENSION, ESTABLISHED (ENGLISH)    HYPOTENSION, ALL CAUSES (ENGLISH)           Clinical Reference Documents Added to Patient Instructions       Document    BLOOD SUGAR LOG, USING A (ENGLISH)    DIABETES, LONG-TERM COMPLICATIONS (ENGLISH)    DIABETES, MANAGING: THE A1C TEST (ENGLISH)    DIABETES, MEAL PLANNING (ENGLISH)              Follow-Up and Disposition     Call patient with results      [facility] On [facility]     [facility] On Call Nurse Care Line - 24/7 Assistance  Registered nurses in the [facility] provide  "clinical advisement, health education, appointment booking, and other advisory services.  Call for this free service at [phone].             Medications           Message regarding Medications     Verify the changes and/or additions to your medication regime listed below are the same as discussed with your clinician today.  If any of these changes or additions are incorrect, please notify your healthcare provider.             Verify that the below list of medications is an accurate representation of the medications you are currently taking.  If none reported, the list may be blank. If incorrect, please contact your healthcare provider. Carry this list with you in case of emergency.           Current Medications     albuterol (PROVENTIL) 2.5 mg /3 mL (0.083 %) nebulizer solution Take 3 mLs (2.5 mg total) by nebulization every 6 (six) hours as needed for Wheezing.    cyanocobalamin (VITAMIN B-12) 1000 MCG tablet Take 100 mcg by mouth once daily.    dabigatran etexilate (PRADAXA) 150 mg Cap Take 1 capsule (150 mg total) by mouth 2 (two) times daily. "Do NOT break, chew, or open capsules."    ferrous sulfate 325 (65 FE) MG EC tablet Take 1 tablet (325 mg total) by mouth once daily.    fluticasone-salmeterol 250-50 mcg/dose (ADVAIR) 250-50 mcg/dose diskus inhaler Inhale 1 puff into the lungs 2 (two) times daily.    lisinopril (PRINIVIL,ZESTRIL) 20 MG tablet Take 1 tablet (20 mg total) by mouth once daily.    metformin (GLUCOPHAGE) 500 MG tablet Take 1 tablet (500 mg total) by mouth 2 (two) times daily with meals.    triamterene-hydrochlorothiazide 37.5-25 mg (MAXZIDE-25) 37.5-25 mg per tablet Take 1 tablet by mouth once daily.           Clinical Reference Information           Your Vitals Were     BP Pulse Height Weight Last Period BMI    116/76 (BP Location: Right arm, Patient Position: Sitting, BP Method: Manual) 97 5' 5" (1.651 m) 77.3 kg (170 lb 6.7 oz) (LMP Unknown) 28.36 kg/m2      Blood Pressure          " Most Recent Value    BP  116/76      Allergies as of 3/13/2017     Plasma, Human, Normal      Immunizations Administered on Date of Encounter - 3/13/2017     None      Language Assistance Services     ATTENTION: Language assistance services are available, free of charge. Please call [phone].      ATENCIÓN: Si habla cristian, tiene a babb disposición servicios gratuitos de asistencia lingüística. Llame al [phone].     GENET Ý: N?u b?n nói Ti?ng Vi?t, có các d?ch v? h? tr? ngôn ng? mi?n phí dành cho b?n. G?i s? [phone].         [facility] complies with applicable Federal civil rights laws and does not discriminate on the basis of race, color, national origin, age, disability, or sex.         normal

## 2022-04-20 ENCOUNTER — PATIENT MESSAGE (OUTPATIENT)
Dept: INTERNAL MEDICINE | Facility: CLINIC | Age: 57
End: 2022-04-20
Payer: COMMERCIAL

## 2022-04-23 ENCOUNTER — PATIENT MESSAGE (OUTPATIENT)
Dept: ADMINISTRATIVE | Facility: HOSPITAL | Age: 57
End: 2022-04-23
Payer: COMMERCIAL

## 2022-04-27 DIAGNOSIS — E11.9 TYPE 2 DIABETES MELLITUS WITHOUT COMPLICATION: ICD-10-CM

## 2023-03-16 NOTE — ASSESSMENT & PLAN NOTE
- BP: (140-147)/(33-94) 140/94  - no medications at home  - hydralazine 10 mg PRN for BP >180/110     Admission medication history completed at [facility]. Please see Pharmacy - Admission Medication History note from 3/13/2023.    Pharmacy Note - Admission Medication History     Pertinent Provider Information: none      ______________________________________________________________________     Prior To Admission (PTA) med list completed and updated in EMR.              PTA Med List   Medication Sig Last Dose     acetaminophen (TYLENOL) 500 MG tablet Take 500-1,000 mg by mouth every 6 hours as needed for mild pain 3/12/2023     benzonatate (TESSALON) 200 MG capsule Take 1 capsule (200 mg) by mouth 3 times daily as needed for cough Past Week     Calcium Carbonate-Vitamin D 600-5 MG-MCG CAPS Take 1 capsule by mouth daily 3/13/2023     Cholecalciferol 10 MCG (400 UNIT) CAPS Take 400 Units by mouth daily 3/13/2023     fluticasone (FLONASE) 50 MCG/ACT nasal spray Spray 1 spray into both nostrils daily as needed Past Month     LORazepam (ATIVAN) 0.5 MG tablet TAKE ONE TABLET BY MOUTH AT BEDTIME AS NEEDED FOR ANXIETY 3/13/2023     omeprazole (PRILOSEC) 40 MG DR capsule Take 40 mg by mouth daily 3/13/2023     polyethylene glycol (MIRALAX) 17 GM/Dose powder Take 17 g by mouth daily as needed for constipation Past Week     pravastatin (PRAVACHOL) 10 MG tablet Take 10 mg by mouth At Bedtime 3/12/2023        Information source(s): Patient and [facility]  Method of interview communication: in-person     Summary of Changes to PTA Med List  ni changes    Patient was asked about OTC/herbal products specifically.  PTA med list reflects this.     In the past week, patient estimated taking medication this percent of the time:  greater than 90%.     Medication Affordability:        Allergies were reviewed, assessed, and updated with the patient.       Patient does not anticipate needing any multi-use medications during admission.     The information provided in this note is only as accurate as  the sources available at the time of the update(s).     Thank you for the opportunity to participate in the care of this patient.     Porter Stoner [facility]  3/13/2023 1:44 PM